# Patient Record
Sex: MALE | Race: WHITE | NOT HISPANIC OR LATINO | Employment: OTHER | URBAN - METROPOLITAN AREA
[De-identification: names, ages, dates, MRNs, and addresses within clinical notes are randomized per-mention and may not be internally consistent; named-entity substitution may affect disease eponyms.]

---

## 2017-01-03 ENCOUNTER — ALLSCRIPTS OFFICE VISIT (OUTPATIENT)
Dept: OTHER | Facility: OTHER | Age: 67
End: 2017-01-03

## 2017-01-06 ENCOUNTER — GENERIC CONVERSION - ENCOUNTER (OUTPATIENT)
Dept: OTHER | Facility: OTHER | Age: 67
End: 2017-01-06

## 2017-01-11 ENCOUNTER — TRANSCRIBE ORDERS (OUTPATIENT)
Dept: LAB | Facility: HOSPITAL | Age: 67
End: 2017-01-11

## 2017-01-12 ENCOUNTER — LAB (OUTPATIENT)
Dept: LAB | Facility: HOSPITAL | Age: 67
End: 2017-01-12
Payer: MEDICARE

## 2017-01-12 DIAGNOSIS — I27.29 OTHER SECONDARY PULMONARY HYPERTENSION (HCC): ICD-10-CM

## 2017-01-12 LAB
BASOPHILS # BLD AUTO: 0.01 THOUSANDS/ΜL (ref 0–0.1)
BASOPHILS NFR BLD AUTO: 0 % (ref 0–1)
EOSINOPHIL # BLD AUTO: 0.1 THOUSAND/ΜL (ref 0–0.61)
EOSINOPHIL NFR BLD AUTO: 2 % (ref 0–6)
ERYTHROCYTE [DISTWIDTH] IN BLOOD BY AUTOMATED COUNT: 13.3 % (ref 11.6–15.1)
HCT VFR BLD AUTO: 38.2 % (ref 36.5–49.3)
HGB BLD-MCNC: 12.2 G/DL (ref 12–17)
LYMPHOCYTES # BLD AUTO: 2.22 THOUSANDS/ΜL (ref 0.6–4.47)
LYMPHOCYTES NFR BLD AUTO: 40 % (ref 14–44)
MCH RBC QN AUTO: 30.7 PG (ref 26.8–34.3)
MCHC RBC AUTO-ENTMCNC: 31.9 G/DL (ref 31.4–37.4)
MCV RBC AUTO: 96 FL (ref 82–98)
MONOCYTES # BLD AUTO: 0.23 THOUSAND/ΜL (ref 0.17–1.22)
MONOCYTES NFR BLD AUTO: 4 % (ref 4–12)
NEUTROPHILS # BLD AUTO: 2.97 THOUSANDS/ΜL (ref 1.85–7.62)
NEUTS SEG NFR BLD AUTO: 54 % (ref 43–75)
NRBC BLD AUTO-RTO: 0 /100 WBCS
NT-PROBNP SERPL-MCNC: 33 PG/ML
PLATELET # BLD AUTO: 209 THOUSANDS/UL (ref 149–390)
PMV BLD AUTO: 9.3 FL (ref 8.9–12.7)
RBC # BLD AUTO: 3.98 MILLION/UL (ref 3.88–5.62)
WBC # BLD AUTO: 5.55 THOUSAND/UL (ref 4.31–10.16)

## 2017-01-12 PROCEDURE — 83880 ASSAY OF NATRIURETIC PEPTIDE: CPT

## 2017-01-12 PROCEDURE — 85025 COMPLETE CBC W/AUTO DIFF WBC: CPT

## 2017-01-12 PROCEDURE — 36415 COLL VENOUS BLD VENIPUNCTURE: CPT

## 2017-03-29 ENCOUNTER — ALLSCRIPTS OFFICE VISIT (OUTPATIENT)
Dept: OTHER | Facility: OTHER | Age: 67
End: 2017-03-29

## 2017-04-10 ENCOUNTER — APPOINTMENT (OUTPATIENT)
Dept: LAB | Facility: HOSPITAL | Age: 67
End: 2017-04-10
Attending: INTERNAL MEDICINE
Payer: MEDICARE

## 2017-04-10 ENCOUNTER — TRANSCRIBE ORDERS (OUTPATIENT)
Dept: LAB | Facility: HOSPITAL | Age: 67
End: 2017-04-10

## 2017-04-10 DIAGNOSIS — D64.9 ANEMIA: ICD-10-CM

## 2017-04-10 LAB
BASOPHILS # BLD AUTO: 0.01 THOUSANDS/ΜL (ref 0–0.1)
BASOPHILS NFR BLD AUTO: 0 % (ref 0–1)
EOSINOPHIL # BLD AUTO: 0.06 THOUSAND/ΜL (ref 0–0.61)
EOSINOPHIL NFR BLD AUTO: 1 % (ref 0–6)
ERYTHROCYTE [DISTWIDTH] IN BLOOD BY AUTOMATED COUNT: 13.5 % (ref 11.6–15.1)
FERRITIN SERPL-MCNC: 42 NG/ML (ref 8–388)
HCT VFR BLD AUTO: 37.9 % (ref 36.5–49.3)
HGB BLD-MCNC: 12.4 G/DL (ref 12–17)
LYMPHOCYTES # BLD AUTO: 1.87 THOUSANDS/ΜL (ref 0.6–4.47)
LYMPHOCYTES NFR BLD AUTO: 36 % (ref 14–44)
MCH RBC QN AUTO: 31.6 PG (ref 26.8–34.3)
MCHC RBC AUTO-ENTMCNC: 32.7 G/DL (ref 31.4–37.4)
MCV RBC AUTO: 96 FL (ref 82–98)
MONOCYTES # BLD AUTO: 0.19 THOUSAND/ΜL (ref 0.17–1.22)
MONOCYTES NFR BLD AUTO: 4 % (ref 4–12)
NEUTROPHILS # BLD AUTO: 3.06 THOUSANDS/ΜL (ref 1.85–7.62)
NEUTS SEG NFR BLD AUTO: 59 % (ref 43–75)
NRBC BLD AUTO-RTO: 0 /100 WBCS
PLATELET # BLD AUTO: 235 THOUSANDS/UL (ref 149–390)
PMV BLD AUTO: 9.8 FL (ref 8.9–12.7)
RBC # BLD AUTO: 3.93 MILLION/UL (ref 3.88–5.62)
WBC # BLD AUTO: 5.2 THOUSAND/UL (ref 4.31–10.16)

## 2017-04-10 PROCEDURE — 82728 ASSAY OF FERRITIN: CPT

## 2017-04-10 PROCEDURE — 85025 COMPLETE CBC W/AUTO DIFF WBC: CPT

## 2017-04-10 PROCEDURE — 36415 COLL VENOUS BLD VENIPUNCTURE: CPT

## 2017-04-14 ENCOUNTER — ALLSCRIPTS OFFICE VISIT (OUTPATIENT)
Dept: OTHER | Facility: OTHER | Age: 67
End: 2017-04-14

## 2017-04-19 ENCOUNTER — ALLSCRIPTS OFFICE VISIT (OUTPATIENT)
Dept: OTHER | Facility: OTHER | Age: 67
End: 2017-04-19

## 2017-05-04 ENCOUNTER — HOSPITAL ENCOUNTER (OUTPATIENT)
Dept: NON INVASIVE DIAGNOSTICS | Facility: HOSPITAL | Age: 67
Discharge: HOME | End: 2017-05-04
Payer: MEDICARE

## 2017-05-04 DIAGNOSIS — I48.0 PAROXYSMAL ATRIAL FIBRILLATION (HCC): ICD-10-CM

## 2017-05-04 PROCEDURE — 93226 XTRNL ECG REC<48 HR SCAN A/R: CPT

## 2017-05-04 PROCEDURE — 93225 XTRNL ECG REC<48 HRS REC: CPT

## 2017-07-10 ENCOUNTER — ALLSCRIPTS OFFICE VISIT (OUTPATIENT)
Dept: OTHER | Facility: OTHER | Age: 67
End: 2017-07-10

## 2017-07-19 ENCOUNTER — ALLSCRIPTS OFFICE VISIT (OUTPATIENT)
Dept: OTHER | Facility: OTHER | Age: 67
End: 2017-07-19

## 2017-07-19 ENCOUNTER — TRANSCRIBE ORDERS (OUTPATIENT)
Dept: ADMINISTRATIVE | Facility: HOSPITAL | Age: 67
End: 2017-07-19

## 2017-07-19 DIAGNOSIS — I49.1 SUPRAVENTRICULAR PREMATURE BEATS: Primary | ICD-10-CM

## 2017-08-27 ENCOUNTER — APPOINTMENT (EMERGENCY)
Dept: RADIOLOGY | Facility: HOSPITAL | Age: 67
DRG: 189 | End: 2017-08-27
Payer: MEDICARE

## 2017-08-27 ENCOUNTER — HOSPITAL ENCOUNTER (INPATIENT)
Facility: HOSPITAL | Age: 67
LOS: 2 days | Discharge: HOME HEALTH | DRG: 189 | End: 2017-08-29
Attending: EMERGENCY MEDICINE | Admitting: INTERNAL MEDICINE
Payer: MEDICARE

## 2017-08-27 DIAGNOSIS — R00.0 SINUS TACHYCARDIA: ICD-10-CM

## 2017-08-27 DIAGNOSIS — J44.1 COPD EXACERBATION (HCC): Primary | ICD-10-CM

## 2017-08-27 DIAGNOSIS — I48.92 ATRIAL FLUTTER (HCC): ICD-10-CM

## 2017-08-27 DIAGNOSIS — J96.10 CHRONIC RESPIRATORY FAILURE (HCC): ICD-10-CM

## 2017-08-27 LAB
ANION GAP SERPL CALCULATED.3IONS-SCNC: 5 MMOL/L (ref 4–13)
APTT PPP: 31 SECONDS (ref 23–35)
ATRIAL RATE: 123 BPM
BASOPHILS # BLD AUTO: 0.01 THOUSANDS/ΜL (ref 0–0.1)
BASOPHILS NFR BLD AUTO: 0 % (ref 0–1)
BUN SERPL-MCNC: 16 MG/DL (ref 5–25)
CALCIUM SERPL-MCNC: 9.4 MG/DL (ref 8.3–10.1)
CHLORIDE SERPL-SCNC: 100 MMOL/L (ref 100–108)
CO2 SERPL-SCNC: 35 MMOL/L (ref 21–32)
CREAT SERPL-MCNC: 0.82 MG/DL (ref 0.6–1.3)
DEPRECATED D DIMER PPP: 353 NG/ML (FEU) (ref 0–424)
EOSINOPHIL # BLD AUTO: 0.06 THOUSAND/ΜL (ref 0–0.61)
EOSINOPHIL NFR BLD AUTO: 1 % (ref 0–6)
ERYTHROCYTE [DISTWIDTH] IN BLOOD BY AUTOMATED COUNT: 13.5 % (ref 11.6–15.1)
GFR SERPL CREATININE-BSD FRML MDRD: 90 ML/MIN/1.73SQ M
GLUCOSE SERPL-MCNC: 154 MG/DL (ref 65–140)
HCT VFR BLD AUTO: 34.7 % (ref 36.5–49.3)
HGB BLD-MCNC: 11.1 G/DL (ref 12–17)
INR PPP: 1.02 (ref 0.86–1.16)
LYMPHOCYTES # BLD AUTO: 1.88 THOUSANDS/ΜL (ref 0.6–4.47)
LYMPHOCYTES NFR BLD AUTO: 27 % (ref 14–44)
MCH RBC QN AUTO: 30.8 PG (ref 26.8–34.3)
MCHC RBC AUTO-ENTMCNC: 32 G/DL (ref 31.4–37.4)
MCV RBC AUTO: 96 FL (ref 82–98)
MONOCYTES # BLD AUTO: 0.31 THOUSAND/ΜL (ref 0.17–1.22)
MONOCYTES NFR BLD AUTO: 4 % (ref 4–12)
NEUTROPHILS # BLD AUTO: 4.77 THOUSANDS/ΜL (ref 1.85–7.62)
NEUTS SEG NFR BLD AUTO: 68 % (ref 43–75)
NRBC BLD AUTO-RTO: 0 /100 WBCS
NT-PROBNP SERPL-MCNC: 70 PG/ML
P AXIS: 269 DEGREES
PLATELET # BLD AUTO: 234 THOUSANDS/UL (ref 149–390)
PMV BLD AUTO: 9.4 FL (ref 8.9–12.7)
POTASSIUM SERPL-SCNC: 3.9 MMOL/L (ref 3.5–5.3)
PR INTERVAL: 224 MS
PROTHROMBIN TIME: 13.4 SECONDS (ref 12.1–14.4)
QRS AXIS: -77 DEGREES
QRSD INTERVAL: 80 MS
QT INTERVAL: 290 MS
QTC INTERVAL: 415 MS
RBC # BLD AUTO: 3.6 MILLION/UL (ref 3.88–5.62)
SODIUM SERPL-SCNC: 140 MMOL/L (ref 136–145)
T WAVE AXIS: 78 DEGREES
TROPONIN I SERPL-MCNC: <0.02 NG/ML
VENTRICULAR RATE: 123 BPM
WBC # BLD AUTO: 7.06 THOUSAND/UL (ref 4.31–10.16)

## 2017-08-27 PROCEDURE — 36415 COLL VENOUS BLD VENIPUNCTURE: CPT | Performed by: EMERGENCY MEDICINE

## 2017-08-27 PROCEDURE — 83880 ASSAY OF NATRIURETIC PEPTIDE: CPT | Performed by: EMERGENCY MEDICINE

## 2017-08-27 PROCEDURE — 71020 HB CHEST X-RAY 2VW FRONTAL&LATL: CPT

## 2017-08-27 PROCEDURE — 85379 FIBRIN DEGRADATION QUANT: CPT | Performed by: EMERGENCY MEDICINE

## 2017-08-27 PROCEDURE — 85730 THROMBOPLASTIN TIME PARTIAL: CPT | Performed by: EMERGENCY MEDICINE

## 2017-08-27 PROCEDURE — 93005 ELECTROCARDIOGRAM TRACING: CPT | Performed by: EMERGENCY MEDICINE

## 2017-08-27 PROCEDURE — 94640 AIRWAY INHALATION TREATMENT: CPT

## 2017-08-27 PROCEDURE — 85610 PROTHROMBIN TIME: CPT | Performed by: EMERGENCY MEDICINE

## 2017-08-27 PROCEDURE — 85025 COMPLETE CBC W/AUTO DIFF WBC: CPT | Performed by: EMERGENCY MEDICINE

## 2017-08-27 PROCEDURE — 84484 ASSAY OF TROPONIN QUANT: CPT | Performed by: EMERGENCY MEDICINE

## 2017-08-27 PROCEDURE — 94760 N-INVAS EAR/PLS OXIMETRY 1: CPT | Performed by: SOCIAL WORKER

## 2017-08-27 PROCEDURE — 93005 ELECTROCARDIOGRAM TRACING: CPT

## 2017-08-27 PROCEDURE — 94760 N-INVAS EAR/PLS OXIMETRY 1: CPT

## 2017-08-27 PROCEDURE — 80048 BASIC METABOLIC PNL TOTAL CA: CPT | Performed by: EMERGENCY MEDICINE

## 2017-08-27 PROCEDURE — 94640 AIRWAY INHALATION TREATMENT: CPT | Performed by: SOCIAL WORKER

## 2017-08-27 PROCEDURE — 99285 EMERGENCY DEPT VISIT HI MDM: CPT

## 2017-08-27 RX ORDER — FERROUS SULFATE 325(65) MG
325 TABLET ORAL DAILY
Status: DISCONTINUED | OUTPATIENT
Start: 2017-08-28 | End: 2017-08-29 | Stop reason: HOSPADM

## 2017-08-27 RX ORDER — ACETAMINOPHEN 325 MG/1
650 TABLET ORAL EVERY 4 HOURS PRN
Status: DISCONTINUED | OUTPATIENT
Start: 2017-08-27 | End: 2017-08-29 | Stop reason: HOSPADM

## 2017-08-27 RX ORDER — SODIUM CHLORIDE FOR INHALATION 0.9 %
3 VIAL, NEBULIZER (ML) INHALATION
Status: DISCONTINUED | OUTPATIENT
Start: 2017-08-27 | End: 2017-08-27

## 2017-08-27 RX ORDER — LEVALBUTEROL 1.25 MG/.5ML
1.25 SOLUTION, CONCENTRATE RESPIRATORY (INHALATION)
Status: DISCONTINUED | OUTPATIENT
Start: 2017-08-27 | End: 2017-08-29 | Stop reason: HOSPADM

## 2017-08-27 RX ORDER — CITALOPRAM 20 MG/1
20 TABLET ORAL DAILY
Status: DISCONTINUED | OUTPATIENT
Start: 2017-08-28 | End: 2017-08-29 | Stop reason: HOSPADM

## 2017-08-27 RX ORDER — CITALOPRAM 20 MG/1
20 TABLET ORAL DAILY
COMMUNITY
End: 2018-02-26 | Stop reason: SDUPTHER

## 2017-08-27 RX ORDER — FUROSEMIDE 20 MG/1
10 TABLET ORAL DAILY
Status: DISCONTINUED | OUTPATIENT
Start: 2017-08-28 | End: 2017-08-29 | Stop reason: HOSPADM

## 2017-08-27 RX ORDER — ALBUTEROL SULFATE 2.5 MG/3ML
5 SOLUTION RESPIRATORY (INHALATION) ONCE
Status: COMPLETED | OUTPATIENT
Start: 2017-08-27 | End: 2017-08-27

## 2017-08-27 RX ORDER — FERROUS SULFATE 325(65) MG
325 TABLET ORAL DAILY
COMMUNITY

## 2017-08-27 RX ORDER — ALBUTEROL SULFATE 2.5 MG/3ML
10 SOLUTION RESPIRATORY (INHALATION) ONCE
Status: COMPLETED | OUTPATIENT
Start: 2017-08-27 | End: 2017-08-27

## 2017-08-27 RX ORDER — METHYLPREDNISOLONE SODIUM SUCCINATE 40 MG/ML
40 INJECTION, POWDER, LYOPHILIZED, FOR SOLUTION INTRAMUSCULAR; INTRAVENOUS EVERY 8 HOURS
Status: DISCONTINUED | OUTPATIENT
Start: 2017-08-27 | End: 2017-08-29 | Stop reason: HOSPADM

## 2017-08-27 RX ORDER — DILTIAZEM HYDROCHLORIDE 180 MG/1
180 CAPSULE, COATED, EXTENDED RELEASE ORAL DAILY
Status: DISCONTINUED | OUTPATIENT
Start: 2017-08-28 | End: 2017-08-29 | Stop reason: HOSPADM

## 2017-08-27 RX ORDER — SODIUM CHLORIDE FOR INHALATION 0.9 %
VIAL, NEBULIZER (ML) INHALATION
Status: COMPLETED
Start: 2017-08-27 | End: 2017-08-27

## 2017-08-27 RX ORDER — DILTIAZEM HYDROCHLORIDE 180 MG/1
180 CAPSULE, COATED, EXTENDED RELEASE ORAL DAILY
COMMUNITY
End: 2018-04-09 | Stop reason: SDUPTHER

## 2017-08-27 RX ORDER — FUROSEMIDE 20 MG/1
10 TABLET ORAL DAILY
Status: ON HOLD | COMMUNITY
End: 2017-11-11

## 2017-08-27 RX ORDER — DILTIAZEM HYDROCHLORIDE 120 MG/1
120 CAPSULE, COATED, EXTENDED RELEASE ORAL ONCE
Status: COMPLETED | OUTPATIENT
Start: 2017-08-27 | End: 2017-08-27

## 2017-08-27 RX ORDER — ONDANSETRON 2 MG/ML
4 INJECTION INTRAMUSCULAR; INTRAVENOUS EVERY 6 HOURS PRN
Status: DISCONTINUED | OUTPATIENT
Start: 2017-08-27 | End: 2017-08-29 | Stop reason: HOSPADM

## 2017-08-27 RX ORDER — LANOLIN ALCOHOL/MO/W.PET/CERES
3 CREAM (GRAM) TOPICAL
Status: DISCONTINUED | OUTPATIENT
Start: 2017-08-27 | End: 2017-08-29 | Stop reason: HOSPADM

## 2017-08-27 RX ADMIN — ISODIUM CHLORIDE 3 ML: 0.03 SOLUTION RESPIRATORY (INHALATION) at 09:01

## 2017-08-27 RX ADMIN — MELATONIN TAB 3 MG 3 MG: 3 TAB at 21:37

## 2017-08-27 RX ADMIN — IPRATROPIUM BROMIDE 0.5 MG: 0.5 SOLUTION RESPIRATORY (INHALATION) at 07:11

## 2017-08-27 RX ADMIN — IPRATROPIUM BROMIDE 0.5 MG: 0.5 SOLUTION RESPIRATORY (INHALATION) at 19:54

## 2017-08-27 RX ADMIN — IPRATROPIUM BROMIDE 1 MG: 0.5 SOLUTION RESPIRATORY (INHALATION) at 09:01

## 2017-08-27 RX ADMIN — METHYLPREDNISOLONE SODIUM SUCCINATE 40 MG: 40 INJECTION, POWDER, FOR SOLUTION INTRAMUSCULAR; INTRAVENOUS at 18:45

## 2017-08-27 RX ADMIN — ALBUTEROL SULFATE 10 MG: 2.5 SOLUTION RESPIRATORY (INHALATION) at 09:01

## 2017-08-27 RX ADMIN — DILTIAZEM HYDROCHLORIDE 120 MG: 120 CAPSULE, EXTENDED RELEASE ORAL at 18:45

## 2017-08-27 RX ADMIN — ALBUTEROL SULFATE 5 MG: 2.5 SOLUTION RESPIRATORY (INHALATION) at 07:11

## 2017-08-27 RX ADMIN — LEVALBUTEROL HYDROCHLORIDE 1.25 MG: 1.25 SOLUTION, CONCENTRATE RESPIRATORY (INHALATION) at 19:54

## 2017-08-28 ENCOUNTER — APPOINTMENT (INPATIENT)
Dept: NON INVASIVE DIAGNOSTICS | Facility: HOSPITAL | Age: 67
DRG: 189 | End: 2017-08-28
Payer: MEDICARE

## 2017-08-28 PROBLEM — R79.89 LOW TSH LEVEL: Status: ACTIVE | Noted: 2017-08-28

## 2017-08-28 LAB
ANION GAP SERPL CALCULATED.3IONS-SCNC: 3 MMOL/L (ref 4–13)
BUN SERPL-MCNC: 18 MG/DL (ref 5–25)
CALCIUM SERPL-MCNC: 9.1 MG/DL (ref 8.3–10.1)
CHLORIDE SERPL-SCNC: 100 MMOL/L (ref 100–108)
CO2 SERPL-SCNC: 37 MMOL/L (ref 21–32)
CREAT SERPL-MCNC: 0.76 MG/DL (ref 0.6–1.3)
GFR SERPL CREATININE-BSD FRML MDRD: 93 ML/MIN/1.73SQ M
GLUCOSE SERPL-MCNC: 166 MG/DL (ref 65–140)
POTASSIUM SERPL-SCNC: 4.2 MMOL/L (ref 3.5–5.3)
SODIUM SERPL-SCNC: 140 MMOL/L (ref 136–145)
T4 FREE SERPL-MCNC: 1.01 NG/DL (ref 0.76–1.46)
TSH SERPL DL<=0.05 MIU/L-ACNC: 0.27 UIU/ML (ref 0.36–3.74)

## 2017-08-28 PROCEDURE — 84443 ASSAY THYROID STIM HORMONE: CPT | Performed by: INTERNAL MEDICINE

## 2017-08-28 PROCEDURE — 84439 ASSAY OF FREE THYROXINE: CPT | Performed by: PHYSICIAN ASSISTANT

## 2017-08-28 PROCEDURE — 94760 N-INVAS EAR/PLS OXIMETRY 1: CPT

## 2017-08-28 PROCEDURE — 94640 AIRWAY INHALATION TREATMENT: CPT

## 2017-08-28 PROCEDURE — 80048 BASIC METABOLIC PNL TOTAL CA: CPT | Performed by: INTERNAL MEDICINE

## 2017-08-28 RX ADMIN — METHYLPREDNISOLONE SODIUM SUCCINATE 40 MG: 40 INJECTION, POWDER, FOR SOLUTION INTRAMUSCULAR; INTRAVENOUS at 08:05

## 2017-08-28 RX ADMIN — Medication 325 MG: at 08:04

## 2017-08-28 RX ADMIN — ENOXAPARIN SODIUM 40 MG: 40 INJECTION SUBCUTANEOUS at 08:04

## 2017-08-28 RX ADMIN — LEVALBUTEROL HYDROCHLORIDE 1.25 MG: 1.25 SOLUTION, CONCENTRATE RESPIRATORY (INHALATION) at 19:14

## 2017-08-28 RX ADMIN — LEVALBUTEROL HYDROCHLORIDE 1.25 MG: 1.25 SOLUTION, CONCENTRATE RESPIRATORY (INHALATION) at 08:16

## 2017-08-28 RX ADMIN — IPRATROPIUM BROMIDE 0.5 MG: 0.5 SOLUTION RESPIRATORY (INHALATION) at 00:51

## 2017-08-28 RX ADMIN — IPRATROPIUM BROMIDE 0.5 MG: 0.5 SOLUTION RESPIRATORY (INHALATION) at 08:16

## 2017-08-28 RX ADMIN — CITALOPRAM HYDROBROMIDE 20 MG: 20 TABLET ORAL at 08:04

## 2017-08-28 RX ADMIN — DILTIAZEM HYDROCHLORIDE 180 MG: 180 CAPSULE, COATED, EXTENDED RELEASE ORAL at 08:05

## 2017-08-28 RX ADMIN — LEVALBUTEROL HYDROCHLORIDE 1.25 MG: 1.25 SOLUTION, CONCENTRATE RESPIRATORY (INHALATION) at 00:51

## 2017-08-28 RX ADMIN — LEVALBUTEROL HYDROCHLORIDE 1.25 MG: 1.25 SOLUTION, CONCENTRATE RESPIRATORY (INHALATION) at 13:32

## 2017-08-28 RX ADMIN — IPRATROPIUM BROMIDE 0.5 MG: 0.5 SOLUTION RESPIRATORY (INHALATION) at 19:14

## 2017-08-28 RX ADMIN — METHYLPREDNISOLONE SODIUM SUCCINATE 40 MG: 40 INJECTION, POWDER, FOR SOLUTION INTRAMUSCULAR; INTRAVENOUS at 17:05

## 2017-08-28 RX ADMIN — FUROSEMIDE 10 MG: 20 TABLET ORAL at 08:04

## 2017-08-28 RX ADMIN — IPRATROPIUM BROMIDE 0.5 MG: 0.5 SOLUTION RESPIRATORY (INHALATION) at 13:32

## 2017-08-28 RX ADMIN — METHYLPREDNISOLONE SODIUM SUCCINATE 40 MG: 40 INJECTION, POWDER, FOR SOLUTION INTRAMUSCULAR; INTRAVENOUS at 00:41

## 2017-08-29 ENCOUNTER — APPOINTMENT (INPATIENT)
Dept: NON INVASIVE DIAGNOSTICS | Facility: HOSPITAL | Age: 67
DRG: 189 | End: 2017-08-29
Payer: MEDICARE

## 2017-08-29 ENCOUNTER — GENERIC CONVERSION - ENCOUNTER (OUTPATIENT)
Dept: OTHER | Facility: OTHER | Age: 67
End: 2017-08-29

## 2017-08-29 VITALS
DIASTOLIC BLOOD PRESSURE: 77 MMHG | WEIGHT: 143.3 LBS | SYSTOLIC BLOOD PRESSURE: 136 MMHG | OXYGEN SATURATION: 97 % | RESPIRATION RATE: 17 BRPM | HEART RATE: 93 BPM | HEIGHT: 67 IN | BODY MASS INDEX: 22.49 KG/M2 | TEMPERATURE: 97.8 F

## 2017-08-29 LAB
ANION GAP SERPL CALCULATED.3IONS-SCNC: 3 MMOL/L (ref 4–13)
BASOPHILS # BLD MANUAL: 0 THOUSAND/UL (ref 0–0.1)
BASOPHILS NFR MAR MANUAL: 0 % (ref 0–1)
BUN SERPL-MCNC: 22 MG/DL (ref 5–25)
CALCIUM SERPL-MCNC: 9.2 MG/DL (ref 8.3–10.1)
CHLORIDE SERPL-SCNC: 98 MMOL/L (ref 100–108)
CO2 SERPL-SCNC: 39 MMOL/L (ref 21–32)
CREAT SERPL-MCNC: 0.84 MG/DL (ref 0.6–1.3)
EOSINOPHIL # BLD MANUAL: 0 THOUSAND/UL (ref 0–0.4)
EOSINOPHIL NFR BLD MANUAL: 0 % (ref 0–6)
ERYTHROCYTE [DISTWIDTH] IN BLOOD BY AUTOMATED COUNT: 14 % (ref 11.6–15.1)
GFR SERPL CREATININE-BSD FRML MDRD: 89 ML/MIN/1.73SQ M
GLUCOSE SERPL-MCNC: 170 MG/DL (ref 65–140)
HCT VFR BLD AUTO: 32.7 % (ref 36.5–49.3)
HGB BLD-MCNC: 10.2 G/DL (ref 12–17)
LYMPHOCYTES # BLD AUTO: 0.26 THOUSAND/UL (ref 0.6–4.47)
LYMPHOCYTES # BLD AUTO: 3 % (ref 14–44)
MCH RBC QN AUTO: 31 PG (ref 26.8–34.3)
MCHC RBC AUTO-ENTMCNC: 31.2 G/DL (ref 31.4–37.4)
MCV RBC AUTO: 99 FL (ref 82–98)
MONOCYTES # BLD AUTO: 0 THOUSAND/UL (ref 0–1.22)
MONOCYTES NFR BLD: 0 % (ref 4–12)
NEUTROPHILS # BLD MANUAL: 8.29 THOUSAND/UL (ref 1.85–7.62)
NEUTS SEG NFR BLD AUTO: 97 % (ref 43–75)
NRBC BLD AUTO-RTO: 0 /100 WBCS
PLATELET # BLD AUTO: 249 THOUSANDS/UL (ref 149–390)
PLATELET BLD QL SMEAR: ADEQUATE
PMV BLD AUTO: 9.9 FL (ref 8.9–12.7)
POIKILOCYTOSIS BLD QL SMEAR: PRESENT
POTASSIUM SERPL-SCNC: 4.6 MMOL/L (ref 3.5–5.3)
RBC # BLD AUTO: 3.29 MILLION/UL (ref 3.88–5.62)
RBC MORPH BLD: PRESENT
SODIUM SERPL-SCNC: 140 MMOL/L (ref 136–145)
WBC # BLD AUTO: 8.55 THOUSAND/UL (ref 4.31–10.16)

## 2017-08-29 PROCEDURE — 80048 BASIC METABOLIC PNL TOTAL CA: CPT | Performed by: PHYSICIAN ASSISTANT

## 2017-08-29 PROCEDURE — 85027 COMPLETE CBC AUTOMATED: CPT | Performed by: PHYSICIAN ASSISTANT

## 2017-08-29 PROCEDURE — 94760 N-INVAS EAR/PLS OXIMETRY 1: CPT

## 2017-08-29 PROCEDURE — 93306 TTE W/DOPPLER COMPLETE: CPT

## 2017-08-29 PROCEDURE — 85007 BL SMEAR W/DIFF WBC COUNT: CPT | Performed by: PHYSICIAN ASSISTANT

## 2017-08-29 PROCEDURE — 94640 AIRWAY INHALATION TREATMENT: CPT

## 2017-08-29 RX ORDER — PREDNISONE 20 MG/1
40 TABLET ORAL DAILY
Qty: 14 TABLET | Refills: 0 | Status: SHIPPED | OUTPATIENT
Start: 2017-08-29 | End: 2017-09-08 | Stop reason: HOSPADM

## 2017-08-29 RX ADMIN — IPRATROPIUM BROMIDE 0.5 MG: 0.5 SOLUTION RESPIRATORY (INHALATION) at 07:31

## 2017-08-29 RX ADMIN — Medication 325 MG: at 08:31

## 2017-08-29 RX ADMIN — FUROSEMIDE 10 MG: 20 TABLET ORAL at 08:31

## 2017-08-29 RX ADMIN — DILTIAZEM HYDROCHLORIDE 180 MG: 180 CAPSULE, COATED, EXTENDED RELEASE ORAL at 08:31

## 2017-08-29 RX ADMIN — LEVALBUTEROL HYDROCHLORIDE 1.25 MG: 1.25 SOLUTION, CONCENTRATE RESPIRATORY (INHALATION) at 07:31

## 2017-08-29 RX ADMIN — METHYLPREDNISOLONE SODIUM SUCCINATE 40 MG: 40 INJECTION, POWDER, FOR SOLUTION INTRAMUSCULAR; INTRAVENOUS at 08:31

## 2017-08-29 RX ADMIN — ENOXAPARIN SODIUM 40 MG: 40 INJECTION SUBCUTANEOUS at 08:31

## 2017-08-29 RX ADMIN — LEVALBUTEROL HYDROCHLORIDE 1.25 MG: 1.25 SOLUTION, CONCENTRATE RESPIRATORY (INHALATION) at 01:23

## 2017-08-29 RX ADMIN — CITALOPRAM HYDROBROMIDE 20 MG: 20 TABLET ORAL at 08:31

## 2017-08-29 RX ADMIN — METHYLPREDNISOLONE SODIUM SUCCINATE 40 MG: 40 INJECTION, POWDER, FOR SOLUTION INTRAMUSCULAR; INTRAVENOUS at 00:40

## 2017-08-29 RX ADMIN — IPRATROPIUM BROMIDE 0.5 MG: 0.5 SOLUTION RESPIRATORY (INHALATION) at 01:23

## 2017-08-30 ENCOUNTER — APPOINTMENT (EMERGENCY)
Dept: RADIOLOGY | Facility: HOSPITAL | Age: 67
DRG: 189 | End: 2017-08-30
Payer: MEDICARE

## 2017-08-30 ENCOUNTER — HOSPITAL ENCOUNTER (INPATIENT)
Facility: HOSPITAL | Age: 67
LOS: 9 days | Discharge: HOME HEALTH | DRG: 189 | End: 2017-09-08
Attending: EMERGENCY MEDICINE | Admitting: INTERNAL MEDICINE
Payer: MEDICARE

## 2017-08-30 ENCOUNTER — GENERIC CONVERSION - ENCOUNTER (OUTPATIENT)
Dept: OTHER | Facility: OTHER | Age: 67
End: 2017-08-30

## 2017-08-30 DIAGNOSIS — J96.21 ACUTE ON CHRONIC RESPIRATORY FAILURE WITH HYPOXIA (HCC): ICD-10-CM

## 2017-08-30 DIAGNOSIS — J44.1 COPD WITH ACUTE EXACERBATION (HCC): Primary | ICD-10-CM

## 2017-08-30 DIAGNOSIS — J44.1 COPD EXACERBATION (HCC): ICD-10-CM

## 2017-08-30 PROBLEM — R09.02 HYPOXIA: Status: ACTIVE | Noted: 2017-08-30

## 2017-08-30 LAB
ALBUMIN SERPL BCP-MCNC: 4.1 G/DL (ref 3.5–5)
ALP SERPL-CCNC: 87 U/L (ref 46–116)
ALT SERPL W P-5'-P-CCNC: 16 U/L (ref 12–78)
ANION GAP SERPL CALCULATED.3IONS-SCNC: 3 MMOL/L (ref 4–13)
AST SERPL W P-5'-P-CCNC: 15 U/L (ref 5–45)
ATRIAL RATE: 129 BPM
ATRIAL RATE: 300 BPM
BASOPHILS # BLD AUTO: 0.01 THOUSANDS/ΜL (ref 0–0.1)
BASOPHILS NFR BLD AUTO: 0 % (ref 0–1)
BILIRUB SERPL-MCNC: 0.28 MG/DL (ref 0.2–1)
BUN SERPL-MCNC: 22 MG/DL (ref 5–25)
CALCIUM SERPL-MCNC: 9.9 MG/DL (ref 8.3–10.1)
CHLORIDE SERPL-SCNC: 95 MMOL/L (ref 100–108)
CO2 SERPL-SCNC: 39 MMOL/L (ref 21–32)
CREAT SERPL-MCNC: 0.86 MG/DL (ref 0.6–1.3)
EOSINOPHIL # BLD AUTO: 0 THOUSAND/ΜL (ref 0–0.61)
EOSINOPHIL NFR BLD AUTO: 0 % (ref 0–6)
ERYTHROCYTE [DISTWIDTH] IN BLOOD BY AUTOMATED COUNT: 13.9 % (ref 11.6–15.1)
GFR SERPL CREATININE-BSD FRML MDRD: 88 ML/MIN/1.73SQ M
GLUCOSE SERPL-MCNC: 135 MG/DL (ref 65–140)
HCT VFR BLD AUTO: 40.2 % (ref 36.5–49.3)
HGB BLD-MCNC: 12.9 G/DL (ref 12–17)
HOLD SPECIMEN: NORMAL
LYMPHOCYTES # BLD AUTO: 0.86 THOUSANDS/ΜL (ref 0.6–4.47)
LYMPHOCYTES NFR BLD AUTO: 8 % (ref 14–44)
MCH RBC QN AUTO: 31.2 PG (ref 26.8–34.3)
MCHC RBC AUTO-ENTMCNC: 32.1 G/DL (ref 31.4–37.4)
MCV RBC AUTO: 97 FL (ref 82–98)
MONOCYTES # BLD AUTO: 0.55 THOUSAND/ΜL (ref 0.17–1.22)
MONOCYTES NFR BLD AUTO: 5 % (ref 4–12)
NEUTROPHILS # BLD AUTO: 9.96 THOUSANDS/ΜL (ref 1.85–7.62)
NEUTS SEG NFR BLD AUTO: 87 % (ref 43–75)
NRBC BLD AUTO-RTO: 0 /100 WBCS
P AXIS: 81 DEGREES
PLATELET # BLD AUTO: 294 THOUSANDS/UL (ref 149–390)
PMV BLD AUTO: 9.8 FL (ref 8.9–12.7)
POTASSIUM SERPL-SCNC: 4.2 MMOL/L (ref 3.5–5.3)
PR INTERVAL: 158 MS
PROT SERPL-MCNC: 7.6 G/DL (ref 6.4–8.2)
QRS AXIS: 26 DEGREES
QRS AXIS: 66 DEGREES
QRSD INTERVAL: 76 MS
QRSD INTERVAL: 82 MS
QT INTERVAL: 302 MS
QT INTERVAL: 338 MS
QTC INTERVAL: 412 MS
QTC INTERVAL: 436 MS
RBC # BLD AUTO: 4.14 MILLION/UL (ref 3.88–5.62)
SODIUM SERPL-SCNC: 137 MMOL/L (ref 136–145)
SPECIMEN SOURCE: NORMAL
T WAVE AXIS: 82 DEGREES
T WAVE AXIS: 84 DEGREES
TROPONIN I BLD-MCNC: 0.02 NG/ML (ref 0–0.08)
VENTRICULAR RATE: 100 BPM
VENTRICULAR RATE: 112 BPM
WBC # BLD AUTO: 11.44 THOUSAND/UL (ref 4.31–10.16)

## 2017-08-30 PROCEDURE — 94660 CPAP INITIATION&MGMT: CPT

## 2017-08-30 PROCEDURE — 94640 AIRWAY INHALATION TREATMENT: CPT

## 2017-08-30 PROCEDURE — 84484 ASSAY OF TROPONIN QUANT: CPT

## 2017-08-30 PROCEDURE — 85025 COMPLETE CBC W/AUTO DIFF WBC: CPT | Performed by: EMERGENCY MEDICINE

## 2017-08-30 PROCEDURE — 94760 N-INVAS EAR/PLS OXIMETRY 1: CPT

## 2017-08-30 PROCEDURE — 36415 COLL VENOUS BLD VENIPUNCTURE: CPT

## 2017-08-30 PROCEDURE — 71010 HB CHEST X-RAY 1 VIEW FRONTAL (PORTABLE): CPT

## 2017-08-30 PROCEDURE — 96361 HYDRATE IV INFUSION ADD-ON: CPT

## 2017-08-30 PROCEDURE — 80053 COMPREHEN METABOLIC PANEL: CPT | Performed by: EMERGENCY MEDICINE

## 2017-08-30 PROCEDURE — 96374 THER/PROPH/DIAG INJ IV PUSH: CPT

## 2017-08-30 PROCEDURE — 93005 ELECTROCARDIOGRAM TRACING: CPT | Performed by: EMERGENCY MEDICINE

## 2017-08-30 PROCEDURE — 99285 EMERGENCY DEPT VISIT HI MDM: CPT

## 2017-08-30 PROCEDURE — 71275 CT ANGIOGRAPHY CHEST: CPT

## 2017-08-30 RX ORDER — ALBUTEROL SULFATE 90 UG/1
1 AEROSOL, METERED RESPIRATORY (INHALATION) EVERY 4 HOURS PRN
Status: DISCONTINUED | OUTPATIENT
Start: 2017-08-30 | End: 2017-09-08 | Stop reason: HOSPADM

## 2017-08-30 RX ORDER — SODIUM CHLORIDE FOR INHALATION 0.9 %
3 VIAL, NEBULIZER (ML) INHALATION EVERY 6 HOURS PRN
Status: DISCONTINUED | OUTPATIENT
Start: 2017-08-30 | End: 2017-08-31

## 2017-08-30 RX ORDER — LEVALBUTEROL 1.25 MG/.5ML
1.25 SOLUTION, CONCENTRATE RESPIRATORY (INHALATION) EVERY 6 HOURS PRN
Status: DISCONTINUED | OUTPATIENT
Start: 2017-08-30 | End: 2017-08-31

## 2017-08-30 RX ORDER — FUROSEMIDE 20 MG/1
10 TABLET ORAL DAILY
Status: DISCONTINUED | OUTPATIENT
Start: 2017-08-31 | End: 2017-09-08 | Stop reason: HOSPADM

## 2017-08-30 RX ORDER — CITALOPRAM 20 MG/1
20 TABLET ORAL DAILY
Status: DISCONTINUED | OUTPATIENT
Start: 2017-08-31 | End: 2017-09-08 | Stop reason: HOSPADM

## 2017-08-30 RX ORDER — DILTIAZEM HYDROCHLORIDE 180 MG/1
180 CAPSULE, COATED, EXTENDED RELEASE ORAL DAILY
Status: DISCONTINUED | OUTPATIENT
Start: 2017-08-31 | End: 2017-09-08 | Stop reason: HOSPADM

## 2017-08-30 RX ORDER — IPRATROPIUM BROMIDE AND ALBUTEROL SULFATE 2.5; .5 MG/3ML; MG/3ML
3 SOLUTION RESPIRATORY (INHALATION)
Status: DISCONTINUED | OUTPATIENT
Start: 2017-08-30 | End: 2017-08-30

## 2017-08-30 RX ORDER — SODIUM CHLORIDE FOR INHALATION 0.9 %
3 VIAL, NEBULIZER (ML) INHALATION
Status: DISCONTINUED | OUTPATIENT
Start: 2017-08-30 | End: 2017-08-31

## 2017-08-30 RX ORDER — FERROUS SULFATE 325(65) MG
325 TABLET ORAL DAILY
Status: DISCONTINUED | OUTPATIENT
Start: 2017-08-31 | End: 2017-09-08 | Stop reason: HOSPADM

## 2017-08-30 RX ORDER — METHYLPREDNISOLONE SODIUM SUCCINATE 125 MG/2ML
125 INJECTION, POWDER, LYOPHILIZED, FOR SOLUTION INTRAMUSCULAR; INTRAVENOUS ONCE
Status: COMPLETED | OUTPATIENT
Start: 2017-08-30 | End: 2017-08-30

## 2017-08-30 RX ORDER — LEVALBUTEROL 1.25 MG/.5ML
1.25 SOLUTION, CONCENTRATE RESPIRATORY (INHALATION)
Status: DISCONTINUED | OUTPATIENT
Start: 2017-08-30 | End: 2017-08-31

## 2017-08-30 RX ORDER — SODIUM CHLORIDE FOR INHALATION 0.9 %
3 VIAL, NEBULIZER (ML) INHALATION
Status: DISCONTINUED | OUTPATIENT
Start: 2017-08-30 | End: 2017-08-30 | Stop reason: SDUPTHER

## 2017-08-30 RX ORDER — LEVALBUTEROL 1.25 MG/.5ML
1.25 SOLUTION, CONCENTRATE RESPIRATORY (INHALATION)
Status: DISCONTINUED | OUTPATIENT
Start: 2017-08-30 | End: 2017-08-30 | Stop reason: SDUPTHER

## 2017-08-30 RX ORDER — METHYLPREDNISOLONE SODIUM SUCCINATE 40 MG/ML
40 INJECTION, POWDER, LYOPHILIZED, FOR SOLUTION INTRAMUSCULAR; INTRAVENOUS EVERY 8 HOURS SCHEDULED
Status: DISCONTINUED | OUTPATIENT
Start: 2017-08-30 | End: 2017-09-02

## 2017-08-30 RX ADMIN — LEVALBUTEROL HYDROCHLORIDE 1.25 MG: 1.25 SOLUTION, CONCENTRATE RESPIRATORY (INHALATION) at 19:28

## 2017-08-30 RX ADMIN — IOHEXOL 70 ML: 350 INJECTION, SOLUTION INTRAVENOUS at 15:40

## 2017-08-30 RX ADMIN — IPRATROPIUM BROMIDE AND ALBUTEROL SULFATE 3 ML: .5; 3 SOLUTION RESPIRATORY (INHALATION) at 13:47

## 2017-08-30 RX ADMIN — IPRATROPIUM BROMIDE AND ALBUTEROL SULFATE 3 ML: .5; 3 SOLUTION RESPIRATORY (INHALATION) at 10:11

## 2017-08-30 RX ADMIN — METHYLPREDNISOLONE SODIUM SUCCINATE 125 MG: 125 INJECTION, POWDER, FOR SOLUTION INTRAMUSCULAR; INTRAVENOUS at 10:10

## 2017-08-30 RX ADMIN — ISODIUM CHLORIDE 3 ML: 0.03 SOLUTION RESPIRATORY (INHALATION) at 19:28

## 2017-08-30 RX ADMIN — METHYLPREDNISOLONE SODIUM SUCCINATE 40 MG: 40 INJECTION, POWDER, FOR SOLUTION INTRAMUSCULAR; INTRAVENOUS at 22:57

## 2017-08-30 RX ADMIN — SODIUM CHLORIDE 1000 ML: 0.9 INJECTION, SOLUTION INTRAVENOUS at 10:17

## 2017-08-31 PROBLEM — J44.9 COPD, SEVERE (HCC): Status: ACTIVE | Noted: 2017-08-31

## 2017-08-31 PROBLEM — I47.1 PAROXYSMAL ATRIAL TACHYCARDIA (HCC): Status: ACTIVE | Noted: 2017-08-31

## 2017-08-31 PROCEDURE — 94640 AIRWAY INHALATION TREATMENT: CPT | Performed by: SOCIAL WORKER

## 2017-08-31 PROCEDURE — 94760 N-INVAS EAR/PLS OXIMETRY 1: CPT | Performed by: SOCIAL WORKER

## 2017-08-31 PROCEDURE — 94640 AIRWAY INHALATION TREATMENT: CPT

## 2017-08-31 PROCEDURE — 94760 N-INVAS EAR/PLS OXIMETRY 1: CPT

## 2017-08-31 RX ORDER — ZOLPIDEM TARTRATE 5 MG/1
5 TABLET ORAL
Status: DISCONTINUED | OUTPATIENT
Start: 2017-08-31 | End: 2017-09-02

## 2017-08-31 RX ORDER — SODIUM CHLORIDE FOR INHALATION 0.9 %
3 VIAL, NEBULIZER (ML) INHALATION
Status: DISCONTINUED | OUTPATIENT
Start: 2017-08-31 | End: 2017-09-07

## 2017-08-31 RX ORDER — LEVALBUTEROL 1.25 MG/.5ML
1.25 SOLUTION, CONCENTRATE RESPIRATORY (INHALATION)
Status: DISCONTINUED | OUTPATIENT
Start: 2017-08-31 | End: 2017-09-06

## 2017-08-31 RX ORDER — ASPIRIN 81 MG/1
81 TABLET, CHEWABLE ORAL DAILY
Status: DISCONTINUED | OUTPATIENT
Start: 2017-08-31 | End: 2017-09-08 | Stop reason: HOSPADM

## 2017-08-31 RX ADMIN — METHYLPREDNISOLONE SODIUM SUCCINATE 40 MG: 40 INJECTION, POWDER, FOR SOLUTION INTRAMUSCULAR; INTRAVENOUS at 15:14

## 2017-08-31 RX ADMIN — TIOTROPIUM BROMIDE 18 MCG: 18 CAPSULE ORAL; RESPIRATORY (INHALATION) at 08:50

## 2017-08-31 RX ADMIN — METHYLPREDNISOLONE SODIUM SUCCINATE 40 MG: 40 INJECTION, POWDER, FOR SOLUTION INTRAMUSCULAR; INTRAVENOUS at 21:22

## 2017-08-31 RX ADMIN — DILTIAZEM HYDROCHLORIDE 180 MG: 180 CAPSULE, EXTENDED RELEASE ORAL at 08:50

## 2017-08-31 RX ADMIN — ISODIUM CHLORIDE 3 ML: 0.03 SOLUTION RESPIRATORY (INHALATION) at 13:45

## 2017-08-31 RX ADMIN — CITALOPRAM HYDROBROMIDE 20 MG: 20 TABLET ORAL at 08:49

## 2017-08-31 RX ADMIN — ISODIUM CHLORIDE 3 ML: 0.03 SOLUTION RESPIRATORY (INHALATION) at 07:16

## 2017-08-31 RX ADMIN — LEVALBUTEROL HYDROCHLORIDE 1.25 MG: 1.25 SOLUTION, CONCENTRATE RESPIRATORY (INHALATION) at 19:49

## 2017-08-31 RX ADMIN — LEVALBUTEROL HYDROCHLORIDE 1.25 MG: 1.25 SOLUTION, CONCENTRATE RESPIRATORY (INHALATION) at 13:45

## 2017-08-31 RX ADMIN — Medication 325 MG: at 08:50

## 2017-08-31 RX ADMIN — ASPIRIN 81 MG 81 MG: 81 TABLET ORAL at 16:36

## 2017-08-31 RX ADMIN — ENOXAPARIN SODIUM 40 MG: 40 INJECTION SUBCUTANEOUS at 08:50

## 2017-08-31 RX ADMIN — METHYLPREDNISOLONE SODIUM SUCCINATE 40 MG: 40 INJECTION, POWDER, FOR SOLUTION INTRAMUSCULAR; INTRAVENOUS at 05:39

## 2017-08-31 RX ADMIN — ISODIUM CHLORIDE 3 ML: 0.03 SOLUTION RESPIRATORY (INHALATION) at 19:49

## 2017-08-31 RX ADMIN — LEVALBUTEROL HYDROCHLORIDE 1.25 MG: 1.25 SOLUTION, CONCENTRATE RESPIRATORY (INHALATION) at 07:16

## 2017-08-31 RX ADMIN — ZOLPIDEM TARTRATE 5 MG: 5 TABLET, FILM COATED ORAL at 22:34

## 2017-08-31 RX ADMIN — FUROSEMIDE 10 MG: 20 TABLET ORAL at 08:50

## 2017-09-01 LAB
ANION GAP SERPL CALCULATED.3IONS-SCNC: 2 MMOL/L (ref 4–13)
ARTERIAL PATENCY WRIST A: YES
BASE EXCESS BLDA CALC-SCNC: 11.4 MMOL/L
BUN SERPL-MCNC: 22 MG/DL (ref 5–25)
CALCIUM SERPL-MCNC: 9.3 MG/DL (ref 8.3–10.1)
CHLORIDE SERPL-SCNC: 96 MMOL/L (ref 100–108)
CO2 SERPL-SCNC: 41 MMOL/L (ref 21–32)
CREAT SERPL-MCNC: 0.78 MG/DL (ref 0.6–1.3)
ERYTHROCYTE [DISTWIDTH] IN BLOOD BY AUTOMATED COUNT: 13.9 % (ref 11.6–15.1)
GFR SERPL CREATININE-BSD FRML MDRD: 92 ML/MIN/1.73SQ M
GLUCOSE SERPL-MCNC: 161 MG/DL (ref 65–140)
HCO3 BLDA-SCNC: 39.8 MMOL/L (ref 22–28)
HCT VFR BLD AUTO: 37 % (ref 36.5–49.3)
HGB BLD-MCNC: 11.3 G/DL (ref 12–17)
MCH RBC QN AUTO: 29.8 PG (ref 26.8–34.3)
MCHC RBC AUTO-ENTMCNC: 30.5 G/DL (ref 31.4–37.4)
MCV RBC AUTO: 98 FL (ref 82–98)
NON VENT ROOM AIR: 21 %
O2 CT BLDA-SCNC: 16.9 ML/DL (ref 16–23)
OXYHGB MFR BLDA: 97.9 % (ref 94–97)
PCO2 BLDA: 74 MM HG (ref 36–44)
PH BLDA: 7.35 [PH] (ref 7.35–7.45)
PLATELET # BLD AUTO: 233 THOUSANDS/UL (ref 149–390)
PLATELET # BLD AUTO: 258 THOUSANDS/UL (ref 149–390)
PMV BLD AUTO: 9.5 FL (ref 8.9–12.7)
PMV BLD AUTO: 9.9 FL (ref 8.9–12.7)
PO2 BLDA: 130.2 MM HG (ref 75–129)
POTASSIUM SERPL-SCNC: 4.4 MMOL/L (ref 3.5–5.3)
RBC # BLD AUTO: 3.79 MILLION/UL (ref 3.88–5.62)
SODIUM SERPL-SCNC: 139 MMOL/L (ref 136–145)
SPECIMEN SOURCE: ABNORMAL
WBC # BLD AUTO: 6.14 THOUSAND/UL (ref 4.31–10.16)

## 2017-09-01 PROCEDURE — 82805 BLOOD GASES W/O2 SATURATION: CPT | Performed by: INTERNAL MEDICINE

## 2017-09-01 PROCEDURE — 94640 AIRWAY INHALATION TREATMENT: CPT

## 2017-09-01 PROCEDURE — 85049 AUTOMATED PLATELET COUNT: CPT | Performed by: INTERNAL MEDICINE

## 2017-09-01 PROCEDURE — 85027 COMPLETE CBC AUTOMATED: CPT | Performed by: PHYSICIAN ASSISTANT

## 2017-09-01 PROCEDURE — 80048 BASIC METABOLIC PNL TOTAL CA: CPT | Performed by: PHYSICIAN ASSISTANT

## 2017-09-01 PROCEDURE — 94660 CPAP INITIATION&MGMT: CPT

## 2017-09-01 PROCEDURE — 94760 N-INVAS EAR/PLS OXIMETRY 1: CPT

## 2017-09-01 RX ADMIN — FUROSEMIDE 10 MG: 20 TABLET ORAL at 08:57

## 2017-09-01 RX ADMIN — ISODIUM CHLORIDE 3 ML: 0.03 SOLUTION RESPIRATORY (INHALATION) at 13:38

## 2017-09-01 RX ADMIN — LEVALBUTEROL HYDROCHLORIDE 1.25 MG: 1.25 SOLUTION, CONCENTRATE RESPIRATORY (INHALATION) at 20:28

## 2017-09-01 RX ADMIN — ZOLPIDEM TARTRATE 5 MG: 5 TABLET, FILM COATED ORAL at 22:14

## 2017-09-01 RX ADMIN — METHYLPREDNISOLONE SODIUM SUCCINATE 40 MG: 40 INJECTION, POWDER, FOR SOLUTION INTRAMUSCULAR; INTRAVENOUS at 22:15

## 2017-09-01 RX ADMIN — ISODIUM CHLORIDE 3 ML: 0.03 SOLUTION RESPIRATORY (INHALATION) at 07:25

## 2017-09-01 RX ADMIN — CITALOPRAM HYDROBROMIDE 20 MG: 20 TABLET ORAL at 08:57

## 2017-09-01 RX ADMIN — LEVALBUTEROL HYDROCHLORIDE 1.25 MG: 1.25 SOLUTION, CONCENTRATE RESPIRATORY (INHALATION) at 13:38

## 2017-09-01 RX ADMIN — METHYLPREDNISOLONE SODIUM SUCCINATE 40 MG: 40 INJECTION, POWDER, FOR SOLUTION INTRAMUSCULAR; INTRAVENOUS at 14:14

## 2017-09-01 RX ADMIN — ENOXAPARIN SODIUM 40 MG: 40 INJECTION SUBCUTANEOUS at 08:58

## 2017-09-01 RX ADMIN — ISODIUM CHLORIDE 3 ML: 0.03 SOLUTION RESPIRATORY (INHALATION) at 20:28

## 2017-09-01 RX ADMIN — Medication 325 MG: at 08:57

## 2017-09-01 RX ADMIN — DILTIAZEM HYDROCHLORIDE 180 MG: 180 CAPSULE, EXTENDED RELEASE ORAL at 08:57

## 2017-09-01 RX ADMIN — LEVALBUTEROL HYDROCHLORIDE 1.25 MG: 1.25 SOLUTION, CONCENTRATE RESPIRATORY (INHALATION) at 07:25

## 2017-09-01 RX ADMIN — ASPIRIN 81 MG 81 MG: 81 TABLET ORAL at 08:57

## 2017-09-01 RX ADMIN — TIOTROPIUM BROMIDE 18 MCG: 18 CAPSULE ORAL; RESPIRATORY (INHALATION) at 08:57

## 2017-09-01 RX ADMIN — METHYLPREDNISOLONE SODIUM SUCCINATE 40 MG: 40 INJECTION, POWDER, FOR SOLUTION INTRAMUSCULAR; INTRAVENOUS at 06:16

## 2017-09-02 LAB
BASE EXCESS BLDA CALC-SCNC: 13.8 MMOL/L
HCO3 BLDA-SCNC: 42 MMOL/L (ref 22–28)
NASAL CANNULA: 4
O2 CT BLDA-SCNC: 16.2 ML/DL (ref 16–23)
OXYHGB MFR BLDA: 98.2 % (ref 94–97)
PCO2 BLDA: 73.9 MM HG (ref 36–44)
PH BLDA: 7.37 [PH] (ref 7.35–7.45)
PO2 BLDA: 160.5 MM HG (ref 75–129)
SPECIMEN SOURCE: ABNORMAL

## 2017-09-02 PROCEDURE — 36600 WITHDRAWAL OF ARTERIAL BLOOD: CPT

## 2017-09-02 PROCEDURE — 94640 AIRWAY INHALATION TREATMENT: CPT

## 2017-09-02 PROCEDURE — 82805 BLOOD GASES W/O2 SATURATION: CPT | Performed by: INTERNAL MEDICINE

## 2017-09-02 PROCEDURE — 94660 CPAP INITIATION&MGMT: CPT

## 2017-09-02 PROCEDURE — 94760 N-INVAS EAR/PLS OXIMETRY 1: CPT

## 2017-09-02 RX ORDER — ZOLPIDEM TARTRATE 5 MG/1
10 TABLET ORAL
Status: DISCONTINUED | OUTPATIENT
Start: 2017-09-02 | End: 2017-09-08 | Stop reason: HOSPADM

## 2017-09-02 RX ORDER — METHYLPREDNISOLONE SODIUM SUCCINATE 40 MG/ML
40 INJECTION, POWDER, LYOPHILIZED, FOR SOLUTION INTRAMUSCULAR; INTRAVENOUS EVERY 12 HOURS SCHEDULED
Status: DISCONTINUED | OUTPATIENT
Start: 2017-09-03 | End: 2017-09-06

## 2017-09-02 RX ADMIN — ISODIUM CHLORIDE 3 ML: 0.03 SOLUTION RESPIRATORY (INHALATION) at 07:53

## 2017-09-02 RX ADMIN — Medication 325 MG: at 08:25

## 2017-09-02 RX ADMIN — ENOXAPARIN SODIUM 40 MG: 40 INJECTION SUBCUTANEOUS at 08:26

## 2017-09-02 RX ADMIN — LEVALBUTEROL HYDROCHLORIDE 1.25 MG: 1.25 SOLUTION, CONCENTRATE RESPIRATORY (INHALATION) at 01:51

## 2017-09-02 RX ADMIN — TIOTROPIUM BROMIDE 18 MCG: 18 CAPSULE ORAL; RESPIRATORY (INHALATION) at 08:26

## 2017-09-02 RX ADMIN — CITALOPRAM HYDROBROMIDE 20 MG: 20 TABLET ORAL at 08:26

## 2017-09-02 RX ADMIN — ISODIUM CHLORIDE 3 ML: 0.03 SOLUTION RESPIRATORY (INHALATION) at 14:25

## 2017-09-02 RX ADMIN — ISODIUM CHLORIDE 3 ML: 0.03 SOLUTION RESPIRATORY (INHALATION) at 19:35

## 2017-09-02 RX ADMIN — LEVALBUTEROL HYDROCHLORIDE 1.25 MG: 1.25 SOLUTION, CONCENTRATE RESPIRATORY (INHALATION) at 07:53

## 2017-09-02 RX ADMIN — METHYLPREDNISOLONE SODIUM SUCCINATE 40 MG: 40 INJECTION, POWDER, FOR SOLUTION INTRAMUSCULAR; INTRAVENOUS at 13:44

## 2017-09-02 RX ADMIN — DILTIAZEM HYDROCHLORIDE 180 MG: 180 CAPSULE, EXTENDED RELEASE ORAL at 08:25

## 2017-09-02 RX ADMIN — FUROSEMIDE 10 MG: 20 TABLET ORAL at 08:25

## 2017-09-02 RX ADMIN — LEVALBUTEROL HYDROCHLORIDE 1.25 MG: 1.25 SOLUTION, CONCENTRATE RESPIRATORY (INHALATION) at 19:35

## 2017-09-02 RX ADMIN — ISODIUM CHLORIDE 3 ML: 0.03 SOLUTION RESPIRATORY (INHALATION) at 01:51

## 2017-09-02 RX ADMIN — METHYLPREDNISOLONE SODIUM SUCCINATE 40 MG: 40 INJECTION, POWDER, FOR SOLUTION INTRAMUSCULAR; INTRAVENOUS at 06:23

## 2017-09-02 RX ADMIN — LEVALBUTEROL HYDROCHLORIDE 1.25 MG: 1.25 SOLUTION, CONCENTRATE RESPIRATORY (INHALATION) at 14:25

## 2017-09-02 RX ADMIN — ZOLPIDEM TARTRATE 10 MG: 5 TABLET, FILM COATED ORAL at 22:16

## 2017-09-02 RX ADMIN — ASPIRIN 81 MG 81 MG: 81 TABLET ORAL at 08:25

## 2017-09-03 PROCEDURE — 94760 N-INVAS EAR/PLS OXIMETRY 1: CPT

## 2017-09-03 PROCEDURE — 94660 CPAP INITIATION&MGMT: CPT

## 2017-09-03 PROCEDURE — 94640 AIRWAY INHALATION TREATMENT: CPT

## 2017-09-03 RX ADMIN — LEVALBUTEROL HYDROCHLORIDE 1.25 MG: 1.25 SOLUTION, CONCENTRATE RESPIRATORY (INHALATION) at 08:15

## 2017-09-03 RX ADMIN — ISODIUM CHLORIDE 3 ML: 0.03 SOLUTION RESPIRATORY (INHALATION) at 08:15

## 2017-09-03 RX ADMIN — ISODIUM CHLORIDE 3 ML: 0.03 SOLUTION RESPIRATORY (INHALATION) at 13:39

## 2017-09-03 RX ADMIN — ISODIUM CHLORIDE 3 ML: 0.03 SOLUTION RESPIRATORY (INHALATION) at 00:50

## 2017-09-03 RX ADMIN — LEVALBUTEROL HYDROCHLORIDE 1.25 MG: 1.25 SOLUTION, CONCENTRATE RESPIRATORY (INHALATION) at 18:50

## 2017-09-03 RX ADMIN — ZOLPIDEM TARTRATE 10 MG: 5 TABLET, FILM COATED ORAL at 22:11

## 2017-09-03 RX ADMIN — CITALOPRAM HYDROBROMIDE 20 MG: 20 TABLET ORAL at 09:53

## 2017-09-03 RX ADMIN — FUROSEMIDE 10 MG: 20 TABLET ORAL at 09:53

## 2017-09-03 RX ADMIN — ISODIUM CHLORIDE 3 ML: 0.03 SOLUTION RESPIRATORY (INHALATION) at 18:50

## 2017-09-03 RX ADMIN — ASPIRIN 81 MG 81 MG: 81 TABLET ORAL at 09:53

## 2017-09-03 RX ADMIN — TIOTROPIUM BROMIDE 18 MCG: 18 CAPSULE ORAL; RESPIRATORY (INHALATION) at 09:53

## 2017-09-03 RX ADMIN — LEVALBUTEROL HYDROCHLORIDE 1.25 MG: 1.25 SOLUTION, CONCENTRATE RESPIRATORY (INHALATION) at 13:39

## 2017-09-03 RX ADMIN — METHYLPREDNISOLONE SODIUM SUCCINATE 40 MG: 40 INJECTION, POWDER, FOR SOLUTION INTRAMUSCULAR; INTRAVENOUS at 09:53

## 2017-09-03 RX ADMIN — DILTIAZEM HYDROCHLORIDE 180 MG: 180 CAPSULE, EXTENDED RELEASE ORAL at 09:53

## 2017-09-03 RX ADMIN — ENOXAPARIN SODIUM 40 MG: 40 INJECTION SUBCUTANEOUS at 09:53

## 2017-09-03 RX ADMIN — METHYLPREDNISOLONE SODIUM SUCCINATE 40 MG: 40 INJECTION, POWDER, FOR SOLUTION INTRAMUSCULAR; INTRAVENOUS at 20:47

## 2017-09-03 RX ADMIN — Medication 325 MG: at 09:53

## 2017-09-03 RX ADMIN — LEVALBUTEROL HYDROCHLORIDE 1.25 MG: 1.25 SOLUTION, CONCENTRATE RESPIRATORY (INHALATION) at 00:50

## 2017-09-04 LAB
ANION GAP SERPL CALCULATED.3IONS-SCNC: 2 MMOL/L (ref 4–13)
ARTERIAL PATENCY WRIST A: YES
BASE EXCESS BLDA CALC-SCNC: 13.2 MMOL/L
BUN SERPL-MCNC: 23 MG/DL (ref 5–25)
CALCIUM SERPL-MCNC: 8.5 MG/DL (ref 8.3–10.1)
CHLORIDE SERPL-SCNC: 97 MMOL/L (ref 100–108)
CO2 SERPL-SCNC: 41 MMOL/L (ref 21–32)
CREAT SERPL-MCNC: 0.84 MG/DL (ref 0.6–1.3)
GFR SERPL CREATININE-BSD FRML MDRD: 89 ML/MIN/1.73SQ M
GLUCOSE SERPL-MCNC: 190 MG/DL (ref 65–140)
HCO3 BLDA-SCNC: 41.5 MMOL/L (ref 22–28)
NASAL CANNULA: 4
O2 CT BLDA-SCNC: 16.7 ML/DL (ref 16–23)
OXYHGB MFR BLDA: 97.8 % (ref 94–97)
PCO2 BLDA: 74.5 MM HG (ref 36–44)
PH BLDA: 7.36 [PH] (ref 7.35–7.45)
PO2 BLDA: 117.6 MM HG (ref 75–129)
POTASSIUM SERPL-SCNC: 4.6 MMOL/L (ref 3.5–5.3)
SODIUM SERPL-SCNC: 140 MMOL/L (ref 136–145)
SPECIMEN SOURCE: ABNORMAL

## 2017-09-04 PROCEDURE — 94760 N-INVAS EAR/PLS OXIMETRY 1: CPT

## 2017-09-04 PROCEDURE — 94640 AIRWAY INHALATION TREATMENT: CPT

## 2017-09-04 PROCEDURE — 94660 CPAP INITIATION&MGMT: CPT

## 2017-09-04 PROCEDURE — 82805 BLOOD GASES W/O2 SATURATION: CPT | Performed by: NURSE PRACTITIONER

## 2017-09-04 PROCEDURE — 84481 FREE ASSAY (FT-3): CPT | Performed by: INTERNAL MEDICINE

## 2017-09-04 PROCEDURE — 80048 BASIC METABOLIC PNL TOTAL CA: CPT | Performed by: NURSE PRACTITIONER

## 2017-09-04 PROCEDURE — 36600 WITHDRAWAL OF ARTERIAL BLOOD: CPT

## 2017-09-04 RX ADMIN — ZOLPIDEM TARTRATE 10 MG: 5 TABLET, FILM COATED ORAL at 22:38

## 2017-09-04 RX ADMIN — ASPIRIN 81 MG 81 MG: 81 TABLET ORAL at 09:07

## 2017-09-04 RX ADMIN — LEVALBUTEROL HYDROCHLORIDE 1.25 MG: 1.25 SOLUTION, CONCENTRATE RESPIRATORY (INHALATION) at 00:32

## 2017-09-04 RX ADMIN — ISODIUM CHLORIDE 3 ML: 0.03 SOLUTION RESPIRATORY (INHALATION) at 00:32

## 2017-09-04 RX ADMIN — ENOXAPARIN SODIUM 40 MG: 40 INJECTION SUBCUTANEOUS at 09:06

## 2017-09-04 RX ADMIN — LEVALBUTEROL HYDROCHLORIDE 1.25 MG: 1.25 SOLUTION, CONCENTRATE RESPIRATORY (INHALATION) at 19:27

## 2017-09-04 RX ADMIN — LEVALBUTEROL HYDROCHLORIDE 1.25 MG: 1.25 SOLUTION, CONCENTRATE RESPIRATORY (INHALATION) at 13:24

## 2017-09-04 RX ADMIN — LEVALBUTEROL HYDROCHLORIDE 1.25 MG: 1.25 SOLUTION, CONCENTRATE RESPIRATORY (INHALATION) at 07:12

## 2017-09-04 RX ADMIN — DILTIAZEM HYDROCHLORIDE 180 MG: 180 CAPSULE, EXTENDED RELEASE ORAL at 09:06

## 2017-09-04 RX ADMIN — TIOTROPIUM BROMIDE 18 MCG: 18 CAPSULE ORAL; RESPIRATORY (INHALATION) at 09:06

## 2017-09-04 RX ADMIN — FUROSEMIDE 10 MG: 20 TABLET ORAL at 09:07

## 2017-09-04 RX ADMIN — METHYLPREDNISOLONE SODIUM SUCCINATE 40 MG: 40 INJECTION, POWDER, FOR SOLUTION INTRAMUSCULAR; INTRAVENOUS at 20:20

## 2017-09-04 RX ADMIN — CITALOPRAM HYDROBROMIDE 20 MG: 20 TABLET ORAL at 09:07

## 2017-09-04 RX ADMIN — Medication 325 MG: at 09:07

## 2017-09-04 RX ADMIN — ISODIUM CHLORIDE 3 ML: 0.03 SOLUTION RESPIRATORY (INHALATION) at 07:12

## 2017-09-04 RX ADMIN — METHYLPREDNISOLONE SODIUM SUCCINATE 40 MG: 40 INJECTION, POWDER, FOR SOLUTION INTRAMUSCULAR; INTRAVENOUS at 09:07

## 2017-09-04 RX ADMIN — ISODIUM CHLORIDE 3 ML: 0.03 SOLUTION RESPIRATORY (INHALATION) at 13:24

## 2017-09-04 RX ADMIN — ISODIUM CHLORIDE 3 ML: 0.03 SOLUTION RESPIRATORY (INHALATION) at 19:27

## 2017-09-05 LAB — T3FREE SERPL-MCNC: 1.47 PG/ML (ref 2.3–4.2)

## 2017-09-05 PROCEDURE — 94760 N-INVAS EAR/PLS OXIMETRY 1: CPT

## 2017-09-05 PROCEDURE — 94640 AIRWAY INHALATION TREATMENT: CPT

## 2017-09-05 PROCEDURE — 94660 CPAP INITIATION&MGMT: CPT

## 2017-09-05 RX ADMIN — ISODIUM CHLORIDE 3 ML: 0.03 SOLUTION RESPIRATORY (INHALATION) at 00:31

## 2017-09-05 RX ADMIN — FUROSEMIDE 10 MG: 20 TABLET ORAL at 09:30

## 2017-09-05 RX ADMIN — ENOXAPARIN SODIUM 40 MG: 40 INJECTION SUBCUTANEOUS at 09:31

## 2017-09-05 RX ADMIN — CITALOPRAM HYDROBROMIDE 20 MG: 20 TABLET ORAL at 09:30

## 2017-09-05 RX ADMIN — LEVALBUTEROL HYDROCHLORIDE 1.25 MG: 1.25 SOLUTION, CONCENTRATE RESPIRATORY (INHALATION) at 19:39

## 2017-09-05 RX ADMIN — LEVALBUTEROL HYDROCHLORIDE 1.25 MG: 1.25 SOLUTION, CONCENTRATE RESPIRATORY (INHALATION) at 13:28

## 2017-09-05 RX ADMIN — LEVALBUTEROL HYDROCHLORIDE 1.25 MG: 1.25 SOLUTION, CONCENTRATE RESPIRATORY (INHALATION) at 07:06

## 2017-09-05 RX ADMIN — ZOLPIDEM TARTRATE 10 MG: 5 TABLET, FILM COATED ORAL at 22:40

## 2017-09-05 RX ADMIN — ISODIUM CHLORIDE 3 ML: 0.03 SOLUTION RESPIRATORY (INHALATION) at 13:28

## 2017-09-05 RX ADMIN — ISODIUM CHLORIDE 3 ML: 0.03 SOLUTION RESPIRATORY (INHALATION) at 19:39

## 2017-09-05 RX ADMIN — ISODIUM CHLORIDE 3 ML: 0.03 SOLUTION RESPIRATORY (INHALATION) at 07:06

## 2017-09-05 RX ADMIN — Medication 325 MG: at 09:31

## 2017-09-05 RX ADMIN — METHYLPREDNISOLONE SODIUM SUCCINATE 40 MG: 40 INJECTION, POWDER, FOR SOLUTION INTRAMUSCULAR; INTRAVENOUS at 09:30

## 2017-09-05 RX ADMIN — ASPIRIN 81 MG 81 MG: 81 TABLET ORAL at 09:31

## 2017-09-05 RX ADMIN — METHYLPREDNISOLONE SODIUM SUCCINATE 40 MG: 40 INJECTION, POWDER, FOR SOLUTION INTRAMUSCULAR; INTRAVENOUS at 21:35

## 2017-09-05 RX ADMIN — LEVALBUTEROL HYDROCHLORIDE 1.25 MG: 1.25 SOLUTION, CONCENTRATE RESPIRATORY (INHALATION) at 00:31

## 2017-09-05 RX ADMIN — DILTIAZEM HYDROCHLORIDE 180 MG: 180 CAPSULE, EXTENDED RELEASE ORAL at 09:31

## 2017-09-05 RX ADMIN — TIOTROPIUM BROMIDE 18 MCG: 18 CAPSULE ORAL; RESPIRATORY (INHALATION) at 09:32

## 2017-09-06 PROCEDURE — 94760 N-INVAS EAR/PLS OXIMETRY 1: CPT

## 2017-09-06 PROCEDURE — 94640 AIRWAY INHALATION TREATMENT: CPT

## 2017-09-06 RX ORDER — PREDNISONE 20 MG/1
40 TABLET ORAL 2 TIMES DAILY WITH MEALS
Status: DISCONTINUED | OUTPATIENT
Start: 2017-09-06 | End: 2017-09-08 | Stop reason: HOSPADM

## 2017-09-06 RX ORDER — LEVALBUTEROL 1.25 MG/.5ML
1.25 SOLUTION, CONCENTRATE RESPIRATORY (INHALATION)
Status: DISCONTINUED | OUTPATIENT
Start: 2017-09-06 | End: 2017-09-06

## 2017-09-06 RX ORDER — LEVALBUTEROL 1.25 MG/.5ML
1.25 SOLUTION, CONCENTRATE RESPIRATORY (INHALATION)
Status: DISCONTINUED | OUTPATIENT
Start: 2017-09-06 | End: 2017-09-07

## 2017-09-06 RX ADMIN — ISODIUM CHLORIDE 3 ML: 0.03 SOLUTION RESPIRATORY (INHALATION) at 00:58

## 2017-09-06 RX ADMIN — CITALOPRAM HYDROBROMIDE 20 MG: 20 TABLET ORAL at 10:46

## 2017-09-06 RX ADMIN — ISODIUM CHLORIDE 3 ML: 0.03 SOLUTION RESPIRATORY (INHALATION) at 19:44

## 2017-09-06 RX ADMIN — LEVALBUTEROL HYDROCHLORIDE 1.25 MG: 1.25 SOLUTION, CONCENTRATE RESPIRATORY (INHALATION) at 19:44

## 2017-09-06 RX ADMIN — PREDNISONE 40 MG: 20 TABLET ORAL at 16:06

## 2017-09-06 RX ADMIN — ZOLPIDEM TARTRATE 10 MG: 5 TABLET, FILM COATED ORAL at 22:04

## 2017-09-06 RX ADMIN — ISODIUM CHLORIDE 3 ML: 0.03 SOLUTION RESPIRATORY (INHALATION) at 07:35

## 2017-09-06 RX ADMIN — FUROSEMIDE 10 MG: 20 TABLET ORAL at 10:45

## 2017-09-06 RX ADMIN — METHYLPREDNISOLONE SODIUM SUCCINATE 40 MG: 40 INJECTION, POWDER, FOR SOLUTION INTRAMUSCULAR; INTRAVENOUS at 10:46

## 2017-09-06 RX ADMIN — ENOXAPARIN SODIUM 40 MG: 40 INJECTION SUBCUTANEOUS at 10:47

## 2017-09-06 RX ADMIN — LEVALBUTEROL HYDROCHLORIDE 1.25 MG: 1.25 SOLUTION, CONCENTRATE RESPIRATORY (INHALATION) at 07:35

## 2017-09-06 RX ADMIN — DILTIAZEM HYDROCHLORIDE 180 MG: 180 CAPSULE, EXTENDED RELEASE ORAL at 10:47

## 2017-09-06 RX ADMIN — LEVALBUTEROL HYDROCHLORIDE 1.25 MG: 1.25 SOLUTION, CONCENTRATE RESPIRATORY (INHALATION) at 13:59

## 2017-09-06 RX ADMIN — Medication 325 MG: at 10:46

## 2017-09-06 RX ADMIN — LEVALBUTEROL HYDROCHLORIDE 1.25 MG: 1.25 SOLUTION, CONCENTRATE RESPIRATORY (INHALATION) at 00:58

## 2017-09-06 RX ADMIN — TIOTROPIUM BROMIDE 18 MCG: 18 CAPSULE ORAL; RESPIRATORY (INHALATION) at 10:48

## 2017-09-06 RX ADMIN — ASPIRIN 81 MG 81 MG: 81 TABLET ORAL at 10:46

## 2017-09-06 RX ADMIN — ISODIUM CHLORIDE 3 ML: 0.03 SOLUTION RESPIRATORY (INHALATION) at 13:59

## 2017-09-07 PROCEDURE — 94640 AIRWAY INHALATION TREATMENT: CPT

## 2017-09-07 PROCEDURE — 94760 N-INVAS EAR/PLS OXIMETRY 1: CPT

## 2017-09-07 RX ORDER — SODIUM CHLORIDE FOR INHALATION 0.9 %
3 VIAL, NEBULIZER (ML) INHALATION
Status: DISCONTINUED | OUTPATIENT
Start: 2017-09-07 | End: 2017-09-08 | Stop reason: HOSPADM

## 2017-09-07 RX ORDER — LEVALBUTEROL 1.25 MG/.5ML
1.25 SOLUTION, CONCENTRATE RESPIRATORY (INHALATION) EVERY 6 HOURS PRN
Status: DISCONTINUED | OUTPATIENT
Start: 2017-09-07 | End: 2017-09-08 | Stop reason: HOSPADM

## 2017-09-07 RX ORDER — SODIUM CHLORIDE FOR INHALATION 0.9 %
3 VIAL, NEBULIZER (ML) INHALATION EVERY 6 HOURS PRN
Status: DISCONTINUED | OUTPATIENT
Start: 2017-09-07 | End: 2017-09-08 | Stop reason: HOSPADM

## 2017-09-07 RX ORDER — LEVALBUTEROL 1.25 MG/.5ML
SOLUTION, CONCENTRATE RESPIRATORY (INHALATION)
Status: COMPLETED
Start: 2017-09-07 | End: 2017-09-07

## 2017-09-07 RX ORDER — LEVALBUTEROL 1.25 MG/.5ML
1.25 SOLUTION, CONCENTRATE RESPIRATORY (INHALATION)
Status: DISCONTINUED | OUTPATIENT
Start: 2017-09-07 | End: 2017-09-08 | Stop reason: HOSPADM

## 2017-09-07 RX ADMIN — ZOLPIDEM TARTRATE 10 MG: 5 TABLET, FILM COATED ORAL at 22:08

## 2017-09-07 RX ADMIN — FUROSEMIDE 10 MG: 20 TABLET ORAL at 09:51

## 2017-09-07 RX ADMIN — ISODIUM CHLORIDE 3 ML: 0.03 SOLUTION RESPIRATORY (INHALATION) at 01:28

## 2017-09-07 RX ADMIN — TIOTROPIUM BROMIDE 18 MCG: 18 CAPSULE ORAL; RESPIRATORY (INHALATION) at 09:50

## 2017-09-07 RX ADMIN — ISODIUM CHLORIDE 3 ML: 0.03 SOLUTION RESPIRATORY (INHALATION) at 08:17

## 2017-09-07 RX ADMIN — PREDNISONE 40 MG: 20 TABLET ORAL at 09:51

## 2017-09-07 RX ADMIN — PREDNISONE 40 MG: 20 TABLET ORAL at 15:31

## 2017-09-07 RX ADMIN — DILTIAZEM HYDROCHLORIDE 180 MG: 180 CAPSULE, EXTENDED RELEASE ORAL at 09:50

## 2017-09-07 RX ADMIN — ASPIRIN 81 MG 81 MG: 81 TABLET ORAL at 09:50

## 2017-09-07 RX ADMIN — ENOXAPARIN SODIUM 40 MG: 40 INJECTION SUBCUTANEOUS at 09:50

## 2017-09-07 RX ADMIN — Medication 325 MG: at 09:51

## 2017-09-07 RX ADMIN — LEVALBUTEROL HYDROCHLORIDE 1.25 MG: 1.25 SOLUTION, CONCENTRATE RESPIRATORY (INHALATION) at 19:33

## 2017-09-07 RX ADMIN — ISODIUM CHLORIDE 3 ML: 0.03 SOLUTION RESPIRATORY (INHALATION) at 14:03

## 2017-09-07 RX ADMIN — LEVALBUTEROL HYDROCHLORIDE 1.25 MG: 1.25 SOLUTION, CONCENTRATE RESPIRATORY (INHALATION) at 01:28

## 2017-09-07 RX ADMIN — ISODIUM CHLORIDE 3 ML: 0.03 SOLUTION RESPIRATORY (INHALATION) at 19:33

## 2017-09-07 RX ADMIN — CITALOPRAM HYDROBROMIDE 20 MG: 20 TABLET ORAL at 09:50

## 2017-09-07 RX ADMIN — LEVALBUTEROL HYDROCHLORIDE 1.25 MG: 1.25 SOLUTION, CONCENTRATE RESPIRATORY (INHALATION) at 14:03

## 2017-09-07 RX ADMIN — LEVALBUTEROL HYDROCHLORIDE 1.25 MG: 1.25 SOLUTION, CONCENTRATE RESPIRATORY (INHALATION) at 08:17

## 2017-09-08 VITALS
SYSTOLIC BLOOD PRESSURE: 113 MMHG | BODY MASS INDEX: 24.13 KG/M2 | OXYGEN SATURATION: 99 % | WEIGHT: 144.84 LBS | HEIGHT: 65 IN | HEART RATE: 76 BPM | DIASTOLIC BLOOD PRESSURE: 53 MMHG | TEMPERATURE: 98 F | RESPIRATION RATE: 18 BRPM

## 2017-09-08 PROCEDURE — G8988 SELF CARE GOAL STATUS: HCPCS

## 2017-09-08 PROCEDURE — 94760 N-INVAS EAR/PLS OXIMETRY 1: CPT

## 2017-09-08 PROCEDURE — 97165 OT EVAL LOW COMPLEX 30 MIN: CPT

## 2017-09-08 PROCEDURE — 97162 PT EVAL MOD COMPLEX 30 MIN: CPT

## 2017-09-08 PROCEDURE — G8989 SELF CARE D/C STATUS: HCPCS

## 2017-09-08 PROCEDURE — G8979 MOBILITY GOAL STATUS: HCPCS

## 2017-09-08 PROCEDURE — G8980 MOBILITY D/C STATUS: HCPCS

## 2017-09-08 PROCEDURE — G8978 MOBILITY CURRENT STATUS: HCPCS

## 2017-09-08 PROCEDURE — G8987 SELF CARE CURRENT STATUS: HCPCS

## 2017-09-08 PROCEDURE — 94640 AIRWAY INHALATION TREATMENT: CPT

## 2017-09-08 RX ORDER — PREDNISONE 20 MG/1
TABLET ORAL
Qty: 30 TABLET | Refills: 0 | Status: SHIPPED | OUTPATIENT
Start: 2017-09-08 | End: 2017-11-05

## 2017-09-08 RX ADMIN — ASPIRIN 81 MG 81 MG: 81 TABLET ORAL at 08:30

## 2017-09-08 RX ADMIN — LEVALBUTEROL HYDROCHLORIDE 1.25 MG: 1.25 SOLUTION, CONCENTRATE RESPIRATORY (INHALATION) at 13:46

## 2017-09-08 RX ADMIN — PREDNISONE 40 MG: 20 TABLET ORAL at 15:37

## 2017-09-08 RX ADMIN — ISODIUM CHLORIDE 3 ML: 0.03 SOLUTION RESPIRATORY (INHALATION) at 13:46

## 2017-09-08 RX ADMIN — DILTIAZEM HYDROCHLORIDE 180 MG: 180 CAPSULE, EXTENDED RELEASE ORAL at 08:32

## 2017-09-08 RX ADMIN — ISODIUM CHLORIDE 3 ML: 0.03 SOLUTION RESPIRATORY (INHALATION) at 07:33

## 2017-09-08 RX ADMIN — Medication 325 MG: at 08:30

## 2017-09-08 RX ADMIN — PREDNISONE 40 MG: 20 TABLET ORAL at 08:30

## 2017-09-08 RX ADMIN — TIOTROPIUM BROMIDE 18 MCG: 18 CAPSULE ORAL; RESPIRATORY (INHALATION) at 08:31

## 2017-09-08 RX ADMIN — CITALOPRAM HYDROBROMIDE 20 MG: 20 TABLET ORAL at 08:30

## 2017-09-08 RX ADMIN — ENOXAPARIN SODIUM 40 MG: 40 INJECTION SUBCUTANEOUS at 08:30

## 2017-09-08 RX ADMIN — FUROSEMIDE 10 MG: 20 TABLET ORAL at 08:30

## 2017-09-08 RX ADMIN — LEVALBUTEROL HYDROCHLORIDE 1.25 MG: 1.25 SOLUTION, CONCENTRATE RESPIRATORY (INHALATION) at 07:33

## 2017-09-13 ENCOUNTER — ALLSCRIPTS OFFICE VISIT (OUTPATIENT)
Dept: OTHER | Facility: OTHER | Age: 67
End: 2017-09-13

## 2017-09-19 DIAGNOSIS — I48.0 PAROXYSMAL ATRIAL FIBRILLATION (HCC): ICD-10-CM

## 2017-09-19 DIAGNOSIS — I48.92 ATRIAL FLUTTER (HCC): ICD-10-CM

## 2017-09-19 DIAGNOSIS — I49.1 ATRIAL PREMATURE DEPOLARIZATION: ICD-10-CM

## 2017-09-26 ENCOUNTER — ALLSCRIPTS OFFICE VISIT (OUTPATIENT)
Dept: OTHER | Facility: OTHER | Age: 67
End: 2017-09-26

## 2017-09-27 ENCOUNTER — GENERIC CONVERSION - ENCOUNTER (OUTPATIENT)
Dept: OTHER | Facility: OTHER | Age: 67
End: 2017-09-27

## 2017-09-29 ENCOUNTER — APPOINTMENT (OUTPATIENT)
Dept: LAB | Facility: HOSPITAL | Age: 67
End: 2017-09-29
Payer: MEDICARE

## 2017-09-29 ENCOUNTER — TRANSCRIBE ORDERS (OUTPATIENT)
Dept: LAB | Facility: HOSPITAL | Age: 67
End: 2017-09-29

## 2017-09-29 ENCOUNTER — GENERIC CONVERSION - ENCOUNTER (OUTPATIENT)
Dept: OTHER | Facility: OTHER | Age: 67
End: 2017-09-29

## 2017-09-29 DIAGNOSIS — I48.92 ATRIAL FLUTTER (HCC): ICD-10-CM

## 2017-09-29 DIAGNOSIS — I48.0 PAROXYSMAL ATRIAL FIBRILLATION (HCC): ICD-10-CM

## 2017-09-29 LAB
FERRITIN SERPL-MCNC: 125 NG/ML (ref 8–388)
HCT VFR BLD AUTO: 35.6 % (ref 36.5–49.3)
HGB BLD-MCNC: 11 G/DL (ref 12–17)
INR PPP: 1.24 (ref 0.86–1.16)
IRON SERPL-MCNC: 78 UG/DL (ref 65–175)
PROTHROMBIN TIME: 15.7 SECONDS (ref 12.1–14.4)

## 2017-09-29 PROCEDURE — 85610 PROTHROMBIN TIME: CPT

## 2017-09-29 PROCEDURE — 82728 ASSAY OF FERRITIN: CPT

## 2017-09-29 PROCEDURE — 85014 HEMATOCRIT: CPT

## 2017-09-29 PROCEDURE — 83540 ASSAY OF IRON: CPT

## 2017-09-29 PROCEDURE — 85018 HEMOGLOBIN: CPT

## 2017-09-29 PROCEDURE — 36415 COLL VENOUS BLD VENIPUNCTURE: CPT

## 2017-10-02 ENCOUNTER — GENERIC CONVERSION - ENCOUNTER (OUTPATIENT)
Dept: OTHER | Facility: OTHER | Age: 67
End: 2017-10-02

## 2017-10-02 DIAGNOSIS — D50.9 IRON DEFICIENCY ANEMIA: ICD-10-CM

## 2017-10-02 DIAGNOSIS — I48.92 ATRIAL FLUTTER (HCC): ICD-10-CM

## 2017-10-02 DIAGNOSIS — I48.0 PAROXYSMAL ATRIAL FIBRILLATION (HCC): ICD-10-CM

## 2017-10-03 ENCOUNTER — TRANSCRIBE ORDERS (OUTPATIENT)
Dept: LAB | Facility: HOSPITAL | Age: 67
End: 2017-10-03

## 2017-10-03 ENCOUNTER — APPOINTMENT (OUTPATIENT)
Dept: LAB | Facility: HOSPITAL | Age: 67
End: 2017-10-03
Payer: MEDICARE

## 2017-10-03 DIAGNOSIS — I48.92 ATRIAL FLUTTER, UNSPECIFIED TYPE (HCC): ICD-10-CM

## 2017-10-03 DIAGNOSIS — I48.92 ATRIAL FLUTTER, UNSPECIFIED TYPE (HCC): Primary | ICD-10-CM

## 2017-10-03 DIAGNOSIS — D50.9 IRON DEFICIENCY ANEMIA: ICD-10-CM

## 2017-10-03 DIAGNOSIS — I48.0 PAROXYSMAL ATRIAL FIBRILLATION (HCC): ICD-10-CM

## 2017-10-03 LAB
BASOPHILS # BLD AUTO: 0.01 THOUSANDS/ΜL (ref 0–0.1)
BASOPHILS NFR BLD AUTO: 0 % (ref 0–1)
EOSINOPHIL # BLD AUTO: 0.05 THOUSAND/ΜL (ref 0–0.61)
EOSINOPHIL NFR BLD AUTO: 1 % (ref 0–6)
ERYTHROCYTE [DISTWIDTH] IN BLOOD BY AUTOMATED COUNT: 14.4 % (ref 11.6–15.1)
FERRITIN SERPL-MCNC: 96 NG/ML (ref 8–388)
HCT VFR BLD AUTO: 33.7 % (ref 36.5–49.3)
HGB BLD-MCNC: 10.8 G/DL (ref 12–17)
LYMPHOCYTES # BLD AUTO: 2.22 THOUSANDS/ΜL (ref 0.6–4.47)
LYMPHOCYTES NFR BLD AUTO: 46 % (ref 14–44)
MCH RBC QN AUTO: 30.9 PG (ref 26.8–34.3)
MCHC RBC AUTO-ENTMCNC: 32 G/DL (ref 31.4–37.4)
MCV RBC AUTO: 97 FL (ref 82–98)
MONOCYTES # BLD AUTO: 0.21 THOUSAND/ΜL (ref 0.17–1.22)
MONOCYTES NFR BLD AUTO: 4 % (ref 4–12)
NEUTROPHILS # BLD AUTO: 2.22 THOUSANDS/ΜL (ref 1.85–7.62)
NEUTS SEG NFR BLD AUTO: 49 % (ref 43–75)
NRBC BLD AUTO-RTO: 0 /100 WBCS
PLATELET # BLD AUTO: 248 THOUSANDS/UL (ref 149–390)
PMV BLD AUTO: 9.1 FL (ref 8.9–12.7)
RBC # BLD AUTO: 3.49 MILLION/UL (ref 3.88–5.62)
WBC # BLD AUTO: 4.79 THOUSAND/UL (ref 4.31–10.16)

## 2017-10-03 PROCEDURE — 36415 COLL VENOUS BLD VENIPUNCTURE: CPT

## 2017-10-03 PROCEDURE — 85025 COMPLETE CBC W/AUTO DIFF WBC: CPT

## 2017-10-03 PROCEDURE — 82728 ASSAY OF FERRITIN: CPT

## 2017-10-04 ENCOUNTER — GENERIC CONVERSION - ENCOUNTER (OUTPATIENT)
Dept: OTHER | Facility: OTHER | Age: 67
End: 2017-10-04

## 2017-10-06 ENCOUNTER — GENERIC CONVERSION - ENCOUNTER (OUTPATIENT)
Dept: OTHER | Facility: OTHER | Age: 67
End: 2017-10-06

## 2017-10-06 ENCOUNTER — APPOINTMENT (OUTPATIENT)
Dept: LAB | Facility: HOSPITAL | Age: 67
End: 2017-10-06
Payer: MEDICARE

## 2017-10-06 DIAGNOSIS — I48.0 PAROXYSMAL ATRIAL FIBRILLATION (HCC): ICD-10-CM

## 2017-10-06 DIAGNOSIS — I48.92 ATRIAL FLUTTER, UNSPECIFIED TYPE (HCC): ICD-10-CM

## 2017-10-06 DIAGNOSIS — I48.92 ATRIAL FLUTTER (HCC): ICD-10-CM

## 2017-10-06 LAB
FERRITIN SERPL-MCNC: 86 NG/ML (ref 8–388)
HCT VFR BLD AUTO: 33.3 % (ref 36.5–49.3)
HGB BLD-MCNC: 10.6 G/DL (ref 12–17)
INR PPP: 3.14 (ref 0.86–1.16)
IRON SERPL-MCNC: 63 UG/DL (ref 65–175)
PROTHROMBIN TIME: 32.7 SECONDS (ref 12.1–14.4)

## 2017-10-06 PROCEDURE — 85018 HEMOGLOBIN: CPT

## 2017-10-06 PROCEDURE — 83540 ASSAY OF IRON: CPT

## 2017-10-06 PROCEDURE — 36415 COLL VENOUS BLD VENIPUNCTURE: CPT

## 2017-10-06 PROCEDURE — 85014 HEMATOCRIT: CPT

## 2017-10-06 PROCEDURE — 85610 PROTHROMBIN TIME: CPT

## 2017-10-06 PROCEDURE — 82728 ASSAY OF FERRITIN: CPT

## 2017-10-10 ENCOUNTER — GENERIC CONVERSION - ENCOUNTER (OUTPATIENT)
Dept: OTHER | Facility: OTHER | Age: 67
End: 2017-10-10

## 2017-10-10 ENCOUNTER — APPOINTMENT (OUTPATIENT)
Dept: LAB | Facility: HOSPITAL | Age: 67
End: 2017-10-10
Payer: MEDICARE

## 2017-10-10 DIAGNOSIS — I48.92 ATRIAL FLUTTER, UNSPECIFIED TYPE (HCC): ICD-10-CM

## 2017-10-10 LAB
INR PPP: 4.02 (ref 0.86–1.16)
PROTHROMBIN TIME: 39.8 SECONDS (ref 12.1–14.4)

## 2017-10-10 PROCEDURE — 85610 PROTHROMBIN TIME: CPT

## 2017-10-10 PROCEDURE — 36415 COLL VENOUS BLD VENIPUNCTURE: CPT

## 2017-10-13 ENCOUNTER — APPOINTMENT (OUTPATIENT)
Dept: LAB | Facility: HOSPITAL | Age: 67
End: 2017-10-13
Payer: MEDICARE

## 2017-10-13 ENCOUNTER — GENERIC CONVERSION - ENCOUNTER (OUTPATIENT)
Dept: OTHER | Facility: OTHER | Age: 67
End: 2017-10-13

## 2017-10-13 ENCOUNTER — TRANSCRIBE ORDERS (OUTPATIENT)
Dept: LAB | Facility: HOSPITAL | Age: 67
End: 2017-10-13

## 2017-10-13 DIAGNOSIS — I48.92 ATRIAL FLUTTER, UNSPECIFIED TYPE (HCC): ICD-10-CM

## 2017-10-13 DIAGNOSIS — Z79.01 LONG-TERM (CURRENT) USE OF ANTICOAGULANTS: ICD-10-CM

## 2017-10-13 DIAGNOSIS — I48.92 ATRIAL FLUTTER, UNSPECIFIED TYPE (HCC): Primary | ICD-10-CM

## 2017-10-13 LAB
HCT VFR BLD AUTO: 33.9 % (ref 36.5–49.3)
HGB BLD-MCNC: 10.4 G/DL (ref 12–17)
INR PPP: 2.46 (ref 0.86–1.16)
PROTHROMBIN TIME: 27 SECONDS (ref 12.1–14.4)

## 2017-10-13 PROCEDURE — 85014 HEMATOCRIT: CPT

## 2017-10-13 PROCEDURE — 36415 COLL VENOUS BLD VENIPUNCTURE: CPT

## 2017-10-13 PROCEDURE — 85018 HEMOGLOBIN: CPT

## 2017-10-13 PROCEDURE — 85610 PROTHROMBIN TIME: CPT

## 2017-10-16 ENCOUNTER — ALLSCRIPTS OFFICE VISIT (OUTPATIENT)
Dept: OTHER | Facility: OTHER | Age: 67
End: 2017-10-16

## 2017-10-16 ENCOUNTER — GENERIC CONVERSION - ENCOUNTER (OUTPATIENT)
Dept: OTHER | Facility: OTHER | Age: 67
End: 2017-10-16

## 2017-10-17 NOTE — PROGRESS NOTES
Assessment  1  Iron (Fe) deficiency anemia (280 9) (D50 9)    Plan  Iron (Fe) deficiency anemia    · (1) CBC/PLT/DIFF; Status:Active; Requested for:13Nov2017;    Perform:[facility]; ODV:66MNP1614; Ordered; For:Iron (Fe) deficiency anemia; Ordered By:Proothi, Malachi;   · (1) COMPREHENSIVE METABOLIC PANEL; Status:Active; Requested NMS:[id number];    Perform:[facility]; Due:16Oct2018; Ordered; For:Iron (Fe) deficiency anemia; Ordered By:Proothi, Malachi;   · (1) OCCULT BLOOD, FECAL IMMUNOCHEMICAL TEST; Status:Active; Requested  BYY:[id number];    Perform:[facility]; Due:17Oct2018; Last Updated By:Cuco Winn; 10/16/2017 9:23:07 AM;Ordered; For:Iron (Fe) deficiency anemia; Ordered By:Proothi, Malachi;   · (1) PROTEIN ELECTRO, SERUM; Status:Active; Requested RSM:[id number];    Perform:[facility]; Due:16Oct2018; Ordered; For:Iron (Fe) deficiency anemia; Ordered By:Proothi, Malachi;   · (1) RETICULOCYTE COUNT; Status:Active; Requested [id number];    Perform:[facility]; Due:16Oct2018; Ordered; For:Iron (Fe) deficiency anemia; Ordered By:Proothi, Malachi;   · (1) SED RATE; Status:Active; Requested BCJ:[id number];    Perform:[facility]; Due:16Oct2018; Ordered; For:Iron (Fe) deficiency anemia; Ordered By:Proothi, Malachi;   · Follow-up visit in 1 month Evaluation and Treatment  Follow-up  Status: Complete  Done:  90TOK0480 04:00PM   Ordered; For: Iron (Fe) deficiency anemia; Ordered By: Kathie Chavez Performed:  Due: 10NDH0071; Last Updated By: Juani Kirklandal; 10/16/2017 9:23:41 AM    Discussion/Summary  Discussion Summary:   Patient is here with his wife  Patient has history of iron deficiency anemia and Hemoccult-positive stool  He had GI evaluation  He had blood transfusions, Venofer and oral iron  Hemoglobin had come up to 12  4  Normal hemoglobin is down to 10 4  Patient has been taking one iron tablet daily  Stool color is brown    Patient had 2 admissions in the hospital in September 2017 for COPD with exacerbation  He is on 4 L nasal oxygen  He is in wheelchair here but he walks independently but slowly at home  Patient is on Coumadin under the care of his cardiologist   He states he goes for Coumadin blood test twice a week  Patient is going to have additional blood tests for anemia and stool test for blood  He will start thinking one iron tablet twice a day  problem is pulmonary and he is on oxygen 4 L/m continuously  He follows with lung specialist  He has less shortness of breath He has minimal nonproductive cough and dyspnea  Has some tiredness had MGUS previously  Last serum protein electrophoresis did not show monoclonal spike  examination and test results are as recorded and discussed  Plan is as above, additional blood tests, Stool test , increasing iron tablet to twice a day and monitoring of blood counts Condition discussed and explained  Questions answered  Counseling Documentation With Imm: The patient was counseled regarding diagnostic results,-- patient and family education,-- impressions  total time of encounter was 25 minutes-- and-- 15 minutes was spent counseling  Goals and Barriers: The patient has the current Goals: Correction of anemia and iron deficiency  The patent has the current Barriers: COPD  Patient's Capacity to Self-Care: Patient is able to Self-Care  Patient agrees and allows to involve family/caregiver in development of care plan:   Medication SE Review and Pt Understands Tx: The treatment plan was reviewed with the patient/guardian  The patient/guardian understands and agrees with the treatment plan   Self Referrals:   Self Referrals: No   Understands and agrees with treatment plan: The treatment plan was reviewed with the patient/guardian   The patient/guardian understands and agrees with the treatment plan      Chief Complaint  Chief Complaint: Chief Complaint:   The patient presents to the office today with Iron deficiency anemia in follow-up visit  History of Present Illness  HPI: Patient is here with his wife  Patient has history of iron deficiency anemia and Hemoccult-positive stool  He had GI evaluation  He had blood transfusions, Venofer and oral iron  Hemoglobin had come up to 12  4  Normal hemoglobin is down to 10 4  Patient has been taking one iron tablet daily  Stool color is brown   Patient had 2 admissions in the hospital in September 2017 for COPD with exacerbation  He is on 4 L nasal oxygen  He is in wheelchair here but he walks independently but slowly at home  Patient is on Coumadin under the care of his cardiologist   He states he goes for Coumadin blood test twice a week  Patient is going to have additional blood tests for anemia and stool test for blood  He will start thinking one iron tablet twice a day  problem is pulmonary and he is on oxygen 4 L/m continuously  He follows with lung specialist  He has less shortness of breath He has minimal nonproductive cough and dyspnea  Has some tiredness had MGUS previously  Last serum protein electrophoresis did not show monoclonal spike  Review of Systems                         No headaches, seizures, diplopia, dysphagia, hoarseness, angina pain, chest pain, palpitations,  hemoptysis, abdominal pain,  or hematuria  No fever, chills,  bone pains, skin rash, weight loss, nausea, vomiting and no change in bowel habits  Appetite is fair  No night sweats  Patient has minor arthritic symptoms  No leg cramps  No swelling of the ankles  No swollen glands  Anxious  Not unusually sensitive to heat or cold  No recent or frequent infections  Reviewed 13 systems  Other symptoms as in history of present illness      ROS Reviewed:   ROS reviewed  Active Problems  1  Advance directive discussed with patient (V65 49) (Z71 89)   2  Aftercare following joint replacement surgery (V54 81) (Z47 1)   3  Anxiety (300 00) (F41 9)   4  Aortic regurgitation (424 1) (I35 1)   5  Arthralgia Of The Right Pelvis/Hip/Femur (719 45)   6  Atrial flutter (427 32) (I48 92)   7  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)   8  Benign hypertension (401 1) (I10)   9  Chronic hypoxemic respiratory failure (518 83,799 02) (J96 11)   10  Chronic obstructive pulmonary disease (496) (J44 9)   11  Colon polyp (211 3) (K63 5)   12  Emphysema (492 8) (J43 9)   13  History of gastrointestinal hemorrhage (V12 79) (Z87 19)   14  History of Helicobacter infection (V12 09) (Z86 19)   15  Hypokalemia (276 8) (E87 6)   16  Hypoxia (799 02) (R09 02)   17  Iron (Fe) deficiency anemia (280 9) (D50 9)   18  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)   19  MGUS (monoclonal gammopathy of unknown significance) (273 1) (D47 2)   20  Need for influenza vaccination (V04 81) (Z23)   21  Need for prophylactic vaccination and inoculation against influenza (V04 81) (Z23)   22  Need for vaccination with 13-polyvalent pneumococcal conjugate vaccine (V03 82) (Z23)   23  Paroxysmal atrial fibrillation (427 31) (I48 0)   24  Patent foramen ovale (745 5) (Q21 1)   25  Pre-operative cardiovascular examination (V72 81) (Z01 810)   26  Primary localized osteoarthrosis of the hip, unspecified laterality (715 15) (M16 10)   27  Pulmonary artery hypertension (416 8) (I27 21)   28  Rupture Of The Proximal Bicipital Tendon Of The Left Arm (727 62)   29  Screening for genitourinary condition (V81 6) (Z13 89)   30  Tachycardia (785 0) (R00 0)    Past Medical History  1  History of Acute and chronic respiratory failure (518 84) (J96 20)   2  Aftercare following joint replacement surgery (V54 81) (Z47 1)   3  History of Atrial flutter (427 32) (I48 92)   4  History of Deep venous thrombosis of distal lower extremity (453 42) (I82 4Z9)   5  History of acute renal failure (V13 09) (Z87 448)   6  History of diverticulitis of colon (V12 79) (Z87 19)   7  History of edema (V13 89) (Z87 898)   8   History of gastrointestinal hemorrhage (V12 79) (Z87 19) 9  History of Helicobacter infection (V12 09) (Z86 19)   10  History of Sinus Tachycardia (427 89)   11  History of Tracheobronchitis (490) (J40)                 Reviewed     Surgical History  1  History of Appendectomy   2  History of Colon Surgery   3  History of Colon Surgery   4  History of Hernia Repair   5  History of Hernia Repair  Surgical History Reviewed: The surgical history was reviewed and updated today  Family History  Mother    1  Family history of Cancer   2  Family history of Smoking Cigarettes  Father    3  Family history of Acute Myocardial Infarction (V17 3)   4  Family history of Sudden/Instantaneous Cardiac Death (V17 41)  Family History    5  Family history of Arthritis (V17 7)   6  Family history of Diabetes Mellitus (V18 0)  Family History Reviewed: The family history was reviewed and updated today  Social History   · Denied: History of Alcohol Use (History)   · Daily Coffee Consumption (1  Cups/Day)   · Former smoker (F71 81) (E22 324)   · Marital History - Currently   Social History Reviewed: The social history was reviewed and updated today  Current Meds   1  Albuterol Sulfate (2 5 MG/3ML) 0 083% Inhalation Nebulization Solution; USE 1 UNIT   DOSE IN NEBULIZER EVERY 6 HOURS AS NEEDED; Therapy: 40DIW2367 to (Evaluate:26Eof4682)  Requested for: 97IDN3934; Last   Rx:53Ort3470 Ordered   2  Breo Ellipta 100-25 MCG/INH Inhalation Aerosol Powder Breath Activated; Take one   inhalation once a day  Rinse her mouth with water after each use; Therapy: 78JYH1992 to (Sima Reddy)  Requested for: 25GOT5802; Last   Rx:13Tec7995 Ordered   3  Cartia  MG Oral Capsule Extended Release 24 Hour; TAKE 1 CAPSULE BY   MOUTH EVERY DAY; Therapy: 76IUC5736 to (TBFXHGDU:85JFB1537)  Requested for: 28UNB2173; Last   Rx:18Hiw0334 Ordered   4  Citalopram Hydrobromide 20 MG Oral Tablet; TAKE 1 TABLET DAILY  Requested for:   19Apr2017; Last Rx:60Eux3374 Ordered   5   Ferrous Sulfate 325 (65 Fe) MG Oral Tablet; Take 1 tablet daily; Last Rx:48Ajj4145   Ordered   6  Folic Acid 1 MG Oral Tablet; take 1 tablet by mouth every day; Therapy: 71LFG1320 to (Evaluate:80Ltg0086)  Requested for: 34Uis2197; Last   Rx:99Gju8714 Ordered   7  Furosemide 20 MG Oral Tablet; take 1/2 tablet daily  Requested for: 52Mft3849; Last   Rx:27Gwd3693 Ordered   8  Potassium Chloride Fernanda ER 20 MEQ Oral Tablet Extended Release; take 1 tablet by   mouth every day; Therapy: 68VKP9540 to (Abbie Flores)  Requested for: 28DBO1329; Last   Rx:09Jan2017 Ordered   9  Spiriva Respimat 2 5 MCG/ACT Inhalation Aerosol Solution; TAKE 2 INHALATIONS DAILY; Therapy: 46IIR3620 to (Last Rx:44Ltr7508)  Requested for: 19Apr2017 Ordered   10  Warfarin Sodium 2 5 MG Oral Tablet; Take 1 to 2 tablets daily by mouth or as directed by    physician; Therapy: 26WVO6502 to (Evaluate:25Mar2018)  Requested for: 89HJA3388; Last    Rx:91Rhh7157 Ordered    Allergies  1  Penicillins                 Penicillin  Vitals  Vital Signs    Recorded: 61ZNC1152 08:49AM   Temperature 97 1 F   Heart Rate 112   Respiration 17   Systolic 455   Diastolic 56   Height 5 ft 7 in   Weight 144 lb    BMI Calculated 22 55   BSA Calculated 1 76   O2 Saturation 82   Pain Scale 0                            Reviewed  Tachycardia     Physical Exam                          Patient is alert and oriented  Not in  acute distress  On nasal oxygen  Stable vital on oxygen except tachycardia   Nick Spruce No icterus  No oral thrush  No sores in the mouth  No palpable neck mass  Regular heart rate  Clear lung fields but prolonged expiration  Abdomen soft and nontender  No palpable abdominal mass  No ascites  No edema of ankles  No calf tenderness  No focal neurological deficit  No skin rash  No palpable lymphadenopathy  Good arterial pulses  Anxious  Performance status 2  No significant change in examination        ECOG 2       Results/Data  (1) HGB AND HCT, BLOOD 09JMS4293 08:55AM José Antonio Reich     Test Name Result Flag Reference   HEMATOCRIT 33 9 % L 36 5-49 3   HEMOGLOBIN 10 4 g/dL L 12 0-17 0     (1) FERRITIN 59HAW7983 07:18AM José Antonio Reich     Test Name Result Flag Reference   FERRITIN 86 ng/mL  8-388     (1) IRON 29XZE1572 07:18AM José Antonio Reich     Test Name Result Flag Reference   IRON 63 ug/dL L    Patients treated with metal-binding drugs (ie  Deferoxamine) may have depressed iron values  (1) CBC/PLT/DIFF 03Oct2017 08:46AM Proothi, Eston Rinne Order Number: [id number]     Test Name Result Flag Reference   WBC COUNT 4 79 Thousand/uL  4 31-10 16   RBC COUNT 3 49 Million/uL L 3 88-5 62   HEMOGLOBIN 10 8 g/dL L 12 0-17 0   HEMATOCRIT 33 7 % L 36 5-49 3   MCV 97 fL  82-98   MCH 30 9 pg  26 8-34 3   MCHC 32 0 g/dL  31 4-37 4   RDW 14 4 %  11 6-15 1   MPV 9 1 fL  8 9-12 7   PLATELET COUNT 732 Thousands/uL  149-390   nRBC AUTOMATED 0 /100 WBCs     NEUTROPHILS RELATIVE PERCENT 49 %  43-75   LYMPHOCYTES RELATIVE PERCENT 46 % H 14-44   MONOCYTES RELATIVE PERCENT 4 %  4-12   EOSINOPHILS RELATIVE PERCENT 1 %  0-6   BASOPHILS RELATIVE PERCENT 0 %  0-1   NEUTROPHILS ABSOLUTE COUNT 2 22 Thousands/? ??L  1 85-7 62   LYMPHOCYTES ABSOLUTE COUNT 2 22 Thousands/? ??L  0 60-4 47   MONOCYTES ABSOLUTE COUNT 0 21 Thousand/? ??L  0 17-1 22   EOSINOPHILS ABSOLUTE COUNT 0 05 Thousand/? ??L  0 00-0 61   BASOPHILS ABSOLUTE COUNT 0 01 Thousands/? ??L  0 00-0 10   - Patient Instructions: This bloodwork is non-fasting  Please drink two glasses of water morning of bloodwork  Future Appointments    Date/Time Provider Specialty Site   11/17/2017 04:00 PM Agueda Sampson MD Hematology Oncology [facility]   01/16/2018 08:30 AM JENNIFER Schaffer   Internal Medicine [facility]   11/06/2017 01:40 PM Sai Alonzo DO Cardiology [facility]     Signatures   Electronically signed by : Bright Cano MD; Oct 16 2017 1:24PM EST                       (Author)    Electronically signed by : Soren Matthew MD; Oct 16 2017  1:25PM EST                       (Author)

## 2017-10-20 ENCOUNTER — GENERIC CONVERSION - ENCOUNTER (OUTPATIENT)
Dept: OTHER | Facility: OTHER | Age: 67
End: 2017-10-20

## 2017-10-20 ENCOUNTER — TRANSCRIBE ORDERS (OUTPATIENT)
Dept: LAB | Facility: HOSPITAL | Age: 67
End: 2017-10-20

## 2017-10-20 ENCOUNTER — APPOINTMENT (OUTPATIENT)
Dept: LAB | Facility: HOSPITAL | Age: 67
End: 2017-10-20
Payer: MEDICARE

## 2017-10-20 DIAGNOSIS — I48.92 ATRIAL FLUTTER (HCC): ICD-10-CM

## 2017-10-20 DIAGNOSIS — D50.9 IRON DEFICIENCY ANEMIA: ICD-10-CM

## 2017-10-20 LAB
ALBUMIN SERPL BCP-MCNC: 3.5 G/DL (ref 3.5–5)
ALP SERPL-CCNC: 84 U/L (ref 46–116)
ALT SERPL W P-5'-P-CCNC: 12 U/L (ref 12–78)
ANION GAP SERPL CALCULATED.3IONS-SCNC: 5 MMOL/L (ref 4–13)
AST SERPL W P-5'-P-CCNC: 12 U/L (ref 5–45)
BILIRUB SERPL-MCNC: 0.39 MG/DL (ref 0.2–1)
BUN SERPL-MCNC: 12 MG/DL (ref 5–25)
CALCIUM SERPL-MCNC: 9.5 MG/DL (ref 8.3–10.1)
CHLORIDE SERPL-SCNC: 96 MMOL/L (ref 100–108)
CO2 SERPL-SCNC: 36 MMOL/L (ref 21–32)
CREAT SERPL-MCNC: 0.89 MG/DL (ref 0.6–1.3)
ERYTHROCYTE [SEDIMENTATION RATE] IN BLOOD: 56 MM/HOUR (ref 0–10)
GFR SERPL CREATININE-BSD FRML MDRD: 87 ML/MIN/1.73SQ M
GLUCOSE SERPL-MCNC: 252 MG/DL (ref 65–140)
INR PPP: 1.67 (ref 0.86–1.16)
POTASSIUM SERPL-SCNC: 4.4 MMOL/L (ref 3.5–5.3)
PROT SERPL-MCNC: 7.3 G/DL (ref 6.4–8.2)
PROTHROMBIN TIME: 19.8 SECONDS (ref 12.1–14.4)
RETICS # AUTO: ABNORMAL 10*3/UL (ref 14356–105094)
RETICS # CALC: 4.04 % (ref 0.37–1.87)
SODIUM SERPL-SCNC: 137 MMOL/L (ref 136–145)

## 2017-10-20 PROCEDURE — 84165 PROTEIN E-PHORESIS SERUM: CPT

## 2017-10-20 PROCEDURE — 85610 PROTHROMBIN TIME: CPT

## 2017-10-20 PROCEDURE — 36415 COLL VENOUS BLD VENIPUNCTURE: CPT

## 2017-10-20 PROCEDURE — 80053 COMPREHEN METABOLIC PANEL: CPT

## 2017-10-20 PROCEDURE — 85652 RBC SED RATE AUTOMATED: CPT

## 2017-10-20 PROCEDURE — 85045 AUTOMATED RETICULOCYTE COUNT: CPT

## 2017-10-23 ENCOUNTER — GENERIC CONVERSION - ENCOUNTER (OUTPATIENT)
Dept: OTHER | Facility: OTHER | Age: 67
End: 2017-10-23

## 2017-10-24 LAB
ALBUMIN SERPL ELPH-MCNC: 3.93 G/DL (ref 3.5–5)
ALBUMIN SERPL ELPH-MCNC: 58.6 % (ref 52–65)
ALPHA1 GLOB SERPL ELPH-MCNC: 0.46 G/DL (ref 0.1–0.4)
ALPHA1 GLOB SERPL ELPH-MCNC: 6.8 % (ref 2.5–5)
ALPHA2 GLOB SERPL ELPH-MCNC: 0.94 G/DL (ref 0.4–1.2)
ALPHA2 GLOB SERPL ELPH-MCNC: 14.1 % (ref 7–13)
BETA GLOB ABNORMAL SERPL ELPH-MCNC: 0.39 G/DL (ref 0.4–0.8)
BETA1 GLOB SERPL ELPH-MCNC: 5.8 % (ref 5–13)
BETA2 GLOB SERPL ELPH-MCNC: 5.1 % (ref 2–8)
BETA2+GAMMA GLOB SERPL ELPH-MCNC: 0.34 G/DL (ref 0.2–0.5)
GAMMA GLOB ABNORMAL SERPL ELPH-MCNC: 0.64 G/DL (ref 0.5–1.6)
GAMMA GLOB SERPL ELPH-MCNC: 9.6 % (ref 12–22)
IGG/ALB SER: 1.42 {RATIO} (ref 1.1–1.8)
PROT SERPL-MCNC: 6.7 G/DL (ref 6.4–8.2)

## 2017-10-27 ENCOUNTER — GENERIC CONVERSION - ENCOUNTER (OUTPATIENT)
Dept: OTHER | Facility: OTHER | Age: 67
End: 2017-10-27

## 2017-10-27 ENCOUNTER — APPOINTMENT (OUTPATIENT)
Dept: LAB | Facility: HOSPITAL | Age: 67
End: 2017-10-27
Payer: MEDICARE

## 2017-10-27 DIAGNOSIS — I48.92 ATRIAL FLUTTER, UNSPECIFIED TYPE (HCC): ICD-10-CM

## 2017-10-27 DIAGNOSIS — I48.92 ATRIAL FLUTTER (HCC): ICD-10-CM

## 2017-10-27 DIAGNOSIS — Z79.01 LONG-TERM (CURRENT) USE OF ANTICOAGULANTS: ICD-10-CM

## 2017-10-27 LAB
HCT VFR BLD AUTO: 35.1 % (ref 36.5–49.3)
HGB BLD-MCNC: 11.2 G/DL (ref 12–17)
INR PPP: 3.39 (ref 0.86–1.16)
PROTHROMBIN TIME: 34.8 SECONDS (ref 12.1–14.4)

## 2017-10-27 PROCEDURE — 85018 HEMOGLOBIN: CPT

## 2017-10-27 PROCEDURE — 36415 COLL VENOUS BLD VENIPUNCTURE: CPT

## 2017-10-27 PROCEDURE — 85014 HEMATOCRIT: CPT

## 2017-10-27 PROCEDURE — 85610 PROTHROMBIN TIME: CPT

## 2017-10-30 ENCOUNTER — APPOINTMENT (OUTPATIENT)
Dept: LAB | Facility: HOSPITAL | Age: 67
End: 2017-10-30
Attending: INTERNAL MEDICINE
Payer: MEDICARE

## 2017-10-30 ENCOUNTER — GENERIC CONVERSION - ENCOUNTER (OUTPATIENT)
Dept: OTHER | Facility: OTHER | Age: 67
End: 2017-10-30

## 2017-10-30 DIAGNOSIS — D50.9 IRON DEFICIENCY ANEMIA: ICD-10-CM

## 2017-10-30 LAB — HEMOCCULT STL QL IA: NEGATIVE

## 2017-10-30 PROCEDURE — G0328 FECAL BLOOD SCRN IMMUNOASSAY: HCPCS

## 2017-11-03 ENCOUNTER — GENERIC CONVERSION - ENCOUNTER (OUTPATIENT)
Dept: OTHER | Facility: OTHER | Age: 67
End: 2017-11-03

## 2017-11-03 ENCOUNTER — APPOINTMENT (OUTPATIENT)
Dept: LAB | Facility: HOSPITAL | Age: 67
DRG: 189 | End: 2017-11-03
Payer: MEDICARE

## 2017-11-03 DIAGNOSIS — I48.92 ATRIAL FLUTTER (HCC): ICD-10-CM

## 2017-11-03 DIAGNOSIS — I48.0 PAROXYSMAL ATRIAL FIBRILLATION (HCC): ICD-10-CM

## 2017-11-03 LAB
ERYTHROCYTE [DISTWIDTH] IN BLOOD BY AUTOMATED COUNT: 14.4 % (ref 11.6–15.1)
HCT VFR BLD AUTO: 31.7 % (ref 36.5–49.3)
HGB BLD-MCNC: 9.8 G/DL (ref 12–17)
INR PPP: 5.79 (ref 0.86–1.16)
MCH RBC QN AUTO: 29.9 PG (ref 26.8–34.3)
MCHC RBC AUTO-ENTMCNC: 30.9 G/DL (ref 31.4–37.4)
MCV RBC AUTO: 97 FL (ref 82–98)
PLATELET # BLD AUTO: 331 THOUSANDS/UL (ref 149–390)
PMV BLD AUTO: 9.5 FL (ref 8.9–12.7)
PROTHROMBIN TIME: 53.2 SECONDS (ref 12.1–14.4)
RBC # BLD AUTO: 3.28 MILLION/UL (ref 3.88–5.62)
WBC # BLD AUTO: 7.55 THOUSAND/UL (ref 4.31–10.16)

## 2017-11-03 PROCEDURE — 36415 COLL VENOUS BLD VENIPUNCTURE: CPT

## 2017-11-03 PROCEDURE — 85027 COMPLETE CBC AUTOMATED: CPT

## 2017-11-03 PROCEDURE — 85610 PROTHROMBIN TIME: CPT

## 2017-11-05 ENCOUNTER — HOSPITAL ENCOUNTER (INPATIENT)
Facility: HOSPITAL | Age: 67
LOS: 6 days | Discharge: HOME HEALTH | DRG: 189 | End: 2017-11-11
Attending: EMERGENCY MEDICINE | Admitting: HOSPITALIST
Payer: MEDICARE

## 2017-11-05 ENCOUNTER — APPOINTMENT (EMERGENCY)
Dept: RADIOLOGY | Facility: HOSPITAL | Age: 67
DRG: 189 | End: 2017-11-05
Payer: MEDICARE

## 2017-11-05 DIAGNOSIS — D64.9 ANEMIA: ICD-10-CM

## 2017-11-05 DIAGNOSIS — R79.1 SUPRATHERAPEUTIC INR: ICD-10-CM

## 2017-11-05 DIAGNOSIS — J44.1 COPD WITH ACUTE EXACERBATION (HCC): Primary | ICD-10-CM

## 2017-11-05 PROBLEM — R06.02 SHORTNESS OF BREATH: Status: ACTIVE | Noted: 2017-11-05

## 2017-11-05 LAB
ANION GAP SERPL CALCULATED.3IONS-SCNC: 4 MMOL/L (ref 4–13)
APTT PPP: 81 SECONDS (ref 23–35)
ATRIAL RATE: 106 BPM
BASOPHILS # BLD AUTO: 0.01 THOUSANDS/ΜL (ref 0–0.1)
BASOPHILS NFR BLD AUTO: 0 % (ref 0–1)
BUN SERPL-MCNC: 18 MG/DL (ref 5–25)
CALCIUM SERPL-MCNC: 8.9 MG/DL (ref 8.3–10.1)
CHLORIDE SERPL-SCNC: 103 MMOL/L (ref 100–108)
CO2 SERPL-SCNC: 33 MMOL/L (ref 21–32)
CREAT SERPL-MCNC: 0.78 MG/DL (ref 0.6–1.3)
EOSINOPHIL # BLD AUTO: 0.18 THOUSAND/ΜL (ref 0–0.61)
EOSINOPHIL NFR BLD AUTO: 2 % (ref 0–6)
ERYTHROCYTE [DISTWIDTH] IN BLOOD BY AUTOMATED COUNT: 14.6 % (ref 11.6–15.1)
GFR SERPL CREATININE-BSD FRML MDRD: 92 ML/MIN/1.73SQ M
GLUCOSE SERPL-MCNC: 149 MG/DL (ref 65–140)
HCT VFR BLD AUTO: 28.9 % (ref 36.5–49.3)
HGB BLD-MCNC: 9.3 G/DL (ref 12–17)
INR PPP: 4.61 (ref 0.86–1.16)
LYMPHOCYTES # BLD AUTO: 4.17 THOUSANDS/ΜL (ref 0.6–4.47)
LYMPHOCYTES NFR BLD AUTO: 47 % (ref 14–44)
MCH RBC QN AUTO: 30.8 PG (ref 26.8–34.3)
MCHC RBC AUTO-ENTMCNC: 32.2 G/DL (ref 31.4–37.4)
MCV RBC AUTO: 96 FL (ref 82–98)
MONOCYTES # BLD AUTO: 0.53 THOUSAND/ΜL (ref 0.17–1.22)
MONOCYTES NFR BLD AUTO: 6 % (ref 4–12)
NEUTROPHILS # BLD AUTO: 3.96 THOUSANDS/ΜL (ref 1.85–7.62)
NEUTS SEG NFR BLD AUTO: 45 % (ref 43–75)
NRBC BLD AUTO-RTO: 0 /100 WBCS
P AXIS: 83 DEGREES
PLATELET # BLD AUTO: 307 THOUSANDS/UL (ref 149–390)
PMV BLD AUTO: 9.2 FL (ref 8.9–12.7)
POTASSIUM SERPL-SCNC: 3.9 MMOL/L (ref 3.5–5.3)
PR INTERVAL: 200 MS
PROTHROMBIN TIME: 44.4 SECONDS (ref 12.1–14.4)
QRS AXIS: 43 DEGREES
QRSD INTERVAL: 86 MS
QT INTERVAL: 320 MS
QTC INTERVAL: 425 MS
RBC # BLD AUTO: 3.02 MILLION/UL (ref 3.88–5.62)
SODIUM SERPL-SCNC: 140 MMOL/L (ref 136–145)
SPECIMEN SOURCE: NORMAL
T WAVE AXIS: 83 DEGREES
TROPONIN I BLD-MCNC: 0 NG/ML (ref 0–0.08)
VENTRICULAR RATE: 106 BPM
WBC # BLD AUTO: 8.92 THOUSAND/UL (ref 4.31–10.16)

## 2017-11-05 PROCEDURE — 71020 HB CHEST X-RAY 2VW FRONTAL&LATL: CPT

## 2017-11-05 PROCEDURE — 93005 ELECTROCARDIOGRAM TRACING: CPT

## 2017-11-05 PROCEDURE — 85610 PROTHROMBIN TIME: CPT | Performed by: EMERGENCY MEDICINE

## 2017-11-05 PROCEDURE — 80048 BASIC METABOLIC PNL TOTAL CA: CPT | Performed by: EMERGENCY MEDICINE

## 2017-11-05 PROCEDURE — 94640 AIRWAY INHALATION TREATMENT: CPT

## 2017-11-05 PROCEDURE — 85025 COMPLETE CBC W/AUTO DIFF WBC: CPT | Performed by: EMERGENCY MEDICINE

## 2017-11-05 PROCEDURE — 84484 ASSAY OF TROPONIN QUANT: CPT

## 2017-11-05 PROCEDURE — 94644 CONT INHLJ TX 1ST HOUR: CPT

## 2017-11-05 PROCEDURE — 85730 THROMBOPLASTIN TIME PARTIAL: CPT | Performed by: EMERGENCY MEDICINE

## 2017-11-05 PROCEDURE — 96374 THER/PROPH/DIAG INJ IV PUSH: CPT

## 2017-11-05 PROCEDURE — 94760 N-INVAS EAR/PLS OXIMETRY 1: CPT

## 2017-11-05 PROCEDURE — 36415 COLL VENOUS BLD VENIPUNCTURE: CPT | Performed by: EMERGENCY MEDICINE

## 2017-11-05 PROCEDURE — 99285 EMERGENCY DEPT VISIT HI MDM: CPT

## 2017-11-05 RX ORDER — DIPHENHYDRAMINE HCL 25 MG
25 TABLET ORAL
Status: DISCONTINUED | OUTPATIENT
Start: 2017-11-05 | End: 2017-11-05

## 2017-11-05 RX ORDER — DILTIAZEM HYDROCHLORIDE 180 MG/1
180 CAPSULE, COATED, EXTENDED RELEASE ORAL DAILY
Status: DISCONTINUED | OUTPATIENT
Start: 2017-11-06 | End: 2017-11-11 | Stop reason: HOSPADM

## 2017-11-05 RX ORDER — LEVALBUTEROL 1.25 MG/.5ML
1.25 SOLUTION, CONCENTRATE RESPIRATORY (INHALATION)
Status: DISCONTINUED | OUTPATIENT
Start: 2017-11-05 | End: 2017-11-06

## 2017-11-05 RX ORDER — CITALOPRAM 20 MG/1
20 TABLET ORAL DAILY
Status: DISCONTINUED | OUTPATIENT
Start: 2017-11-06 | End: 2017-11-11 | Stop reason: HOSPADM

## 2017-11-05 RX ORDER — FUROSEMIDE 40 MG/1
40 TABLET ORAL DAILY
Status: DISCONTINUED | OUTPATIENT
Start: 2017-11-06 | End: 2017-11-11 | Stop reason: HOSPADM

## 2017-11-05 RX ORDER — METHYLPREDNISOLONE SODIUM SUCCINATE 125 MG/2ML
125 INJECTION, POWDER, LYOPHILIZED, FOR SOLUTION INTRAMUSCULAR; INTRAVENOUS ONCE
Status: COMPLETED | OUTPATIENT
Start: 2017-11-05 | End: 2017-11-05

## 2017-11-05 RX ORDER — FERROUS SULFATE 325(65) MG
325 TABLET ORAL DAILY
Status: DISCONTINUED | OUTPATIENT
Start: 2017-11-06 | End: 2017-11-11 | Stop reason: HOSPADM

## 2017-11-05 RX ORDER — AZITHROMYCIN 200 MG/5ML
500 POWDER, FOR SUSPENSION ORAL ONCE
Status: COMPLETED | OUTPATIENT
Start: 2017-11-05 | End: 2017-11-05

## 2017-11-05 RX ORDER — WARFARIN SODIUM 2.5 MG/1
2.5 TABLET ORAL
COMMUNITY
End: 2018-01-29 | Stop reason: HOSPADM

## 2017-11-05 RX ORDER — LANOLIN ALCOHOL/MO/W.PET/CERES
6 CREAM (GRAM) TOPICAL
Status: DISCONTINUED | OUTPATIENT
Start: 2017-11-05 | End: 2017-11-11 | Stop reason: HOSPADM

## 2017-11-05 RX ORDER — POTASSIUM CHLORIDE 750 MG/1
10 TABLET, EXTENDED RELEASE ORAL ONCE
Status: COMPLETED | OUTPATIENT
Start: 2017-11-05 | End: 2017-11-05

## 2017-11-05 RX ORDER — METHYLPREDNISOLONE SODIUM SUCCINATE 40 MG/ML
40 INJECTION, POWDER, LYOPHILIZED, FOR SOLUTION INTRAMUSCULAR; INTRAVENOUS EVERY 12 HOURS SCHEDULED
Status: DISCONTINUED | OUTPATIENT
Start: 2017-11-05 | End: 2017-11-06

## 2017-11-05 RX ORDER — SODIUM CHLORIDE FOR INHALATION 0.9 %
3 VIAL, NEBULIZER (ML) INHALATION ONCE
Status: COMPLETED | OUTPATIENT
Start: 2017-11-05 | End: 2017-11-05

## 2017-11-05 RX ORDER — IPRATROPIUM BROMIDE AND ALBUTEROL SULFATE 2.5; .5 MG/3ML; MG/3ML
3 SOLUTION RESPIRATORY (INHALATION)
Status: DISCONTINUED | OUTPATIENT
Start: 2017-11-05 | End: 2017-11-05

## 2017-11-05 RX ADMIN — IPRATROPIUM BROMIDE 1 MG: 0.5 SOLUTION RESPIRATORY (INHALATION) at 06:43

## 2017-11-05 RX ADMIN — ALBUTEROL SULFATE 10 MG: 2.5 SOLUTION RESPIRATORY (INHALATION) at 06:42

## 2017-11-05 RX ADMIN — MELATONIN TAB 3 MG 6 MG: 3 TAB at 22:24

## 2017-11-05 RX ADMIN — METHYLPREDNISOLONE SODIUM SUCCINATE 40 MG: 40 INJECTION, POWDER, FOR SOLUTION INTRAMUSCULAR; INTRAVENOUS at 20:58

## 2017-11-05 RX ADMIN — POTASSIUM CHLORIDE 10 MEQ: 750 TABLET, EXTENDED RELEASE ORAL at 16:31

## 2017-11-05 RX ADMIN — LEVALBUTEROL HYDROCHLORIDE 1.25 MG: 1.25 SOLUTION, CONCENTRATE RESPIRATORY (INHALATION) at 19:14

## 2017-11-05 RX ADMIN — AZITHROMYCIN 500 MG: 200 POWDER, FOR SUSPENSION ORAL at 07:42

## 2017-11-05 RX ADMIN — ISODIUM CHLORIDE 3 ML: 0.03 SOLUTION RESPIRATORY (INHALATION) at 06:43

## 2017-11-05 RX ADMIN — METHYLPREDNISOLONE SODIUM SUCCINATE 125 MG: 125 INJECTION, POWDER, FOR SOLUTION INTRAMUSCULAR; INTRAVENOUS at 06:53

## 2017-11-05 RX ADMIN — IPRATROPIUM BROMIDE 0.5 MG: 0.5 SOLUTION RESPIRATORY (INHALATION) at 19:14

## 2017-11-05 NOTE — RESPIRATORY THERAPY NOTE
RT Protocol Note  Merrick Ansari 69 y o  male MRN: [id number]  Unit/Bed#: [facility] 629-01 Encounter: [id number]    Assessment    Principal Problem:    Severe COPD with acute exacerbation  Active Problems:    Iron deficiency anemia    Shortness of breath    Supratherapeutic INR      Home Pulmonary Medications:        Past Medical History:   Diagnosis Date    Anxiety     Aortic regurgitation     Atrial flutter (HCC)     Chronic respiratory failure (HCC)     Colon polyp     COPD (chronic obstructive pulmonary disease) (HCC)     GI bleed     Hypertension     Iron deficiency anemia     MGUS (monoclonal gammopathy of unknown significance)     Osteoarthritis of hip     PAC (premature atrial contraction)     Paroxysmal atrial fibrillation (HCC)     Patent foramen ovale     Pulmonary artery hypertension      Social History     Social History    Marital status: /Civil Union     Spouse name: N/A    Number of children: N/A    Years of education: N/A     Social History Main Topics    Smoking status: Former Smoker     Packs/day: 1 50     Years: 46 00     Types: Cigarettes     Quit date: 2012    Smokeless tobacco: Never Used    Alcohol use No    Drug use: No    Sexual activity: Not Asked     Other Topics Concern    None     Social History Narrative    None       Subjective         Objective    Physical Exam:   Assessment Type: (P) Assess only  General Appearance: (P) Awake  Respiratory Pattern: (P) Dyspnea with exertion  Bilateral Breath Sounds: (P) Clear, Diminished  Cough: (P) Non-productive, Strong  O2 Device: (P) 4lpm    Vitals:  Blood pressure 128/57, pulse (!) 109, temperature 98 5 °F (36 9 °C), temperature source Oral, resp  rate 20, weight 65 3 kg (144 lb), SpO2 99 %  Imaging and other studies: I have personally reviewed pertinent reports  O2 Device: (P) 4lpm     Plan    Respiratory Plan: (P) Mild Distress pathway        Resp Comments: (P) Pt presents with exacerbation of COPD  Vineet CURTIS diminished and clear with strong non-productive couph  Pt does wear 4lpm O2 at home   Will begin UDN with 1 25mg xopenex/NSS TID/prn

## 2017-11-05 NOTE — ED ATTENDING ATTESTATION
Hugh Aggarwal MD, saw and evaluated the patient  I have discussed the patient with the resident/non-physician practitioner and agree with the resident's/non-physician practitioner's findings, Plan of Care, and MDM as documented in the resident's/non-physician practitioner's note, except where noted  All available labs and Radiology studies were reviewed  At this point I agree with the current assessment done in the Emergency Department  I have conducted an independent evaluation of this patient including a focused history and a physical exam     68-year-old male, history of COPD, history of previous intubations for COPD, presenting to the emergency department for evaluation of increased shortness of breath  Patient states that this was present on awakening this morning  Patient reports that he took his pulse ox at home and was found to be in the 60s on his baseline 4 L nasal cannula  Patient denies any increased cough or purulent sputum  No fever  No chest pain  No abdominal pain, nausea, vomiting, diarrhea, dysuria, hematuria, urinary frequency  Ten systems reviewed negative except as noted  Stat elderly male sitting upright in the stretcher, getting a nebulized treatment during my evaluation  Head is normocephalic and atraumatic  Eyelids and lashes are normal   Mucous membranes are moist   Neck is nontender with full range of motion  Lungs are diminished throughout with some faint end-expiratory wheezing at the bases  Heart is tachycardic and irregular without any murmurs rubs or gallops  Abdomen is soft and nontender  Extremities are unremarkable in appearance  There is no calf swelling or tenderness  Skin is warm without any rashes  GCS is 15, cranial nerves 2-12 are intact, motor is 5/5 bilateral upper lower extremities  Assessment and plan:  68-year-old male, presenting to the emergency department for evaluation of shortness of breath which is likely secondary to COPD    Patient will be treated with Heart neb, Solu-Medrol, will obtain a chest x-ray and lab work, and the patient will be reassessed but likely need admission  Labs Reviewed   CBC AND DIFFERENTIAL - Abnormal        Result Value Ref Range Status    RBC 3 02 (*) 3 88 - 5 62 Million/uL Final    Hemoglobin 9 3 (*) 12 0 - 17 0 g/dL Final    Hematocrit 28 9 (*) 36 5 - 49 3 % Final    Lymphocytes Relative 47 (*) 14 - 44 % Final    WBC 8 92  4 31 - 10 16 Thousand/uL Final    MCV 96  82 - 98 fL Final    MCH 30 8  26 8 - 34 3 pg Final    MCHC 32 2  31 4 - 37 4 g/dL Final    RDW 14 6  11 6 - 15 1 % Final    MPV 9 2  8 9 - 12 7 fL Final    Platelets 107  965 - 390 Thousands/uL Final    nRBC 0  /100 WBCs Final    Neutrophils Relative 45  43 - 75 % Final    Monocytes Relative 6  4 - 12 % Final    Eosinophils Relative 2  0 - 6 % Final    Basophils Relative 0  0 - 1 % Final    Neutrophils Absolute 3 96  1 85 - 7 62 Thousands/µL Final    Lymphocytes Absolute 4 17  0 60 - 4 47 Thousands/µL Final    Monocytes Absolute 0 53  0 17 - 1 22 Thousand/µL Final    Eosinophils Absolute 0 18  0 00 - 0 61 Thousand/µL Final    Basophils Absolute 0 01  0 00 - 0 10 Thousands/µL Final   BASIC METABOLIC PANEL - Abnormal     CO2 33 (*) 21 - 32 mmol/L Final    Glucose 149 (*) 65 - 140 mg/dL Final    Comment:   If the patient is fasting, the ADA then defines impaired fasting glucose as > 100 mg/dL and diabetes as > or equal to 123 mg/dL  Specimen collection should occur prior to Sulfasalazine administration due to the potential for falsely depressed results  Specimen collection should occur prior to Sulfapyridine administration due to the potential for falsely elevated results      Sodium 140  136 - 145 mmol/L Final    Potassium 3 9  3 5 - 5 3 mmol/L Final    Chloride 103  100 - 108 mmol/L Final    Anion Gap 4  4 - 13 mmol/L Final    BUN 18  5 - 25 mg/dL Final    Creatinine 0 78  0 60 - 1 30 mg/dL Final    Comment: Standardized to IDMS reference method    Calcium 8 9  8 3 - 10 1 mg/dL Final    eGFR 92  ml/min/1 73sq m Final    Narrative:     National Kidney Disease Education Program recommendations are as follows:  GFR calculation is accurate only with a steady state creatinine  Chronic Kidney disease less than 60 ml/min/1 73 sq  meters  Kidney failure less than 15 ml/min/1 73 sq  meters  PROTIME-INR - Abnormal     Protime 44 4 (*) 12 1 - 14 4 seconds Final    INR 4 61 (*) 0 86 - 1 16 Final   APTT - Abnormal     PTT 81 (*) 23 - 35 seconds Final    Narrative: Therapeutic Heparin Range = 60-90 seconds   POCT TROPONIN - Normal    POC Troponin I 0 00  0 00 - 0 08 ng/ml Final    Specimen Type VENOUS   Final    Narrative:     [employer] i-Stat handheld analyzer 99% cutoff is > 0 08ng/mL in network Emergency Departments    o cTnI 99% cutoff is useful only when applied to patients in the clinical setting of myocardial ischemia  o cTnI 99% cutoff should be interpreted in the context of clinical history, ECG findings and possibly cardiac imaging to establish correct diagnosis  o cTnI 99% cutoff may be suggestive but clearly not indicative of a coronary event without the clinical setting of myocardial ischemia  XR chest 2 views   Final Result      Emphysematous changes are noted  Otherwise clear lungs            Workstation performed: [id number]

## 2017-11-05 NOTE — H&P
History and Physical - [facility]    Patient Information: Danny Roman 69 y o  male MRN: [id number]  Unit/Bed#: ED 14 Encounter: [id number]  Admitting Physician: Christi Clay MD  PCP: Ricky Soto MD  Date of Admission:  11/05/17    Assessment/Plan:    Hospital Problem List:     Principal Problem:    Severe COPD with acute exacerbation  Active Problems:    Iron deficiency anemia    Shortness of breath    Supratherapeutic INR      Plan for the Primary Problem(s):  · Acute COPD exacerbation:  · Possibly exacerbated by a viral infection - patient denies any worsening cough  · Patient has known history of severe COPD on 4 L nasal cannula at baseline continuously  · Has been admitted multiple times with COPD exacerbation  · Currently on baseline home oxygen  · Symptoms improved after steroids and nebulizers in the ER  · Will admit the patient and start the patient on 40 mg IV Solu-Medrol b i d  can switch to p o  in 24-48 hours once patient symptoms improved significantly  · Will hold his home Breo and Spiriva for now and start the patient on DuoNeb every 4 hours  · Will hold off on antibiotic for now as patient denies any significant cough or secretions  · Consult pulmonology  · Patient will benefit from outpatient pulmonary rehab    Plan for Additional Problems:   · History of paroxysmal atrial fibrillation:  Currently in normal sinus rhythm  Continue Coumadin  · Supratherapeutic INR:  Hold Coumadin dose tonight as INR 4 6 on admission  Resume Coumadin tomorrow  Patient currently taking 2 5 mg daily Coumadin  · Iron-deficiency anemia:  Patient has required transfusions in the past year follows with pulmonology  Hemoglobin just a little lower than baseline  No evidence of bleeding  Continue to monitor  Continue iron tablets      VTE Prophylaxis: Warfarin (Coumadin)  / sequential compression device   Code Status: full  POLST: POLST form is not discussed and not completed at this time     Anticipated Length of Stay:  Patient will be admitted on an Inpatient basis with an anticipated length of stay of  > 2 midnights  Justification for Hospital Stay:  COPD exacerbation    Total Time for Visit, including Counseling / Coordination of Care: 1 hour  Greater than 50% of this total time spent on direct patient counseling and coordination of care  Chief Complaint:   Worsening shortness of breath and hypoxia    History of Present Illness:    Felicia Bamberger is a 69 y o  male who presents with worsening shortness of breath that woke him up from sleep this morning  Patient has known history of severe COPD and currently uses 4 L of oxygen at baseline  When he woke up this morning he was feeling short of breath  He checked his pulse ox at home and it was 62%  He used his nebulizer but his symptoms did not improve and also his hypoxia was persistent, so he called EMS and was brought to the hospital   He was feeling fine up until last night when he went to bed  He denies any fever, cough, chest pain or palpitations  He said that his daughter works as a dental hygienist is sick with cold  But he currently denies any sore throat cough or cold  He was recently admitted for COPD exacerbation the end of September  He follows with Dr James Meng as outpatient  Any recently saw him in the office on 10/16  As per the patient and the family he was advised to do some aerobic exercises but he has not been doing that  In the ER patient's symptoms improved significantly after getting 125 mg IV Solu-Medrol, albuterol nebulizer and azithromycin  He also has history of proximal atrial fibrillation and follows with Dr Wooten Said  He was started on Coumadin by him  He currently denies any palpitations or chest pain  He has known history of iron-deficiency anemia, follows with Dr Edi Montes as outpatient    He has had required transfusions in the past     Review of Systems:    Review of Systems Constitutional: Negative for activity change and appetite change  HENT: Negative for congestion and sore throat  Eyes: Negative for photophobia and visual disturbance  Respiratory: Positive for shortness of breath and wheezing  Negative for cough  Cardiovascular: Negative for chest pain and palpitations  Gastrointestinal: Negative for abdominal distention, abdominal pain, diarrhea, nausea and vomiting  Endocrine: Negative for polyuria  Genitourinary: Negative for dysuria and flank pain  Musculoskeletal: Negative for arthralgias and myalgias  Skin: Negative for rash  Neurological: Negative for dizziness and light-headedness  Psychiatric/Behavioral: Negative for behavioral problems  Past Medical and Surgical History:     Past Medical History:   Diagnosis Date    Anxiety     Aortic regurgitation     Atrial flutter (HCC)     Chronic respiratory failure (HCC)     Colon polyp     COPD (chronic obstructive pulmonary disease) (HCC)     GI bleed     Hypertension     Iron deficiency anemia     MGUS (monoclonal gammopathy of unknown significance)     Osteoarthritis of hip     PAC (premature atrial contraction)     Paroxysmal atrial fibrillation (HCC)     Patent foramen ovale     Pulmonary artery hypertension        Past Surgical History:   Procedure Laterality Date    APPENDECTOMY      COLON SURGERY      HERNIA REPAIR      JOINT REPLACEMENT      KNEE SURGERY         Meds/Allergies:    Prior to Admission medications    Medication Sig Start Date End Date Taking?  Authorizing Provider   Albuterol Sulfate (PROVENTIL HFA IN) Inhale 2 puffs as needed    Historical Provider, MD   citalopram (CELEXA) 20 mg tablet Take 20 mg by mouth daily    Historical Provider, MD   diltiazem (DILTIAZEM CD) 180 mg 24 hr capsule Take 180 mg by mouth daily    Historical Provider, MD   ferrous sulfate 325 (65 Fe) mg tablet Take 325 mg by mouth daily    Historical Provider, MD   Fluticasone Furoate-Vilanterol (BREO ELLIPTA IN) Inhale 1 puff daily    Historical Provider, MD   furosemide (LASIX) 20 mg tablet Take 10 mg by mouth daily    Historical Provider, MD   Potassium Chloride (KLOR-CON 10 PO) Take 10 mEq by mouth daily    Historical Provider, MD   tiotropium (SPIRIVA HANDIHALER) 18 mcg inhalation capsule Place 1 puff into inhaler and inhale daily    Historical Provider, MD   predniSONE 20 mg tablet 4 tabs PO once  daily for 3 days  3 tabs PO once daily for 3 days  2 tabs PO once daily for 3 days  1 tabs PO once daily for 3 days and stop 9/8/17 11/5/17  Ruby Rae MD     I have reviewed home medications with patient personally  Allergies: Allergies   Allergen Reactions    Penicillins Anaphylaxis       Social History:     Marital Status: /Civil Union   Occupation:   Patient Pre-hospital Living Situation:   Patient Pre-hospital Level of Mobility:   Patient Pre-hospital Diet Restrictions:   Substance Use History:   History   Alcohol Use No     History   Smoking Status    Former Smoker    Packs/day: 1 50    Years: 46 00    Types: Cigarettes    Quit date: 2012   Smokeless Tobacco    Never Used     History   Drug Use No       Family History:    Family History   Problem Relation Age of Onset    Cancer Mother     Heart attack Father     Sudden death Father        Physical Exam:     Vitals:   Blood Pressure: 149/63 (11/05/17 1100)  Pulse: 104 (11/05/17 1100)  Temperature: 98 8 °F (37 1 °C) (11/05/17 0618)  Temp Source: Oral (11/05/17 0618)  Respirations: (!) 33 (11/05/17 1100)  Weight - Scale: 65 3 kg (144 lb) (11/05/17 0615)  SpO2: 100 % (11/05/17 1100)    Physical Exam   Constitutional: He is oriented to person, place, and time  No distress  He appeared comfortable when I saw him  He was able to finish full sentences without any trouble breathing  HENT:   Head: Normocephalic and atraumatic  Eyes: EOM are normal    Neck: Neck supple     Cardiovascular: Normal rate, regular rhythm, normal heart sounds and intact distal pulses  Exam reveals no gallop and no friction rub  No murmur heard  Pulmonary/Chest: Effort normal  No respiratory distress  He has no wheezes  He has no rales  Decreased breath sounds bilaterally, no audible wheezing   Abdominal: Soft  Bowel sounds are normal  He exhibits no distension  There is no tenderness  Musculoskeletal: Normal range of motion  He exhibits no edema  Neurological: He is alert and oriented to person, place, and time  Skin: Skin is warm  Psychiatric: He has a normal mood and affect  Additional Data:     Lab Results: I have personally reviewed pertinent reports  Results from last 7 days  Lab Units 11/05/17  0653   WBC Thousand/uL 8 92   HEMOGLOBIN g/dL 9 3*   HEMATOCRIT % 28 9*   PLATELETS Thousands/uL 307   NEUTROS PCT % 45   LYMPHS PCT % 47*   MONOS PCT % 6   EOS PCT % 2       Results from last 7 days  Lab Units 11/05/17  0653   SODIUM mmol/L 140   POTASSIUM mmol/L 3 9   CHLORIDE mmol/L 103   CO2 mmol/L 33*   BUN mg/dL 18   CREATININE mg/dL 0 78   CALCIUM mg/dL 8 9   GLUCOSE RANDOM mg/dL 149*       Results from last 7 days  Lab Units 11/05/17  0654   INR  4 61*       Imaging: I have personally reviewed pertinent reports  Xr Chest 2 Views    Result Date: 11/5/2017  Narrative: CHEST - DUAL ENERGY INDICATION:  Shortness of breath  COMPARISON:  Chest x-ray from 8/30/2017, and CT pulmonary angiogram from 8/30/2017  VIEWS:  PA (including soft tissue/bone algorithms) and lateral projections IMAGES:  5 FINDINGS:     Cardiomediastinal silhouette appears unremarkable  Emphysematous changes are noted consistent with chronic obstructive pulmonary disease  No airspace opacity to suggest focal pneumonia  No pneumothorax or pleural effusion  Visualized osseous structures appear within normal limits for the patient's age  Impression: Emphysematous changes are noted  Otherwise clear lungs   Workstation performed: [id number]       Allscripts / Epic Records Reviewed: Yes     ** Please Note: This note has been constructed using a voice recognition system   **

## 2017-11-05 NOTE — ED PROVIDER NOTES
History  Chief Complaint   Patient presents with    Shortness of Breath     pt brought in by ems pt c/o sob this morning hx of copd      Patient is a 26-year-old male with a past medical history significant for COPD on 4 L home O2, AFib on Coumadin, and multiple other comorbidities who presents with shortness of breath  Patient reports that he woke up this morning and was feeling considerably more short of breath than baseline, checked his home pulse ox and noted his oxygen to be 60%  He gave himself a nebulizer treatment and felt mild improvement, then called EMS  Patient received another nebulizer treatment on route by EMS  Currently, patient says that he feels slightly improved but not back to his baseline  Patient reports that he always has a cough and it is not changed in terms of severity or sputum  Denies fevers, chills, chest pain, palpitations  Does report that his daughter is sick with a cold  Patient does have a history previous ICU admissions for his COPD and has been previously intubated as well  Assessment and plan:  Shortness of breath most likely secondary to a COPD exacerbation  Will give a 1 hour long nebulizer treatment, steroids, azithromycin  Chest x-ray to rule out pneumonia  Cardiac workup  Reassess  Prior to Admission Medications   Prescriptions Last Dose Informant Patient Reported? Taking?    Albuterol Sulfate (PROVENTIL HFA IN)   Yes No   Sig: Inhale 2 puffs as needed   Fluticasone Furoate-Vilanterol (BREO ELLIPTA IN)   Yes No   Sig: Inhale 1 puff daily   Potassium Chloride (KLOR-CON 10 PO)   Yes No   Sig: Take 10 mEq by mouth daily   citalopram (CELEXA) 20 mg tablet   Yes No   Sig: Take 20 mg by mouth daily   diltiazem (DILTIAZEM CD) 180 mg 24 hr capsule   Yes No   Sig: Take 180 mg by mouth daily   ferrous sulfate 325 (65 Fe) mg tablet   Yes No   Sig: Take 325 mg by mouth daily   furosemide (LASIX) 20 mg tablet   Yes No   Sig: Take 10 mg by mouth daily   tiotropium (Becky Barks) 18 mcg inhalation capsule   Yes No   Sig: Place 1 puff into inhaler and inhale daily   warfarin (COUMADIN) 2 5 mg tablet   Yes Yes   Sig: Take 2 5 mg by mouth daily      Facility-Administered Medications: None       Past Medical History:   Diagnosis Date    Anxiety     Aortic regurgitation     Atrial flutter (HCC)     Chronic respiratory failure (HCC)     Colon polyp     COPD (chronic obstructive pulmonary disease) (HCC)     GI bleed     Hypertension     Iron deficiency anemia     MGUS (monoclonal gammopathy of unknown significance)     Osteoarthritis of hip     PAC (premature atrial contraction)     Paroxysmal atrial fibrillation (HCC)     Patent foramen ovale     Pulmonary artery hypertension        Past Surgical History:   Procedure Laterality Date    APPENDECTOMY      COLON SURGERY      HERNIA REPAIR      JOINT REPLACEMENT      KNEE SURGERY         Family History   Problem Relation Age of Onset    Cancer Mother     Heart attack Father     Sudden death Father      I have reviewed and agree with the history as documented  Social History   Substance Use Topics    Smoking status: Former Smoker     Packs/day: 1 50     Years: 46 00     Types: Cigarettes     Quit date: 2012    Smokeless tobacco: Never Used    Alcohol use No        Review of Systems   Constitutional: Negative for chills and fever  HENT: Negative for congestion  Eyes: Negative for visual disturbance  Respiratory: Positive for shortness of breath  Negative for cough, chest tightness, wheezing and stridor  Cardiovascular: Negative for chest pain and palpitations  Gastrointestinal: Negative for abdominal pain, diarrhea, nausea and vomiting  Genitourinary: Negative for dysuria and hematuria  Musculoskeletal: Negative for back pain, neck pain and neck stiffness  Skin: Negative for pallor  Neurological: Negative for dizziness, light-headedness and headaches     Psychiatric/Behavioral: Negative for sleep disturbance  All other systems reviewed and are negative  Physical Exam  ED Triage Vitals   Temperature Pulse Respirations Blood Pressure SpO2   11/05/17 0618 11/05/17 0615 11/05/17 0615 11/05/17 0615 11/05/17 0615   98 8 °F (37 1 °C) (!) 108 22 155/58 100 %      Temp Source Heart Rate Source Patient Position - Orthostatic VS BP Location FiO2 (%)   11/05/17 0618 11/05/17 0615 11/05/17 0615 11/05/17 0615 --   Oral Monitor Lying Right arm       Pain Score       11/05/17 0845       No Pain           Orthostatic Vital Signs  Vitals:    11/05/17 1500 11/05/17 1530 11/05/17 1606 11/05/17 1900   BP: 116/59 119/59 128/57 117/68   Pulse: (!) 106 (!) 106 (!) 109 (!) 111   Patient Position - Orthostatic VS: Lying Lying Lying        Physical Exam   Constitutional: He is oriented to person, place, and time  He appears well-developed and well-nourished  No distress  HENT:   Head: Normocephalic and atraumatic  Right Ear: External ear normal    Left Ear: External ear normal    Nose: Nose normal    Mouth/Throat: Oropharynx is clear and moist  No oropharyngeal exudate  Eyes: Conjunctivae and EOM are normal  Pupils are equal, round, and reactive to light  Neck: Normal range of motion  Neck supple  Cardiovascular: Regular rhythm, normal heart sounds and intact distal pulses  Exam reveals no gallop and no friction rub  No murmur heard  Mildly tachycardic   Pulmonary/Chest: No respiratory distress  He has wheezes  He has no rales  He exhibits no tenderness  Mildly tachypneic and severely diminished breath sounds   Abdominal: Soft  Bowel sounds are normal  He exhibits no distension and no mass  There is no tenderness  There is no guarding  Musculoskeletal: Normal range of motion  He exhibits no edema or tenderness  Neurological: He is alert and oriented to person, place, and time  Moves all 4 extremities    Skin: Skin is warm and dry  Capillary refill takes less than 2 seconds   He is not diaphoretic  No erythema  Psychiatric: He has a normal mood and affect  His behavior is normal    Nursing note and vitals reviewed  ED Medications  Medications    EMS REPLENISHMENT MED (not administered)   citalopram (CeleXA) tablet 20 mg (not administered)   diltiazem (CARDIZEM CD) 24 hr capsule 180 mg (not administered)   ferrous sulfate tablet 325 mg (not administered)   furosemide (LASIX) tablet 40 mg (not administered)   methylPREDNISolone sodium succinate (Solu-MEDROL) injection 40 mg (not administered)   levalbuterol (XOPENEX) inhalation solution 1 25 mg (1 25 mg Nebulization Given 11/5/17 1914)   ipratropium (ATROVENT) 0 02 % inhalation solution 0 5 mg (0 5 mg Nebulization Given 11/5/17 1914)   albuterol inhalation solution 10 mg (10 mg Nebulization Given 11/5/17 0642)   ipratropium (ATROVENT) 0 02 % inhalation solution 1 mg (1 mg Nebulization Given 11/5/17 0643)   sodium chloride 0 9 % inhalation solution 3 mL (3 mL Nebulization Given 11/5/17 0643)   methylPREDNISolone sodium succinate (Solu-MEDROL) injection 125 mg (125 mg Intravenous Given 11/5/17 0653)   azithromycin (ZITHROMAX) oral suspension 500 mg (500 mg Oral Given 11/5/17 0742)   potassium chloride (K-DUR,KLOR-CON) CR tablet 10 mEq (10 mEq Oral Given 11/5/17 1631)       Diagnostic Studies  Results Reviewed     Procedure Component Value Units Date/Time    Protime-INR [[id number]]  (Abnormal) Collected:  11/05/17 0654    Lab Status:  Final result Specimen:  Blood from Arm, Right Updated:  11/05/17 0812     Protime 44 4 (H) seconds      INR 4 61 (H)    APTT [[id number]]  (Abnormal) Collected:  11/05/17 0654    Lab Status:  Final result Specimen:  Blood from Arm, Right Updated:  11/05/17 0812     PTT 81 (H) seconds     Narrative:          Therapeutic Heparin Range = 60-90 seconds    Basic metabolic panel [[id number]]  (Abnormal) Collected:  11/05/17 0653    Lab Status:  Final result Specimen:  Blood from Arm, Right Updated:  11/05/17 0723     Sodium 140 mmol/L      Potassium 3 9 mmol/L      Chloride 103 mmol/L      CO2 33 (H) mmol/L      Anion Gap 4 mmol/L      BUN 18 mg/dL      Creatinine 0 78 mg/dL      Glucose 149 (H) mg/dL      Calcium 8 9 mg/dL      eGFR 92 ml/min/1 73sq m     Narrative:         National Kidney Disease Education Program recommendations are as follows:  GFR calculation is accurate only with a steady state creatinine  Chronic Kidney disease less than 60 ml/min/1 73 sq  meters  Kidney failure less than 15 ml/min/1 73 sq  meters  POCT troponin [[id number]]  (Normal) Collected:  11/05/17 0657    Lab Status:  Final result Updated:  11/05/17 0710     POC Troponin I 0 00 ng/ml      Specimen Type VENOUS    Narrative:         [employer] i-Stat handheld analyzer 99% cutoff is > 0 08ng/mL in network Emergency Departments    o cTnI 99% cutoff is useful only when applied to patients in the clinical setting of myocardial ischemia  o cTnI 99% cutoff should be interpreted in the context of clinical history, ECG findings and possibly cardiac imaging to establish correct diagnosis  o cTnI 99% cutoff may be suggestive but clearly not indicative of a coronary event without the clinical setting of myocardial ischemia      CBC and differential [[id number]]  (Abnormal) Collected:  11/05/17 0653    Lab Status:  Final result Specimen:  Blood from Arm, Right Updated:  11/05/17 0704     WBC 8 92 Thousand/uL      RBC 3 02 (L) Million/uL      Hemoglobin 9 3 (L) g/dL      Hematocrit 28 9 (L) %      MCV 96 fL      MCH 30 8 pg      MCHC 32 2 g/dL      RDW 14 6 %      MPV 9 2 fL      Platelets 447 Thousands/uL      nRBC 0 /100 WBCs      Neutrophils Relative 45 %      Lymphocytes Relative 47 (H) %      Monocytes Relative 6 %      Eosinophils Relative 2 %      Basophils Relative 0 %      Neutrophils Absolute 3 96 Thousands/µL      Lymphocytes Absolute 4 17 Thousands/µL      Monocytes Absolute 0 53 Thousand/µL      Eosinophils Absolute 0 18 Thousand/µL      Basophils Absolute 0 01 Thousands/µL                  XR chest 2 views   Final Result by Bree Wolfe MD (11/05 7852)      Emphysematous changes are noted  Otherwise clear lungs  Workstation performed: [id number]               Procedures  ECG 12 Lead Documentation  Date/Time: 11/5/2017 6:45 AM  Performed by: Savi Erazo  Authorized by: Liliana Lagos     Indications / Diagnosis:  Sob  Patient location:  ED  Previous ECG:     Previous ECG:  Compared to current    Comparison ECG info:  08/30/2017    Similarity:  No change  Interpretation:     Interpretation: non-specific    Rate:     ECG rate:  106    ECG rate assessment: tachycardic    Rhythm:     Rhythm: sinus rhythm    Ectopy:     Ectopy: PAC    QRS:     QRS axis:  Normal    QRS intervals:  Normal  Conduction:     Conduction: normal    ST segments:     ST segments:  Non-specific  T waves:     T waves: flattening      Flattening:  V2          Phone Consults  ED Phone Contact    ED Course  ED Course as of Nov 05 2032   Sun Nov 05, 2017   0827 Hemoglobin: (!) 9 3   0827 Supratherapeutic  INR: (!) 4 61   [id number] Paged SLIM x 2    [id number] 3rd time paging SLIM                                MDM  CritCare Time    Disposition  Final diagnoses:   COPD with acute exacerbation ([facility] )   Supratherapeutic INR   Anemia     Time reflects when diagnosis was documented in both MDM as applicable and the Disposition within this note     Time User Action Codes Description Comment    11/5/2017  8:29 AM Michaela Naranjo Add [J44 1] COPD with acute exacerbation ([facility] )     11/5/2017  8:29 AM Michaela Naranjo Add [R79 1] Supratherapeutic INR     11/5/2017  8:29 AM Abrahan Newton [D64 9] Anemia     11/5/2017 11:56 AM Nelson Arita Modify [R79 1] Supratherapeutic INR       ED Disposition     ED Disposition Condition Comment    Admit  [facility] was agreed to be Admission Status: inpatient status to the service of Dr [facility]   Follow-up Information    None       Current Discharge Medication List      CONTINUE these medications which have NOT CHANGED    Details   warfarin (COUMADIN) 2 5 mg tablet Take 2 5 mg by mouth daily      Albuterol Sulfate (PROVENTIL HFA IN) Inhale 2 puffs as needed      citalopram (CELEXA) 20 mg tablet Take 20 mg by mouth daily      diltiazem (DILTIAZEM CD) 180 mg 24 hr capsule Take 180 mg by mouth daily      ferrous sulfate 325 (65 Fe) mg tablet Take 325 mg by mouth daily      Fluticasone Furoate-Vilanterol (BREO ELLIPTA IN) Inhale 1 puff daily      furosemide (LASIX) 20 mg tablet Take 10 mg by mouth daily      Potassium Chloride (KLOR-CON 10 PO) Take 10 mEq by mouth daily      tiotropium (SPIRIVA HANDIHALER) 18 mcg inhalation capsule Place 1 puff into inhaler and inhale daily           No discharge procedures on file  ED Provider  Attending physically available and evaluated Rickie Phoenix I managed the patient along with the ED Attending      Electronically Signed by         Mack Dinh DO  Resident  11/05/17 2032

## 2017-11-06 ENCOUNTER — GENERIC CONVERSION - ENCOUNTER (OUTPATIENT)
Dept: OTHER | Facility: OTHER | Age: 67
End: 2017-11-06

## 2017-11-06 LAB
ANION GAP SERPL CALCULATED.3IONS-SCNC: 2 MMOL/L (ref 4–13)
BUN SERPL-MCNC: 21 MG/DL (ref 5–25)
CALCIUM SERPL-MCNC: 9 MG/DL (ref 8.3–10.1)
CHLORIDE SERPL-SCNC: 104 MMOL/L (ref 100–108)
CO2 SERPL-SCNC: 36 MMOL/L (ref 21–32)
CREAT SERPL-MCNC: 0.89 MG/DL (ref 0.6–1.3)
ERYTHROCYTE [DISTWIDTH] IN BLOOD BY AUTOMATED COUNT: 14.7 % (ref 11.6–15.1)
GFR SERPL CREATININE-BSD FRML MDRD: 87 ML/MIN/1.73SQ M
GLUCOSE SERPL-MCNC: 172 MG/DL (ref 65–140)
HCT VFR BLD AUTO: 25.7 % (ref 36.5–49.3)
HGB BLD-MCNC: 8.1 G/DL (ref 12–17)
INR PPP: 4.06 (ref 0.86–1.16)
MCH RBC QN AUTO: 30.3 PG (ref 26.8–34.3)
MCHC RBC AUTO-ENTMCNC: 31.5 G/DL (ref 31.4–37.4)
MCV RBC AUTO: 96 FL (ref 82–98)
PLATELET # BLD AUTO: 299 THOUSANDS/UL (ref 149–390)
PMV BLD AUTO: 9.3 FL (ref 8.9–12.7)
POTASSIUM SERPL-SCNC: 4.4 MMOL/L (ref 3.5–5.3)
PROTHROMBIN TIME: 40.1 SECONDS (ref 12.1–14.4)
RBC # BLD AUTO: 2.67 MILLION/UL (ref 3.88–5.62)
SODIUM SERPL-SCNC: 142 MMOL/L (ref 136–145)
WBC # BLD AUTO: 6.86 THOUSAND/UL (ref 4.31–10.16)

## 2017-11-06 PROCEDURE — G8979 MOBILITY GOAL STATUS: HCPCS

## 2017-11-06 PROCEDURE — 85027 COMPLETE CBC AUTOMATED: CPT | Performed by: INTERNAL MEDICINE

## 2017-11-06 PROCEDURE — G8980 MOBILITY D/C STATUS: HCPCS

## 2017-11-06 PROCEDURE — 80048 BASIC METABOLIC PNL TOTAL CA: CPT | Performed by: INTERNAL MEDICINE

## 2017-11-06 PROCEDURE — 94760 N-INVAS EAR/PLS OXIMETRY 1: CPT

## 2017-11-06 PROCEDURE — 97163 PT EVAL HIGH COMPLEX 45 MIN: CPT

## 2017-11-06 PROCEDURE — 85610 PROTHROMBIN TIME: CPT | Performed by: INTERNAL MEDICINE

## 2017-11-06 PROCEDURE — 94640 AIRWAY INHALATION TREATMENT: CPT

## 2017-11-06 PROCEDURE — G8978 MOBILITY CURRENT STATUS: HCPCS

## 2017-11-06 RX ORDER — METHYLPREDNISOLONE SODIUM SUCCINATE 125 MG/2ML
60 INJECTION, POWDER, LYOPHILIZED, FOR SOLUTION INTRAMUSCULAR; INTRAVENOUS EVERY 8 HOURS SCHEDULED
Status: DISCONTINUED | OUTPATIENT
Start: 2017-11-06 | End: 2017-11-07

## 2017-11-06 RX ORDER — LEVALBUTEROL 1.25 MG/.5ML
1.25 SOLUTION, CONCENTRATE RESPIRATORY (INHALATION) 4 TIMES DAILY
Status: DISCONTINUED | OUTPATIENT
Start: 2017-11-06 | End: 2017-11-06

## 2017-11-06 RX ORDER — LEVALBUTEROL 1.25 MG/.5ML
1.25 SOLUTION, CONCENTRATE RESPIRATORY (INHALATION) EVERY 6 HOURS
Status: DISCONTINUED | OUTPATIENT
Start: 2017-11-06 | End: 2017-11-06

## 2017-11-06 RX ORDER — LEVALBUTEROL 1.25 MG/.5ML
1.25 SOLUTION, CONCENTRATE RESPIRATORY (INHALATION)
Status: DISCONTINUED | OUTPATIENT
Start: 2017-11-06 | End: 2017-11-11 | Stop reason: HOSPADM

## 2017-11-06 RX ADMIN — METHYLPREDNISOLONE SODIUM SUCCINATE 60 MG: 125 INJECTION, POWDER, FOR SOLUTION INTRAMUSCULAR; INTRAVENOUS at 14:30

## 2017-11-06 RX ADMIN — MELATONIN TAB 3 MG 6 MG: 3 TAB at 22:14

## 2017-11-06 RX ADMIN — FUROSEMIDE 40 MG: 40 TABLET ORAL at 08:37

## 2017-11-06 RX ADMIN — IPRATROPIUM BROMIDE 0.5 MG: 0.5 SOLUTION RESPIRATORY (INHALATION) at 07:33

## 2017-11-06 RX ADMIN — Medication 325 MG: at 08:37

## 2017-11-06 RX ADMIN — DILTIAZEM HYDROCHLORIDE 180 MG: 180 CAPSULE, COATED, EXTENDED RELEASE ORAL at 08:38

## 2017-11-06 RX ADMIN — IPRATROPIUM BROMIDE 0.5 MG: 0.5 SOLUTION RESPIRATORY (INHALATION) at 13:40

## 2017-11-06 RX ADMIN — METHYLPREDNISOLONE SODIUM SUCCINATE 60 MG: 125 INJECTION, POWDER, FOR SOLUTION INTRAMUSCULAR; INTRAVENOUS at 22:14

## 2017-11-06 RX ADMIN — LEVALBUTEROL HYDROCHLORIDE 1.25 MG: 1.25 SOLUTION, CONCENTRATE RESPIRATORY (INHALATION) at 13:40

## 2017-11-06 RX ADMIN — IPRATROPIUM BROMIDE 0.5 MG: 0.5 SOLUTION RESPIRATORY (INHALATION) at 19:34

## 2017-11-06 RX ADMIN — LEVALBUTEROL HYDROCHLORIDE 1.25 MG: 1.25 SOLUTION, CONCENTRATE RESPIRATORY (INHALATION) at 07:33

## 2017-11-06 RX ADMIN — METHYLPREDNISOLONE SODIUM SUCCINATE 40 MG: 40 INJECTION, POWDER, FOR SOLUTION INTRAMUSCULAR; INTRAVENOUS at 08:37

## 2017-11-06 RX ADMIN — CITALOPRAM HYDROBROMIDE 20 MG: 20 TABLET ORAL at 08:37

## 2017-11-06 RX ADMIN — LEVALBUTEROL HYDROCHLORIDE 1.25 MG: 1.25 SOLUTION, CONCENTRATE RESPIRATORY (INHALATION) at 19:34

## 2017-11-06 NOTE — ASSESSMENT & PLAN NOTE
· Continue IV steroids as prescribed by pulmonology  · Continue nebulized bronchodilators currently at 4 times daily  · Supplemental oxygen, taper down as tolerated  · Incentive spirometry  · Antibiotics held due to lack of infectious symptoms  · Will likely need 3-4 days in the hospital with a slow taper as he has failed outpatient therapy and has been readmitted

## 2017-11-06 NOTE — PHYSICAL THERAPY NOTE
Physical Therapy Evaluation    Patient Name: Yohan Juárez    GSIPE'V Date: 11/6/2017     Problem List  Patient Active Problem List   Diagnosis    Acute hypoxic respiratory failure superimposed on chronic hypercapnic respiratory failure     Iron deficiency anemia    Pulmonary artery hypertension    Aortic regurgitation    Severe COPD with acute exacerbation    Low TSH level    Paroxysmal atrial tachycardia (HCC)    Supratherapeutic INR        Past Medical History  Past Medical History:   Diagnosis Date    Anxiety     Aortic regurgitation     Atrial flutter (HCC)     Chronic respiratory failure (HCC)     Colon polyp     COPD (chronic obstructive pulmonary disease) (HCC)     GI bleed     Hypertension     Iron deficiency anemia     MGUS (monoclonal gammopathy of unknown significance)     Osteoarthritis of hip     PAC (premature atrial contraction)     Paroxysmal atrial fibrillation (HCC)     Patent foramen ovale     Pulmonary artery hypertension         Past Surgical History  Past Surgical History:   Procedure Laterality Date    APPENDECTOMY      COLON SURGERY      HERNIA REPAIR      JOINT REPLACEMENT      KNEE SURGERY          11/06/17 1415   Note Type   Note type Eval only   Pain Assessment   Pain Assessment No/denies pain   Pain Score No Pain   Home Living   Type of Home House   Home Layout Multi-level; Able to live on main level with bedroom/bathroom; Bed/bath upstairs  (main bed and bath upstairs)   Home Equipment (o2; pulse ox; nebulizer)   Prior Function   Level of [place] Needs assistance with IADLs; Independent with ADLs and functional mobility   Lives With Spouse; Family;Daughter   Receives Help From Family   ADL Assistance Independent   IADLs Needs assistance   Falls in the last 6 months 0   Comments indep w/ functional mobiltiy- completed pulmonary rehab- 4lo2 at home chronic- requires frequent rests at baseline- rests during stairs and only completes 1x/ day   Restrictions/Precautions   Other Precautions O2;Telemetry;Multiple lines   General   Additional Pertinent History readmission recnet stay for COPD exac 8/30-9/8/17   Family/Caregiver Present No   Cognition   Overall Cognitive Status WFL   Arousal/Participation Alert   Orientation Level Oriented X4   Memory Within functional limits   Following Commands Follows all commands and directions without difficulty   RUE Assessment   RUE Assessment WFL   LUE Assessment   LUE Assessment WFL   RLE Assessment   RLE Assessment WFL   LLE Assessment   LLE Assessment WFL   Coordination   Movements are Fluid and Coordinated 1   Light Touch   RLE Light Touch Grossly intact   LLE Light Touch Grossly intact   Transfers   Sit to Stand 7  Independent   Stand to Sit 7  Independent   Stand pivot 7  Independent   Ambulation/Elevation   Gait pattern WNL   Gait Assistance 7  Independent   Additional items (A for o2 tank only )   Assistive Device None   Distance 200' w/ standing rests 0 LOB- Spo2 w/ ambualtion maintained >92% (+) SOB and BURTON   Stair Management Assistance (pt declines need to attempt- )   Balance   Static Sitting Normal   Dynamic Sitting Normal   Static Standing Good   Dynamic Standing Good   Ambulatory Good   Endurance Deficit   Endurance Deficit Yes   Endurance Deficit Description SOB/BURTON- worse that reported basenli ne per pt report   Activity Tolerance   Medical Staff Made Aware CM and restorative updated    Nurse Made Aware yes-    Assessment   Prognosis Good   Problem List Decreased endurance;Decreased mobility   Assessment Pt is 69 y o  male seen for PT evaluation s/p admit to [place] [id number] on 11/5/2017  Pt w/ recent admit and d/c for acute COPD exac 8/30- 9/8/17  Pt presenting from home w/ worsening SOB/ BURTON w/ SPO2 taken at home in the 60's%- Pt has hx of severe COPD and is on 4LO2 at baseline at home      Current dx/ problem list includes: severe COPD w/ acute exacerbation; also dx w/ low TSH levels; paroxysmal atria tachycardia; and supratherapeutic INR  PT now consulted for assessment of mobility and d/c needs  PMhx and  personal factors currently affecting pt's physical performance include: (see all listed above) as well as home O@ (4L) multiple hospitalizations; limited mobiltiy at baseline 2* severe COPD/ limited activity tolerance    Prior to admission, pt was living w/ spouse and adult daughter in [facility] w/ mobiltiy- has as hx of pulmonary rehab- but not currently active in same- uses 4Lo2 at home  Pablo [place] Upon evaluation, pt currently demonstrating mobility at independent  levels and w/o use of AD- SPO2 w/ activity was monitored and was maintained >92% w/ ambulation >200' w/ standing rest breaks- Pt requires A w/ O2 safe and management only  Pt was instructed in basic seated HEP to increase endurance and energy conservation was reviewed  From a PT perspective pt is safe to d/c home once medically cleared and skilled PT in acute setting is currently not indicated as pt is able to perform mobiltiy tasks at baseline- only limitation is endurance and activity tolerance and restorative is recommended while in hospital for frequent ambulation w/ rest b/t   Pt is agreeable w/ plan and d/c from pt at current time  CM updated on above      Goals   Patient Goals breathe better and go home    Recommendation   Recommendation D/C PT   Equipment Recommended (pt has all at home )   PT - OK to Discharge Yes  (when medically cleared )   Modified [place] Scale   Modified [place] Scale 1   Barthel Index   Feeding 10   Bathing 5   Grooming Score 5   Dressing Score 10   Bladder Score 10   Bowels Score 10   Toilet Use Score 10   Transfers (Bed/Chair) Score 15   Mobility (Level Surface) Score 15   Stairs Score 5   Barthel Index Score 95   Alicia Ramsey, PT

## 2017-11-06 NOTE — SOCIAL WORK
CM attempted to meet w/pt who was sleeping @ bedside  Phoned wife/preferred contact Enma Merlos [phone] who reports pt lives with wife and adult daughter in 3-story home [address]  Pt is independent in ADLs  Pt uses O2 ([facility]) and nebulizer  Pt open w/[facility]  Has hx inpt rehab at [facility]  Pt's preferred pharmacy is [facility] on [phone]  Pt does not have hx MH/D&A tx  Main contact: Wife- Porfirio Dickson ([phone])  Referral made to  [facility] for resumption of care  Will follow for d/c needs  CM reviewed d/c planning process including the following: identifying help at home, patient preference for d/c planning needs, Discharge Lounge, [facility] Meds to Bed program, availability of treatment team to discuss questions or concerns patient and/or family may have regarding understanding medications and recognizing signs and symptoms once discharged  CM also encouraged patient to follow up with all recommended appointments after discharge  Patient advised of importance for patient and family to participate in managing patients medical well being

## 2017-11-06 NOTE — PROGRESS NOTES
Attempted to reapply telemetry leads and patches several times, unable to get telemetry reading on monitor  Shaheen Cohen RN, at bedside

## 2017-11-06 NOTE — ASSESSMENT & PLAN NOTE
· Likely worsened due to Xopenex  · Continue to monitor on telemetry  · Encourage p o  fluid intake  · Continue Cardizem  · Monitor heart rates

## 2017-11-06 NOTE — PLAN OF CARE
Problem: DISCHARGE PLANNING - CARE MANAGEMENT  Goal: Discharge to post-acute care or home with appropriate resources  INTERVENTIONS:  - Conduct assessment to determine patient/family and health care team treatment goals, and need for post-acute services based on payer coverage, community resources, and patient preferences, and barriers to discharge  - Address psychosocial, clinical, and financial barriers to discharge as identified in assessment in conjunction with the patient/family and health care team  - Arrange appropriate level of post-acute services according to patient's   needs and preference and payer coverage in collaboration with the physician and health care team  - Communicate with and update the patient/family, physician, and health care team regarding progress on the discharge plan  - Arrange appropriate transportation to post-acute venues  - Anticipate d/c to home w/[facility]  Outcome: Progressing

## 2017-11-06 NOTE — RESPIRATORY THERAPY NOTE
RT Protocol Note  Rickie Phoenix 69 y o  male MRN: [id number]  Unit/Bed#: [facility] 629-01 Encounter: [id number]    Assessment    Principal Problem:    Severe COPD with acute exacerbation  Active Problems:    Iron deficiency anemia    Paroxysmal atrial tachycardia (HCC)    Supratherapeutic INR      Home Pulmonary Medications:  Breo, spiriva    Past Medical History:   Diagnosis Date    Anxiety     Aortic regurgitation     Atrial flutter (HCC)     Chronic respiratory failure (HCC)     Colon polyp     COPD (chronic obstructive pulmonary disease) (HCC)     GI bleed     Hypertension     Iron deficiency anemia     MGUS (monoclonal gammopathy of unknown significance)     Osteoarthritis of hip     PAC (premature atrial contraction)     Paroxysmal atrial fibrillation (HCC)     Patent foramen ovale     Pulmonary artery hypertension      Social History     Social History    Marital status: /Civil Union     Spouse name: N/A    Number of children: N/A    Years of education: N/A     Social History Main Topics    Smoking status: Former Smoker     Packs/day: 1 50     Years: 46 00     Types: Cigarettes     Quit date: 2012    Smokeless tobacco: Never Used    Alcohol use No    Drug use: No    Sexual activity: Not Asked     Other Topics Concern    None     Social History Narrative    None       Subjective    Subjective Data: sob with excertion    Objective    Physical Exam:   Assessment Type: Pre-treatment  General Appearance: Alert, Awake  Respiratory Pattern: Normal  Chest Assessment: Chest expansion symmetrical  Bilateral Breath Sounds: Diminished, Clear  Cough: Non-productive  O2 Device: nasal cannula  Subjective Data: sob with excertion    Vitals:  Blood pressure 108/67, pulse 94, temperature 98 6 °F (37 °C), temperature source Oral, resp  rate 18, weight 65 3 kg (144 lb), SpO2 95 %  Imaging and other studies: I have personally reviewed pertinent reports        O2 Device: nasal cannula     Plan    Respiratory Plan: No distress/Pulmonary history        Resp Comments: went to explain flutter device and pt refused and asked to take it back    pt has one at home and feels it doesn't work

## 2017-11-06 NOTE — PROGRESS NOTES
Progress Note - Katia Mcpherson 69 y o  male MRN: [id number]    Unit/Bed#: [facility] 629-01 Encounter: [id number]        * Severe COPD with acute exacerbation   Assessment & Plan    · Acute on chronic respiratory failure  · Continue IV steroids as prescribed by pulmonology  · Continue nebulized bronchodilators currently at 4 times daily  · Supplemental oxygen, taper down as tolerated  · Incentive spirometry  · Antibiotics held due to lack of infectious symptoms  · Will likely need 3-4 days in the hospital with a slow taper as he has failed outpatient therapy and has been readmitted            Pharmacologic: Warfarin (Coumadin)  Mechanical VTE Prophylaxis in Place: Yes    Patient Centered Rounds: I have performed bedside rounds with nursing staff today  Education and Discussions with Family / Patient: patient    Time Spent for Care: 20 minutes  More than 50% of total time spent on counseling and coordination of care as described above  Current Length of Stay: 1 day(s)    Current Patient Status: Inpatient   Certification Statement: The patient will continue to require additional inpatient hospital stay due to IV steroids, shortness of breath    Discharge Plan / Estimated Discharge Date: 3-4 days      Code Status: Level 1 - Full Code      Subjective:   Reports continued shortness of breath, he is able to make it to the bathroom but has significant dyspnea afterwards  Mild cough nonproductive  Reports he can't sleep due to multiple medications and blood draws  Denies nausea vomiting abdominal pain or diarrhea    Objective:     Vitals:   Temp (24hrs), Av 4 °F (36 9 °C), Min:97 8 °F (36 6 °C), Max:99 4 °F (37 4 °C)    HR:  [] 100  Resp:  [18-22] 20  BP: ()/(51-68) 103/57  SpO2:  [95 %-100 %] 99 %  Body mass index is 23 96 kg/m²  Input and Output Summary (last 24 hours):        Intake/Output Summary (Last 24 hours) at 17 1630  Last data filed at 17 1430   Gross per 24 hour   Intake 580 ml   Output                3 ml   Net              577 ml       Physical Exam:     Physical Exam   Constitutional: He is oriented to person, place, and time  He appears well-developed and well-nourished  HENT:   Head: Normocephalic and atraumatic  Eyes: EOM are normal  Pupils are equal, round, and reactive to light  Neck: Neck supple  No JVD present  Cardiovascular:   Tachycardic and irregular   Pulmonary/Chest: Effort normal    Very little air entry in lung fields  No discernible wheezing or crackles   Abdominal: Soft  There is no tenderness  Musculoskeletal: He exhibits no edema  Neurological: He is alert and oriented to person, place, and time  Skin: Skin is warm and dry  Additional Data:     Labs:      Results from last 7 days  Lab Units 11/06/17  0538 11/05/17  0653   WBC Thousand/uL 6 86 8 92   HEMOGLOBIN g/dL 8 1* 9 3*   HEMATOCRIT % 25 7* 28 9*   PLATELETS Thousands/uL 299 307   NEUTROS PCT %  --  45   LYMPHS PCT %  --  47*   MONOS PCT %  --  6   EOS PCT %  --  2       Results from last 7 days  Lab Units 11/06/17  0538   SODIUM mmol/L 142   POTASSIUM mmol/L 4 4   CHLORIDE mmol/L 104   CO2 mmol/L 36*   BUN mg/dL 21   CREATININE mg/dL 0 89   CALCIUM mg/dL 9 0   GLUCOSE RANDOM mg/dL 172*       Results from last 7 days  Lab Units 11/06/17  0538   INR  4 06*         Recent Cultures (last 7 days):           Last 24 Hours Medication List:     EMS replenish medication  Does not apply Once   citalopram 20 mg Oral Daily   diltiazem 180 mg Oral Daily   ferrous sulfate 325 mg Oral Daily   furosemide 40 mg Oral Daily   ipratropium 0 5 mg Nebulization TID   levalbuterol 1 25 mg Nebulization TID   methylPREDNISolone sodium succinate 60 mg Intravenous Q8H [address]        Today, Patient Was Seen By: Danya Hoskins MD    ** Please Note: Dragon 360 Dictation voice to text software may have been used in the creation of this document   **

## 2017-11-06 NOTE — CASE MANAGEMENT
Initial Clinical Review    Admission: Date/Time/Statement: 11/5/17 @ 0939     Orders Placed This Encounter   Procedures    Inpatient Admission (expected length of stay for this patient is greater than two midnights)     Standing Status:   Standing     Number of Occurrences:   1     Order Specific Question:   Admitting Physician     Answer:   Melina Yang [[id number]]     Order Specific Question:   Level of Care     Answer:   Med Surg [[id number]]     Order Specific Question:   Estimated length of stay     Answer:   More than 2 Midnights     Order Specific Question:   Certification     Answer:   I certify that inpatient services are medically necessary for this patient for a duration of greater than two midnights  See H&P and MD Progress Notes for additional information about the patient's course of treatment  ED: Date/Time/Mode of Arrival:   ED Arrival Information     Expected Arrival Acuity Means of Arrival Escorted By Service Admission Type    - 11/5/2017 06:09 Emergent Ambulance [facility]) General Medicine Emergency    Arrival Complaint    SOB          Chief Complaint:   Chief Complaint   Patient presents with    Shortness of Breath     pt brought in by ems pt c/o sob this morning hx of copd        History of Illness:   Yazmin Kulkarni is a 69 y o  male who presents with worsening shortness of breath that woke him up from sleep this morning  Patient has known history of severe COPD and currently uses 4 L of oxygen at baseline  When he woke up this morning he was feeling short of breath  He checked his pulse ox at home and it was 62%  He used his nebulizer but his symptoms did not improve and also his hypoxia was persistent, so he called EMS and was brought to the hospital   He was feeling fine up until last night when he went to bed  He denies any fever, cough, chest pain or palpitations  He said that his daughter works as a dental hygienist is sick with cold    But he currently denies any sore throat cough or cold        He was recently admitted for COPD exacerbation the end of September  He follows with Dr Dane Peguero as outpatient  Any recently saw him in the office on 10/16  As per the patient and the family he was advised to do some aerobic exercises but he has not been doing that      In the ER patient's symptoms improved significantly after getting 125 mg IV Solu-Medrol, albuterol nebulizer and azithromycin      He also has history of proximal atrial fibrillation and follows with Dr Dipesh Whittaker  He was started on Coumadin by him  He currently denies any palpitations or chest pain      He has known history of iron-deficiency anemia, follows with Dr Hoang Park as outpatient    He has had required transfusions in the past        ED Vital Signs:   ED Triage Vitals   Temperature Pulse Respirations Blood Pressure SpO2   11/05/17 0618 11/05/17 0615 11/05/17 0615 11/05/17 0615 11/05/17 0615   98 8 °F (37 1 °C) (!) 108 22 155/58 100 %      Temp Source Heart Rate Source Patient Position - Orthostatic VS BP Location FiO2 (%)   11/05/17 0618 11/05/17 0615 11/05/17 0615 11/05/17 0615 --   Oral Monitor Lying Right arm       Pain Score       11/05/17 0845       No Pain        Wt Readings from Last 1 Encounters:   11/05/17 65 3 kg (144 lb)       Vital Signs (abnormal):   Date/Time  Temp  Pulse  Resp  BP  SpO2  O2 Device  Patient Position - Orthostatic VS   11/06/17 0853  --  --  --  --  --  Nasal cannula  --   11/06/17 0733  --  --  --  --  98 %  Nasal cannula  --   11/06/17 0700  98 6 °F (37 °C)  94  18  108/67  100 %  --  Lying   11/06/17 0318  97 8 °F (36 6 °C)  62  18  97/54  99 %  Nasal cannula  Lying   11/05/17 2244  99 4 °F (37 4 °C)  83  22  90/51  100 %  --  --   11/05/17 2100  --  --  --  --  --  Nasal cannula  --   11/05/17 1914  --  --  --  --  98 %  Nasal cannula  --   11/05/17 1900  98 1 °F (36 7 °C)   111  22  117/68  98 %  --  --   11/05/17 1641  --  --  --  --  --  Nasal cannula  --   11/05/17 1606  98 5 °F (36 9 °C)   109  20  128/57  99 %  Nasal cannula  Lying   11/05/17 1530  --   106   26  119/59  100 %  None (Room air)  Lying   11/05/17 1500  --   106   30  116/59  100 %  Nasal cannula  Lying   11/05/17 1430  --   106   23  122/56  100 %  None (Room air)  Lying   11/05/17 1400  --   106   31  110/51  100 %  --  --   11/05/17 1330  --   106  19  131/58  100 %  None (Room air)  Lying   11/05/17 1230  --   120   24   129/107  96 %  None (Room air)  Lying   11/05/17 1100  --  104   33  149/63  100 %  --  --   11/05/17 1057  --  105  22  149/63  100 %  Nasal cannula  --   11/05/17 0845  --   108  18  124/56  100 %  --  --   11/05/17 0638  --  --  --  --  100 %  Nasal cannula  --   11/05/17 0618  98 8 °F (37 1 °C)  --  --  --  --  --  --   11/05/17 0615  --   108  22  155/58  100 %  Nasal cannula  Lying         Abnormal Labs/Diagnostic Test Results:  Lab Units 11/06/17  0538 11/05/17  0653   WBC Thousand/uL 6 86 8 92   HEMOGLOBIN g/dL 8 1* 9 3*   HEMATOCRIT % 25 7* 28 9*   PLATELETS Thousands/uL 299 307   NEUTROS PCT %  --  45   LYMPHS PCT %  --  47*   MONOS PCT %  --  6   EOS PCT %  --  2         Lab Units 11/06/17  0538   SODIUM mmol/L 142   POTASSIUM mmol/L 4 4   CHLORIDE mmol/L 104   CO2 mmol/L 36*   BUN mg/dL 21   CREATININE mg/dL 0 89   CALCIUM mg/dL 9 0   GLUCOSE RANDOM mg/dL 172*         Lab Units 11/06/17  0538   INR   4 06*              CXR  - Emphysematous changes are noted    Otherwise clear lungs    ED Treatment:   Medication Administration from 11/05/2017 0609 to 11/05/2017 1550       Date/Time Order Dose Route Action Action by Comments     11/05/2017 0642 albuterol inhalation solution 10 mg 10 mg Nebulization Given Damon Flores, RT      11/05/2017 0643 ipratropium (ATROVENT) 0 02 % inhalation solution 1 mg 1 mg Nebulization Given Damon Flower, RT      11/05/2017 3954 sodium chloride 0 9 % inhalation solution 3 mL 3 mL Nebulization Given Damon Flower, RT      11/05/2017 3944 methylPREDNISolone sodium succinate (Solu-MEDROL) injection 125 mg 125 mg Intravenous Given Solo Servin RN      02/90/4849 0742 azithromycin (ZITHROMAX) oral suspension 500 mg 500 mg Oral Given Solo Servin RN           Past Medical/Surgical History:   Past Medical History:   Diagnosis Date    Anxiety     Aortic regurgitation     Atrial flutter (HCC)     Chronic respiratory failure (HCC)     Colon polyp     COPD (chronic obstructive pulmonary disease) (HCC)     GI bleed     Hypertension     Iron deficiency anemia     MGUS (monoclonal gammopathy of unknown significance)     Osteoarthritis of hip     PAC (premature atrial contraction)     Paroxysmal atrial fibrillation (HCC)     Patent foramen ovale     Pulmonary artery hypertension      Past Surgical History:   Procedure Laterality Date    APPENDECTOMY        COLON SURGERY        HERNIA REPAIR        JOINT REPLACEMENT        KNEE SURGERY                 Admitting Diagnosis: Shortness of breath [R06 02]  Anemia [D64 9]  COPD with acute exacerbation (HCC) [J44 1]  Supratherapeutic INR [R79 1]    Age/Sex: 69 y o  male    Assessment/Plan:   Plan for the Primary Problem(s):  · Acute COPD exacerbation:  ? Possibly exacerbated by a viral infection - patient denies any worsening cough  ? Patient has known history of severe COPD on 4 L nasal cannula at baseline continuously  ? Has been admitted multiple times with COPD exacerbation  ? Currently on baseline home oxygen  ? Symptoms improved after steroids and nebulizers in the ER  ? Will admit the patient and start the patient on 40 mg IV Solu-Medrol b i d  can switch to p o  in 24-48 hours once patient symptoms improved significantly  ? Will hold his home Breo and Spiriva for now and start the patient on DuoNeb every 4 hours  ? Will hold off on antibiotic for now as patient denies any significant cough or secretions  ? Consult pulmonology  ?  Patient will benefit from outpatient pulmonary rehab   Plan for Additional Problems:   · History of paroxysmal atrial fibrillation:  Currently in normal sinus rhythm  Continue Coumadin  · Supratherapeutic INR:  Hold Coumadin dose tonight as INR 4 6 on admission  Resume Coumadin tomorrow  Patient currently taking 2 5 mg daily Coumadin  · Iron-deficiency anemia:  Patient has required transfusions in the past year follows with pulmonology  Hemoglobin just a little lower than baseline  No evidence of bleeding  Continue to monitor    Continue iron tablets        Admission Orders:  Scheduled Meds:   citalopram 20 mg Oral Daily   diltiazem 180 mg Oral Daily   ferrous sulfate 325 mg Oral Daily   furosemide 40 mg Oral Daily   ipratropium 0 5 mg Nebulization 4x Daily   levalbuterol 1 25 mg Nebulization 4x Daily   methylPREDNISolone sodium succinate 60 mg Intravenous Q8H [address]     Continuous Infusions:    PRN Meds: melatonin    Nursing Orders - Telem - VS q 4 - Sequential compression device to le's- Diet regular house - PT/OT eval  - 4 L NC O2     Consult - Pulmonary  - Impressions:   1- COPD with acute exacerbation     Severe COPD, History of tobacco abuse   Stopped 5 years ago  2- history of PAF on long term anticoagulation  3- supratheraputic INR  4- Iron deficiency anemia        Recommendations:  1- increase Steroid  2- Increase Neb treatments  3- Chest PT including flutter valve     If not improving by tomorrow to add antibiotics for broad spectrum coverage

## 2017-11-06 NOTE — CONSULTS
Pulmonary Consultation   Della Luque 69 y o  male MRN: [id number]  Unit/Bed#: [facility] 629-01 Encounter: [id number]      Reason for consultation:  Severe COPD with acute exacerbation    Requesting physician:     Pete Petersen MD    Impressions:   1- COPD with acute exacerbation    Severe COPD, History of tobacco abuse   Stopped 5 years ago  2- history of PAF on long term anticoagulation  3- supratheraputic INR  4- Iron deficiency anemia      Recommendations:  1- increase Steroid  2- Increase Neb treatments  3- Chest PT including flutter valve    If not improving by tomorrow to add antibiotics for broad spectrum coverage     History of Present Illness   HPI:  Della Luque is a 69 y o  male whowho presents with worsening shortness of breath that woke him up from sleep this morning  Patient has known history of severe COPD and currently uses 4 L of oxygen at baseline  When he woke up this morning he was feeling short of breath  He checked his pulse ox at home and it was 62%  He used his nebulizer but his symptoms did not improve and also his hypoxia was persistent, so he called EMS and was brought to the hospital   He was feeling fine up until last night when he went to bed  He denies any fever, cough, chest pain or palpitations  He said that his daughter works as a dental hygienist is sick with cold  But he currently denies any sore throat cough or cold      Last exacerbation was in September    Review of systems:  12 point review of systems was completed and was otherwise negative except as listed in HPI   SOB, cough but unable to produce sputum, no hemoptysis      Historical Information   Past Medical History:   Diagnosis Date    Anxiety     Aortic regurgitation     Atrial flutter (HCC)     Chronic respiratory failure (HCC)     Colon polyp     COPD (chronic obstructive pulmonary disease) (HCC)     GI bleed     Hypertension     Iron deficiency anemia     MGUS (monoclonal gammopathy of unknown significance)     Osteoarthritis of hip     PAC (premature atrial contraction)     Paroxysmal atrial fibrillation (HCC)     Patent foramen ovale     Pulmonary artery hypertension      Past Surgical History:   Procedure Laterality Date    APPENDECTOMY      COLON SURGERY      HERNIA REPAIR      JOINT REPLACEMENT      KNEE SURGERY       Family History   Problem Relation Age of Onset    Cancer Mother     Heart attack Father     Sudden death Father        Occupational History:reviewed  Social History: smoked heavy till 5 years ago  Meds/Allergies   Current Facility-Administered Medications   Medication Dose Route Frequency     EMS REPLENISHMENT MED   Does not apply Once    citalopram (CeleXA) tablet 20 mg  20 mg Oral Daily    diltiazem (CARDIZEM CD) 24 hr capsule 180 mg  180 mg Oral Daily    ferrous sulfate tablet 325 mg  325 mg Oral Daily    furosemide (LASIX) tablet 40 mg  40 mg Oral Daily    ipratropium (ATROVENT) 0 02 % inhalation solution 0 5 mg  0 5 mg Nebulization TID    levalbuterol (XOPENEX) inhalation solution 1 25 mg  1 25 mg Nebulization TID    melatonin tablet 6 mg  6 mg Oral HS PRN    methylPREDNISolone sodium succinate (Solu-MEDROL) injection 40 mg  40 mg Intravenous Q12H [address]     Prescriptions Prior to Admission   Medication    warfarin (COUMADIN) 2 5 mg tablet    Albuterol Sulfate (PROVENTIL HFA IN)    citalopram (CELEXA) 20 mg tablet    diltiazem (DILTIAZEM CD) 180 mg 24 hr capsule    ferrous sulfate 325 (65 Fe) mg tablet    Fluticasone Furoate-Vilanterol (BREO ELLIPTA IN)    furosemide (LASIX) 20 mg tablet    Potassium Chloride (KLOR-CON 10 PO)    tiotropium (SPIRIVA HANDIHALER) 18 mcg inhalation capsule     Allergies   Allergen Reactions    Penicillins Anaphylaxis       Vitals: Blood pressure 108/67, pulse 94, temperature 98 6 °F (37 °C), temperature source Oral, resp  rate 18, weight 65 3 kg (144 lb), SpO2 98 %  , 4L Body mass index is 23 96 kg/m²        Intake/Output Summary (Last 24 hours) at 11/06/17 1035  Last data filed at 11/05/17 1920   Gross per 24 hour   Intake              120 ml   Output                0 ml   Net              120 ml       Physical Exam  General: , Awake alert and oriented x 3, conversant with some exertional  dyspnea, NAD, normal affect  HEENT:  PERRL, Sclera noninjected, nonicteric OU, Nares patent, no nasal flaring, no nasal drainage, Mucous membranes, moist, no oral lesions, normal dentition  NECK: Trachea midline, no accessory muscle use, no stridor, no cervical or supraclavicular adenopathy, JVP not elevated  CARDIAC: IRR  PULM: Decrease BS bilateral with scattered wheeze  CHEST: No gross deformities, equal chest expansion on inspiration bilaterally  ABD: Normoactive bowel sounds, soft nontender, nondistended, no rebound, no rigidity, no guarding    EXT: No cyanosis, no clubbing, no edema, normal capillary refill  SKIN:  No rashes, no lesions  NEURO: no focal neurologic deficits, AAOx3, moving all extremities appropriately    Labs: I have personally reviewed pertinent lab results  , ABG: No results found for: PHART, TMT7GZG, PO2ART, WGP4NAK, H5VNVHQX, BEART, SOURCE, BNP: No results found for: BNP, CBC:   Lab Results   Component Value Date    WBC 6 86 11/06/2017    HGB 8 1 (L) 11/06/2017    HCT 25 7 (L) 11/06/2017    MCV 96 11/06/2017     11/06/2017    MCH 30 3 11/06/2017    MCHC 31 5 11/06/2017    RDW 14 7 11/06/2017    MPV 9 3 11/06/2017   , CMP:   Lab Results   Component Value Date     11/06/2017    K 4 4 11/06/2017     11/06/2017    CO2 36 (H) 11/06/2017    ANIONGAP 2 (L) 11/06/2017    BUN 21 11/06/2017    CREATININE 0 89 11/06/2017    GLUCOSE 172 (H) 11/06/2017    CALCIUM 9 0 11/06/2017    EGFR 87 11/06/2017   , PT/INR:   Lab Results   Component Value Date    INR 4 06 (H) 11/06/2017   , Troponin: No results found for: TROPONINI    Results from last 7 days  Lab Units 11/06/17  0538 11/05/17  0653 11/03/17  0848   WBC Thousand/uL 6 86 8 92 7 55   HEMOGLOBIN g/dL 8 1* 9 3* 9 8*   HEMATOCRIT % 25 7* 28 9* 31 7*   PLATELETS Thousands/uL 299 307 331   NEUTROS PCT %  --  45  --    MONOS PCT %  --  6  --       Results from last 7 days  Lab Units 11/06/17  0538 11/05/17  0653   SODIUM mmol/L 142 140   POTASSIUM mmol/L 4 4 3 9   CHLORIDE mmol/L 104 103   CO2 mmol/L 36* 33*   BUN mg/dL 21 18   CREATININE mg/dL 0 89 0 78   CALCIUM mg/dL 9 0 8 9   GLUCOSE RANDOM mg/dL 172* 149*                Results from last 7 days  Lab Units 11/06/17  0538 11/05/17  0654 11/03/17  0848   INR  4 06* 4 61* 5 79*   PTT seconds  --  81*  --            0  Lab Value Date/Time   TROPONINI <0 02 08/27/2017 0652   TROPONINI <0 04 06/02/2015 0326   TROPONINI <0 04 06/01/2015 1949   TROPONINI <0 04 05/14/2015 0600       Imaging and other studies: I have personally reviewed pertinent reports  and I have personally reviewed pertinent films in PACS  Emphysema  Pulmonary function testing will check    EKG, Pathology, and Other Studies: I have personally reviewed pertinent reports     and I have personally reviewed pertinent films in PACS      Code Status: Level 1 - Full Code    Sonali Manzo MD  [address]

## 2017-11-06 NOTE — ASSESSMENT & PLAN NOTE
· Acute on chronic respiratory failure  · Continue IV steroids as prescribed by pulmonology, possible taper after tomorrow  · Continue nebulized bronchodilators currently TID  · Supplemental oxygen, taper down as tolerated  · Incentive spirometry  · Antibiotics held due to lack of infectious symptoms  · Will likely need 3-4 days in the hospital with a slow taper as he has failed outpatient therapy and has been readmitted

## 2017-11-07 DIAGNOSIS — I48.92 ATRIAL FLUTTER (HCC): ICD-10-CM

## 2017-11-07 LAB
HCT VFR BLD AUTO: 26.7 % (ref 36.5–49.3)
HGB BLD-MCNC: 8.4 G/DL (ref 12–17)
INR PPP: 3.04 (ref 0.86–1.16)
PROTHROMBIN TIME: 31.9 SECONDS (ref 12.1–14.4)

## 2017-11-07 PROCEDURE — 94640 AIRWAY INHALATION TREATMENT: CPT

## 2017-11-07 PROCEDURE — 94760 N-INVAS EAR/PLS OXIMETRY 1: CPT

## 2017-11-07 PROCEDURE — 85610 PROTHROMBIN TIME: CPT | Performed by: INTERNAL MEDICINE

## 2017-11-07 PROCEDURE — 85014 HEMATOCRIT: CPT | Performed by: INTERNAL MEDICINE

## 2017-11-07 PROCEDURE — 85018 HEMOGLOBIN: CPT | Performed by: INTERNAL MEDICINE

## 2017-11-07 RX ORDER — METHYLPREDNISOLONE SODIUM SUCCINATE 40 MG/ML
40 INJECTION, POWDER, LYOPHILIZED, FOR SOLUTION INTRAMUSCULAR; INTRAVENOUS EVERY 8 HOURS SCHEDULED
Status: DISCONTINUED | OUTPATIENT
Start: 2017-11-07 | End: 2017-11-09

## 2017-11-07 RX ADMIN — MELATONIN TAB 3 MG 6 MG: 3 TAB at 21:16

## 2017-11-07 RX ADMIN — LEVALBUTEROL HYDROCHLORIDE 1.25 MG: 1.25 SOLUTION, CONCENTRATE RESPIRATORY (INHALATION) at 14:37

## 2017-11-07 RX ADMIN — METHYLPREDNISOLONE SODIUM SUCCINATE 40 MG: 40 INJECTION, POWDER, FOR SOLUTION INTRAMUSCULAR; INTRAVENOUS at 13:42

## 2017-11-07 RX ADMIN — METHYLPREDNISOLONE SODIUM SUCCINATE 40 MG: 40 INJECTION, POWDER, FOR SOLUTION INTRAMUSCULAR; INTRAVENOUS at 21:15

## 2017-11-07 RX ADMIN — FUROSEMIDE 40 MG: 40 TABLET ORAL at 08:55

## 2017-11-07 RX ADMIN — IPRATROPIUM BROMIDE 0.5 MG: 0.5 SOLUTION RESPIRATORY (INHALATION) at 14:37

## 2017-11-07 RX ADMIN — IPRATROPIUM BROMIDE 0.5 MG: 0.5 SOLUTION RESPIRATORY (INHALATION) at 07:32

## 2017-11-07 RX ADMIN — LEVALBUTEROL HYDROCHLORIDE 1.25 MG: 1.25 SOLUTION, CONCENTRATE RESPIRATORY (INHALATION) at 19:26

## 2017-11-07 RX ADMIN — DILTIAZEM HYDROCHLORIDE 180 MG: 180 CAPSULE, COATED, EXTENDED RELEASE ORAL at 08:55

## 2017-11-07 RX ADMIN — LEVALBUTEROL HYDROCHLORIDE 1.25 MG: 1.25 SOLUTION, CONCENTRATE RESPIRATORY (INHALATION) at 07:32

## 2017-11-07 RX ADMIN — Medication 325 MG: at 08:56

## 2017-11-07 RX ADMIN — CITALOPRAM HYDROBROMIDE 20 MG: 20 TABLET ORAL at 08:55

## 2017-11-07 RX ADMIN — IPRATROPIUM BROMIDE 0.5 MG: 0.5 SOLUTION RESPIRATORY (INHALATION) at 19:26

## 2017-11-07 RX ADMIN — METHYLPREDNISOLONE SODIUM SUCCINATE 60 MG: 125 INJECTION, POWDER, FOR SOLUTION INTRAMUSCULAR; INTRAVENOUS at 06:52

## 2017-11-07 NOTE — ASSESSMENT & PLAN NOTE
· Likely worsened on admission due to Xopenex, improved now  · Continue to monitor on telemetry  · Encourage p o  fluid intake  · Continue Cardizem  · Monitor heart rates

## 2017-11-07 NOTE — PLAN OF CARE
DISCHARGE PLANNING - CARE MANAGEMENT     Discharge to post-acute care or home with appropriate resources Progressing        Potential for Falls     Patient will remain free of falls Progressing        RESPIRATORY - ADULT     Achieves optimal ventilation and oxygenation Progressing

## 2017-11-07 NOTE — PROGRESS NOTES
Progress Note - Pulmonary   Aditya Toure 69 y o  male MRN: [id number]  Unit/Bed#: [facility] 629-01 Encounter: [id number]      Assessment:  1  Chronic hypoxic respiratory failure - on 4LNC at baseline  2  Severe COPD with acute exacerbation - slowly improving    Plan:  1  Plan to decrease solumedrol to 40mg Q8hrs in AM  2  Continue Xopenex/Atrovent nebulizers TID  3  At discharge, should be placed back on Breo 100, Spiriva, and ventolin as needed  I prescription has also been placed in [employer] for albuterol solution for nebulizer to be used Q6hrs prn  4  Continue supplemental oxygen at [facility] to keep saturations greater than or equal to 88%  5  Incentive spirometry Q1hr, OOB as able, increase activity as able  6  Outpatient pulmonary follow up as per discharge instructions    Subjective:   Mr Cha Valles is seen sitting in bed this morning  He notes that he is feeling better than on admission, but still not yet back to his pulmonary baseline  He is on his baseline amount of home O2  He denies chest pain, resting shortness of breath, fevers or bronchospasm  He has occasional nonproductive cough  He was able to ambulate yesterday in the halls for a bit  He does not have any medication at home for his nebulizer that is not   Objective:     Vitals: Blood pressure 116/56, pulse (!) 54, temperature 97 8 °F (36 6 °C), temperature source Oral, resp  rate 18, height 5' 7" (1 702 m), weight 65 3 kg (144 lb), SpO2 99 %  , 4LNC, Body mass index is 22 55 kg/m²        Intake/Output Summary (Last 24 hours) at 17 0926  Last data filed at 17 0601   Gross per 24 hour   Intake              640 ml   Output              153 ml   Net              487 ml         Physical Exam  Gen: Awake, alert, oriented x 3, no acute distress  HEENT: Mucous membranes moist, no oral lesions, no thrush, wearing O2 via NC  NECK: No accessory muscle use, JVP not elevated  Cardiac: Irregular, single S1, single S2, no murmurs, no rubs, no gallops  Lungs: Decreased breath sounds throughout bilaterally without wheezes, rhonchi or rales noted  Abdomen: normoactive bowel sounds, soft nontender, nondistended, no rebound or rigidity, no guarding  Extremities: no cyanosis, no clubbing, no edema    Labs: I have personally reviewed pertinent lab results  , ABG: No results found for: PHART, ZQX8EQQ, PO2ART, EZN3JSJ, D3LHRSDL, BEART, SOURCE, BNP: No results found for: BNP, CBC:   Lab Results   Component Value Date    HGB 8 4 (L) 11/07/2017    HCT 26 7 (L) 11/07/2017   , CMP: No results found for: NA, K, CL, CO2, ANIONGAP, BUN, CREATININE, GLUCOSE, CALCIUM, AST, ALT, ALKPHOS, PROT, ALBUMIN, BILITOT, EGFR, PT/INR:   Lab Results   Component Value Date    INR 3 04 (H) 11/07/2017   , Troponin: No results found for: TROPONINI     Imaging and other studies: I have personally reviewed pertinent films in PACS    PA & Lat CXR 11/5/17  FINDINGS:          Cardiomediastinal silhouette appears unremarkable      Emphysematous changes are noted consistent with chronic obstructive pulmonary disease  No airspace opacity to suggest focal pneumonia  No pneumothorax or pleural effusion      Visualized osseous structures appear within normal limits for the patient's age      IMPRESSION:     Emphysematous changes are noted  Otherwise clear lungs       Griselda Colace, PA-C

## 2017-11-07 NOTE — PROGRESS NOTES
Progress Note - Kimberly Cowart 69 y o  male MRN: [id number]    Unit/Bed#: [facility] 629-01 Encounter: [id number]        * Severe COPD with acute exacerbation   Assessment & Plan    · Acute on chronic respiratory failure  · Continue IV steroids as prescribed by pulmonology, possible taper after tomorrow  · Continue nebulized bronchodilators currently TID  · Supplemental oxygen, taper down as tolerated  · Incentive spirometry  · Antibiotics held due to lack of infectious symptoms  · Will likely need 3-4 days in the hospital with a slow taper as he has failed outpatient therapy and has been readmitted        Supratherapeutic INR   Assessment & Plan    · Improving  · No signs of bleeding  · Continue to hold warfarin 1 more day  · Monitor INR daily        Iron deficiency anemia   Assessment & Plan    · Stable  · Continue iron supplementation        Paroxysmal atrial tachycardia (HCC)   Assessment & Plan    · Likely worsened on admission due to Xopenex, improved now  · Continue to monitor on telemetry  · Encourage p o  fluid intake  · Continue Cardizem  · Monitor heart rates            Pharmacologic: Warfarin (Coumadin)  Mechanical VTE Prophylaxis in Place: Yes    Patient Centered Rounds: I have performed bedside rounds with nursing staff today  Education and Discussions with Family / Patient: patient    Time Spent for Care: 15 minutes  More than 50% of total time spent on counseling and coordination of care as described above  Current Length of Stay: 2 day(s)    Current Patient Status: Inpatient   Certification Statement: The patient will continue to require additional inpatient hospital stay due to acute resp failure    Discharge Plan / Estimated Discharge Date: 3-4 days      Code Status: Level 1 - Full Code      Subjective:   Slight improvement in breathing, denies significant cough   Still very dyspneic with minimal exertion    Objective:     Vitals:   Temp (24hrs), Av °F (36 7 °C), Min:97 5 °F (36 4 °C), Max:98 6 °F (37 °C)    HR:  [] 68  Resp:  [18-21] 18  BP: (103-119)/(53-65) 114/65  SpO2:  [93 %-99 %] 97 %  Body mass index is 22 55 kg/m²  Input and Output Summary (last 24 hours): Intake/Output Summary (Last 24 hours) at 11/07/17 1451  Last data filed at 11/07/17 1401   Gross per 24 hour   Intake              740 ml   Output              500 ml   Net              240 ml       Physical Exam:     Physical Exam   Constitutional: He is oriented to person, place, and time  He appears well-developed and well-nourished  HENT:   Head: Normocephalic and atraumatic  Eyes: EOM are normal  Pupils are equal, round, and reactive to light  No scleral icterus  Neck: Neck supple  No JVD present  Cardiovascular: Normal rate and regular rhythm  Pulmonary/Chest: Effort normal    Very diminished breath sounds in all lung fields   Abdominal: Soft  There is no tenderness  Musculoskeletal: He exhibits no edema  Neurological: He is alert and oriented to person, place, and time  Skin: Skin is warm         Additional Data:     Labs:      Results from last 7 days  Lab Units 11/07/17  0843 11/06/17  0538 11/05/17  0653   WBC Thousand/uL  --  6 86 8 92   HEMOGLOBIN g/dL 8 4* 8 1* 9 3*   HEMATOCRIT % 26 7* 25 7* 28 9*   PLATELETS Thousands/uL  --  299 307   NEUTROS PCT %  --   --  45   LYMPHS PCT %  --   --  47*   MONOS PCT %  --   --  6   EOS PCT %  --   --  2       Results from last 7 days  Lab Units 11/06/17  0538   SODIUM mmol/L 142   POTASSIUM mmol/L 4 4   CHLORIDE mmol/L 104   CO2 mmol/L 36*   BUN mg/dL 21   CREATININE mg/dL 0 89   CALCIUM mg/dL 9 0   GLUCOSE RANDOM mg/dL 172*       Results from last 7 days  Lab Units 11/07/17  0843   INR  3 04*         Recent Cultures (last 7 days):           Last 24 Hours Medication List:     EMS replenish medication  Does not apply Once   citalopram 20 mg Oral Daily   diltiazem 180 mg Oral Daily   ferrous sulfate 325 mg Oral Daily   furosemide 40 mg Oral Daily ipratropium 0 5 mg Nebulization TID   levalbuterol 1 25 mg Nebulization TID   methylPREDNISolone sodium succinate 40 mg Intravenous Counts include 234 beds at the [facility]        Today, Patient Was Seen By: Alba Olmstead MD    ** Please Note: Dragon 360 Dictation voice to text software may have been used in the creation of this document   **

## 2017-11-08 LAB
BASOPHILS # BLD MANUAL: 0 THOUSAND/UL (ref 0–0.1)
BASOPHILS NFR MAR MANUAL: 0 % (ref 0–1)
EOSINOPHIL # BLD MANUAL: 0 THOUSAND/UL (ref 0–0.4)
EOSINOPHIL NFR BLD MANUAL: 0 % (ref 0–6)
ERYTHROCYTE [DISTWIDTH] IN BLOOD BY AUTOMATED COUNT: 15 % (ref 11.6–15.1)
HCT VFR BLD AUTO: 27.2 % (ref 36.5–49.3)
HGB BLD-MCNC: 8.5 G/DL (ref 12–17)
HYPERCHROMIA BLD QL SMEAR: PRESENT
INR PPP: 2.42 (ref 0.86–1.16)
LYMPHOCYTES # BLD AUTO: 0.44 THOUSAND/UL (ref 0.6–4.47)
LYMPHOCYTES # BLD AUTO: 4 % (ref 14–44)
MCH RBC QN AUTO: 30.1 PG (ref 26.8–34.3)
MCHC RBC AUTO-ENTMCNC: 31.3 G/DL (ref 31.4–37.4)
MCV RBC AUTO: 97 FL (ref 82–98)
MONOCYTES # BLD AUTO: 0.11 THOUSAND/UL (ref 0–1.22)
MONOCYTES NFR BLD: 1 % (ref 4–12)
NEUTROPHILS # BLD MANUAL: 10.34 THOUSAND/UL (ref 1.85–7.62)
NEUTS SEG NFR BLD AUTO: 95 % (ref 43–75)
NRBC BLD AUTO-RTO: 0 /100 WBCS
PLATELET # BLD AUTO: 335 THOUSANDS/UL (ref 149–390)
PLATELET BLD QL SMEAR: ADEQUATE
PMV BLD AUTO: 9.2 FL (ref 8.9–12.7)
POIKILOCYTOSIS BLD QL SMEAR: PRESENT
PROTHROMBIN TIME: 26.6 SECONDS (ref 12.1–14.4)
RBC # BLD AUTO: 2.82 MILLION/UL (ref 3.88–5.62)
RBC MORPH BLD: PRESENT
WBC # BLD AUTO: 10.88 THOUSAND/UL (ref 4.31–10.16)

## 2017-11-08 PROCEDURE — 94760 N-INVAS EAR/PLS OXIMETRY 1: CPT

## 2017-11-08 PROCEDURE — 94640 AIRWAY INHALATION TREATMENT: CPT

## 2017-11-08 PROCEDURE — 85610 PROTHROMBIN TIME: CPT | Performed by: INTERNAL MEDICINE

## 2017-11-08 PROCEDURE — 85027 COMPLETE CBC AUTOMATED: CPT | Performed by: INTERNAL MEDICINE

## 2017-11-08 PROCEDURE — G8988 SELF CARE GOAL STATUS: HCPCS

## 2017-11-08 PROCEDURE — G8987 SELF CARE CURRENT STATUS: HCPCS

## 2017-11-08 PROCEDURE — 97166 OT EVAL MOD COMPLEX 45 MIN: CPT

## 2017-11-08 PROCEDURE — G8989 SELF CARE D/C STATUS: HCPCS

## 2017-11-08 PROCEDURE — 85007 BL SMEAR W/DIFF WBC COUNT: CPT | Performed by: INTERNAL MEDICINE

## 2017-11-08 RX ADMIN — METHYLPREDNISOLONE SODIUM SUCCINATE 40 MG: 40 INJECTION, POWDER, FOR SOLUTION INTRAMUSCULAR; INTRAVENOUS at 13:24

## 2017-11-08 RX ADMIN — LEVALBUTEROL HYDROCHLORIDE 1.25 MG: 1.25 SOLUTION, CONCENTRATE RESPIRATORY (INHALATION) at 07:28

## 2017-11-08 RX ADMIN — IPRATROPIUM BROMIDE 0.5 MG: 0.5 SOLUTION RESPIRATORY (INHALATION) at 13:29

## 2017-11-08 RX ADMIN — LEVALBUTEROL HYDROCHLORIDE 1.25 MG: 1.25 SOLUTION, CONCENTRATE RESPIRATORY (INHALATION) at 20:06

## 2017-11-08 RX ADMIN — MELATONIN TAB 3 MG 6 MG: 3 TAB at 21:32

## 2017-11-08 RX ADMIN — IPRATROPIUM BROMIDE 0.5 MG: 0.5 SOLUTION RESPIRATORY (INHALATION) at 20:06

## 2017-11-08 RX ADMIN — IPRATROPIUM BROMIDE 0.5 MG: 0.5 SOLUTION RESPIRATORY (INHALATION) at 07:28

## 2017-11-08 RX ADMIN — Medication 325 MG: at 08:34

## 2017-11-08 RX ADMIN — CITALOPRAM HYDROBROMIDE 20 MG: 20 TABLET ORAL at 08:32

## 2017-11-08 RX ADMIN — DILTIAZEM HYDROCHLORIDE 180 MG: 180 CAPSULE, COATED, EXTENDED RELEASE ORAL at 08:32

## 2017-11-08 RX ADMIN — METHYLPREDNISOLONE SODIUM SUCCINATE 40 MG: 40 INJECTION, POWDER, FOR SOLUTION INTRAMUSCULAR; INTRAVENOUS at 05:36

## 2017-11-08 RX ADMIN — LEVALBUTEROL HYDROCHLORIDE 1.25 MG: 1.25 SOLUTION, CONCENTRATE RESPIRATORY (INHALATION) at 13:29

## 2017-11-08 RX ADMIN — FUROSEMIDE 40 MG: 40 TABLET ORAL at 08:32

## 2017-11-08 RX ADMIN — METHYLPREDNISOLONE SODIUM SUCCINATE 40 MG: 40 INJECTION, POWDER, FOR SOLUTION INTRAMUSCULAR; INTRAVENOUS at 21:32

## 2017-11-08 NOTE — PROGRESS NOTES
Progress Note - Pulmonary   Ervin Acharya 69 y o  male MRN: [id number]  Unit/Bed#: [facility] 629-01 Encounter: [id number]      Assessment:  1  Chronic hypoxic respiratory failure - on 4LNC at baseline  2  Severe COPD with acute exacerbation - slowly improving    Still far from his baseline as he says  Plan:  1  Continue same steroid dose for today  2  Continue Xopenex/Atrovent nebulizers TID  3  At discharge, should be placed back on Breo 100, Spiriva, and ventolin as needed  I prescription has also been placed in [employer] for albuterol solution for nebulizer to be used Q6hrs prn  4  Continue supplemental oxygen at [facility] to keep saturations greater than or equal to 88%  5  Incentive spirometry Q1hr, OOB as able, increase activity as able  6  Outpatient pulmonary follow up as per discharge instructions    Subjective:   Mr Gael Hopkins is seen sitting in bed this morning  He notes that he is feeling better than on admission, but still not yet back to his pulmonary baseline  He is on his baseline amount of home O2  He denies chest pain, resting shortness of breath, fevers or bronchospasm  He has occasional nonproductive cough  He was able to ambulate yesterday in the halls for a bit then got too short of breath    He does not have any medication at home for his nebulizer  Objective:     Vitals: Blood pressure 134/62, pulse 83, temperature 97 9 °F (36 6 °C), temperature source Oral, resp  rate 18, height 5' 7" (1 702 m), weight 65 3 kg (144 lb), SpO2 99 %  , 4LNC, Body mass index is 22 55 kg/m²        Intake/Output Summary (Last 24 hours) at 11/08/17 1032  Last data filed at 11/08/17 0836   Gross per 24 hour   Intake              640 ml   Output              700 ml   Net              -60 ml         Physical Exam  Gen: Awake, alert, oriented x 3, no acute distress  HEENT: Mucous membranes moist, no oral lesions, no thrush, wearing O2 via NC  NECK: No accessory muscle use, JVP not elevated  Cardiac: Irregular, single S1, single S2, no murmurs, no rubs, no gallops  Lungs: Decreased breath sounds throughout bilaterally without wheezes, rhonchi or rales noted  Abdomen: normoactive bowel sounds, soft nontender, nondistended, no rebound or rigidity, no guarding  Extremities: no cyanosis, no clubbing, no edema    Labs: I have personally reviewed pertinent lab results  , ABG: No results found for: PHART, JTS2IDK, PO2ART, BVP4UHE, Y6BVUSDM, BEART, SOURCE, BNP: No results found for: BNP, CBC:   Lab Results   Component Value Date    WBC 10 88 (H) 11/08/2017    HGB 8 5 (L) 11/08/2017    HCT 27 2 (L) 11/08/2017    MCV 97 11/08/2017     11/08/2017    MCH 30 1 11/08/2017    MCHC 31 3 (L) 11/08/2017    RDW 15 0 11/08/2017    MPV 9 2 11/08/2017    NRBC 0 11/08/2017   , CMP: No results found for: NA, K, CL, CO2, ANIONGAP, BUN, CREATININE, GLUCOSE, CALCIUM, AST, ALT, ALKPHOS, PROT, ALBUMIN, BILITOT, EGFR, PT/INR:   Lab Results   Component Value Date    INR 2 42 (H) 11/08/2017   , Troponin: No results found for: TROPONINI     Imaging and other studies: I have personally reviewed pertinent films in PACS    PA & Lat CXR 11/5/17  FINDINGS:          Cardiomediastinal silhouette appears unremarkable      Emphysematous changes are noted consistent with chronic obstructive pulmonary disease  No airspace opacity to suggest focal pneumonia  No pneumothorax or pleural effusion      Visualized osseous structures appear within normal limits for the patient's age      IMPRESSION:     Emphysematous changes are noted  Otherwise clear lungs       Astrid Price MD

## 2017-11-08 NOTE — OCCUPATIONAL THERAPY NOTE
[address] Evaluation     Patient Name: Sedrick Homans  HMFWK'C Date: 11/8/2017  Problem List  Patient Active Problem List   Diagnosis    Acute hypoxic respiratory failure superimposed on chronic hypercapnic respiratory failure     Iron deficiency anemia    Pulmonary artery hypertension    Aortic regurgitation    Severe COPD with acute exacerbation    Low TSH level    Paroxysmal atrial tachycardia (HCC)    Supratherapeutic INR     Past Medical History  Past Medical History:   Diagnosis Date    Anxiety     Aortic regurgitation     Atrial flutter (HCC)     Chronic respiratory failure (HCC)     Colon polyp     COPD (chronic obstructive pulmonary disease) (HCC)     GI bleed     Hypertension     Iron deficiency anemia     MGUS (monoclonal gammopathy of unknown significance)     Osteoarthritis of hip     PAC (premature atrial contraction)     Paroxysmal atrial fibrillation (HCC)     Patent foramen ovale     Pulmonary artery hypertension      Past Surgical History  Past Surgical History:   Procedure Laterality Date    APPENDECTOMY      COLON SURGERY      HERNIA REPAIR      JOINT REPLACEMENT      KNEE SURGERY              11/08/17 0905   Note Type   Note type Eval only   Restrictions/Precautions   Weight Bearing Precautions Per Order No   Other Precautions O2   Pain Assessment   Pain Assessment No/denies pain   Pain Score No Pain   Home Living   Type of Home House   Home Layout Multi-level;Bed/bath upstairs; Able to live on main level with bedroom/bathroom  (2 [address]UMYA)   Bathroom Shower/Tub Tub/shower unit   Bathroom Toilet Standard   Bathroom Equipment Shower chair  (NO USE PTA)   Bathroom Accessibility Accessible   Additional Comments NO DME USE AT BASELINE   HOME O2/NEBULIZER   Prior Function   Level of [place] Independent with ADLs and functional mobility   Lives With Spouse;Daughter   Receives Help From Family   ADL Assistance Independent   IADLs Independent   Falls in the last 6 months 0   Vocational Retired   Lifestyle   Autonomy PT REPORTS BASELINE INDEPENDENCE IN ADLS/HOUSEHOLD [place]  NO DME USE AT BASELINE  NO RECENT H/O FALLS  Reciprocal Relationships PT RESIDES WITH SPOUSE AND DAUGHTER -- WORK FT  Service to Others PT IS RETIRED  PREVIOUSLY WORKED IN [employer]  Intrinsic Gratification PT ENJOYS WATCHING TV  Psychosocial   Psychosocial (WDL) WDL   Subjective   Subjective "I DON'T WANT TO LEAVE TOO EARLY"   ADL   Where Assessed Chair   Eating Assistance 7  Independent   Grooming Assistance 7  Independent   UB Bathing Assistance 6  Modified Independent   LB Bathing Assistance 6  Modified Independent   UB Dressing Assistance 6  Modified independent   LB Dressing Assistance 6  Modified independent   Toileting Assistance  6  Modified independent   Bed Mobility   Supine to Sit Unable to assess   Sit to Supine Unable to assess   Additional Comments PT OOB IN CHAIR UPON ARRIVAL  PT LEFT IN ROOM CHAIR POST EVAL      Transfers   Sit to Stand 7  Independent   Stand to Sit 7  Independent   Functional Mobility   Functional Mobility 7  Independent   Balance   Static Sitting Good   Dynamic Sitting Good   Static Standing Good   Dynamic Standing Good   Ambulatory Good   Activity Tolerance   Activity Tolerance Patient limited by fatigue;Treatment limited secondary to medical complications (Comment)   Medical Staff Made Aware F/U W/ CM DORON   Nurse Made Aware OKAY TO SEE PER PROSPER SYED Assessment   RUE Assessment WFL   LUE Assessment   LUE Assessment WFL   Hand Function   Gross Motor Coordination Functional   Fine Motor Coordination Functional   Cognition   Overall Cognitive Status WFL   Arousal/Participation Alert   Attention Within functional limits   Orientation Level Oriented X4   Memory Within functional limits   Following Commands Follows all commands and directions without difficulty   Comments PT ENGAGES IN APPROPRIATE CONVERSATION W/ THERAPIST THIS AM  Assessment   Prognosis Good   Assessment PT IS A 68 YO M ADMIT W/ WORSENING SOB AND HYPOXIA BASED ON HOME O2 CHECK  PT CURRENTLY UNDERGOING W/U FOR COPD EXACERBATION AND SUPRATHERAPEUTIC INR  PT W/ PMH SIGNIFICANT FOR SEVERE COPD, PFO, GI BLEED, PAF, ANEMIA AND AORTIC REGURGITATION  PT ON 4LO2 AT BASELINE  PT IS FROM HOME W/ FAMILY AND REPORTS BASELINE INDEPENDENCE IN ADLS/HOUSEHOLD IADLS  PT W/ NO DME USE AT BASELINE AND NO RECENT H/O FALLS  CURRENTLY PT COMPLETING ADLS W/ MOD I W/ INCREASED TIME AND REST BREAKS REQUIRED  PT CURRENTLY COMPLETING FUNCTIONAL TRANSFERS/AMBULATION W/ INDEPENDENCE W/ ADD'L REST BREAKS REQUIRED  PT EDUCATED RE: COMPENSATORY STRATEGIES AND SAFETY PRECAUTIONS FOR ADL COMPLETION AT D/C  PT REMAINS LIMITED 2* ACTIVITY TOLERANCE, FATIGUE AND MEDICAL STATUS  OT DOES ANTICIPATE D/C HOME W/ FAMILY SUPPORT PENDING MEDICAL STABILITY  NO ADD'L INPT OT NEEDS IDENTIFIED AT THIS TIME  D/C OT  Goals   Patient Goals PT WOULD LIKE TO GET BETTER AND RETURN HOME   Plan   OT Frequency Eval only   Recommendation   OT Discharge Recommendation Home with family support   OT - OK to Discharge (PENDING MEDICAL STABILITY   )   Barthel Index   Feeding 10   Bathing 5   Grooming Score 5   Dressing Score 10   Bladder Score 10   Bowels Score 10   Toilet Use Score 10   Transfers (Bed/Chair) Score 15   Mobility (Level Surface) Score 15   Stairs Score 5   Barthel Index Score 95   Modified González Scale   Modified Ware Scale 2     DOCUMENTATION COMPLETED BY PAULA MILLS MS, OTR/L

## 2017-11-09 LAB
ALBUMIN SERPL BCP-MCNC: 3.3 G/DL (ref 3.5–5)
ALP SERPL-CCNC: 70 U/L (ref 46–116)
ALT SERPL W P-5'-P-CCNC: 18 U/L (ref 12–78)
ANION GAP SERPL CALCULATED.3IONS-SCNC: 5 MMOL/L (ref 4–13)
ANISOCYTOSIS BLD QL SMEAR: PRESENT
AST SERPL W P-5'-P-CCNC: 11 U/L (ref 5–45)
BASOPHILS # BLD MANUAL: 0 THOUSAND/UL (ref 0–0.1)
BASOPHILS NFR MAR MANUAL: 0 % (ref 0–1)
BILIRUB SERPL-MCNC: 0.34 MG/DL (ref 0.2–1)
BUN SERPL-MCNC: 22 MG/DL (ref 5–25)
CALCIUM SERPL-MCNC: 9.1 MG/DL (ref 8.3–10.1)
CHLORIDE SERPL-SCNC: 95 MMOL/L (ref 100–108)
CO2 SERPL-SCNC: 38 MMOL/L (ref 21–32)
CREAT SERPL-MCNC: 0.78 MG/DL (ref 0.6–1.3)
EOSINOPHIL # BLD MANUAL: 0 THOUSAND/UL (ref 0–0.4)
EOSINOPHIL NFR BLD MANUAL: 0 % (ref 0–6)
ERYTHROCYTE [DISTWIDTH] IN BLOOD BY AUTOMATED COUNT: 14.6 % (ref 11.6–15.1)
GFR SERPL CREATININE-BSD FRML MDRD: 92 ML/MIN/1.73SQ M
GLUCOSE SERPL-MCNC: 164 MG/DL (ref 65–140)
HCT VFR BLD AUTO: 28.6 % (ref 36.5–49.3)
HGB BLD-MCNC: 8.9 G/DL (ref 12–17)
INR PPP: 1.72 (ref 0.86–1.16)
LYMPHOCYTES # BLD AUTO: 0.44 THOUSAND/UL (ref 0.6–4.47)
LYMPHOCYTES # BLD AUTO: 5 % (ref 14–44)
MACROCYTES BLD QL AUTO: PRESENT
MCH RBC QN AUTO: 30 PG (ref 26.8–34.3)
MCHC RBC AUTO-ENTMCNC: 31.1 G/DL (ref 31.4–37.4)
MCV RBC AUTO: 96 FL (ref 82–98)
MONOCYTES # BLD AUTO: 0.09 THOUSAND/UL (ref 0–1.22)
MONOCYTES NFR BLD: 1 % (ref 4–12)
MYELOCYTES NFR BLD MANUAL: 1 % (ref 0–1)
NEUTROPHILS # BLD MANUAL: 8.26 THOUSAND/UL (ref 1.85–7.62)
NEUTS SEG NFR BLD AUTO: 93 % (ref 43–75)
NRBC BLD AUTO-RTO: 1 /100 WBCS
PLATELET # BLD AUTO: 368 THOUSANDS/UL (ref 149–390)
PLATELET BLD QL SMEAR: ADEQUATE
PMV BLD AUTO: 9.5 FL (ref 8.9–12.7)
POIKILOCYTOSIS BLD QL SMEAR: PRESENT
POTASSIUM SERPL-SCNC: 4.1 MMOL/L (ref 3.5–5.3)
PROT SERPL-MCNC: 6.6 G/DL (ref 6.4–8.2)
PROTHROMBIN TIME: 20.3 SECONDS (ref 12.1–14.4)
RBC # BLD AUTO: 2.97 MILLION/UL (ref 3.88–5.62)
RBC MORPH BLD: PRESENT
SODIUM SERPL-SCNC: 138 MMOL/L (ref 136–145)
WBC # BLD AUTO: 8.88 THOUSAND/UL (ref 4.31–10.16)

## 2017-11-09 PROCEDURE — 80053 COMPREHEN METABOLIC PANEL: CPT | Performed by: INTERNAL MEDICINE

## 2017-11-09 PROCEDURE — 85007 BL SMEAR W/DIFF WBC COUNT: CPT | Performed by: INTERNAL MEDICINE

## 2017-11-09 PROCEDURE — 85610 PROTHROMBIN TIME: CPT | Performed by: INTERNAL MEDICINE

## 2017-11-09 PROCEDURE — 94760 N-INVAS EAR/PLS OXIMETRY 1: CPT

## 2017-11-09 PROCEDURE — 85027 COMPLETE CBC AUTOMATED: CPT | Performed by: INTERNAL MEDICINE

## 2017-11-09 PROCEDURE — 94640 AIRWAY INHALATION TREATMENT: CPT

## 2017-11-09 RX ORDER — METHYLPREDNISOLONE SODIUM SUCCINATE 40 MG/ML
40 INJECTION, POWDER, LYOPHILIZED, FOR SOLUTION INTRAMUSCULAR; INTRAVENOUS EVERY 12 HOURS SCHEDULED
Status: DISCONTINUED | OUTPATIENT
Start: 2017-11-09 | End: 2017-11-09

## 2017-11-09 RX ORDER — METHYLPREDNISOLONE SODIUM SUCCINATE 40 MG/ML
20 INJECTION, POWDER, LYOPHILIZED, FOR SOLUTION INTRAMUSCULAR; INTRAVENOUS EVERY 8 HOURS SCHEDULED
Status: DISCONTINUED | OUTPATIENT
Start: 2017-11-09 | End: 2017-11-10

## 2017-11-09 RX ORDER — WARFARIN SODIUM 1 MG/1
3 TABLET ORAL
Status: DISCONTINUED | OUTPATIENT
Start: 2017-11-09 | End: 2017-11-11 | Stop reason: HOSPADM

## 2017-11-09 RX ADMIN — LEVALBUTEROL HYDROCHLORIDE 1.25 MG: 1.25 SOLUTION, CONCENTRATE RESPIRATORY (INHALATION) at 13:13

## 2017-11-09 RX ADMIN — MELATONIN TAB 3 MG 6 MG: 3 TAB at 22:19

## 2017-11-09 RX ADMIN — LEVALBUTEROL HYDROCHLORIDE 1.25 MG: 1.25 SOLUTION, CONCENTRATE RESPIRATORY (INHALATION) at 07:21

## 2017-11-09 RX ADMIN — METHYLPREDNISOLONE SODIUM SUCCINATE 20 MG: 40 INJECTION, POWDER, FOR SOLUTION INTRAMUSCULAR; INTRAVENOUS at 22:18

## 2017-11-09 RX ADMIN — IPRATROPIUM BROMIDE 0.5 MG: 0.5 SOLUTION RESPIRATORY (INHALATION) at 19:05

## 2017-11-09 RX ADMIN — FUROSEMIDE 40 MG: 40 TABLET ORAL at 09:03

## 2017-11-09 RX ADMIN — IPRATROPIUM BROMIDE 0.5 MG: 0.5 SOLUTION RESPIRATORY (INHALATION) at 13:13

## 2017-11-09 RX ADMIN — METHYLPREDNISOLONE SODIUM SUCCINATE 40 MG: 40 INJECTION, POWDER, FOR SOLUTION INTRAMUSCULAR; INTRAVENOUS at 05:44

## 2017-11-09 RX ADMIN — CITALOPRAM HYDROBROMIDE 20 MG: 20 TABLET ORAL at 09:03

## 2017-11-09 RX ADMIN — METHYLPREDNISOLONE SODIUM SUCCINATE 20 MG: 40 INJECTION, POWDER, FOR SOLUTION INTRAMUSCULAR; INTRAVENOUS at 17:11

## 2017-11-09 RX ADMIN — Medication 325 MG: at 09:03

## 2017-11-09 RX ADMIN — DILTIAZEM HYDROCHLORIDE 180 MG: 180 CAPSULE, COATED, EXTENDED RELEASE ORAL at 09:03

## 2017-11-09 RX ADMIN — LEVALBUTEROL HYDROCHLORIDE 1.25 MG: 1.25 SOLUTION, CONCENTRATE RESPIRATORY (INHALATION) at 19:05

## 2017-11-09 RX ADMIN — IPRATROPIUM BROMIDE 0.5 MG: 0.5 SOLUTION RESPIRATORY (INHALATION) at 07:21

## 2017-11-09 RX ADMIN — WARFARIN SODIUM 3 MG: 1 TABLET ORAL at 17:17

## 2017-11-09 NOTE — PROGRESS NOTES
Progress Note - Pulmonary   Alana Arizmendi 69 y o  male MRN: [id number]  Unit/Bed#: [facility] 629-01 Encounter: [id number]      Assessment:  1  Chronic hypoxic respiratory failure - on 4LNC at baseline  2  Severe COPD with acute exacerbation - slowly improving  Better but not back to his baseline  Plan:  1  Decrease Steroid slowly  2  Continue Xopenex/Atrovent nebulizers TID  3  At discharge, should be placed back on Breo 100, Spiriva, and ventolin as needed  I prescription has also been placed in [employer] for albuterol solution for nebulizer to be used Q6hrs prn  4  Continue supplemental oxygen at [facility] to keep saturations greater than or equal to 88%  5  Incentive spirometry Q1hr, OOB as able, increase activity as able  6  Outpatient pulmonary follow up as per discharge instructions    Subjective:   Mr Belen Simpson is seen sitting in bed this morning  He notes that he is feeling better than on admission, but still not yet back to his pulmonary baseline  He is on his baseline amount of home O2  He denies chest pain, resting shortness of breath, fevers or bronchospasm  He has occasional nonproductive cough  He was able to ambulate yesterday in the halls for a bit then got too short of breath    He does not have any medication at home for his nebulizer    Better but not back to his baseline   Objective:     Vitals: Blood pressure 123/67, pulse 77, temperature 98 °F (36 7 °C), temperature source Oral, resp  rate 18, height 5' 7" (1 702 m), weight 65 3 kg (144 lb), SpO2 100 %  , 4LNC, Body mass index is 22 55 kg/m²        Intake/Output Summary (Last 24 hours) at 11/09/17 1153  Last data filed at 11/09/17 0400   Gross per 24 hour   Intake              320 ml   Output              550 ml   Net             -230 ml         Physical Exam  Gen: Awake, alert, oriented x 3, no acute distress  HEENT: Mucous membranes moist, no oral lesions, no thrush, wearing O2 via NC  NECK: No accessory muscle use, JVP not elevated  Cardiac: Irregular, single S1, single S2, no murmurs, no rubs, no gallops  Lungs: Decreased breath sounds throughout bilaterally without wheezes, rhonchi or rales noted  Abdomen: normoactive bowel sounds, soft nontender, nondistended, no rebound or rigidity, no guarding  Extremities: no cyanosis, no clubbing, no edema    Labs: I have personally reviewed pertinent lab results  , ABG: No results found for: PHART, CVR0YZZ, PO2ART, UDA4CUY, Z5LIBGYK, BEART, SOURCE, BNP: No results found for: BNP, CBC:   Lab Results   Component Value Date    WBC 8 88 11/09/2017    HGB 8 9 (L) 11/09/2017    HCT 28 6 (L) 11/09/2017    MCV 96 11/09/2017     11/09/2017    MCH 30 0 11/09/2017    MCHC 31 1 (L) 11/09/2017    RDW 14 6 11/09/2017    MPV 9 5 11/09/2017    NRBC 1 11/09/2017   , CMP:   Lab Results   Component Value Date     11/09/2017    K 4 1 11/09/2017    CL 95 (L) 11/09/2017    CO2 38 (H) 11/09/2017    ANIONGAP 5 11/09/2017    BUN 22 11/09/2017    CREATININE 0 78 11/09/2017    GLUCOSE 164 (H) 11/09/2017    CALCIUM 9 1 11/09/2017    AST 11 11/09/2017    ALT 18 11/09/2017    ALKPHOS 70 11/09/2017    PROT 6 6 11/09/2017    ALBUMIN 3 3 (L) 11/09/2017    BILITOT 0 34 11/09/2017    EGFR 92 11/09/2017   , PT/INR:   Lab Results   Component Value Date    INR 1 72 (H) 11/09/2017   , Troponin: No results found for: TROPONINI     Imaging and other studies: I have personally reviewed pertinent films in PACS    PA & Lat CXR 11/5/17  FINDINGS:          Cardiomediastinal silhouette appears unremarkable      Emphysematous changes are noted consistent with chronic obstructive pulmonary disease  No airspace opacity to suggest focal pneumonia  No pneumothorax or pleural effusion      Visualized osseous structures appear within normal limits for the patient's age      IMPRESSION:     Emphysematous changes are noted  Otherwise clear lungs       Kimberly Rosas MD

## 2017-11-09 NOTE — PROGRESS NOTES
[facility] Progress Note  Patient: Rosa Elena Jacobo 69 y o  male   MRN: [id number]  PCP: Ancelmo Roe MD  Unit/Bed#: [facility] 629-01 Encounter: [id number]  Date Of Visit: 17    Assessment:      Principal Problem:    Severe COPD with acute exacerbation  Active Problems:    Iron deficiency anemia    Paroxysmal atrial tachycardia (HCC)    Supratherapeutic INR      Plan:  · Acute COPD exacerbation  · Background severe COPD  · IV steroids/ nebs/o2  · pulm following  · Slow steroid taper anticipated  · Anxiety  · Reassured  · AFib  · Rate controlled  · Warfarin  · Check INR  · Chronic resp failure  · Home o2  · pulm follow up          VTE Pharmacologic Prophylaxis: Warfarin (Coumadin)  Mechanical: Mechanical VTE prophylaxis in place  Patient Centered Rounds: I have performed bedside rounds with nursing staff today  Discussions with Specialists or Other Care Team Provider:     Education and Discussions with Family / Patient:     Time Spent for Care: More than 50% of total time spent on counseling and coordination of care as described above  Current Length of Stay: 3 day(s)    Current Patient Status: Inpatient   Certification Statement: The patient will continue to require additional inpatient hospital stay due to IV steroids    Discharge Plan:    Code Status: Level 1 - Full Code      Subjective: still wheezing    Objective:     Vitals:   Temp (24hrs), Av °F (36 7 °C), Min:97 9 °F (36 6 °C), Max:98 2 °F (36 8 °C)    HR:  [] 87  Resp:  [18-20] 18  BP: ()/(58-62) 125/58  SpO2:  [97 %-100 %] 99 %  Body mass index is 22 55 kg/m²  Input and Output Summary (last 24 hours): Intake/Output Summary (Last 24 hours) at 17  Last data filed at 17 1400   Gross per 24 hour   Intake              460 ml   Output              350 ml   Net              110 ml       Physical Exam   Constitutional: He is oriented to person, place, and time     Cardiovascular: Normal heart sounds  Pulmonary/Chest: He has wheezes  Abdominal: Soft  Bowel sounds are normal    Neurological: He is alert and oriented to person, place, and time  anxious         Additional Data:     Labs:      Results from last 7 days  Lab Units 11/08/17  0544  11/05/17  0653   WBC Thousand/uL 10 88*  < > 8 92   HEMOGLOBIN g/dL 8 5*  < > 9 3*   HEMATOCRIT % 27 2*  < > 28 9*   PLATELETS Thousands/uL 335  < > 307   NEUTROS PCT %  --   --  45   LYMPHS PCT %  --   --  47*   LYMPHO PCT % 4*  --   --    MONOS PCT %  --   --  6   MONO PCT MAN % 1*  --   --    EOS PCT %  --   --  2   EOSINO PCT MANUAL % 0  --   --    < > = values in this interval not displayed  Results from last 7 days  Lab Units 11/06/17  0538   SODIUM mmol/L 142   POTASSIUM mmol/L 4 4   CHLORIDE mmol/L 104   CO2 mmol/L 36*   BUN mg/dL 21   CREATININE mg/dL 0 89   CALCIUM mg/dL 9 0   GLUCOSE RANDOM mg/dL 172*       Results from last 7 days  Lab Units 11/08/17  0544   INR  2 42*       * I Have Reviewed All Lab Data Listed Above  Imaging:  Imaging Personally Reviewed by Myself Includes:     Cultures:   Blood Culture: No results found for: BLOODCX  Urine Culture: No results found for: URINECX  Sputum Culture: No components found for: SPUTUMCX  Wound Culture: No results found for: WOUNDCULT    Last 24 Hours Medication List:     EMS replenish medication  Does not apply Once   citalopram 20 mg Oral Daily   diltiazem 180 mg Oral Daily   ferrous sulfate 325 mg Oral Daily   furosemide 40 mg Oral Daily   ipratropium 0 5 mg Nebulization TID   levalbuterol 1 25 mg Nebulization TID   methylPREDNISolone sodium succinate 40 mg Intravenous Q8H [address]        Today, Patient Was Seen By: Delgado Palma MD    ** Please Note: Dragon 360 Dictation voice to text software may have been used in the creation of this document   **

## 2017-11-09 NOTE — CASE MANAGEMENT
Continued Stay Review    Date: 11/9/2017    Vital Signs: /67   Pulse 77   Temp 98 °F (36 7 °C) (Oral)   Resp 18   Ht 5' 7" (1 702 m)   Wt 65 3 kg (144 lb)   SpO2 100% on 4 liters NC O2    BMI 22 55 kg/m²     Medications:   Scheduled Meds:   citalopram 20 mg Oral Daily   diltiazem 180 mg Oral Daily   ferrous sulfate 325 mg Oral Daily   furosemide 40 mg Oral Daily   ipratropium 0 5 mg Nebulization TID   levalbuterol 1 25 mg Nebulization TID   methylPREDNISolone sodium succinate 40 mg Intravenous Q8H [address]     Continuous Infusions:    PRN Meds: melatonin     Nursing orders - VS q 4 0 Sequential Compression device to le's - NC O2 - Diet regular house     Abnormal Labs/Diagnostic Results:      Ref Range & Units 11/9/17 0607 11/8/17 0544 11/7/17 0843 11/6/17 0538 11/5/17 0653   WBC 4 31 - 10 16 Thousand/uL 8 88  10 88    6 86  8 92    RBC 3 88 - 5 62 Million/uL 2 97   2 82    2 67   3 02     Hemoglobin 12 0 - 17 0 g/dL 8 9   8 5   8 4   8 1   9 3     Hematocrit 36 5 - 49 3 % 28 6   27 2   26 7   25 7   28 9     MCV 82 - 98 fL 96  97   96  96    MCH 26 8 - 34 3 pg 30 0  30 1   30 3  30 8    MCHC 31 4 - 37 4 g/dL 31 1   31 3    31 5  32 2    RDW 11 6 - 15 1 % 14 6  15 0   14 7  14 6    MPV 8 9 - 12 7 fL 9 5  9 2   9 3  9 2    Platelets 587 - 516 Thousands/uL 368  335   299  307    nRBC /100 WBCs 1  0CM    0    Neutrophils Relative      45    Basophils Absolute      0 01    Lymphocytes Relative      47     Monocytes Relative      6    Eosinophils Relative      2    Basophils Relative      0    Neutrophils Absolute      3 96    Lymphocytes Absolute      4 17    Monocytes Absolute      0 53    Eosinophils Absolute      0 18               Ref Range & Units 11/9/17 0607 11/6/17 0538 11/5/17 0653 10/20/17 0836 10/20/17 0836   Sodium 136 - 145 mmol/L 138  142  140   137    Potassium 3 5 - 5 3 mmol/L 4 1  4 4  3 9   4 4    Chloride 100 - 108 mmol/L 95   104  103   96     CO2 21 - 32 mmol/L 38   36   33    36     Anion Gap 4 - 13 mmol/L 5  2   4   5    BUN 5 - 25 mg/dL 22  21  18   12    Creatinine 0 60 - 1 30 mg/dL 0 78  0 89CM  0 78CM   0 89CM    Glucose 65 - 140 mg/dL 164   172CM   149CM    252CM     Calcium 8 3 - 10 1 mg/dL 9 1  9 0  8 9   9 5    AST 5 - 45 U/L 11     12CM    ALT 12 - 78 U/L 18     12CM    Alkaline Phosphatase 46 - 116 U/L 70     84    Total Protein 6 4 - 8 2 g/dL 6 6    6 7  7 3    Albumin 3 5 - 5 0 g/dL 3 3      3 5    Total Bilirubin 0 20 - 1 00 mg/dL 0 34     0 39    eGFR ml/min/1 73sq m 92  87  92   87            Pt/INR 20 3/1 72      Age/Sex: 69 y o  male     Assessment/Plan:     · Acute COPD exacerbation  ? Background severe COPD  ? IV steroids/ nebs/o2  ? pulm following  ? Slow steroid taper anticipated  · Anxiety  ? Reassured  · AFib  ? Rate controlled  ? Warfarin  ? Check INR  · Chronic resp failure  ? Home o2  ?  pulm follow up       Discharge Plan:  Lives with wife & daughter -  independent with ADL's- has home O2 and nebulizer - open to [facility]  Hx of rehab at [facility]

## 2017-11-09 NOTE — PROGRESS NOTES
[facility] Progress Note  Patient: Alana Arizmendi 69 y o  male   MRN: [id number]  PCP: Ken Jones MD  Unit/Bed#: [facility] 629-01 Encounter: [id number]  Date Of Visit: 17    Assessment:      Principal Problem:    Severe COPD with acute exacerbation  Active Problems:    Iron deficiency anemia    Paroxysmal atrial tachycardia ([address] )    Supratherapeutic INR      Plan:  · Acute COPD exacerbation  ? Background severe COPD on 4l home o2  ? Continue IV steroids- reduce dose to 20 Q8h solumedrol  ? Continue nebs/ inhalers and o2  ? pulm following  · Anxiety  ? Reassured  · AFib  ? Rate controlled  ? Restarted Warfarin  ? Rpt INR in 48 hrs  · Chronic resp failure  ? Home o2  ? pulm follow up          VTE Pharmacologic Prophylaxis: Warfarin (Coumadin)  Mechanical: Mechanical VTE prophylaxis in place  Patient Centered Rounds: I have performed bedside rounds with nursing staff today  Discussions with Specialists or Other Care Team Provider:     Education and Discussions with Family / Patient:     Time Spent for Care: More than 50% of total time spent on counseling and coordination of care as described above  Current Length of Stay: 4 day(s)    Current Patient Status: Inpatient   Certification Statement: The patient will continue to require additional inpatient hospital stay due to COPD exacerbation    Discharge Plan:    Code Status: Level 1 - Full Code      Subjective: no new complaints overnight    Objective:     Vitals:   Temp (24hrs), Av 9 °F (36 6 °C), Min:97 7 °F (36 5 °C), Max:98 °F (36 7 °C)    HR:  [77-82] 77  Resp:  [18] 18  BP: (108-123)/(55-67) 123/67  SpO2:  [88 %-100 %] 88 %  Body mass index is 22 55 kg/m²  Input and Output Summary (last 24 hours):        Intake/Output Summary (Last 24 hours) at 17 1537  Last data filed at 17 1401   Gross per 24 hour   Intake              860 ml   Output              550 ml   Net              310 ml       Physical Exam Constitutional: He is oriented to person, place, and time  HENT:   Head: Normocephalic and atraumatic  Cardiovascular: Normal heart sounds  Pulmonary/Chest: Effort normal and breath sounds normal    Abdominal: Soft  Bowel sounds are normal    Neurological: He is alert and oriented to person, place, and time  Skin: There is pallor  Additional Data:     Labs:      Results from last 7 days  Lab Units 11/09/17  0607  11/05/17  0653   WBC Thousand/uL 8 88  < > 8 92   HEMOGLOBIN g/dL 8 9*  < > 9 3*   HEMATOCRIT % 28 6*  < > 28 9*   PLATELETS Thousands/uL 368  < > 307   NEUTROS PCT %  --   --  45   LYMPHS PCT %  --   --  47*   LYMPHO PCT % 5*  < >  --    MONOS PCT %  --   --  6   MONO PCT MAN % 1*  < >  --    EOS PCT %  --   --  2   EOSINO PCT MANUAL % 0  < >  --    < > = values in this interval not displayed  Results from last 7 days  Lab Units 11/09/17  0607   SODIUM mmol/L 138   POTASSIUM mmol/L 4 1   CHLORIDE mmol/L 95*   CO2 mmol/L 38*   BUN mg/dL 22   CREATININE mg/dL 0 78   CALCIUM mg/dL 9 1   TOTAL PROTEIN g/dL 6 6   BILIRUBIN TOTAL mg/dL 0 34   ALK PHOS U/L 70   ALT U/L 18   AST U/L 11   GLUCOSE RANDOM mg/dL 164*       Results from last 7 days  Lab Units 11/09/17  0607   INR  1 72*       * I Have Reviewed All Lab Data Listed Above      Imaging:  Imaging Personally Reviewed by Myself Includes:     Cultures:   Blood Culture: No results found for: BLOODCX  Urine Culture: No results found for: URINECX  Sputum Culture: No components found for: SPUTUMCX  Wound Culture: No results found for: WOUNDCULT    Last 24 Hours Medication List:     EMS replenish medication  Does not apply Once   citalopram 20 mg Oral Daily   diltiazem 180 mg Oral Daily   ferrous sulfate 325 mg Oral Daily   furosemide 40 mg Oral Daily   ipratropium 0 5 mg Nebulization TID   levalbuterol 1 25 mg Nebulization TID   methylPREDNISolone sodium succinate 40 mg Intravenous Q12H [facility]        Today, Patient Was Seen By: Mac Max MD Jonathan    ** Please Note: Dragon 360 Dictation voice to text software may have been used in the creation of this document   **

## 2017-11-10 LAB
ALBUMIN SERPL BCP-MCNC: 2.8 G/DL (ref 3.5–5)
ALP SERPL-CCNC: 65 U/L (ref 46–116)
ALT SERPL W P-5'-P-CCNC: 17 U/L (ref 12–78)
ANION GAP SERPL CALCULATED.3IONS-SCNC: 3 MMOL/L (ref 4–13)
AST SERPL W P-5'-P-CCNC: 23 U/L (ref 5–45)
BASOPHILS # BLD AUTO: 0.01 THOUSANDS/ΜL (ref 0–0.1)
BASOPHILS NFR BLD AUTO: 0 % (ref 0–1)
BILIRUB SERPL-MCNC: 0.28 MG/DL (ref 0.2–1)
BUN SERPL-MCNC: 26 MG/DL (ref 5–25)
CALCIUM SERPL-MCNC: 8.8 MG/DL (ref 8.3–10.1)
CHLORIDE SERPL-SCNC: 99 MMOL/L (ref 100–108)
CO2 SERPL-SCNC: 38 MMOL/L (ref 21–32)
CREAT SERPL-MCNC: 0.78 MG/DL (ref 0.6–1.3)
EOSINOPHIL # BLD AUTO: 0 THOUSAND/ΜL (ref 0–0.61)
EOSINOPHIL NFR BLD AUTO: 0 % (ref 0–6)
ERYTHROCYTE [DISTWIDTH] IN BLOOD BY AUTOMATED COUNT: 14.8 % (ref 11.6–15.1)
GFR SERPL CREATININE-BSD FRML MDRD: 92 ML/MIN/1.73SQ M
GLUCOSE SERPL-MCNC: 169 MG/DL (ref 65–140)
HCT VFR BLD AUTO: 29 % (ref 36.5–49.3)
HGB BLD-MCNC: 9.1 G/DL (ref 12–17)
LYMPHOCYTES # BLD AUTO: 0.55 THOUSANDS/ΜL (ref 0.6–4.47)
LYMPHOCYTES NFR BLD AUTO: 6 % (ref 14–44)
MCH RBC QN AUTO: 30.2 PG (ref 26.8–34.3)
MCHC RBC AUTO-ENTMCNC: 31.4 G/DL (ref 31.4–37.4)
MCV RBC AUTO: 96 FL (ref 82–98)
MONOCYTES # BLD AUTO: 0.45 THOUSAND/ΜL (ref 0.17–1.22)
MONOCYTES NFR BLD AUTO: 5 % (ref 4–12)
NEUTROPHILS # BLD AUTO: 8.59 THOUSANDS/ΜL (ref 1.85–7.62)
NEUTS SEG NFR BLD AUTO: 89 % (ref 43–75)
NRBC BLD AUTO-RTO: 0 /100 WBCS
PLATELET # BLD AUTO: 340 THOUSANDS/UL (ref 149–390)
PMV BLD AUTO: 9 FL (ref 8.9–12.7)
POTASSIUM SERPL-SCNC: 4.9 MMOL/L (ref 3.5–5.3)
PROT SERPL-MCNC: 5.7 G/DL (ref 6.4–8.2)
RBC # BLD AUTO: 3.01 MILLION/UL (ref 3.88–5.62)
SODIUM SERPL-SCNC: 140 MMOL/L (ref 136–145)
WBC # BLD AUTO: 9.68 THOUSAND/UL (ref 4.31–10.16)

## 2017-11-10 PROCEDURE — 80053 COMPREHEN METABOLIC PANEL: CPT | Performed by: INTERNAL MEDICINE

## 2017-11-10 PROCEDURE — 94760 N-INVAS EAR/PLS OXIMETRY 1: CPT

## 2017-11-10 PROCEDURE — 94640 AIRWAY INHALATION TREATMENT: CPT

## 2017-11-10 PROCEDURE — 85025 COMPLETE CBC W/AUTO DIFF WBC: CPT | Performed by: INTERNAL MEDICINE

## 2017-11-10 RX ORDER — GUAIFENESIN/DEXTROMETHORPHAN 100-10MG/5
10 SYRUP ORAL EVERY 4 HOURS PRN
Status: DISCONTINUED | OUTPATIENT
Start: 2017-11-10 | End: 2017-11-11 | Stop reason: HOSPADM

## 2017-11-10 RX ORDER — PREDNISONE 20 MG/1
40 TABLET ORAL DAILY
Status: DISCONTINUED | OUTPATIENT
Start: 2017-11-10 | End: 2017-11-11 | Stop reason: HOSPADM

## 2017-11-10 RX ADMIN — FUROSEMIDE 40 MG: 40 TABLET ORAL at 09:30

## 2017-11-10 RX ADMIN — DILTIAZEM HYDROCHLORIDE 180 MG: 180 CAPSULE, COATED, EXTENDED RELEASE ORAL at 09:30

## 2017-11-10 RX ADMIN — LEVALBUTEROL HYDROCHLORIDE 1.25 MG: 1.25 SOLUTION, CONCENTRATE RESPIRATORY (INHALATION) at 13:34

## 2017-11-10 RX ADMIN — LEVALBUTEROL HYDROCHLORIDE 1.25 MG: 1.25 SOLUTION, CONCENTRATE RESPIRATORY (INHALATION) at 19:45

## 2017-11-10 RX ADMIN — PREDNISONE 40 MG: 20 TABLET ORAL at 13:57

## 2017-11-10 RX ADMIN — LEVALBUTEROL HYDROCHLORIDE 1.25 MG: 1.25 SOLUTION, CONCENTRATE RESPIRATORY (INHALATION) at 08:00

## 2017-11-10 RX ADMIN — IPRATROPIUM BROMIDE 0.5 MG: 0.5 SOLUTION RESPIRATORY (INHALATION) at 08:00

## 2017-11-10 RX ADMIN — Medication 325 MG: at 09:32

## 2017-11-10 RX ADMIN — WARFARIN SODIUM 3 MG: 1 TABLET ORAL at 17:17

## 2017-11-10 RX ADMIN — IPRATROPIUM BROMIDE 0.5 MG: 0.5 SOLUTION RESPIRATORY (INHALATION) at 13:34

## 2017-11-10 RX ADMIN — IPRATROPIUM BROMIDE 0.5 MG: 0.5 SOLUTION RESPIRATORY (INHALATION) at 19:45

## 2017-11-10 RX ADMIN — CITALOPRAM HYDROBROMIDE 20 MG: 20 TABLET ORAL at 09:30

## 2017-11-10 RX ADMIN — METHYLPREDNISOLONE SODIUM SUCCINATE 20 MG: 40 INJECTION, POWDER, FOR SOLUTION INTRAMUSCULAR; INTRAVENOUS at 05:55

## 2017-11-10 NOTE — PROGRESS NOTES
[facility] Progress Note  Patient: Phyllis Rabago 69 y o  male   MRN: [id number]  PCP: Jarek Damon MD  Unit/Bed#: [facility] 629-01 Encounter: [id number]  Date Of Visit: 11/10/17    Assessment:      Principal Problem:    Severe COPD with acute exacerbation  Active Problems:    Iron deficiency anemia    Paroxysmal atrial tachycardia ([address] )    Supratherapeutic INR      Plan:  · Acute COPD exacerbation  ? Background severe COPD on 4l home o2  ? Switched to PO prednisone per pulm1  ? Continue nebs/ inhalers and o2  ? pulm follow up as outpt  · Anxiety  ? Worried about readmission; believes that he was discharged too early  · AFib  ? Rate controlled  ? Restarted Warfarin  ? Rpt INR in am  · Acute resp failure POA - resolved on Chronic resp failure  ? Home o2  ? pulm follow up          VTE Pharmacologic Prophylaxis: Warfarin (Coumadin)  Mechanical: Mechanical VTE prophylaxis in place  Patient Centered Rounds: I have performed bedside rounds with nursing staff today  Discussions with Specialists or Other Care Team Provider:     Education and Discussions with Family / Patient:     Time Spent for Care: More than 50% of total time spent on counseling and coordination of care as described above  Current Length of Stay: 5 day(s)    Current Patient Status: Inpatient   Certification Statement: The patient will continue to require additional inpatient hospital stay due to copd exacerbation    Discharge Plan:    Code Status: Level 1 - Full Code      Subjective: no new complaints; reports some expectoration after which, he feels better    Objective:     Vitals:   Temp (24hrs), Av 5 °F (36 9 °C), Min:98 2 °F (36 8 °C), Max:98 9 °F (37 2 °C)    HR:  [] 98  Resp:  [18] 18  BP: (110-140)/(52-78) 140/78  SpO2:  [93 %-98 %] 97 %  Body mass index is 22 55 kg/m²  Input and Output Summary (last 24 hours):        Intake/Output Summary (Last 24 hours) at 11/10/17 1529  Last data filed at 11/10/17 1400 Gross per 24 hour   Intake             1000 ml   Output              700 ml   Net              300 ml       Physical Exam   Constitutional: He is oriented to person, place, and time  HENT:   Head: Normocephalic and atraumatic  Eyes: Pupils are equal, round, and reactive to light  Cardiovascular: Normal heart sounds  Pulmonary/Chest: Effort normal and breath sounds normal    Abdominal: Soft  Bowel sounds are normal    Neurological: He is alert and oriented to person, place, and time  Skin: There is pallor  Additional Data:     Labs:      Results from last 7 days  Lab Units 11/10/17  0846   WBC Thousand/uL 9 68   HEMOGLOBIN g/dL 9 1*   HEMATOCRIT % 29 0*   PLATELETS Thousands/uL 340   NEUTROS PCT % 89*   LYMPHS PCT % 6*   MONOS PCT % 5   EOS PCT % 0       Results from last 7 days  Lab Units 11/10/17  0556   SODIUM mmol/L 140   POTASSIUM mmol/L 4 9   CHLORIDE mmol/L 99*   CO2 mmol/L 38*   BUN mg/dL 26*   CREATININE mg/dL 0 78   CALCIUM mg/dL 8 8   TOTAL PROTEIN g/dL 5 7*   BILIRUBIN TOTAL mg/dL 0 28   ALK PHOS U/L 65   ALT U/L 17   AST U/L 23   GLUCOSE RANDOM mg/dL 169*       Results from last 7 days  Lab Units 11/09/17  0607   INR  1 72*       * I Have Reviewed All Lab Data Listed Above      Imaging:  Imaging Personally Reviewed by Myself Includes:     Cultures:   Blood Culture: No results found for: BLOODCX  Urine Culture: No results found for: URINECX  Sputum Culture: No components found for: SPUTUMCX  Wound Culture: No results found for: WOUNDCULT    Last 24 Hours Medication List:     EMS replenish medication  Does not apply Once   citalopram 20 mg Oral Daily   diltiazem 180 mg Oral Daily   ferrous sulfate 325 mg Oral Daily   furosemide 40 mg Oral Daily   ipratropium 0 5 mg Nebulization TID   levalbuterol 1 25 mg Nebulization TID   predniSONE 40 mg Oral Daily   warfarin 3 mg Oral Daily (warfarin)        Today, Patient Was Seen By: Ian Wilson MD    ** Please Note: Dragon 360 Dictation voice to text software may have been used in the creation of this document   **

## 2017-11-10 NOTE — PROGRESS NOTES
Progress Note - Pulmonary   Rach Wang 69 y o  male MRN: [id number]  Unit/Bed#: [facility] 629-01 Encounter: [id number]      Assessment:  1  Chronic hypoxic respiratory failure - on 4LNC at baseline  Better clinically  2  Severe COPD with acute exacerbation - slowly improving  Continue to improve    Plan:  1  Switch to oral Steroid  2  Continue Xopenex/Atrovent nebulizers TID  3  At discharge, should be placed back on Breo 100, Spiriva, and ventolin as needed  I prescription has also been placed in [employer] for albuterol solution for nebulizer to be used Q6hrs prn  4  Continue supplemental oxygen at [facility] to keep saturations greater than or equal to 88%  5  Incentive spirometry Q1hr, OOB as able, increase activity as able  6  Outpatient pulmonary follow up as per discharge instructions    Ok to be discharged tomorrow  Will switch to oral Prednisone and taper very slowly    He has appointment with Pulmonary     Subjective:   Mr Reyna Quintana is seen sitting in bed this morning  He notes that he is feeling better than on admission, but still not yet back to his pulmonary baseline  He is on his baseline amount of home O2  He denies chest pain, resting shortness of breath, fevers or bronchospasm  He has occasional nonproductive cough  He was able to ambulate yesterday in the halls for a bit then got too short of breath    He does not have any medication at home for his nebulizer    Better but not back to his baseline   Objective:     Vitals: Blood pressure 140/78, pulse 98, temperature 98 2 °F (36 8 °C), temperature source Oral, resp  rate 18, height 5' 7" (1 702 m), weight 65 3 kg (144 lb), SpO2 97 %  , 4LNC, Body mass index is 22 55 kg/m²        Intake/Output Summary (Last 24 hours) at 11/10/17 1344  Last data filed at 11/10/17 0950   Gross per 24 hour   Intake              780 ml   Output              400 ml   Net              380 ml         Physical Exam  Gen: Awake, alert, oriented x 3, no acute distress  HEENT: Mucous membranes moist, no oral lesions, no thrush, wearing O2 via NC  NECK: No accessory muscle use, JVP not elevated  Cardiac: Irregular, single S1, single S2, no murmurs, no rubs, no gallops  Lungs: Decreased breath sounds throughout bilaterally without wheezes, rhonchi or rales noted  Abdomen: normoactive bowel sounds, soft nontender, nondistended, no rebound or rigidity, no guarding  Extremities: no cyanosis, no clubbing, no edema    Labs: I have personally reviewed pertinent lab results  , ABG: No results found for: PHART, PKA5VEJ, PO2ART, JSC4LGE, D5UDJUFS, BEART, SOURCE, BNP: No results found for: BNP, CBC:   Lab Results   Component Value Date    WBC 9 68 11/10/2017    HGB 9 1 (L) 11/10/2017    HCT 29 0 (L) 11/10/2017    MCV 96 11/10/2017     11/10/2017    MCH 30 2 11/10/2017    MCHC 31 4 11/10/2017    RDW 14 8 11/10/2017    MPV 9 0 11/10/2017    NRBC 0 11/10/2017   , CMP:   Lab Results   Component Value Date     11/10/2017    K 4 9 11/10/2017    CL 99 (L) 11/10/2017    CO2 38 (H) 11/10/2017    ANIONGAP 3 (L) 11/10/2017    BUN 26 (H) 11/10/2017    CREATININE 0 78 11/10/2017    GLUCOSE 169 (H) 11/10/2017    CALCIUM 8 8 11/10/2017    AST 23 11/10/2017    ALT 17 11/10/2017    ALKPHOS 65 11/10/2017    PROT 5 7 (L) 11/10/2017    ALBUMIN 2 8 (L) 11/10/2017    BILITOT 0 28 11/10/2017    EGFR 92 11/10/2017   , PT/INR:   No results found for: PT, INR, Troponin: No results found for: TROPONINI     Imaging and other studies: I have personally reviewed pertinent films in PACS    PA & Lat CXR 11/5/17  FINDINGS:          Cardiomediastinal silhouette appears unremarkable      Emphysematous changes are noted consistent with chronic obstructive pulmonary disease  No airspace opacity to suggest focal pneumonia  No pneumothorax or pleural effusion      Visualized osseous structures appear within normal limits for the patient's age      IMPRESSION:     Emphysematous changes are noted  Otherwise clear lungs  Henry Wong MD

## 2017-11-11 VITALS
WEIGHT: 144 LBS | HEART RATE: 98 BPM | HEIGHT: 67 IN | RESPIRATION RATE: 18 BRPM | OXYGEN SATURATION: 93 % | DIASTOLIC BLOOD PRESSURE: 67 MMHG | SYSTOLIC BLOOD PRESSURE: 140 MMHG | BODY MASS INDEX: 22.6 KG/M2 | TEMPERATURE: 98.2 F

## 2017-11-11 PROBLEM — J44.1 COPD WITH ACUTE EXACERBATION (HCC): Status: RESOLVED | Noted: 2017-08-27 | Resolved: 2017-11-11

## 2017-11-11 PROBLEM — R79.1 SUPRATHERAPEUTIC INR: Status: RESOLVED | Noted: 2017-11-05 | Resolved: 2017-11-11

## 2017-11-11 LAB
ALBUMIN SERPL BCP-MCNC: 2.8 G/DL (ref 3.5–5)
ALP SERPL-CCNC: 73 U/L (ref 46–116)
ALT SERPL W P-5'-P-CCNC: 17 U/L (ref 12–78)
ANION GAP SERPL CALCULATED.3IONS-SCNC: 3 MMOL/L (ref 4–13)
AST SERPL W P-5'-P-CCNC: 10 U/L (ref 5–45)
BASOPHILS # BLD AUTO: 0 THOUSANDS/ΜL (ref 0–0.1)
BASOPHILS NFR BLD AUTO: 0 % (ref 0–1)
BILIRUB SERPL-MCNC: 0.2 MG/DL (ref 0.2–1)
BUN SERPL-MCNC: 22 MG/DL (ref 5–25)
CALCIUM SERPL-MCNC: 8.8 MG/DL (ref 8.3–10.1)
CHLORIDE SERPL-SCNC: 98 MMOL/L (ref 100–108)
CO2 SERPL-SCNC: 42 MMOL/L (ref 21–32)
CREAT SERPL-MCNC: 0.74 MG/DL (ref 0.6–1.3)
EOSINOPHIL # BLD AUTO: 0 THOUSAND/ΜL (ref 0–0.61)
EOSINOPHIL NFR BLD AUTO: 0 % (ref 0–6)
ERYTHROCYTE [DISTWIDTH] IN BLOOD BY AUTOMATED COUNT: 14.8 % (ref 11.6–15.1)
GFR SERPL CREATININE-BSD FRML MDRD: 94 ML/MIN/1.73SQ M
GLUCOSE SERPL-MCNC: 161 MG/DL (ref 65–140)
HCT VFR BLD AUTO: 26 % (ref 36.5–49.3)
HGB BLD-MCNC: 8.1 G/DL (ref 12–17)
INR PPP: 1.98 (ref 0.86–1.16)
LYMPHOCYTES # BLD AUTO: 0.45 THOUSANDS/ΜL (ref 0.6–4.47)
LYMPHOCYTES NFR BLD AUTO: 7 % (ref 14–44)
MCH RBC QN AUTO: 30 PG (ref 26.8–34.3)
MCHC RBC AUTO-ENTMCNC: 31.2 G/DL (ref 31.4–37.4)
MCV RBC AUTO: 96 FL (ref 82–98)
MONOCYTES # BLD AUTO: 0.46 THOUSAND/ΜL (ref 0.17–1.22)
MONOCYTES NFR BLD AUTO: 7 % (ref 4–12)
NEUTROPHILS # BLD AUTO: 5.87 THOUSANDS/ΜL (ref 1.85–7.62)
NEUTS SEG NFR BLD AUTO: 86 % (ref 43–75)
NRBC BLD AUTO-RTO: 1 /100 WBCS
PLATELET # BLD AUTO: 311 THOUSANDS/UL (ref 149–390)
PMV BLD AUTO: 9 FL (ref 8.9–12.7)
POTASSIUM SERPL-SCNC: 4.1 MMOL/L (ref 3.5–5.3)
PROT SERPL-MCNC: 5.5 G/DL (ref 6.4–8.2)
PROTHROMBIN TIME: 22.7 SECONDS (ref 12.1–14.4)
RBC # BLD AUTO: 2.7 MILLION/UL (ref 3.88–5.62)
SODIUM SERPL-SCNC: 143 MMOL/L (ref 136–145)
WBC # BLD AUTO: 6.91 THOUSAND/UL (ref 4.31–10.16)

## 2017-11-11 PROCEDURE — 80053 COMPREHEN METABOLIC PANEL: CPT | Performed by: INTERNAL MEDICINE

## 2017-11-11 PROCEDURE — 85610 PROTHROMBIN TIME: CPT | Performed by: INTERNAL MEDICINE

## 2017-11-11 PROCEDURE — 94760 N-INVAS EAR/PLS OXIMETRY 1: CPT

## 2017-11-11 PROCEDURE — 85025 COMPLETE CBC W/AUTO DIFF WBC: CPT | Performed by: INTERNAL MEDICINE

## 2017-11-11 PROCEDURE — 94640 AIRWAY INHALATION TREATMENT: CPT

## 2017-11-11 RX ORDER — FUROSEMIDE 20 MG/1
20 TABLET ORAL DAILY
Qty: 30 TABLET | Refills: 0 | Status: SHIPPED | OUTPATIENT
Start: 2017-11-13 | End: 2018-03-21 | Stop reason: SDUPTHER

## 2017-11-11 RX ORDER — PREDNISONE 10 MG/1
TABLET ORAL
Qty: 100 TABLET | Refills: 0 | Status: SHIPPED | OUTPATIENT
Start: 2017-11-11 | End: 2018-02-20 | Stop reason: ALTCHOICE

## 2017-11-11 RX ORDER — LANOLIN ALCOHOL/MO/W.PET/CERES
6 CREAM (GRAM) TOPICAL
Qty: 30 TABLET | Refills: 0 | Status: SHIPPED | OUTPATIENT
Start: 2017-11-11

## 2017-11-11 RX ORDER — FLUTICASONE FUROATE AND VILANTEROL 100; 25 UG/1; UG/1
1 POWDER RESPIRATORY (INHALATION) DAILY
Qty: 1 EACH | Refills: 0 | Status: SHIPPED | OUTPATIENT
Start: 2017-11-11 | End: 2018-02-20 | Stop reason: SDUPTHER

## 2017-11-11 RX ORDER — GUAIFENESIN/DEXTROMETHORPHAN 100-10MG/5
10 SYRUP ORAL EVERY 4 HOURS PRN
Qty: 118 ML | Refills: 0 | Status: SHIPPED | OUTPATIENT
Start: 2017-11-11 | End: 2018-11-13 | Stop reason: ALTCHOICE

## 2017-11-11 RX ADMIN — PREDNISONE 40 MG: 20 TABLET ORAL at 09:22

## 2017-11-11 RX ADMIN — IPRATROPIUM BROMIDE 0.5 MG: 0.5 SOLUTION RESPIRATORY (INHALATION) at 13:45

## 2017-11-11 RX ADMIN — DILTIAZEM HYDROCHLORIDE 180 MG: 180 CAPSULE, COATED, EXTENDED RELEASE ORAL at 09:22

## 2017-11-11 RX ADMIN — LEVALBUTEROL HYDROCHLORIDE 1.25 MG: 1.25 SOLUTION, CONCENTRATE RESPIRATORY (INHALATION) at 13:45

## 2017-11-11 RX ADMIN — CITALOPRAM HYDROBROMIDE 20 MG: 20 TABLET ORAL at 09:22

## 2017-11-11 RX ADMIN — FUROSEMIDE 40 MG: 40 TABLET ORAL at 09:21

## 2017-11-11 RX ADMIN — LEVALBUTEROL HYDROCHLORIDE 1.25 MG: 1.25 SOLUTION, CONCENTRATE RESPIRATORY (INHALATION) at 07:44

## 2017-11-11 RX ADMIN — Medication 325 MG: at 09:21

## 2017-11-11 RX ADMIN — IPRATROPIUM BROMIDE 0.5 MG: 0.5 SOLUTION RESPIRATORY (INHALATION) at 07:44

## 2017-11-11 NOTE — SOCIAL WORK
CM consulted to [address] for RN order to resume services  Dr Shelia Eng from [facility] made aware

## 2017-11-11 NOTE — DISCHARGE SUMMARY
Discharge Summary - [facility]    Patient Information: Melissa Abad 69 y o  male MRN: [id number]  Unit/Bed#: [facility] 629-01 Encounter: [id number]    Discharging Physician / Practitioner: Magdiel Darden MD  PCP: Jan Ruiz MD  Admission Date: 11/5/2017  Discharge Date: 11/11/17    Reason for Admission: copd exacerbation    Discharge Diagnoses:     Principal Problem (Resolved):    Severe COPD with acute exacerbation  Active Problems:    Chronic respiratory failure (HCC)    Iron deficiency anemia    Paroxysmal atrial tachycardia ([address] )  Resolved Problems:    Supratherapeutic INR      Consultations During Hospital Stay:  · Dr Carlos Smith Pulmonary    Procedures Performed:     · CXR - nil       Incidental Findings:   · None    Test Results Pending at Discharge (will require follow up): · None     Outpatient Tests Requested:  · None    Complications: None    Hospital Course:     Melissa Abad is a 69 y o  male patient who originally presented to the hospital on 11/5/2017 due to dyspnea and hypoxia with wheezing of sudden onset in early hours  He responded fairly quickly but then continues to have significant wheezing requiring prolonged IV steroids  He also has a significant component of anxiety based on presenting scenario when his sats were recorded in the high 60's  There could have been some pulm congestion but recent echo unremarkable, BNP was nml  He does take lasic at home that was continued in hospital   Eventually he was discharged on a slow steroid taper, Breo, spiriva and ventolin  Pulm will follow as outpt  Stable at discharge  Will resume lasix at 20mg daily      Condition at Discharge: fair     Discharge Day Visit / Exam:     Vitals: Blood Pressure: 140/67 (11/11/17 0700)  Pulse: 98 (11/11/17 0700)  Temperature: 98 2 °F (36 8 °C) (11/11/17 0700)  Temp Source: Oral (11/11/17 0700)  Respirations: 18 (11/11/17 0700)  Height: 5' 7" (170 2 cm) (11/05/17 0615)  Weight - Scale: 65 3 kg (144 lb) (11/05/17 0615)  SpO2: 93 % (11/11/17 1346)  Exam:   Physical Exam   Constitutional: He is oriented to person, place, and time  HENT:   Head: Normocephalic  Cardiovascular: Normal heart sounds  Pulmonary/Chest: Effort normal and breath sounds normal    Abdominal: Soft  Bowel sounds are normal    Neurological: He is alert and oriented to person, place, and time  Skin: Skin is warm and dry  Discharge instructions/Information to patient and family:   See after visit summary for information provided to patient and family  Provisions for Follow-Up Care:  See after visit summary for information related to follow-up care and any pertinent home health orders  Disposition: Home     Discharge Statement:  I spent 35 minutes discharging the patient  This time was spent on the day of discharge  I had direct contact with the patient on the day of discharge  Greater than 50% of the total time was spent examining patient, answering all patient questions, arranging and discussing plan of care with patient as well as directly providing post-discharge instructions  Additional time then spent on discharge activities  Discharge Medications:  See after visit summary for reconciled discharge medications provided to patient and family  ** Please Note: Dragon 360 Dictation voice to text software may have been used in the creation of this document   **

## 2017-11-13 ENCOUNTER — GENERIC CONVERSION - ENCOUNTER (OUTPATIENT)
Dept: OTHER | Facility: OTHER | Age: 67
End: 2017-11-13

## 2017-11-13 ENCOUNTER — LAB REQUISITION (OUTPATIENT)
Dept: LAB | Facility: HOSPITAL | Age: 67
End: 2017-11-13
Payer: MEDICARE

## 2017-11-13 DIAGNOSIS — I48.92 ATRIAL FLUTTER (HCC): ICD-10-CM

## 2017-11-13 DIAGNOSIS — D50.9 IRON DEFICIENCY ANEMIA: ICD-10-CM

## 2017-11-13 DIAGNOSIS — R73.01 IMPAIRED FASTING GLUCOSE: ICD-10-CM

## 2017-11-13 LAB
ANISOCYTOSIS BLD QL SMEAR: PRESENT
BASOPHILS # BLD MANUAL: 0 THOUSAND/UL (ref 0–0.1)
BASOPHILS NFR MAR MANUAL: 0 % (ref 0–1)
EOSINOPHIL # BLD MANUAL: 0.08 THOUSAND/UL (ref 0–0.4)
EOSINOPHIL NFR BLD MANUAL: 1 % (ref 0–6)
ERYTHROCYTE [DISTWIDTH] IN BLOOD BY AUTOMATED COUNT: 14.7 % (ref 11.6–15.1)
HCT VFR BLD AUTO: 31.9 % (ref 36.5–49.3)
HGB BLD-MCNC: 9.9 G/DL (ref 12–17)
INR PPP: 2.15 (ref 0.86–1.16)
LYMPHOCYTES # BLD AUTO: 0.72 THOUSAND/UL (ref 0.6–4.47)
LYMPHOCYTES # BLD AUTO: 9 % (ref 14–44)
MCH RBC QN AUTO: 30.2 PG (ref 26.8–34.3)
MCHC RBC AUTO-ENTMCNC: 31 G/DL (ref 31.4–37.4)
MCV RBC AUTO: 97 FL (ref 82–98)
MONOCYTES # BLD AUTO: 0.08 THOUSAND/UL (ref 0–1.22)
MONOCYTES NFR BLD: 1 % (ref 4–12)
NEUTROPHILS # BLD MANUAL: 7 THOUSAND/UL (ref 1.85–7.62)
NEUTS SEG NFR BLD AUTO: 88 % (ref 43–75)
NRBC BLD AUTO-RTO: 0 /100 WBCS
PLATELET # BLD AUTO: 360 THOUSANDS/UL (ref 149–390)
PLATELET BLD QL SMEAR: ADEQUATE
PMV BLD AUTO: 9.4 FL (ref 8.9–12.7)
PROTHROMBIN TIME: 24.2 SECONDS (ref 12.1–14.4)
RBC # BLD AUTO: 3.28 MILLION/UL (ref 3.88–5.62)
RBC MORPH BLD: PRESENT
VARIANT LYMPHS # BLD AUTO: 1 %
WBC # BLD AUTO: 7.96 THOUSAND/UL (ref 4.31–10.16)

## 2017-11-13 PROCEDURE — 85027 COMPLETE CBC AUTOMATED: CPT | Performed by: INTERNAL MEDICINE

## 2017-11-13 PROCEDURE — 85610 PROTHROMBIN TIME: CPT | Performed by: INTERNAL MEDICINE

## 2017-11-13 PROCEDURE — 85007 BL SMEAR W/DIFF WBC COUNT: CPT | Performed by: INTERNAL MEDICINE

## 2017-11-17 ENCOUNTER — GENERIC CONVERSION - ENCOUNTER (OUTPATIENT)
Dept: OTHER | Facility: OTHER | Age: 67
End: 2017-11-17

## 2017-11-17 ENCOUNTER — ALLSCRIPTS OFFICE VISIT (OUTPATIENT)
Dept: OTHER | Facility: OTHER | Age: 67
End: 2017-11-17

## 2017-11-20 NOTE — PROGRESS NOTES
Assessment    1  Iron (Fe) deficiency anemia (280 9) (D50 9)    Plan  Iron (Fe) deficiency anemia    · Follow-up visit in 3 months Evaluation and Treatment  Follow-up  Status: Hold For -Scheduling  Requested for: 96DEE3783   Ordered; For: Iron (Fe) deficiency anemia; Ordered By: Maximo York Performed:  Due: 36XVI8604   · (1) CBC/PLT/DIFF; Status:Active; Requested JRO:53NPB0645; Perform:[facility]; OZC:97KWE6001; Last Updated By:Daniel Winn; 11/17/2017 4:30:19 PM;Ordered; For:Iron (Fe) deficiency anemia; Ordered By:Proothi, Malachi;   · (1) CBC/PLT/DIFF; Status:Active; Requested ZLX:76KNU0540; Perform:[facility]; VPV:68NWC7010; Last Updated By:Daniel Winn; 11/17/2017 4:30:09 PM;Ordered; For:Iron (Fe) deficiency anemia; Ordered By:Proothi, Malachi;   · (1) CBC/PLT/DIFF; Status:Active; Requested JLD:14EMQ2682; Perform:[facility]; JPB:84DHO4459; Last Updated By:Daniel Winn; 11/17/2017 4:30:09 PM;Ordered; For:Iron (Fe) deficiency anemia; Ordered By:Proothi, Malachi;   · (1) CBC/PLT/DIFF; Status:Active; Requested SAQ:27XOA4142; Perform:[facility]; JXO:32GIY3521; Last Updated By:Daniel Winn; 11/17/2017 4:29:58 PM;Ordered; For:Iron (Fe) deficiency anemia; Ordered By:Proothi, Malachi;   · (1) CBC/PLT/DIFF; Status:Active; Requested for:26Nbq6799;    Perform:[facility]; Due:65Pyw1098; Last Updated By:Daniel Winn; 11/17/2017 4:29:58 PM;Ordered; For:Iron (Fe) deficiency anemia; Ordered By:Proothi, Malachi;   · (1) CBC/PLT/DIFF; Status:Active; Requested for:17Nov2017;    Perform:[facility]; WWN:88WQY8328; Ordered; For:Iron (Fe) deficiency anemia; Ordered By:Proothi, Malachi;   · (1) CBC/PLT/DIFF; Status:Active; Requested for:17Nov2017;    Perform:[facility]; NUS:43DUY8545; Last Updated By:Daniel Winn; 11/17/2017 4:30:19 PM;Ordered;(Fe) deficiency anemia;  Ordered By:Proothi, Malachi;   · (1) CBC/PLT/DIFF; Status:Complete; Requested for:Recurring Schedule: 2017;12/15/2017; 2018; 2018; 3/9/2018; 2018 ; Perform:[facility]; IHQ:83MAR7105; Ordered; For:Iron (Fe) deficiency anemia; Ordered By:Proothi, Malachi;   · (1) FERRITIN; Status:Active; Requested JJL:47BSE1181; Perform:[facility]; ATT:48GXF0982; Last Updated By:Walter Winn; 2017 4:30:19 PM;Ordered; For:Iron (Fe) deficiency anemia; Ordered By:Proothi, Malachi;   · (1) FERRITIN; Status:Active; Requested SDZ:88MES2208; Perform:[facility]; DADA:47RCW7154; Last Updated By:Walter Winn; 2017 4:30:09 PM;Ordered; For:Iron (Fe) deficiency anemia; Ordered By:Proothi, Malachi;   · (1) FERRITIN; Status:Active; Requested [id number]; Perform:[facility]; KAQ:01SJN1163; Last Updated By:Walter Winn; 2017 4:30:09 PM;Ordered; For:Iron (Fe) deficiency anemia; Ordered By:Proothi, Malachi;   · (1) FERRITIN; Status:Active; Requested HAMZAH:96UWI7664; Perform:[facility]; OHM:14NEB1885; Last Updated By:Walter Winn; 2017 4:29:58 PM;Ordered; For:Iron (Fe) deficiency anemia; Ordered By:Proothi, Malachi;   · (1) FERRITIN; Status:Active; Requested for:64Vic1863;    Perform:[facility]; Due:97Lsx0272; Last Updated By:Walter Winn; 2017 4:29:58 PM;Ordered; For:Iron (Fe) deficiency anemia; Ordered By:Proothi, Malachi;   · (1) FERRITIN; Status:Active; Requested for:2017;    Perform:[facility]; WKY:55HLC7455; Ordered; For:Iron (Fe) deficiency anemia; Ordered By:Malachi Davis;   · (1) FERRITIN; Status:Active; Requested for:2017;    Perform:[facility]; TND:05HUI6537; Last Updated By:Walter Winn; 2017 4:30:19 PM;Ordered;(Fe) deficiency anemia; Ordered By:Malachi Davis;   · (1) FERRITIN; Status:Complete; Requested for:Recurring Schedule: 2017;12/15/2017; 2018; 2018; 3/9/2018; 2018 ;     Perform:[facility]; Due:08Lpt9704; Ordered;(Fe) deficiency anemia; Ordered By:Aundrea Davis;    Discussion/Summary  Discussion Summary:   Patient is here with his wife  Patient has been taking oral iron tablet twice a day for iron deficiency anemia  Previously he had Hemoccult-positive stool  He had GI evaluation  He had blood transfusions, Venofer and oral iron  Hemoglobin had come up to 12  4  Normal hemoglobin is down to 9 9  Stool color is brown  Hemoccult stool test is negative this time  Patient had 2 admissions in the hospital in September 2017 for COPD with exacerbation  He was admitted again this month, November 2017 for COPD with exacerbation  Also I have noticed that his blood sugar was high 252  He does not have history of diabetes  He will discuss this with his primary physician  He is on 4 L nasal oxygen  He is in wheelchair here but he walks independently but slowly at home  Patient is on Coumadin under the care of his cardiologist   He states he goes for Coumadin blood test twice a week  Patient will have blood checked for blood count and ferritin few times and there is no improvement in blood counts and he remained iron deficient he would consider intravenous iron and repeat GI evaluation  Otherwise he will be evaluated for anemia  problem is pulmonary and he is on oxygen 4 L/m continuously  He follows with lung specialist  He has less shortness of breath He has minimal nonproductive cough and dyspnea  Has some tiredness had MGUS previously  Last serum protein electrophoresis did not show monoclonal spike  examination and test results are as recorded and discussed  Plan is as above, monitoring of blood counts and ferritin and decide about intravenous Venofer if needed otherwise additional workup for anemia  Condition and plan discussed and explained  Questions answered  Counseling Documentation With Imm: The patient was counseled regarding diagnostic results,-- patient and family education,-- impressions   total time of encounter was 25 minutes-- and-- 15 minutes was spent counseling  Goals and Barriers: The patient has the current Goals: Correction of anemia and iron deficiency  The patent has the current Barriers: COPD  Patient's Capacity to Self-Care: Patient is able to Self-Care  Patient agrees and allows to involve family/caregiver in development of care plan:   Medication SE Review and Pt Understands Tx: Possible side effects of new medications were reviewed with the patient/guardian today  The treatment plan was reviewed with the patient/guardian  The patient/guardian understands and agrees with the treatment plan   Self Referrals:   Self Referrals: No   Understands and agrees with treatment plan: The treatment plan was reviewed with the patient/guardian  The patient/guardian understands and agrees with the treatment plan      Chief Complaint  Chief Complaint: Chief Complaint:  The patient presents to the office today with Iron deficiency anemia in follow-up visit  History of Present Illness  HPI: Patient is here with his wife  Patient has been taking oral iron tablet twice a day for iron deficiency anemia  Previously he had Hemoccult-positive stool  He had GI evaluation  He had blood transfusions, Venofer and oral iron  Hemoglobin had come up to 12  4  Normal hemoglobin is down to 9 9  Stool color is brown  Hemoccult stool test is negative this time  Patient had 2 admissions in the hospital in September 2017 for COPD with exacerbation  He was admitted again this month, November 2017 for COPD with exacerbation  Also I have noticed that his blood sugar was high 252  He does not have history of diabetes  He will discuss this with his primary physician  He is on 4 L nasal oxygen  He is in wheelchair here but he walks independently but slowly at home  Patient is on Coumadin under the care of his cardiologist   He states he goes for Coumadin blood test twice a week   Patient will have blood checked for blood count and ferritin few times and there is no improvement in blood counts and he remained iron deficient he would consider intravenous iron and repeat GI evaluation  Otherwise he will be evaluated for anemia  problem is pulmonary and he is on oxygen 4 L/m continuously  He follows with lung specialist  He has less shortness of breath He has minimal nonproductive cough and dyspnea  Has some tiredness had MGUS previously  Last serum protein electrophoresis did not show monoclonal spike  Review of Systems                      No headaches, seizures, diplopia, dysphagia, hoarseness, angina pain, chest pain, palpitations,  hemoptysis, abdominal pain,  or hematuria  No fever, chills,  bone pains, skin rash, weight loss, nausea, vomiting and no change in bowel habits  Appetite is fair  No night sweats  Patient has minor arthritic symptoms  No leg cramps  No swelling of the ankles  No swollen glands  Anxious  Not unusually sensitive to heat or cold  Reviewed 13 systems  Other symptoms as in history of present illness    ROS Reviewed:   ROS reviewed  Active Problems  1  Advance directive discussed with patient (V65 49) (Z71 89)   2  Aftercare following joint replacement surgery (V54 81) (Z47 1)   3  Anxiety (300 00) (F41 9)   4  Aortic regurgitation (424 1) (I35 1)   5  Arthralgia Of The Right Pelvis/Hip/Femur (719 45)   6  Atrial flutter (427 32) (I48 92)   7  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)   8  Benign hypertension (401 1) (I10)   9  Chronic hypoxemic respiratory failure (518 83,799 02) (J96 11)   10  Chronic obstructive pulmonary disease (496) (J44 9)   11  Colon polyp (211 3) (K63 5)   12  Emphysema (492 8) (J43 9)   13  History of gastrointestinal hemorrhage (V12 79) (Z87 19)   14  History of Helicobacter infection (V12 09) (Z86 19)   15  Hypokalemia (276 8) (E87 6)   16  Hypoxia (799 02) (R09 02)   17  Iron (Fe) deficiency anemia (280 9) (D50 9)   18  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)   19   MGUS (monoclonal gammopathy of unknown significance) (273 1) (D47 2)   20  Need for influenza vaccination (V04 81) (Z23)   21  Need for prophylactic vaccination and inoculation against influenza (V04 81) (Z23)   22  Need for vaccination with 13-polyvalent pneumococcal conjugate vaccine (V03 82) (Z23)   23  Paroxysmal atrial fibrillation (427 31) (I48 0)   24  Patent foramen ovale (745 5) (Q21 1)   25  Pre-operative cardiovascular examination (V72 81) (Z01 810)   26  Primary localized osteoarthrosis of the hip, unspecified laterality (715 15) (M16 10)   27  Pulmonary artery hypertension (416 8) (I27 21)   28  Rupture Of The Proximal Bicipital Tendon Of The Left Arm (727 62)   29  Screening for genitourinary condition (V81 6) (Z13 89)   30  Tachycardia (785 0) (R00 0)    Past Medical History    1  History of Acute and chronic respiratory failure (518 84) (J96 20)   2  Aftercare following joint replacement surgery (V54 81) (Z47 1)   3  History of Atrial flutter (427 32) (I48 92)   4  History of Deep venous thrombosis of distal lower extremity (453 42) (I82 4Z9)   5  History of acute renal failure (V13 09) (Z87 448)   6  History of diverticulitis of colon (V12 79) (Z87 19)   7  History of edema (V13 89) (Z87 898)   8  History of gastrointestinal hemorrhage (V12 79) (Z87 19)   9  History of Helicobacter infection (V12 09) (Z86 19)   10  History of Sinus Tachycardia (427 89)   11  History of Tracheobronchitis (490) (J40)          Reviewed   Surgical History  1  History of Appendectomy   2  History of Colon Surgery   3  History of Colon Surgery   4  History of Hernia Repair   5  History of Hernia Repair  Surgical History Reviewed: The surgical history was reviewed and updated today  Family History  Mother    1  Family history of Cancer   2  Family history of Smoking Cigarettes  Father    3  Family history of Acute Myocardial Infarction (V17 3)   4   Family history of Sudden/Instantaneous Cardiac Death (V17 41)  Family History    5  Family history of Arthritis (V17 7)   6  Family history of Diabetes Mellitus (V18 0)  Family History Reviewed: The family history was reviewed and updated today  Social History     · Denied: History of Alcohol Use (History)   · Daily Coffee Consumption (1  Cups/Day)   · Former smoker (B71 22) (W24 501)   · Marital History - Currently   Social History Reviewed: The social history was reviewed and updated today  Current Meds   1  Albuterol Sulfate (2 5 MG/3ML) 0 083% Inhalation Nebulization Solution; USE 1 UNIT DOSE EVERY 4-6 HOURS AS NEEDED FOR WHEEZING ; Therapy: 65GQW3909 to (457 1881)  Requested for: 96SAD1432; Last Rx:05Uvu4590 Ordered   2  Albuterol Sulfate (2 5 MG/3ML) 0 083% Inhalation Nebulization Solution; USE 1 UNIT DOSE IN NEBULIZER EVERY 6 HOURS AS NEEDED; Therapy: 74EUS5701 to (Evaluate:90Nfs4251)  Requested for: 02EXL9613; Last Rx:89Vck9011 Ordered   3  Breo Ellipta 100-25 MCG/INH Inhalation Aerosol Powder Breath Activated; Take one inhalation once a day  Rinse her mouth with water after each use; Therapy: 35VTW2874 to (Radha Crowder)  Requested for: 00CCX6309; Last Rx:94Nud0822 Ordered   4  Cartia  MG Oral Capsule Extended Release 24 Hour; TAKE 1 CAPSULE BY MOUTH EVERY DAY; Therapy: 11VXY4966 to (FTYBZZOP:43QXR5978)  Requested for: 36OUC6473; Last Rx:18Trc8985 Ordered   5  Citalopram Hydrobromide 20 MG Oral Tablet; TAKE 1 TABLET DAILY  Requested for: 34Yfm9122; Last Rx:60Hrv5224 Ordered   6  Ferrous Sulfate 325 (65 Fe) MG Oral Tablet; Take 1 tablet daily; Last Rx:13Utg9231 Ordered   7  Folic Acid 1 MG Oral Tablet; take 1 tablet by mouth every day; Therapy: 59BGO1544 to (Evaluate:56Vrl2894)  Requested for: 66Ntc4506; Last Rx:78Pvw8698 Ordered   8  Furosemide 20 MG Oral Tablet; TAKE 1/2 TABLET BY MOUTH DAILY; Therapy: 77TKF7135 to [phone])  Requested for: 25Oct2017; Last WO:59LRT0080 Ordered   9   Potassium Chloride Fernanda ER 20 MEQ Oral Tablet Extended Release; take 1 tablet by mouth every day; Therapy: 62MOJ5743 to ([phone])  Requested for: 26WDD8598; Last Rx:09Jan2017 Ordered   10  PredniSONE 10 MG Oral Tablet; TAKE 4 TABLET Daily; Therapy: (Recorded:17Nov2017) to Recorded   11  Spiriva Respimat 2 5 MCG/ACT Inhalation Aerosol Solution; TAKE 2 INHALATIONS DAILY; Therapy: 46OAH2951 to (Last Rx:19Apr2017)  Requested for: 19Apr2017 Ordered   12  Warfarin Sodium 2 5 MG Oral Tablet; Take 1 to 2 tablets daily by mouth or as directed by  physician; Therapy: 98YPX8612 to (Evaluate:25Mar2018)  Requested for: 92ERI0815; Last  Rx:37Mli9473 Ordered    Allergies    1  Penicillins          Penicillin  Vitals  Vital Signs    Recorded: 43JAH2969 04:02PM   Temperature 97 3 F   Heart Rate 95   Respiration 15   Systolic 739   Diastolic 60   Height 5 ft 7 in   Weight 140 lb 2 oz   BMI Calculated 21 95   BSA Calculated 1 74   O2 Saturation 93   Pain Scale 0                       Reviewed  Tachycardia   Physical Exam                       Patient is alert and oriented  Not in  acute distress  On nasal oxygen  Stable vital on oxygen except low normal blood pressure      No icterus  No oral thrush  No sores in the mouth  No palpable neck mass  Regular heart rate  Clear lung fields but prolonged expiration  Abdomen soft and nontender  No palpable abdominal mass  No ascites  No edema of ankles  No calf tenderness  No focal neurological deficit  No skin rash  No palpable lymphadenopathy  Good arterial pulses  Anxious  Performance status 2       ECOG 2       Results/Data  (1) CBC/PLT/DIFF 39VWV0489 02:50PM Proothi, Glennda Reagin     Test Name Result Flag Reference   WBC COUNT 7 96 Thousand/uL  4 31-10 16   RBC COUNT 3 28 Million/uL L 3 88-5 62   HEMOGLOBIN 9 9 g/dL L 12 0-17 0   HEMATOCRIT 31 9 % L 36 5-49 3   MCV 97 fL  82-98   MCH 30 2 pg  26 8-34 3   MCHC 31 0 g/dL L 31 4-37 4   RDW 14 7 %  11 6-15 1   MPV 9 4 fL  8 9-12 7   PLATELET COUNT 213 Thousands/uL  149-390   nRBC AUTOMATED 0 /100 WBCs       This is an appended report  These results have been appended to a previously preliminary verified report  This is a patient instruction: This test is non-fasting  Please drink two glasses of water morning of bloodwork  This is an appended report  These results have been appended to a previously verified report  (1) OCCULT BLOOD, FECAL IMMUNOCHEMICAL TEST 30Oct2017 01:25PM Anais Limon Order Number: [id number]     Test Name Result Flag Reference   OCCULT BLD, FECAL IMMUNOLOGICAL Negative  Negative   Performed by Fecal Immunochemical Test      (1) RETICULOCYTE COUNT 84ZDO8113 08:36AM Proothi, Malachi     Test Name Result Flag Reference   RETIC ABS # [id number] H [id number]   RETIC % 4 04 % H 0 37-1 87     (1) COMPREHENSIVE METABOLIC PANEL 23RFD2910 26:95BF Proothi, Malachi     Test Name Result Flag Reference   GLUCOSE,RANDM 252 mg/dL H      If the patient is fasting, the ADA then defines impaired fasting glucose as > 100 mg/dL and diabetes as > or equal to 123 mg/dL  Specimen collection should occur prior to Sulfasalazine administration due to the potential for falsely depressed results  Specimen collection should occur prior to Sulfapyridine administration due to the potential for falsely elevated results  SODIUM 137 mmol/L  136-145   POTASSIUM 4 4 mmol/L  3 5-5 3   CHLORIDE 96 mmol/L L 100-108   CARBON DIOXIDE 36 mmol/L H 21-32   ANION GAP (CALC) 5 mmol/L  4-13   BLOOD UREA NITROGEN 12 mg/dL  5-25   CREATININE 0 89 mg/dL  0 60-1 30   Standardized to IDMS reference method   CALCIUM 9 5 mg/dL  8 3-10 1   BILI, TOTAL 0 39 mg/dL  0 20-1 00   ALK PHOSPHATAS 84 U/L     ALT (SGPT) 12 U/L  12-78   Specimen collection should occur prior to Sulfasalazine and/or Sulfapyridine administration due to the potential for falsely depressed results     AST(SGOT) 12 U/L  5-45   Specimen collection should occur prior to Sulfasalazine administration due to the potential for falsely depressed results  ALBUMIN 3 5 g/dL  3 5-5 0   TOTAL PROTEIN 7 3 g/dL  6 4-8 2   eGFR 87 ml/min/1 73sq m       National Kidney Disease Education Program recommendations are as follows: GFR calculation is accurate only with a steady state creatinine Chronic Kidney disease less than 60 ml/min/1 73 sq  meters Kidney failure less than 15 ml/min/1 73 sq  meters  (1) SED RATE 20Oct2017 08:36AM Proothi, Eula Solian     Test Name Result Flag Reference   SED RATE 56 mm/hour H 0-10     (1) PROTEIN ELECTRO, SERUM 20Oct2017 08:36AM Proothi, Malachi     Test Name Result Flag Reference   A/G RATIO 1 42  1 10-1 80   Albumin 58 6 %  52 0-65 0   Albumin Conc  3 93 g/dl  3 50-5 00   Alpha 1 Conc  0 46 g/dL H 0 10-0 40   ALPHA 1 6 8 % H 2 5-5 0   Alpha 2 Conc  0 94 g/dL  0 40-1 20   ALPHA 2 14 1 % H 7 0-13 0   Beta 1 Conc  0 39 g/dL L 0 40-0 80   BETA-1 5 8 %  5 0-13 0   Beta 2 Conc 0 34 g/dL  0 20-0 50   BETA-2 5 1 %  2 0-8 0   Gamma Conc 0 64 g/dL  0 50-1 60   GAMMA GLOBULIN 9 6 % L 12 0-22 0   Interpretation (Report)     The serum total protein and albumin are within normal limits  The serum protein electrophoresis shows an increased alpha-1 region  The serum protein electrophoresis shows a decreased beta-1 region  No monoclonal bands noted  Reviewed by: Maris Olivas MD ([id number]) **Electronic Signature**   TOTAL PROTEIN  6 7 g/dL  6 4-8 2     (1) FERRITIN 07IYF1075 07:18AM Shadia Kotyk     Test Name Result Flag Reference   FERRITIN 86 ng/mL  8-388     (1) IRON 49HNB2480 07:18AM Shadia Kotyk     Test Name Result Flag Reference   IRON 63 ug/dL L    Patients treated with metal-binding drugs (ie  Deferoxamine) may have depressed iron values       Future Appointments    Date/Time Provider Specialty Site   11/21/2017 08:00 AM Shelbi Rabago Nurse Practitioner [facility]   03/19/2018 09:00 AM Estiven Conn MD Hematology Oncology [facility]   01/16/2018 08:30 AM JENNIFER Schaffer   Internal Medicine [facility]   01/08/2018 03:40 PM Greg Joyce MD Pulmonary Medicine [place] [address]       Signatures   Electronically signed by : Bright Cano MD; Nov 19 2017 10:52PM EST                       (Author)

## 2017-11-21 ENCOUNTER — ALLSCRIPTS OFFICE VISIT (OUTPATIENT)
Dept: OTHER | Facility: OTHER | Age: 67
End: 2017-11-21

## 2017-12-15 DIAGNOSIS — D50.9 IRON DEFICIENCY ANEMIA: ICD-10-CM

## 2017-12-15 DIAGNOSIS — I48.92 ATRIAL FLUTTER (HCC): ICD-10-CM

## 2018-01-02 ENCOUNTER — LAB REQUISITION (OUTPATIENT)
Dept: LAB | Facility: HOSPITAL | Age: 68
End: 2018-01-02
Payer: MEDICARE

## 2018-01-02 DIAGNOSIS — J43.9 PULMONARY EMPHYSEMA (HCC): ICD-10-CM

## 2018-01-02 DIAGNOSIS — D50.9 IRON DEFICIENCY ANEMIA: ICD-10-CM

## 2018-01-02 DIAGNOSIS — J96.10 CHRONIC RESPIRATORY FAILURE (HCC): ICD-10-CM

## 2018-01-02 LAB
BASOPHILS # BLD AUTO: 0.01 THOUSANDS/ΜL (ref 0–0.1)
BASOPHILS NFR BLD AUTO: 0 % (ref 0–1)
EOSINOPHIL # BLD AUTO: 0.05 THOUSAND/ΜL (ref 0–0.61)
EOSINOPHIL NFR BLD AUTO: 1 % (ref 0–6)
ERYTHROCYTE [DISTWIDTH] IN BLOOD BY AUTOMATED COUNT: 14.6 % (ref 11.6–15.1)
EST. AVERAGE GLUCOSE BLD GHB EST-MCNC: 146 MG/DL
FERRITIN SERPL-MCNC: 43 NG/ML (ref 8–388)
HBA1C MFR BLD: 6.7 % (ref 4.2–6.3)
HCT VFR BLD AUTO: 32.4 % (ref 36.5–49.3)
HGB BLD-MCNC: 10.2 G/DL (ref 12–17)
LYMPHOCYTES # BLD AUTO: 1.36 THOUSANDS/ΜL (ref 0.6–4.47)
LYMPHOCYTES NFR BLD AUTO: 28 % (ref 14–44)
MCH RBC QN AUTO: 29.7 PG (ref 26.8–34.3)
MCHC RBC AUTO-ENTMCNC: 31.5 G/DL (ref 31.4–37.4)
MCV RBC AUTO: 94 FL (ref 82–98)
MONOCYTES # BLD AUTO: 0.3 THOUSAND/ΜL (ref 0.17–1.22)
MONOCYTES NFR BLD AUTO: 6 % (ref 4–12)
NEUTROPHILS # BLD AUTO: 3.02 THOUSANDS/ΜL (ref 1.85–7.62)
NEUTS SEG NFR BLD AUTO: 65 % (ref 43–75)
NRBC BLD AUTO-RTO: 0 /100 WBCS
PLATELET # BLD AUTO: 312 THOUSANDS/UL (ref 149–390)
PMV BLD AUTO: 9.5 FL (ref 8.9–12.7)
RBC # BLD AUTO: 3.44 MILLION/UL (ref 3.88–5.62)
WBC # BLD AUTO: 4.79 THOUSAND/UL (ref 4.31–10.16)

## 2018-01-02 PROCEDURE — 82728 ASSAY OF FERRITIN: CPT | Performed by: INTERNAL MEDICINE

## 2018-01-02 PROCEDURE — 85025 COMPLETE CBC W/AUTO DIFF WBC: CPT | Performed by: INTERNAL MEDICINE

## 2018-01-02 PROCEDURE — 83036 HEMOGLOBIN GLYCOSYLATED A1C: CPT | Performed by: INTERNAL MEDICINE

## 2018-01-05 ENCOUNTER — ALLSCRIPTS OFFICE VISIT (OUTPATIENT)
Dept: OTHER | Facility: OTHER | Age: 68
End: 2018-01-05

## 2018-01-06 NOTE — CONSULTS
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)    Plan   Atrial flutter    · EKG/ECG- POC; Status:Complete;   Done: 97QPW6797   Perform: In Office; SLB:56QWW0831; Last Updated By:Keri Walters; 1/5/2018 9:58:18 AM;Ordered; For:Atrial flutter; Ordered By:Prieto Rojas;  Atrial premature complex    · Follow-up PRN Evaluation and Treatment  Follow-up  Status: Complete  Done:    29EKB5130 06:30PM   Ordered; For: Atrial premature complex; Ordered By: Tonya Duque Performed:  Due: 87NEF6337    Discussion/Summary      This patient presents a complex decision making process  I have not found any documented atrial fibrillation and he had most likely a paroxysmal atrial tachycardia during his hospital admission in early September  He did have bleeding  His wife states he was on full dose or even higher dose aspirin therapy  He certainly is at risk for the development of atrial fibrillation as there is medical literature demonstrating that dense atrial ectopy predisposes patients for cerebrovascular accident and for development of atrial fibrillation  He has significant lung disease and valvular heart disease, both of which increase his risk for atrial fibrillation or atrial flutter  is tolerating warfarin  discussed the possibility of placing a cardiac event recorder today  I showed him on a cardiac event recorder looks like I explained how the procedure is done and how we follow   of his sinus tachycardia and his frequent atrial ectopy  I do believe it is is going to be very difficult to tease out whether he's having atrial fibrillation or atrial flutter  Because of the limited electrocardiogram is obtained with placement of a cardiac event recorder  Event recorder will be prone to calling atrial fibrillation   Wet cc an irregular RR interval and although the current generation recorder is supposed to have less false positives in the previous event recorder  I still believe  He will have quite a bit of false positives cold for atrial fibrillation, and I think be very difficult to tease out whether he is having atrial fibrillation or not and it will be difficult to use this data to decide upon and anticoagulation strategy  also was not particularly interested in having the device place because he does not want to feel it in his chest wall  second option would be to place a 30 day monitor  Certainly, if we saw/confirmed atrial fibrillation  This would make us feel better about keeping him on warfarin long-term  However, if over 30 day  We did not see atrial fibrillation  I would not necessarily be inclined to stop the warfarin given his predisposition for dense atrial ectopy and high risk for developing atrial fibrillation in the future  summary, the patient is tolerating warfarin  It is already been initiated for stroke prevention and I think it is reasonable to continue it understanding there are inherent bleeding risks  Chief Complaint   Patient here for consult for afib/flutter per Dr Winston Rm  Only complaint is SOB (COPD), on continuous O2  History of Present Illness   Cardiology HPI Free Text Note Form [facility]: This patient is seen in consultation at the request of Dr Luis Cooley for consideration for placement of a cardiac event recorder to evaluate for atrial fibrillation  He has significant atrial ectopy  I reviewed multiple EKGs from past hospital admissions and he did have an episode of paroxysmal atrial tachycardia  However, I did not appreciate any atrial flutter or atrial fibrillation  has severe COPD and is on home oxygen and essentially is on oxygen all the time  He becomes short of breath with minimal exertion  He does not have palpitations, lightheadedness, or dizziness   Other medical history is remarkable for aortic regurgitation  There was one episode of gastrointestinal bleeding  However, this was when he was on full dose aspirin  is 2+ aortic regurgitation  Also, history of pulmonary hypertension  He has been on warfarin therapy since September 2017  point review of systems is positive for lack of energy, shortness of breath with minimal exertion  Depression and dizziness  He also suffers from chronic anemia  All other review of systems negative  Today      Review of Systems      ROS reviewed  Active Problems   1  Advance directive discussed with patient (V65 49) (Z71 89)   2  Aftercare following joint replacement surgery (V54 81) (Z47 1)   3  AI (aortic incompetence) (424 1) (I35 1)   4  Anxiety (300 00) (F41 9)   5  Arthralgia Of The Right Pelvis/Hip/Femur (719 45)   6  Atrial flutter (427 32) (I48 92)   7  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)   8  Benign hypertension (401 1) (I10)   9  Chronic hypoxemic respiratory failure (518 83,799 02) (J96 11)   10  Chronic obstructive pulmonary disease (496) (J44 9)   11  Colon polyp (211 3) (K63 5)   12  Emphysema (492 8) (J43 9)   13  History of gastrointestinal hemorrhage (V12 79) (Z87 19)   14  History of Helicobacter infection (V12 09) (Z86 19)   15  Hypokalemia (276 8) (E87 6)   16  Hypoxia (799 02) (R09 02)   17  Impaired fasting glucose (790 21) (R73 01)   18  Iron (Fe) deficiency anemia (280 9) (D50 9)   19  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)   20  MGUS (monoclonal gammopathy of unknown significance) (273 1) (D47 2)   21  Need for influenza vaccination (V04 81) (Z23)   22  Need for prophylactic vaccination and inoculation against influenza (V04 81) (Z23)   23  Need for vaccination with 13-polyvalent pneumococcal conjugate vaccine (V03 82) (Z23)   24  Paroxysmal atrial fibrillation (427 31) (I48 0)   25  Patent foramen ovale (745 5) (Q21 1)   26  Pre-operative cardiovascular examination (V72 81) (Z01 810)   27   Primary localized osteoarthrosis of the hip, unspecified laterality (715 15) (M16 10)   28  Pulmonary artery hypertension (416 8) (I27 21)   29  Rupture Of The Proximal Bicipital Tendon Of The Left Arm (727 62)   30  Screening for genitourinary condition (V81 6) (Z13 89)   31  Tachycardia (785 0) (R00 0)    Past Medical History    · History of Acute and chronic respiratory failure (518 84) (J96 20)   · Aftercare following joint replacement surgery (V54 81) (Z47 1)   · History of Atrial flutter (427 32) (I48 92)   · History of Deep venous thrombosis of distal lower extremity (453 42) (I82 4Z9)   · History of acute renal failure (V13 09) (Z87 448)   · History of diverticulitis of colon (V12 79) (Z87 19)   · History of edema (V13 89) (A21 099)   · History of gastrointestinal hemorrhage (V12 79) (Z87 19)   · History of Helicobacter infection (V12 09) (Z86 19)   · History of Sinus Tachycardia (427 89)   · History of Tracheobronchitis (490) (J40)     The active problems and past medical history were reviewed and updated today  Surgical History    · History of Appendectomy   · History of Colon Surgery   · History of Colon Surgery   · History of Hernia Repair   · History of Hernia Repair     The surgical history was reviewed and updated today  Family History   Mother    · Family history of Cancer   · Family history of Smoking Cigarettes  Father    · Family history of Acute Myocardial Infarction (V17 3)   · Family history of Sudden/Instantaneous Cardiac Death (V17 41)  Family History    · Family history of Arthritis (V17 7)   · Family history of Diabetes Mellitus (V18 0)  Family History Reviewed: The family history was reviewed and updated today  Social History    · Denied: History of Alcohol Use (History)   · Daily Coffee Consumption (1  Cups/Day)   · Former smoker (Z08 87) (O48 826)   · Quit 2 years ago  Siena Skipper Siena Skipper Smoked for 35 years, a pack and a half a day   · Marital History - Currently   The social history was reviewed and updated today  Current Meds    1  Albuterol Sulfate (2 5 MG/3ML) 0 083% Inhalation Nebulization Solution; USE 1 UNIT     DOSE EVERY 4-6 HOURS AS NEEDED FOR WHEEZING ; Therapy: 61XHE4515 to ([facility])  Requested for: 59CYQ7659; Last     Rx:08Nov2017 Ordered   2  Breo Ellipta 100-25 MCG/INH Inhalation Aerosol Powder Breath Activated; Take one     inhalation once a day  Rinse her mouth with water after each use; Therapy: 26PDC3244 to (Viktoria Courtney)  Requested for: 29WIG6821; Last     Rx:69Wwu4395 Ordered   3  Cartia  MG Oral Capsule Extended Release 24 Hour; TAKE 1 CAPSULE BY     MOUTH EVERY DAY; Therapy: 19VNF8781 to (NJYQVGBZ:66YUN3757)  Requested for: 22JOY9929; Last     Rx:23Phz2490 Ordered   4  Citalopram Hydrobromide 20 MG Oral Tablet; TAKE 1 TABLET DAILY  Requested for:     19Apr2017; Last Rx:19Apr2017 Ordered   5  Ferrous Sulfate 325 (65 Fe) MG Oral Tablet; Take 1 tablet daily; Last Rx:42Fgt4171     Ordered   6  Folic Acid 1 MG Oral Tablet; take 1 tablet by mouth every day; Therapy: 02YNE4192 to (Evaluate:46Suv4177)  Requested for: 20Cvp2845; Last     Rx:91Cyu7892 Ordered   7  Furosemide 20 MG Oral Tablet; TAKE 1/2 TABLET BY MOUTH DAILY; Therapy: 83LKD5093 to [phone])  Requested for: 25Oct2017; Last     IB:73OJA3647 Ordered   8  Potassium Chloride Fernanda ER 20 MEQ Oral Tablet Extended Release; take 1 tablet by     mouth every day; Therapy: 91ZBK6001 to (Syed Trimble)  Requested for: 98KFY7117; Last     Rx:09Jan2017 Ordered   9  PredniSONE 10 MG Oral Tablet; TAKE 4 TABLET Daily; Therapy: (Recorded:17Nov2017) to Recorded   10  Spiriva Respimat 2 5 MCG/ACT Inhalation Aerosol Solution; TAKE 2 INHALATIONS DAILY; Therapy: 20DUJ6653 to [facility])  Requested for: 53FJO7744; Last      Rx:01Jan2018 Ordered   11  Warfarin Sodium 2 5 MG Oral Tablet; Take 1 to 2 tablets daily by mouth or as directed by      physician;       Therapy: 21CSV0752 to (Bette Kapadia)  Requested for: 13GZJ4841; Last      Rx:41Hht3000 Ordered     The medication list was reviewed and updated today  Allergies   1  Penicillins    Vitals   Signs   Heart Rate: 971  Systolic: 177, RUE, Sitting  Diastolic: 60, RUE, Sitting  BP Cuff Size: Large  Height: 5 ft 7 in  Weight: 138 lb 2 oz  BMI Calculated: 21 63  BSA Calculated: 1 73    Physical Exam        Constitutional - General appearance: No acute distress, well appearing and well nourished  Eyes - Conjunctiva and Sclera examination: Conjunctiva pink, sclera anicteric  Neck - Normal, no JVD   Pulmonary - Respiratory effort: No signs of respiratory distress  -- Auscultation of lungs: Abnormal  -- He has diminished airflow throughout his lung fields  Cardiovascular - Auscultation of heart: Normal rate and rhythm, normal S1 and S2, no murmurs  -- Pedal pulses: Normal, 2+ bilaterally  -- Examination of extremities for edema and/or varicosities: Normal        Abdomen - Soft  Musculoskeletal - Gait and station: Normal gait  Skin - Skin: Normal without rashes  Skin is warm and well perfused  Neurologic - Speech normal  No focal deficits        Psychiatric - Orientation to person, place, and time: Normal       Results/Data   Sinus tachycardia at 117 bpm, right atrial enlargement present ECHO COMPLETE WITH CONTRAST IF INDICATED 2016 12:44PM Ez Ano      Test Name Result Flag Reference   ECHO COMPLETE WITH CONTRAST IF INDICATED (Report)     [facility] [address]      [address]      [phone]           Transthoracic Echocardiogram      2D, M-mode, Doppler, and Color Doppler           Study date: 2016           Patient: Aurelio Rogel      MR number: [id number]      Account number: [de-identified]      : 1950      Age: 77 years      Gender: Male      Status: Outpatient      Location: [facility]      Height: 67 in      Weight: 147 lb      BP: 122/ 72 mmHg           Indications: Aortic insufficiency           Diagnoses: I35 9 - Nonrheumatic aortic valve disorder, unspecified           Sonographer: Alicja Resendiz      Primary Physician: Megan Sena MD      Referring Physician: Vee Murrell MD      Group: [facility]      Interpreting Physician: Lizbeth Crane MD           SUMMARY           PROCEDURE INFORMATION:      This was a technically difficult study  LEFT VENTRICLE:      Systolic function was vigorous  Ejection fraction was estimated to be 65 %  There were no regional wall motion abnormalities  MITRAL VALVE:      There was trace regurgitation  AORTIC VALVE:      There was mild regurgitation  TRICUSPID VALVE:      There was trace regurgitation  COMPARISONS:      There has been no significant interval change  Comparison was made with the      previous study of 07-May-2015  HISTORY: PRIOR HISTORY: Atrial flutter, atrial fibrillation, tachycardia,      pulmonary hypertension, PFO, deep vein thrombosis, former smoker, COPD, edema           PROCEDURE: The study was performed in the [address] [facility]  This was a routine study  The transthoracic approach was used  The study      included complete 2D imaging, M-mode, complete spectral Doppler, and color      Doppler  Echocardiographic views were limited due to lung interference  This      was a technically difficult study  LEFT VENTRICLE: Size was normal  Systolic function was vigorous  Ejection      fraction was estimated to be 65 %  There were no regional wall motion      abnormalities  Wall thickness was normal  No evidence of apical thrombus        DOPPLER: Left ventricular diastolic function parameters were normal            RIGHT VENTRICLE: The size was normal  Systolic function was normal  Wall      thickness was normal  LEFT ATRIUM: Size was normal            RIGHT ATRIUM: Size was normal            MITRAL VALVE: Valve structure was normal  There was normal leaflet separation  DOPPLER: The transmitral velocity was within the normal range  There was no      evidence for stenosis  There was trace regurgitation  AORTIC VALVE: The valve was trileaflet  Leaflets exhibited mildly increased      thickness, mild calcification, and normal cuspal separation  DOPPLER:      Transaortic velocity was within the normal range  There was no evidence for      stenosis  There was mild regurgitation  TRICUSPID VALVE: The valve structure was normal  There was normal leaflet      separation  DOPPLER: The transtricuspid velocity was within the normal range  There was no evidence for stenosis  There was trace regurgitation  PULMONIC VALVE: Leaflets exhibited normal thickness, no calcification, and      normal cuspal separation  DOPPLER: The transpulmonic velocity was within the      normal range  There was no significant regurgitation  PERICARDIUM: There was no pericardial effusion  The pericardium was normal in      appearance  AORTA: The root exhibited normal size  SYSTEMIC VEINS: IVC: The inferior vena cava was normal in size             SYSTEM MEASUREMENT TABLES           2D      %FS: 32 18 %      Ao Diam: 2 61 cm      EDV(Teich): 46 5 ml      EF(Cube): 68 8 %      EF(Teich): 61 58 %      ESV(Cube): 11 96 ml      ESV(Teich): 17 86 ml      IVSd: 0 81 cm      LA Area: 12 1 cm2      LA Diam: 2 25 cm      LVEDV MOD A4C: 41 68 ml      LVEF MOD A4C: 51 86 %      LVESV MOD A4C: 20 07 ml      LVIDd: 3 37 cm      LVIDs: 2 29 cm      LVLd A4C: 6 9 cm      LVLs A4C: 5 93 cm      LVPWd: 0 9 cm      RA Area: 13 19 cm2      RV Diam : 4 1 cm      SV MOD A4C: 21 62 ml      SV(Cube): 26 38 ml      SV(Teich): 28 63 ml           CW      AR Dec Crockett: 2 63 m/s2      AR Dec Time: 1437 58 ms AR PHT: 416 9 ms      AR Vmax: 3 63 m/s      AR maxP 84 mmHg           MM      TAPSE: 2 35 cm           PW      E': 0 09 m/s      E/E': 7 64      MV A Carlos: 1 1 m/s      MV Dec Ellsworth: 6 66 m/s2      MV DecT: 107 97 ms      MV E Carlos: 0 72 m/s      MV E/A Ratio: 0 65           [facility] Accredited Echocardiography Laboratory           Prepared and electronically signed by           Aldo Noguera MD      Signed 2016 14:07:50      Future Appointments      Date/Time Provider Specialty Site   2018 09:00 AM Sav Diallo MD Hematology Oncology [address] [place] [address] ONCOLOGY   2018 08:30 AM JENNIFER Spaulding  [facility]   2018 03:40 PM Ladarius Whitmore MD Pulmonary Medicine [facility] [place]     End of Encounter Meds   1  Citalopram Hydrobromide 20 MG Oral Tablet; TAKE 1 TABLET DAILY  Requested for:     2017; Last Rx:2017 Ordered  2  Warfarin Sodium 2 5 MG Oral Tablet; Take 1 to 2 tablets daily by mouth or as directed by     physician; Therapy: 26UTY7598 to (HAZAZNJO:60KYM9954)  Requested for: 18VTC6675; Last     Rx:65Lqa0164 Ordered  3  Albuterol Sulfate (2 5 MG/3ML) 0 083% Inhalation Nebulization Solution; USE 1 UNIT     DOSE EVERY 4-6 HOURS AS NEEDED FOR WHEEZING ; Therapy: 53HHZ1680 to (Bonnie Lorri)  Requested for: 88IPE7126; Last     Rx:71Fnn0767 Ordered  4  Breo Ellipta 100-25 MCG/INH Inhalation Aerosol Powder Breath Activated; Take one     inhalation once a day  Rinse her mouth with water after each use; Therapy: 27FDP0828 to (Olga Muhammad)  Requested for: 99VZP4854; Last     Rx:79Oxn3987 Ordered   5  Furosemide 20 MG Oral Tablet; TAKE 1/2 TABLET BY MOUTH DAILY; Therapy: 51GOA6098 to 96 312268)  Requested for: 2017; Last     S68VVP6624 Ordered   6  Spiriva Respimat 2 5 MCG/ACT Inhalation Aerosol Solution; TAKE 2 INHALATIONS DAILY;      Therapy: 81XLU0464 to ()  Requested for: 11UCH7214; Last     Rx:01Jan2018 Ordered  7  Ferrous Sulfate 325 (65 Fe) MG Oral Tablet; Take 1 tablet daily; Last Rx:13Sep2017     Ordered  8  Potassium Chloride Fernanda ER 20 MEQ Oral Tablet Extended Release; take 1 tablet by     mouth every day; Therapy: 78NAY7428 to ([id number])  Requested for: 05ZQC2811; Last     Rx:09Jan2017 Ordered  9  Folic Acid 1 MG Oral Tablet; take 1 tablet by mouth every day; Therapy: 83CKV8459 to (Evaluate:03Sep2018)  Requested for: 36Slj8617; Last     Rx:08Sep2017 Ordered  10  Cartia  MG Oral Capsule Extended Release 24 Hour; TAKE 1 CAPSULE BY      MOUTH EVERY DAY; Therapy: 30JNU4386 to (CJCBAIWO:45KOS1369)  Requested for: 13QUQ5038; Last      Rx:27Sep2017 Ordered  11  PredniSONE 10 MG Oral Tablet; TAKE 4 TABLET Daily;       Therapy: (Recorded:17Nov2017) to Recorded    Signatures    Electronically signed by : Roslyn Reddy DO; Jan 5 2018  6:36PM EST                       (Author)

## 2018-01-10 NOTE — MISCELLANEOUS
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)   9  Benign hypertension (401 1) (I10)   10  Chronic hypoxemic respiratory failure (518 83,799 02) (J96 11)   11  Chronic obstructive pulmonary disease (496) (J44 9)   12  Colon polyp (211 3) (K63 5)   13  Emphysema (492 8) (J43 9)   14  History of gastrointestinal hemorrhage (V12 79) (Z87 19)   15  History of Helicobacter infection (V12 09) (Z86 19)   16  Hypokalemia (276 8) (E87 6)   17  Hypoxia (799 02) (R09 02)   18  Iron (Fe) deficiency anemia (280 9) (D50 9)   19  MGUS (monoclonal gammopathy of unknown significance) (273 1) (D47 2)   20  Need for influenza vaccination (V04 81) (Z23)   21  Need for prophylactic vaccination and inoculation against influenza (V04 81) (Z23)   22  Need for vaccination with 13-polyvalent pneumococcal conjugate vaccine (V03 82) (Z23)   23  Paroxysmal atrial fibrillation (427 31) (I48 0)   24  Patent foramen ovale (745 5) (Q21 1)   25  Pre-operative cardiovascular examination (V72 81) (Z01 810)   26  Primary localized osteoarthrosis of the hip, unspecified laterality (715 15) (M16 10)   27  Pulmonary artery hypertension (416 8) (I27 2)   28  Rupture Of The Proximal Bicipital Tendon Of The Left Arm (727 62)   29  Screening for genitourinary condition (V81 6) (Z13 89)   30  Tachycardia (785 0) (R00 0)    Past Medical History    1  History of Acute and chronic respiratory failure (518 84) (J96 20)   2   Aftercare following joint replacement surgery (V54 81) (Z47 1)   3  History of Atrial flutter (427 32) (I48 92)   4  History of Deep venous thrombosis of distal lower extremity (453 42) (I82 4Z9)   5  History of acute renal failure (V13 09) (Z87 448)   6  History of diverticulitis of colon (V12 79) (Z87 19)   7  History of edema (V13 89) (Z87 898)   8  History of gastrointestinal hemorrhage (V12 79) (Z87 19)   9  History of Helicobacter infection (V12 09) (Z86 19)   10  History of Sinus Tachycardia (427 89)   11  History of Tracheobronchitis (490) (J40)    Surgical History    1  History of Appendectomy   2  History of Colon Surgery   3  History of Colon Surgery   4  History of Hernia Repair   5  History of Hernia Repair    Family History  Mother    1  Family history of Cancer   2  Family history of Smoking Cigarettes  Father    3  Family history of Acute Myocardial Infarction (V17 3)   4  Family history of Sudden/Instantaneous Cardiac Death (V17 41)  Family History    5  Family history of Arthritis (V17 7)   6  Family history of Diabetes Mellitus (V18 0)    Social History    · Denied: History of Alcohol Use (History)   · Daily Coffee Consumption (1  Cups/Day)   · Former smoker (P30 09) (O41 444)   · Marital History - Currently     Current Meds   1  Breo Ellipta 100-25 MCG/INH Inhalation Aerosol Powder Breath Activated; Take one   inhalation once a day  Rinse her mouth with water after each use; Therapy: 08WVR9530 to (Eliane Ngo)  Requested for: 65ZOX4141; Last   Rx:83Ljq2756 Ordered   2  Cartia  MG Oral Capsule Extended Release 24 Hour; TAKE 1 CAPSULE BY   MOUTH EVERY DAY; Therapy: 36NBS0391 to (Hair Burt)  Requested for: 03Apr2017; Last   Rx:03Apr2017 Ordered   3  Citalopram Hydrobromide 20 MG Oral Tablet; TAKE 1 TABLET DAILY  Requested for:   19Apr2017; Last Rx:19Apr2017 Ordered   4  Ferrous Sulfate 325 (65 Fe) MG Oral Tablet; TAKE 1 TABLET TWICE DAILY; Therapy: (Recorded:15Jun2015) to Recorded   5  Folic Acid 1 MG Oral Tablet; TAKE 1 TABLET DAILY; Therapy: 72PHW7606 to (Terrace Severance)  Requested for: 90LBW9190; Last   TR:12VRL7962 Ordered   6  Furosemide 20 MG Oral Tablet; take 1/2 tablet daily  Requested for: 01Adm0957; Last   Rx:50Bda2360 Ordered   7  Potassium Chloride Fernanda ER 20 MEQ Oral Tablet Extended Release; take 1 tablet by   mouth every day; Therapy: 19GXT2900 to (Terrace Severance)  Requested for: 24AXF6847; Last   Rx:09Jan2017 Ordered   8  Spiriva Respimat 2 5 MCG/ACT Inhalation Aerosol Solution; TAKE 2 INHALATIONS DAILY; Therapy: 81PGD7674 to (Last Rx:54Ahr6958)  Requested for: 50Ykk7597 Ordered    Allergies    1  Penicillins    Future Appointments    Date/Time Provider Specialty Site   09/26/2017 01:30 PM JENNIFER Espana   Cardiology [facility]   10/16/2017 08:45 AM Lizbeth Casey MD Hematology Oncology [facility]   10/04/2017 08:30 AM Kelvin Dias MD Pulmonary Medicine [place] [address]     Signatures   Electronically signed by : Evon Grace, ; Aug 30 2017  9:08AM EST                       (Author)    Electronically signed by : JENNIFER Edwards ; Aug 30 2017  1:39PM EST                       (Author)

## 2018-01-12 VITALS
SYSTOLIC BLOOD PRESSURE: 120 MMHG | HEIGHT: 67 IN | DIASTOLIC BLOOD PRESSURE: 60 MMHG | WEIGHT: 145.5 LBS | BODY MASS INDEX: 22.84 KG/M2 | HEART RATE: 109 BPM | OXYGEN SATURATION: 86 %

## 2018-01-12 NOTE — PROGRESS NOTES
REPORT NAME: Progress Notes Report  VISIT DATE: 10/6/2017  VISIT TIME: 12:23 PM EDT  PATIENT NAME: Doreen Edmonds  MEDICAL RECORD NUMBER: [id number]  YOB: 1950  AGE: 69  REFERRING PHYSICIAN: Elizabeth Francis  SUPERVISING CLINICIAN: [facility] ([facility] CARE PROVIDER: [facility]  PATIENT HOME ADDRESS: [address]  PATIENT HOME PHONE: [phone]  SOCIAL SECURITY NUMBER:   DIAGNOSIS 1: Unspecified atrial flutter / I48 92  DIAGNOSIS 2:   INR RANGE: 2 - 2 5  INR GOAL: 2 25  TREATMENT START DATE:   TREATMENT END DATE:   NEXT VISIT: 10/10/2017  [facility]  VISIT RESULTS  ENCOUNTER NUMBER:   TEST LOCATION:   TEST TYPE:   VISIT TYPE:   CURRENT INR: 3 14 PROTIME:   SPECIMEN COL AND RPT DATE: 10/6/2017 12:23 PM  EDT  VITAL SIGNS  PULSE:  BP: / WEIGHT:  HEIGHT:  TEMP:   CURRENT ANTICOAGULANT DOSING SCHEDULE  DOSE SIZE: 2 5mg    ANTICOAGULANT TYPE: WARFARIN SOD  DOSING REGIMEN  Sun       Mon       Tues      Wed       Thurs     Fri       Sat  Total/Wk  2 5       2 5       2 5       2 5       2 5       2 5       2 5       17 5  PATIENT MEDICATION INSTRUCTION: Yes  PATIENT NUTRITIONAL COUNSELING: No  PATIENT BRUISING INSTRUCTION: No  LAST EDUCATION DATE:   PREVIOUS VISIT INFORMATION  VISITDATE  INRGoal INR   Sun    Mon    Tues   Wed    Thurs  Fri    Sat  Total/wk  10/6/2017   2 25    3 14  2 5    2 5    2 5    2 5    2 5    2 5    2 5  17 5  9/29/2017   2 25    1 2   2 5    2 5    5      5      2 5    5      5  27 5  ADDITIONAL PREVIOUS VISIT INFORMATION  VISITDATE   PRIMARY RX               DOSE      CrCl  10/6/2017   WARFARIN SOD             2 5mg  9/29/2017   WARFARIN SOD             2 5mg  OTHER CURRENT MEDICATIONS:  WARFARIN SOD  PROGRESS NOTES: gave dosage to kami, retest inr on Tuesday    PATIENT INSTRUCTIONS:   TEST LOCATION: , , ,   INBOUND LAB DATA:  Lab       Lab Value Col Date                 Rpt Date                 Lab  Reference Range  Electronically signed by: [facility] on 10/6/2017 12:23 PM EDT

## 2018-01-12 NOTE — RESULT NOTES
Verified Results  (1) PT WITH INR 95Cpw1878 08:36AM Porter Merrill Order Number: [id number]     Test Name Result Flag Reference   INR 1 67 H 0 86-1 16   PT 19 8 seconds H 12 1-14 4

## 2018-01-12 NOTE — MISCELLANEOUS
Message   Recorded as Task   Date: 08/08/2016 10:36 AM, Created By: Donnell Aguayo   Task Name: Med Renewal Request   Assigned To: Saima Merida   Regarding Patient: Octavio Jones, Status: Active   CommentJosie Lynsey - 08 Aug 2016 10:36 AM     TASK CREATED  Caller: Self; Renew Medication; [phone] (Home); [phone] (Work)  Pt asking if he should renew Cler 20 potassium tablet  Pharmacy states physician must contact pharmacy to renew  Please advise, thank you      Pharmacy is [facility]    Please notify pt [phone]        Plan  Hypokalemia    · Klor-Con M20 20 MEQ Oral Tablet Extended Release; TAKE 1 TABLET DAILY    Signatures   Electronically signed by : Reino Boxer, M D ; Aug  8 2016  1:21PM EST                       (Author)

## 2018-01-12 NOTE — MISCELLANEOUS
Assessment    1  Chronic obstructive pulmonary disease (496) (J44 9)   2  Benign hypertension (401 1) (I10)   3  Impaired fasting glucose (790 21) (R73 01)    Plan  Impaired fasting glucose    · (1) HEMOGLOBIN A1C; Status:Active; Requested for:43Uyf0733;    Perform:[facility]; Due:51Oxg9723; Ordered; For:Impaired fasting glucose; Ordered By:Emma Stewart;    Discussion/Summary  Discussion Summary:   Increase protein   gain weight   follow up with specialists  had flu shot     Chief Complaint  Chief Complaint Free Text Note Form: TCM      History of Present Illness  TCM Communication [facility]: The patient is being contacted for follow-up after hospitalization  Hospital records were reviewed  He was hospitalized at [facility]  The date of admission: 11/05/2017, date of discharge: 11/11/2017  Diagnosis: Severe COPD with acute exacerbation  He was discharged to home  Medications were not reviewed today  He scheduled a follow up appointment  The patient is currently asymptomatic  Counseling was provided to patient's family  Wife, Feliciano Knutson  Communication performed and completed by Jose Dinero   HPI: patient is here for a hospital follow up  he has high sugar 200s but not diabetic  on prednisone for copd  also dx with afib following dr Camacho Media  uses portable O2      Review of Systems  Complete-Male:   Constitutional: no fever, not feeling poorly, no chills and not feeling tired  Eyes: no eye pain, no eyesight problems, eyes not red and no purulent discharge from the eyes  ENT: no earache, no nosebleeds, no hearing loss and no nasal discharge  Cardiovascular: no chest pain, the heart rate was not fast and no palpitations  Respiratory: shortness of breath, wheezing and shortness of breath during exertion, but no cough  Gastrointestinal: no abdominal pain, no nausea, no constipation and no diarrhea  Active Problems    1  Advance directive discussed with patient (V65 49) (Z61 89)   2  Aftercare following joint replacement surgery (V54 81) (Z47 1)   3  Anxiety (300 00) (F41 9)   4  Aortic regurgitation (424 1) (I35 1)   5  Arthralgia Of The Right Pelvis/Hip/Femur (719 45)   6  Atrial flutter (427 32) (I48 92)   7  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)   8  Benign hypertension (401 1) (I10)   9  Chronic hypoxemic respiratory failure (518 83,799 02) (J96 11)   10  Chronic obstructive pulmonary disease (496) (J44 9)   11  Colon polyp (211 3) (K63 5)   12  Emphysema (492 8) (J43 9)   13  History of gastrointestinal hemorrhage (V12 79) (Z87 19)   14  History of Helicobacter infection (V12 09) (Z86 19)   15  Hypokalemia (276 8) (E87 6)   16  Hypoxia (799 02) (R09 02)   17  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)   18  MGUS (monoclonal gammopathy of unknown significance) (273 1) (D47 2)   19  Need for influenza vaccination (V04 81) (Z23)   20  Need for prophylactic vaccination and inoculation against influenza (V04 81) (Z23)   21  Need for vaccination with 13-polyvalent pneumococcal conjugate vaccine (V03 82) (Z23)   22  Paroxysmal atrial fibrillation (427 31) (I48 0)   23  Patent foramen ovale (745 5) (Q21 1)   24  Pre-operative cardiovascular examination (V72 81) (Z01 810)   25  Primary localized osteoarthrosis of the hip, unspecified laterality (715 15) (M16 10)   26  Pulmonary artery hypertension (416 8) (I27 21)   27  Rupture Of The Proximal Bicipital Tendon Of The Left Arm (727 62)   28  Screening for genitourinary condition (V81 6) (Z13 89)   29  Tachycardia (785 0) (R00 0)    Past Medical History    1  History of Acute and chronic respiratory failure (518 84) (J96 20)   2  Aftercare following joint replacement surgery (V54 81) (Z47 1)   3  History of Atrial flutter (427 32) (I48 92)   4  History of Deep venous thrombosis of distal lower extremity (453 42) (I82 4Z9)   5  History of acute renal failure (V13 09) (Z87 448)   6  History of diverticulitis of colon (V12 79) (Z87 19)   7   History of edema (V13 89) (Z87 898)   8  History of gastrointestinal hemorrhage (V12 79) (Z87 19)   9  History of Helicobacter infection (V12 09) (Z86 19)   10  History of Sinus Tachycardia (427 89)   11  History of Tracheobronchitis (490) (J40)    Surgical History    1  History of Appendectomy   2  History of Colon Surgery   3  History of Colon Surgery   4  History of Hernia Repair   5  History of Hernia Repair  Surgical History Reviewed: The surgical history was reviewed and updated today  Family History  Mother    1  Family history of Cancer   2  Family history of Smoking Cigarettes  Father    3  Family history of Acute Myocardial Infarction (V17 3)   4  Family history of Sudden/Instantaneous Cardiac Death (V17 41)  Family History    5  Family history of Arthritis (V17 7)   6  Family history of Diabetes Mellitus (V18 0)  Family History Reviewed: The family history was reviewed and updated today  Social History    · Denied: History of Alcohol Use (History)   · Daily Coffee Consumption (1  Cups/Day)   · Former smoker (M53 27) (Y92 830)   · Marital History - Currently   Social History Reviewed: The social history was reviewed and updated today  The social history was reviewed and is unchanged  Current Meds   1  Albuterol Sulfate (2 5 MG/3ML) 0 083% Inhalation Nebulization Solution; USE 1 UNIT   DOSE EVERY 4-6 HOURS AS NEEDED FOR WHEEZING ; Therapy: 47OCD6111 to (Rose Marie Solum)  Requested for: 90GRP3937; Last   Rx:66Hbz0934 Ordered   2  Albuterol Sulfate (2 5 MG/3ML) 0 083% Inhalation Nebulization Solution; USE 1 UNIT   DOSE IN NEBULIZER EVERY 6 HOURS AS NEEDED; Therapy: 37XSG6915 to (Evaluate:69Xzk3060)  Requested for: 43EOR0183; Last   Rx:09Pmn5519 Ordered   3  Breo Ellipta 100-25 MCG/INH Inhalation Aerosol Powder Breath Activated; Take one   inhalation once a day  Rinse her mouth with water after each use;    Therapy: 99TKP2394 to (Brenda Kennedy)  Requested for: 27NEQ2987; Last UE:29ZOG5768 Ordered   4  Cartia  MG Oral Capsule Extended Release 24 Hour; TAKE 1 CAPSULE BY   MOUTH EVERY DAY; Therapy: 14AGD1108 to (YXSCCSAX:76PTQ0179)  Requested for: 60WHN0564; Last   Rx:21Wpr9345 Ordered   5  Citalopram Hydrobromide 20 MG Oral Tablet; TAKE 1 TABLET DAILY  Requested for:   19Apr2017; Last Rx:19Apr2017 Ordered   6  Ferrous Sulfate 325 (65 Fe) MG Oral Tablet; Take 1 tablet daily; Last Rx:01Oow1232   Ordered   7  Folic Acid 1 MG Oral Tablet; take 1 tablet by mouth every day; Therapy: 34RFF6498 to (Evaluate:84Jxo8424)  Requested for: 22Sbh2165; Last   Rx:31Uwe4456 Ordered   8  Furosemide 20 MG Oral Tablet; TAKE 1/2 TABLET BY MOUTH DAILY; Therapy: 10IET9462 to [phone])  Requested for: 25Oct2017; Last   ZZ:90TSX3204 Ordered   9  Potassium Chloride Fernanda ER 20 MEQ Oral Tablet Extended Release; take 1 tablet by   mouth every day; Therapy: 40GZC3228 to (404 80 912)  Requested for: 86PUQ9615; Last   Rx:09Jan2017 Ordered   10  Spiriva Respimat 2 5 MCG/ACT Inhalation Aerosol Solution; TAKE 2 INHALATIONS DAILY; Therapy: 04QEF2362 to (Last Rx:19Apr2017)  Requested for: 19Apr2017 Ordered   11  Warfarin Sodium 2 5 MG Oral Tablet; Take 1 to 2 tablets daily by mouth or as directed by    physician; Therapy: 19LRZ4321 to 479 3086)  Requested for: 45IGP5868; Last    Rx:39Xvv1828 Ordered  Medication List Reviewed: The medication list was reviewed and updated today  Allergies    1  Penicillins    Vitals  Signs   Recorded: 21Nov2017 08:17AM   Heart Rate: 96  Respiration: 22  Systolic: 060, LUE, Sitting  Diastolic: 58, LUE, Sitting  Height: 5 ft 7 in  Weight: 139 lb 0 2 oz  BMI Calculated: 21 77  BSA Calculated: 1 73    Physical Exam    Constitutional   General appearance: No acute distress, well appearing and well nourished  Eyes   Conjunctiva and lids: No swelling, erythema, or discharge  Pupils and irises: Equal, round and reactive to light      Ears, Nose, Mouth, and Throat   External inspection of ears and nose: Normal     Otoscopic examination: Tympanic membrance translucent with normal light reflex  Canals patent without erythema  Nasal mucosa, septum, and turbinates: Normal without edema or erythema  Oropharynx: Normal with no erythema, edema, exudate or lesions  Pulmonary   Respiratory effort: No increased work of breathing or signs of respiratory distress  Auscultation of lungs: Clear to auscultation, equal breath sounds bilaterally, no wheezes, no rales, no rhonci  Cardiovascular   Auscultation of heart: Normal rate and rhythm, normal S1 and S2, without murmurs  Examination of extremities for edema and/or varicosities: Normal     Abdomen   Abdomen: Non-tender, no masses  Liver and spleen: No hepatomegaly or splenomegaly  Lymphatic   Palpation of lymph nodes in neck: No lymphadenopathy  Musculoskeletal   Gait and station: Normal     Digits and nails: Normal without clubbing or cyanosis  Inspection/palpation of joints, bones, and muscles: Normal     Skin   Skin and subcutaneous tissue: Normal without rashes or lesions  Psychiatric   Orientation to person, place and time: Normal     Mood and affect: Normal          Future Appointments    Date/Time Provider Specialty Site   03/19/2018 09:00 AM Kristopher Brar MD Hematology Oncology [facility]   01/16/2018 08:30 AM JENNIFER Zhou   Internal Medicine [facility]   01/08/2018 03:40 PM Álvaro Roman MD Pulmonary Medicine [place] [address]     Signatures   Electronically signed by : Rosmery Grajeda, ; Nov 13 2017 11:59AM EST                       (Author)    Electronically signed by : Emelie Bloch; Nov 21 2017 11:40AM EST                       (Author)    Electronically signed by : JENNIFER Hdz ; Nov 21 2017 12:51PM EST                       (Review)

## 2018-01-12 NOTE — PROGRESS NOTES
REPORT NAME: Progress Notes Report  VISIT DATE: 11/17/2017  VISIT TIME: 11:53 AM EST  PATIENT NAME: Fransisco Mcnair  MEDICAL RECORD NUMBER: [id number]  YOB: 1950  AGE: 69  REFERRING PHYSICIAN: Deborah Almanzar  SUPERVISING CLINICIAN: [facility] CARE PROVIDER: [facility]  PATIENT HOME ADDRESS: [address]  PATIENT HOME PHONE: [phone]  SOCIAL SECURITY NUMBER:   DIAGNOSIS 1: Unspecified atrial flutter / I48 92  DIAGNOSIS 2:   INR RANGE: 2 - 2 5  INR GOAL: 2 25  TREATMENT START DATE:   TREATMENT END DATE:   NEXT VISIT: 11/21/2017  [facility]  VISIT RESULTS  ENCOUNTER NUMBER:   TEST LOCATION:   TEST TYPE:   VISIT TYPE:   CURRENT INR: 3 PROTIME:   SPECIMEN COL AND RPT DATE: 11/17/2017 11:53 AM  EST  VITAL SIGNS  PULSE:  BP: / WEIGHT:  HEIGHT:  TEMP:   CURRENT ANTICOAGULANT DOSING SCHEDULE  DOSE SIZE: 2 5mg    ANTICOAGULANT TYPE: WARFARIN SOD  DOSING REGIMEN  Sun       Mon Tues Wed Thurs Fri       Sat  Total/Wk  1 25      2 5       2 5       1 25      2 5       1 25      2 5       13 75  PATIENT MEDICATION INSTRUCTION: Yes  PATIENT NUTRITIONAL COUNSELING: No  PATIENT BRUISING INSTRUCTION: No  LAST EDUCATION DATE:   PREVIOUS VISIT INFORMATION  VISITDATE  INRGoal INR   Sun    Mon Tues Wed Thurs Fri    Sat  Total/wk  11/17/2017  2 25    3     1 25   2 5    2 5    1 25   2 5    1 25   2 5  13 75  11/15/2017  2 25    3     2 5    2 5    2 5    1 25   2 5    0      2 5  13 75  11/13/2017  2 25    2 1   2 5    2 5    2 5    0      0      0      2 5  10  11/3/2017   2 25    5 79  HOLD   1 25   1 25   2 5    1 25   2 5    HOLD  8 75  ADDITIONAL PREVIOUS VISIT INFORMATION  VISITDATE   PRIMARY RX               DOSE      CrCl  11/17/2017  WARFARIN SOD             2 5mg  11/15/2017  WARFARIN SOD             2 5mg  11/13/2017  WARFARIN SOD             2 5mg  11/3/2017   WARFARIN SOD             2 5mg  OTHER CURRENT MEDICATIONS:  WARFARIN SOD  PROGRESS NOTES: gave dosage to Mandy Brown  v/o to retest inr on Tuesday  gave dosage to wife    PATIENT INSTRUCTIONS:   TEST LOCATION: , , ,   INBOUND LAB DATA:  Lab       Lab Value Col Date                 Rpt Date                 Lab  Reference Range  Electronically signed byLenard Stanton on 11/17/2017 11:53 AM EST

## 2018-01-13 VITALS
RESPIRATION RATE: 18 BRPM | DIASTOLIC BLOOD PRESSURE: 52 MMHG | TEMPERATURE: 99.2 F | OXYGEN SATURATION: 91 % | HEART RATE: 88 BPM | SYSTOLIC BLOOD PRESSURE: 120 MMHG | HEIGHT: 67 IN | BODY MASS INDEX: 23.07 KG/M2 | WEIGHT: 147 LBS

## 2018-01-13 VITALS
HEART RATE: 117 BPM | OXYGEN SATURATION: 81 % | BODY MASS INDEX: 23.27 KG/M2 | RESPIRATION RATE: 19 BRPM | SYSTOLIC BLOOD PRESSURE: 140 MMHG | HEIGHT: 67 IN | TEMPERATURE: 96.6 F | DIASTOLIC BLOOD PRESSURE: 62 MMHG | WEIGHT: 148.25 LBS

## 2018-01-13 VITALS
HEART RATE: 104 BPM | SYSTOLIC BLOOD PRESSURE: 120 MMHG | WEIGHT: 143.06 LBS | HEIGHT: 67 IN | OXYGEN SATURATION: 89 % | DIASTOLIC BLOOD PRESSURE: 50 MMHG | BODY MASS INDEX: 22.45 KG/M2

## 2018-01-13 VITALS
TEMPERATURE: 97.8 F | BODY MASS INDEX: 23.7 KG/M2 | SYSTOLIC BLOOD PRESSURE: 124 MMHG | WEIGHT: 151 LBS | RESPIRATION RATE: 17 BRPM | HEART RATE: 103 BPM | OXYGEN SATURATION: 82 % | HEIGHT: 67 IN | DIASTOLIC BLOOD PRESSURE: 68 MMHG

## 2018-01-13 VITALS
HEART RATE: 100 BPM | DIASTOLIC BLOOD PRESSURE: 60 MMHG | BODY MASS INDEX: 23.59 KG/M2 | HEIGHT: 67 IN | SYSTOLIC BLOOD PRESSURE: 140 MMHG | OXYGEN SATURATION: 80 % | WEIGHT: 150.31 LBS

## 2018-01-13 VITALS
OXYGEN SATURATION: 82 % | WEIGHT: 144 LBS | DIASTOLIC BLOOD PRESSURE: 56 MMHG | HEART RATE: 112 BPM | HEIGHT: 67 IN | SYSTOLIC BLOOD PRESSURE: 122 MMHG | BODY MASS INDEX: 22.6 KG/M2 | TEMPERATURE: 97.1 F | RESPIRATION RATE: 17 BRPM

## 2018-01-13 NOTE — PROGRESS NOTES
REPORT NAME: Progress Notes Report  VISIT DATE: 10/10/2017  VISIT TIME: 12:03 PM EDT  PATIENT NAME: Milagros Gray  MEDICAL RECORD NUMBER: [id number]  YOB: 1950  AGE: 69  REFERRING PHYSICIAN: [facility] Coronado  SUPERVISING CLINICIAN: [facility] CARE PROVIDER: [facility]  PATIENT HOME ADDRESS: [address]  PATIENT HOME PHONE: [phone]  SOCIAL SECURITY NUMBER:   DIAGNOSIS 1: Unspecified atrial flutter / I48 92  DIAGNOSIS 2:   INR RANGE: 2 - 2 5  INR GOAL: 2 25  TREATMENT START DATE:   TREATMENT END DATE:   NEXT VISIT: 10/13/2017  [facility]  VISIT RESULTS  ENCOUNTER NUMBER:   TEST LOCATION:   TEST TYPE:   VISIT TYPE:   CURRENT INR: 4 02 PROTIME:   SPECIMEN COL AND RPT DATE: 10/10/2017 12:03 PM  EDT  VITAL SIGNS  PULSE:  BP: / WEIGHT:  HEIGHT:  TEMP:   CURRENT ANTICOAGULANT DOSING SCHEDULE  DOSE SIZE: 2 5mg    ANTICOAGULANT TYPE: WARFARIN SOD  DOSING REGIMEN  Sun       Mon       Tues      Wed       Thurs     Fri       Sat  Total/Wk  2 5       2 5       HOLD      1 25      1 25      2 5       2 5       12 5  PATIENT MEDICATION INSTRUCTION: Yes  PATIENT NUTRITIONAL COUNSELING: No  PATIENT BRUISING INSTRUCTION: No  LAST EDUCATION DATE:   PREVIOUS VISIT INFORMATION  VISITDATE  INRGoal INR   Sun    Mon    Tues   Wed    Thurs  Fri    Sat  Total/wk  10/10/2017  2 25    4 02  2 5    2 5    HOLD   1 25   1 25   2 5    2 5  12 5  10/6/2017   2 25    3 14  2 5    2 5    2 5    2 5    2 5    2 5    2 5  17 5  9/29/2017   2 25    1 2   2 5    2 5    5      5      2 5    5      5  27 5  ADDITIONAL PREVIOUS VISIT INFORMATION  VISITDATE   PRIMARY RX               DOSE      CrCl  10/10/2017  WARFARIN SOD             2 5mg  10/6/2017   WARFARIN SOD             2 5mg  9/29/2017   WARFARIN SOD             2 5mg  OTHER CURRENT MEDICATIONS:  WARFARIN SOD  PROGRESS NOTES: gave dosage to wife  retest inr on Friday    PATIENT INSTRUCTIONS:   TEST LOCATION: , , , INBOUND LAB DATA:  Lab       Lab Value Col Date                 Rpt Date                 Lab  Reference Range  Electronically signed bySaud Browne on 10/10/2017 12:03 PM EDT

## 2018-01-13 NOTE — RESULT NOTES
Verified Results  (1) PT WITH INR 10Oct2017 08:56AM Vivienne Theodore     Test Name Result Flag Reference   INR 4 02 H 0 86-1 16   PT 39 8 seconds H 12 1-14 4

## 2018-01-14 VITALS
BODY MASS INDEX: 23.7 KG/M2 | SYSTOLIC BLOOD PRESSURE: 130 MMHG | OXYGEN SATURATION: 93 % | DIASTOLIC BLOOD PRESSURE: 62 MMHG | HEIGHT: 67 IN | RESPIRATION RATE: 20 BRPM | TEMPERATURE: 98.2 F | HEART RATE: 93 BPM | WEIGHT: 151 LBS

## 2018-01-14 VITALS
RESPIRATION RATE: 15 BRPM | HEIGHT: 67 IN | OXYGEN SATURATION: 93 % | WEIGHT: 140.13 LBS | SYSTOLIC BLOOD PRESSURE: 102 MMHG | HEART RATE: 95 BPM | BODY MASS INDEX: 21.99 KG/M2 | TEMPERATURE: 97.3 F | DIASTOLIC BLOOD PRESSURE: 60 MMHG

## 2018-01-14 VITALS
BODY MASS INDEX: 21.82 KG/M2 | WEIGHT: 139.01 LBS | HEIGHT: 67 IN | DIASTOLIC BLOOD PRESSURE: 58 MMHG | RESPIRATION RATE: 22 BRPM | HEART RATE: 96 BPM | SYSTOLIC BLOOD PRESSURE: 164 MMHG

## 2018-01-14 NOTE — RESULT NOTES
Verified Results  (1) IRON [id number] 08:36AM Kenisha Pavonsarthak Order Number: [id number]     Test Name Result Flag Reference   IRON 78 ug/dL     Patients treated with metal-binding drugs (ie  Deferoxamine) may have depressed iron values

## 2018-01-14 NOTE — RESULT NOTES
Verified Results  (1) CBC/ PLT (NO DIFF) 27EMB3557 08:48AM [facility] [place] Order Number: [id number]     Test Name Result Flag Reference   HEMATOCRIT 31 7 % L 36 5-49 3   HEMOGLOBIN 9 8 g/dL L 12 0-17 0   MCHC 30 9 g/dL L 31 4-37 4   MCH 29 9 pg  26 8-34 3   MCV 97 fL  82-98   PLATELET COUNT 109 Thousands/uL  149-390   RBC COUNT 3 28 Million/uL L 3 88-5 62   RDW 14 4 %  11 6-15 1   WBC COUNT 7 55 Thousand/uL  4 31-10 16   MPV 9 5 fL  8 9-12 7     (1) PT WITH INR 94IKQ7353 08:48AM Patricia Rivera Order Number: [id number]     Test Name Result Flag Reference   INR 5 79 HH 0 86-1 16   Slight Hemolysis   PT 53 2 seconds H 12 1-14 4

## 2018-01-15 NOTE — MISCELLANEOUS
History of Present Illness  HPI: Pt was admitted to [address] [place] [address] 11/5-11/11with acute exacerbation of very severe COPD  COPD Hospital Discharge Initial Follow-Up Call: The patient is being contacted for follow-up after hospitalization  The date of discharge is 11/11  I spoke with the patient   Patient was identified as medium risk for readmission per risk stratification  The patient was discharged to home with [address] [place] [address]  The FEV1 is in 2014 was 13 %  The Gold Stage of COPD is very severe  The patient is a former smoker with a 69 pack year history  The patient quit smoking in 2012  The patient is experiencing the following symptoms: cough and dyspnea, but no wheezing and no fever  with oxygen at 4 LPM  Oxygen is used all the time  Zone tool status is yellow  The following topics were reviewed:  rescue vs  maintenance inhalers, COPD zone tool: when to take action, energy conservation, physician follow-ups and medication compliance  The patient felt ready to be discharged from the hospital  The patient was given written &/or verbal educational materials specific to his COPD diagnosis  The patient was given the opportunity to walk in the hallways with staff assistance to assess his breathing status and review breathing techniques to help prevent increasing shortness of breath  The patient was instructed on when to return to his normal activities  The staff offered to assist the patient in making follow-up appointments with his family doctor or other specialists  Narrative Summary:    Roldan Guerrero reports being close to his chronic baseline  We reviewed his medication schedule- his wife monitors and schedules  Roldan Guerrero is enrolled in Pulmonary PALS and RN CM coming to complete REAGAN visit later today  He is realistic with his disease and goals of care  Current Meds    1  Citalopram Hydrobromide 20 MG Oral Tablet; TAKE 1 TABLET DAILY  Requested for:   19Apr2017; Last Rx:19Apr2017 Ordered    2  Warfarin Sodium 2 5 MG Oral Tablet; Take 1 to 2 tablets daily by mouth or as directed by   physician; Therapy: 56MZQ6116 to ([facility]:47PTV3832)  Requested for: 74EOO3996; Last   Rx:17Bqz9268 Ordered    3  Albuterol Sulfate (2 5 MG/3ML) 0 083% Inhalation Nebulization Solution; USE 1 UNIT   DOSE EVERY 4-6 HOURS AS NEEDED FOR WHEEZING ; Therapy: 52TEH3078 to (Amado Chaney)  Requested for: 45SUP8720; Last   Rx:14Khn7389 Ordered    4  Albuterol Sulfate (2 5 MG/3ML) 0 083% Inhalation Nebulization Solution; USE 1 UNIT   DOSE IN NEBULIZER EVERY 6 HOURS AS NEEDED; Therapy: 93BXH3691 to (Evaluate:52Drh0292)  Requested for: 11QMZ3255; Last   Rx:04Gvk8823 Ordered   5  Breo Ellipta 100-25 MCG/INH Inhalation Aerosol Powder Breath Activated; Take one   inhalation once a day  Rinse her mouth with water after each use; Therapy: 96YQX5091 to (Ramon Wells)  Requested for: 83GCJ6657; Last   Rx:63Hub5807 Ordered   6  Furosemide 20 MG Oral Tablet; TAKE 1/2 TABLET BY MOUTH DAILY; Therapy: 66SPN3743 to [id number])  Requested for: 25Oct2017; Last   WK:68RJJ5074 Ordered   7  Spiriva Respimat 2 5 MCG/ACT Inhalation Aerosol Solution; TAKE 2 INHALATIONS DAILY; Therapy: 50AEB4647 to (Last Rx:19Apr2017)  Requested for: 17Ige4004 Ordered    8  Ferrous Sulfate 325 (65 Fe) MG Oral Tablet; Take 1 tablet daily; Last Rx:13Pko3745   Ordered    9  Potassium Chloride Fernanda ER 20 MEQ Oral Tablet Extended Release; take 1 tablet by   mouth every day; Therapy: 87KCJ7626 to (Vero Ochoa)  Requested for: 50SCX4775; Last   Rx:09Jan2017 Ordered    10  Folic Acid 1 MG Oral Tablet; take 1 tablet by mouth every day; Therapy: 30NAG6642 to (Evaluate:14Jjw3110)  Requested for: 13Pez3370; Last    Rx:02Gdt0279 Ordered    11  Cartia  MG Oral Capsule Extended Release 24 Hour; TAKE 1 CAPSULE BY    MOUTH EVERY DAY;     Therapy: 05NIT6955 to [phone])  Requested for: 13OPL2271; Last    MS:85BFC7944 Ordered    Allergies    1  Penicillins    Future Appointments    Date/Time Provider Specialty Site   11/21/2017 08:00 AM Shelbi Cruz Nurse Practitioner [facility]   11/17/2017 04:00 PM Olivia Cohen MD Hematology Oncology [address] [place] [address] ONCOLOGY   01/16/2018 08:30 AM JENNIFER Umana   Internal Medicine [facility]   01/08/2018 03:40 PM Yaya Chaparro MD Pulmonary Medicine [place] [address]     Signatures   Electronically signed by : Joseph Monahan, ; Nov 13 2017 12:12PM EST                       (Author)

## 2018-01-15 NOTE — RESULT NOTES
Verified Results  (1) PT WITH INR 36Lzs3837 08:36AM Gabriele Howard Order Number: [id number]     Test Name Result Flag Reference   INR 1 24 H 0 86-1 16   PT 15 7 seconds H 12 1-14 4

## 2018-01-15 NOTE — PROGRESS NOTES
REPORT NAME: Progress Notes Report  VISIT DATE: 10/27/2017  VISIT TIME: 11:30 AM EDT  PATIENT NAME: Álvaro Lopez  MEDICAL RECORD NUMBER: [id number]  YOB: 1950  AGE: 69  REFERRING PHYSICIAN: Eldon Parrish  SUPERVISING CLINICIAN: [facility] ([facility]) CARE PROVIDER: [address]   PATIENT HOME ADDRESS: [address]  PATIENT HOME PHONE: [phone]  SOCIAL SECURITY NUMBER:   DIAGNOSIS 1: Unspecified atrial flutter / [id number]  DIAGNOSIS 2:   INR RANGE: 2 - 2 5  INR GOAL: 2 25  TREATMENT START DATE:   TREATMENT END DATE:   NEXT VISIT: 11/3/2017  [facility]  VISIT RESULTS  ENCOUNTER NUMBER:   TEST LOCATION:   TEST TYPE:   VISIT TYPE:   CURRENT INR: 3 39 PROTIME:   SPECIMEN COL AND RPT DATE: 10/27/2017 11:30 AM  EDT  VITAL SIGNS  PULSE:  BP: / WEIGHT:  HEIGHT:  TEMP:   CURRENT ANTICOAGULANT DOSING SCHEDULE  DOSE SIZE: 2 5mg    ANTICOAGULANT TYPE: WARFARIN SOD  DOSING REGIMEN  Sun       Mon Tues Wed Thurs Fri       Sat  Total/Wk  1 25      2 5       1 25      2 5       1 25      2 5       1 25      12 5  PATIENT MEDICATION INSTRUCTION: Yes  PATIENT NUTRITIONAL COUNSELING: Yes  PATIENT BRUISING INSTRUCTION: Yes  LAST EDUCATION DATE:   PREVIOUS VISIT INFORMATION  VISITDATE  INRGoal INR   Sun    Mon Tues Wed Thurs Fri    Sat  Total/wk  10/27/2017  2 25    3 39  1 25   2 5    1 25   2 5    1 25   2 5    1 25  12 5  10/20/2017  2 25    1 67  1 25   2 5    2 5    2 5    1 25   2 5    2 5  15  10/13/2017  2 25    2 46  1 25   2 5    1 25   2 5    1 25   2 5    1 25  12 5  10/10/2017  2 25    4 02  2 5    2 5    HOLD   1 25   1 25   2 5    2 5  12 5  ADDITIONAL PREVIOUS VISIT INFORMATION  VISITDATE   PRIMARY RX               DOSE      CrCl  10/27/2017  WARFARIN SOD             2 5mg  10/20/2017  WARFARIN SOD             2 5mg  10/13/2017  WARFARIN SOD             2 5mg  10/10/2017  WARFARIN SOD             2 5mg  OTHER CURRENT MEDICATIONS:  WARFARIN SOD  PROGRESS NOTES: spoke to wife  w changes/ 1 wk  PATIENT INSTRUCTIONS:   TEST LOCATION: , , ,   INBOUND LAB DATA:  Lab       Lab Value Col Date                 Rpt Date                 Lab  Reference Range  Electronically signed byAnnye Saint  on 10/27/2017 11:30 AM EDT

## 2018-01-16 DIAGNOSIS — I48.92 ATRIAL FLUTTER (HCC): ICD-10-CM

## 2018-01-16 NOTE — PROGRESS NOTES
REPORT NAME: Progress Notes Report  VISIT DATE: 10/13/2017  VISIT TIME: 11:44 AM EDT  PATIENT NAME: Doreen Edmonds  MEDICAL RECORD NUMBER: [id number]  YOB: 1950  AGE: 69  REFERRING PHYSICIAN: Elizabeth Francis  SUPERVISING CLINICIAN: [facility] ([facility]) CARE PROVIDER: [facility]  PATIENT HOME ADDRESS: [address]  PATIENT HOME PHONE: [phone]  SOCIAL SECURITY NUMBER:   DIAGNOSIS 1: Unspecified atrial flutter / I48 92  DIAGNOSIS 2:   INR RANGE: 2 - 2 5  INR GOAL: 2 25  TREATMENT START DATE:   TREATMENT END DATE:   NEXT VISIT: 10/20/2017  [facility]  VISIT RESULTS  ENCOUNTER NUMBER:   TEST LOCATION:   TEST TYPE:   VISIT TYPE:   CURRENT INR: 2 46 PROTIME:   SPECIMEN COL AND RPT DATE: 10/13/2017 11:44 AM  EDT  VITAL SIGNS  PULSE:  BP: / WEIGHT:  HEIGHT:  TEMP:   CURRENT ANTICOAGULANT DOSING SCHEDULE  DOSE SIZE: 2 5mg    ANTICOAGULANT TYPE: WARFARIN SOD  DOSING REGIMEN  Sun       Mon Tues Wed Thurs Fri       Sat  Total/Wk  1 25      2 5       1 25      2 5       1 25      2 5       1 25      12 5  PATIENT MEDICATION INSTRUCTION: Yes  PATIENT NUTRITIONAL COUNSELING: No  PATIENT BRUISING INSTRUCTION: No  LAST EDUCATION DATE:   PREVIOUS VISIT INFORMATION  VISITDATE  INRGoal INR   Sun    Mon Tues Wed Thurs Fri    Sat  Total/wk  10/13/2017  2 25    2 46  1 25   2 5    1 25   2 5    1 25   2 5    1 25  12 5  10/10/2017  2 25    4 02  2 5    2 5    HOLD   1 25   1 25   2 5    2 5  12 5  10/6/2017   2 25    3 14  2 5    2 5    2 5    2 5    2 5    2 5    2 5  17 5  9/29/2017   2 25    1 2   2 5    2 5    5      5      2 5    5      5  27 5  ADDITIONAL PREVIOUS VISIT INFORMATION  VISITDATE   PRIMARY RX               DOSE      CrCl  10/13/2017  WARFARIN SOD             2 5mg  10/10/2017  WARFARIN SOD             2 5mg  10/6/2017   WARFARIN SOD             2 5mg  9/29/2017   WARFARIN SOD             2 5mg  OTHER CURRENT MEDICATIONS:  WARFARIN SOD  PROGRESS NOTES: gave dosage to wife, retest inr in one week    PATIENT INSTRUCTIONS:   TEST LOCATION: , , ,   INBOUND LAB DATA:  Lab       Lab Value Col Date                 Rpt Date                 Lab  Reference Range  Electronically signed byJen Vera on 10/13/2017 11:43 AM EDT

## 2018-01-16 NOTE — PROGRESS NOTES
REPORT NAME: Progress Notes Report  VISIT DATE: 10/20/2017  VISIT TIME: 2:04 PM EDT  PATIENT NAME: Papi Shah  MEDICAL RECORD NUMBER: [id number]  YOB: 1950  AGE: 69  REFERRING PHYSICIAN: Denny Schaffer  SUPERVISING CLINICIAN: [facility] ([facility] CARE PROVIDER: [facility]  PATIENT HOME ADDRESS: [address]  PATIENT HOME PHONE: [phone]  SOCIAL SECURITY NUMBER:   DIAGNOSIS 1: Unspecified atrial flutter / [id number]  DIAGNOSIS 2:   INR RANGE: 2 - 2 5  INR GOAL: 2 25  TREATMENT START DATE:   TREATMENT END DATE:   NEXT VISIT: 10/27/2017  [facility] Cardiology  VISIT RESULTS  ENCOUNTER NUMBER:   TEST LOCATION:   TEST TYPE:   VISIT TYPE:   CURRENT INR: 1 67 PROTIME:   SPECIMEN COL AND RPT DATE: 10/20/2017 2:04 PM  EDT  VITAL SIGNS  PULSE:  BP: / WEIGHT:  HEIGHT:  TEMP:   CURRENT ANTICOAGULANT DOSING SCHEDULE  DOSE SIZE: 2 5mg    ANTICOAGULANT TYPE: WARFARIN SOD  DOSING REGIMEN  Sun       Mon Tues Wed Thurs Fri       Sat  Total/Wk  1 25      2 5       2 5       2 5       1 25      2 5       2 5       15  PATIENT MEDICATION INSTRUCTION: Yes  PATIENT NUTRITIONAL COUNSELING: No  PATIENT BRUISING INSTRUCTION: No  LAST EDUCATION DATE:   PREVIOUS VISIT INFORMATION  VISITDATE  INRGoal INR   Sun    Mon Tues Wed Thurs Fri    Sat  Total/wk  10/20/2017  2 25    1 67  1 25   2 5    2 5    2 5    1 25   2 5    2 5  15  10/13/2017  2 25    2 46  1 25   2 5    1 25   2 5    1 25   2 5    1 25  12 5  10/10/2017  2 25    4 02  2 5    2 5    HOLD   1 25   1 25   2 5    2 5  12 5  10/6/2017   2 25    3 14  2 5    2 5    2 5    2 5    2 5    2 5    2 5  17 5  ADDITIONAL PREVIOUS VISIT INFORMATION  VISITDATE   PRIMARY RX               DOSE      CrCl  10/20/2017  WARFARIN SOD             2 5mg  10/13/2017  WARFARIN SOD             2 5mg  10/10/2017  WARFARIN SOD             2 5mg  10/6/2017   WARFARIN SOD             2 5mg  OTHER CURRENT MEDICATIONS:  WARFARIN SOD  PROGRESS NOTES: gave dosage to wife, retest inr in one week    PATIENT INSTRUCTIONS:   TEST LOCATION: , , ,   INBOUND LAB DATA:  Lab       Lab Value Col Date                 Rpt Date                 Lab  Reference Range  Electronically signed Ana Maciel on 10/20/2017 2:04 PM EDT

## 2018-01-16 NOTE — MISCELLANEOUS
Assessment    1  Chronic obstructive pulmonary disease (496) (J44 9)   2  Benign hypertension (401 1) (I10)   3  Hypokalemia (276 8) (E87 6)   4  Chronic hypoxemic respiratory failure (518 83,799 02) (J96 11)   5  Iron (Fe) deficiency anemia (280 9) (D50 9)    Discussion/Summary  Discussion Summary:   COPD: continue medications and follow up pulm, pulse ox here today is good, and pt monitors at home    HTN: controlled, cont med    Anemia: continue iron as per heme onc    Will give flu shot today  Chief Complaint  Chief Complaint Free Text Note Form: TCM      History of Present Illness  TCM Communication [facility]: The patient is being contacted for follow-up after hospitalization  Hospital records were reviewed  He was hospitalized at [facility]  The date of admission: 08/30/2017, date of discharge: 09/08/2017  Diagnosis: COPD exacerbation  He was discharged to home  Medications were not reviewed today  He scheduled a follow up appointment  Symptoms: fatigue  Sore throat Counseling was provided to the patient  Communication performed and completed by Brown Bloch   HPI: I reviewed hospitalization for COPD exacerbation, pt doesn't feel back to baseline yet  Pt on oral steroid taper      Review of Systems  Complete-Male:   Constitutional: feeling tired, but no fever and no chills  Cardiovascular: the heart rate was not slow, no chest pain, the heart rate was not fast, no palpitations and no extremity edema  Respiratory: shortness of breath and shortness of breath during exertion, but no cough and no wheezing  Gastrointestinal: no constipation and no diarrhea  Genitourinary: no dysuria  Psychiatric: no anxiety  Active Problems    1  Advance directive discussed with patient (V65 49) (Z71 89)   2  Aftercare following joint replacement surgery (V54 81) (Z47 1)   3  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)   4  Anxiety (300 00) (F41 9)   5  Aortic regurgitation (424 1) (I35 1)   6   Arthralgia Of The Right Pelvis/Hip/Femur (719 45)   7  Atrial flutter (427 32) (I48 92)   8  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)   9  Benign hypertension (401 1) (I10)   10  Chronic hypoxemic respiratory failure (518 83,799 02) (J96 11)   11  Chronic obstructive pulmonary disease (496) (J44 9)   12  Colon polyp (211 3) (K63 5)   13  Emphysema (492 8) (J43 9)   14  History of gastrointestinal hemorrhage (V12 79) (Z87 19)   15  History of Helicobacter infection (V12 09) (Z86 19)   16  Hypokalemia (276 8) (E87 6)   17  Hypoxia (799 02) (R09 02)   18  Iron (Fe) deficiency anemia (280 9) (D50 9)   19  MGUS (monoclonal gammopathy of unknown significance) (273 1) (D47 2)   20  Need for influenza vaccination (V04 81) (Z23)   21  Need for prophylactic vaccination and inoculation against influenza (V04 81) (Z23)   22  Need for vaccination with 13-polyvalent pneumococcal conjugate vaccine (V03 82) (Z23)   23  Paroxysmal atrial fibrillation (427 31) (I48 0)   24  Patent foramen ovale (745 5) (Q21 1)   25  Pre-operative cardiovascular examination (V72 81) (Z01 810)   26  Primary localized osteoarthrosis of the hip, unspecified laterality (715 15) (M16 10)   27  Pulmonary artery hypertension (416 8) (I27 2)   28  Rupture Of The Proximal Bicipital Tendon Of The Left Arm (727 62)   29  Screening for genitourinary condition (V81 6) (Z13 89)   30  Tachycardia (785 0) (R00 0)    Past Medical History    1  History of Acute and chronic respiratory failure (518 84) (J96 20)   2  Aftercare following joint replacement surgery (V54 81) (Z47 1)   3  History of Atrial flutter (427 32) (I48 92)   4  History of Deep venous thrombosis of distal lower extremity (453 42) (I82 4Z9)   5  History of acute renal failure (V13 09) (Z87 448)   6  History of diverticulitis of colon (V12 79) (Z87 19)   7  History of edema (V13 89) (Z87 898)   8  History of gastrointestinal hemorrhage (V12 79) (Z87 19)   9   History of Helicobacter infection (V12 09) (Z86 19) 10  History of Sinus Tachycardia (427 89)   11  History of Tracheobronchitis (490) (J40)    Surgical History    1  History of Appendectomy   2  History of Colon Surgery   3  History of Colon Surgery   4  History of Hernia Repair   5  History of Hernia Repair    Family History  Mother    1  Family history of Cancer   2  Family history of Smoking Cigarettes  Father    3  Family history of Acute Myocardial Infarction (V17 3)   4  Family history of Sudden/Instantaneous Cardiac Death (V17 41)  Family History    5  Family history of Arthritis (V17 7)   6  Family history of Diabetes Mellitus (V18 0)    Social History    · Denied: History of Alcohol Use (History)   · Daily Coffee Consumption (1  Cups/Day)   · Former smoker (B64 60) (P49 395)   · Marital History - Currently   Social History Reviewed: The social history was reviewed and updated today  Current Meds   1  Albuterol Sulfate (2 5 MG/3ML) 0 083% Inhalation Nebulization Solution; USE 1 UNIT   DOSE IN NEBULIZER EVERY 6 HOURS AS NEEDED; Therapy: 65OOE4093 to (Evaluate:71Eny3102)  Requested for: 45FES4944; Last   Rx:96Kry8848 Ordered   2  Breo Ellipta 100-25 MCG/INH Inhalation Aerosol Powder Breath Activated; Take one   inhalation once a day  Rinse her mouth with water after each use; Therapy: 42ZRE9935 to (Authur Blush)  Requested for: 43ISE7800; Last   Rx:23Cjx5069 Ordered   3  Cartia  MG Oral Capsule Extended Release 24 Hour; TAKE 1 CAPSULE BY   MOUTH EVERY DAY; Therapy: 98QFU1245 to (Benigno Osborn)  Requested for: 03Apr2017; Last   Rx:03Apr2017 Ordered   4  Citalopram Hydrobromide 20 MG Oral Tablet; TAKE 1 TABLET DAILY  Requested for:   19Apr2017; Last Rx:19Apr2017 Ordered   5  Ferrous Sulfate 325 (65 Fe) MG Oral Tablet; TAKE 1 TABLET TWICE DAILY; Therapy: (Recorded:15Jun2015) to Recorded   6  Folic Acid 1 MG Oral Tablet; take 1 tablet by mouth every day;    Therapy: 99VVM2265 to (Evaluate:31Yvo7018)  Requested for: 59Unv5006; Last   Rx:08Sep2017 Ordered   7  Furosemide 20 MG Oral Tablet; take 1/2 tablet daily  Requested for: 43Aud9931; Last   Rx:95Moa3650 Ordered   8  Potassium Chloride Fernanda ER 20 MEQ Oral Tablet Extended Release; take 1 tablet by   mouth every day; Therapy: 82OMU5088 to ([id number])  Requested for: 01AWO7128; Last   Rx:56Xsg1782 Ordered   9  Spiriva Respimat 2 5 MCG/ACT Inhalation Aerosol Solution; TAKE 2 INHALATIONS DAILY; Therapy: 36RQP5586 to (Last Rx:19Apr2017)  Requested for: 19Apr2017 Ordered  Medication List Reviewed: The medication list was reviewed and updated today  Allergies    1  Penicillins    Vitals  Signs   Recorded: 13Sep2017 11:08AM   Temperature: 98 1 F, Oral  Heart Rate: 67  Respiration: 16  Systolic: 004, Sitting  Diastolic: 62, Sitting  Weight: 141 lb   BMI Calculated: 22 08  BSA Calculated: 1 74  O2 Saturation: 93    Physical Exam    Constitutional   General appearance: No acute distress, well appearing and well nourished  Head and Face   Head and face: Normal     Eyes   Conjunctiva and lids: No erythema, swelling or discharge  Ears, Nose, Mouth, and Throat   External inspection of ears and nose: Normal     Neck   Neck: Supple, symmetric, trachea midline, no masses  Thyroid: Normal, no thyromegaly  Pulmonary   Respiratory effort: No increased work of breathing or signs of respiratory distress  Auscultation of lungs: Clear to auscultation  Cardiovascular   Auscultation of heart: Normal rate and rhythm, normal S1 and S2, no murmurs  Carotid pulses: 2+ bilaterally  Examination of extremities for edema and/or varicosities: Normal     Neurologic   Cortical function: Normal mental status  Psychiatric   Judgment and insight: Normal     Orientation to person, place and time: Normal     Recent and remote memory: Intact      Mood and affect: Normal        Future Appointments    Date/Time Provider Specialty Site   09/26/2017 01:30 PM Dossie Cogan, M D  Cardiology [facility]   10/16/2017 08:45 AM Svitlana Davis MD Hematology Oncology [facility] MEDICAL ONCOLOGY   10/04/2017 08:30 AM Jitendra Shah MD Pulmonary Medicine [facility] 37     Signatures   Electronically signed by : Maddison Thompson, ; Sep 11 2017 10:16AM EST                       (Author)    Electronically signed by : Maddison Thompson, ; Sep 11 2017 10:17AM EST                       (Author)    Electronically signed by : Maddison Thompson, ; Sep 11 2017 10:18AM EST                       (Author)    Electronically signed by : JENNIFER Craft ; Sep 13 2017 11:24AM EST                       (Author)

## 2018-01-17 NOTE — PROGRESS NOTES
REPORT NAME: Progress Notes Report  VISIT DATE: 11/3/2017  VISIT TIME: 11:59 AM EDT  PATIENT NAME: Josue Wong  MEDICAL RECORD NUMBER: [id number]  YOB: 1950  AGE: 69  REFERRING PHYSICIAN: Ninfa Herbert  SUPERVISING CLINICIAN: [facility] [place] [facility] CARE PROVIDER: [facility]  PATIENT HOME ADDRESS: [address]  PATIENT HOME PHONE: [phone]  SOCIAL SECURITY NUMBER:   DIAGNOSIS 1: Unspecified atrial flutter / I48 92  DIAGNOSIS 2:   INR RANGE: 2 - 2 5  INR GOAL: 2 25  TREATMENT START DATE:   TREATMENT END DATE:   NEXT VISIT: 11/7/2017  [facility] Cardiology  VISIT RESULTS  ENCOUNTER NUMBER:   TEST LOCATION:   TEST TYPE:   VISIT TYPE:   CURRENT INR: 5 79 PROTIME:   SPECIMEN COL AND RPT DATE: 11/3/2017 11:59 AM  EDT  VITAL SIGNS  PULSE:  BP: / WEIGHT:  HEIGHT:  TEMP:   CURRENT ANTICOAGULANT DOSING SCHEDULE  DOSE SIZE: 2 5mg    ANTICOAGULANT TYPE: WARFARIN SOD  DOSING REGIMEN  Sun       Mon Tues Wed Thurs Fri       Sat  Total/Wk  HOLD      1 25      1 25      2 5       1 25      2 5       HOLD      8 75  PATIENT MEDICATION INSTRUCTION: Yes  PATIENT NUTRITIONAL COUNSELING: No  PATIENT BRUISING INSTRUCTION: No  LAST EDUCATION DATE:   PREVIOUS VISIT INFORMATION  VISITDATE  INRGoal INR   Sun    Mon Tues Wed Thurs Fri    Sat  Total/wk  11/3/2017   2 25    5 79  HOLD   1 25   1 25   2 5    1 25   2 5    HOLD  8 75  10/27/2017  2 25    3 39  1 25   2 5    1 25   2 5    1 25   2 5    1 25  12 5  10/20/2017  2 25    1 67  1 25   2 5    2 5    2 5    1 25   2 5    2 5  15  10/13/2017  2 25    2 46  1 25   2 5    1 25   2 5    1 25   2 5    1 25  12 5  ADDITIONAL PREVIOUS VISIT INFORMATION  VISITDATE   PRIMARY RX               DOSE      CrCl  11/3/2017   WARFARIN SOD             2 5mg  10/27/2017  WARFARIN SOD             2 5mg  10/20/2017  WARFARIN SOD             2 5mg  10/13/2017  WARFARIN SOD             2 5mg  OTHER CURRENT MEDICATIONS:  WARFARIN SOD  PROGRESS NOTES: gave dosage to wife, she denies any changes in meds or  health, pt treats COPD but has not had an med changes    PATIENT INSTRUCTIONS:   TEST LOCATION: , , ,   INBOUND LAB DATA:  Lab       Lab Value Col Date                 Rpt Date                 Lab  Reference Range  Electronically signed Shamir Olvera on 11/3/2017 11:59 AM EDT

## 2018-01-18 NOTE — RESULT NOTES
Verified Results  (1) HGB AND HCT, BLOOD 13Oct2017 08:55AM Karolina Bottoms     Test Name Result Flag Reference   HEMATOCRIT 33 9 % L 36 5-49 3   HEMOGLOBIN 10 4 g/dL L 12 0-17 0     (1) PT WITH INR 13Oct2017 08:55AM Karolina Bottoms     Test Name Result Flag Reference   INR 2 46 H 0 86-1 16   PT 27 0 seconds H 12 1-14 4

## 2018-01-18 NOTE — RESULT NOTES
Verified Results  (1) PT WITH INR 27Oct2017 08:34AM Mary Jane Mcdonough Order Number: [id number]     Test Name Result Flag Reference   INR 3 39 H 0 86-1 16   PT 34 8 seconds H 12 1-14 4     (1) HGB AND HCT, BLOOD 27Oct2017 08:34AM Lisa Carpenterford     Test Name Result Flag Reference   HEMATOCRIT 35 1 % L 36 5-49 3   HEMOGLOBIN 11 2 g/dL L 12 0-17 0

## 2018-01-20 ENCOUNTER — ANTICOAG VISIT (OUTPATIENT)
Dept: CARDIOLOGY CLINIC | Facility: CLINIC | Age: 68
End: 2018-01-20

## 2018-01-22 ENCOUNTER — GENERIC CONVERSION - ENCOUNTER (OUTPATIENT)
Dept: OTHER | Facility: OTHER | Age: 68
End: 2018-01-22

## 2018-01-22 VITALS
BODY MASS INDEX: 23.46 KG/M2 | SYSTOLIC BLOOD PRESSURE: 142 MMHG | WEIGHT: 141 LBS | TEMPERATURE: 98.1 F | DIASTOLIC BLOOD PRESSURE: 62 MMHG | RESPIRATION RATE: 16 BRPM | OXYGEN SATURATION: 93 % | HEART RATE: 67 BPM

## 2018-01-22 VITALS
OXYGEN SATURATION: 91 % | BODY MASS INDEX: 22.44 KG/M2 | TEMPERATURE: 97.5 F | SYSTOLIC BLOOD PRESSURE: 118 MMHG | DIASTOLIC BLOOD PRESSURE: 58 MMHG | RESPIRATION RATE: 18 BRPM | WEIGHT: 143 LBS | HEIGHT: 67 IN | HEART RATE: 107 BPM

## 2018-01-23 VITALS
BODY MASS INDEX: 21.68 KG/M2 | DIASTOLIC BLOOD PRESSURE: 60 MMHG | SYSTOLIC BLOOD PRESSURE: 130 MMHG | HEART RATE: 117 BPM | HEIGHT: 67 IN | WEIGHT: 138.13 LBS

## 2018-01-29 ENCOUNTER — OFFICE VISIT (OUTPATIENT)
Dept: CARDIOLOGY CLINIC | Facility: CLINIC | Age: 68
End: 2018-01-29
Payer: MEDICARE

## 2018-01-29 VITALS
WEIGHT: 140.3 LBS | HEIGHT: 67 IN | DIASTOLIC BLOOD PRESSURE: 68 MMHG | HEART RATE: 113 BPM | SYSTOLIC BLOOD PRESSURE: 160 MMHG | OXYGEN SATURATION: 84 % | BODY MASS INDEX: 22.02 KG/M2

## 2018-01-29 DIAGNOSIS — I48.0 PAROXYSMAL ATRIAL FIBRILLATION (HCC): Primary | ICD-10-CM

## 2018-01-29 PROCEDURE — 99214 OFFICE O/P EST MOD 30 MIN: CPT | Performed by: INTERNAL MEDICINE

## 2018-01-29 NOTE — PROGRESS NOTES
Patient seen on May 5, 2014  He had atrial flutter in the past  He was unaware at that time  He Holter counter in November 2013 no supraventricular tachycardia noted is very severe chronic obstructive lung disease and his oxygen dependent  He also has a documented PFO  His left ear function is known to be normal  Last echocardiogram November 2013  Left heart function 50% with +2 AI  He'll have another echocardiogram November 2014  bmp in November 2013 was mihaela     Patient seen September 9, 2014  He has severe chronic obstructive lung disease and is oxygen dependent  We follow for a history of past atrial flutter  He had a Holter counter done on may of 2014 in no atrial fibrillation  He also follow him for aortic regurgitation  His echocardiogram in may of 2014  Left ear function is normal  There is no volume overload  He has +2 aortic regurgitation  This is stable  In addition, his right ventricle is normal   His cholesterol done in may of 2014 shows an LDL of 96  He is only on low dose of Lasix and potassium and he'll days and  He is not on a statin drug      Patient seen Lexy 15, 2015  We are following her for mild to moderate aortic regurgitation  Last echocardiogram was done in may of 2015 and he has mild to moderate aortic regurgitation which does not appear to be significantly progressive  Left heart function is normal   In addition he's had atrial flutter in the past  His EKGs during his recent hospitalization with severe anemia shows normal sinus rhythm with right atrial enlargement  His Holter counter was also done in may of 2015 showing 1300+ premature contractions but no evidence of atrial fibrillation or flutter  His main complaint is severe chronic obstructive lung disease and oxygen dependency  In the end addition recently had severe anemia  I suspect he has angiodysplasia  He is on Medicare of hematology and gastroenterology  He had endoscopy and colonoscopy   Apparently pending is a capsule for the small bowel  He did get iron  From our standpoint we will follow his valve      Patient seen April 12, 2016  He has aortic regurgitation, pulmonary hypertension, adequate left ventricular function last checked, severe chronic obstructive lung disease and a history of recent drop in hemoglobin hematocrit  He fortunately continues to have sinus rhythm  Franny Chahal concern about him converting into atrial fibrillation flutter  Ablation would be quite troublesome at that time if that were to occur  We will recheck his echocardiogram to guard his aortic valve and his right sided heart pressures and function  His saturation is only 87% on 3 L  He is followed closely by the pulmonary group      Patient seen January 3, 2017  He is severe chronic obstructive lung disease and he has in his old records evidence of possibly paroxysmal atrial fibrillation  He is not on any anticoagulation  He had an echocardiogram and a Holter counter prior to this visit  He shows no evidence of atrial fibrillation  His echocardiogram is normal  In particular, his right heart is normal      Patient seen July 19, 2017  The issue at this gentleman with severe chronic obstructive lung disease is frequent premature atrial contractions are always concerned he may go into atrial fibrillation  Last Holter counter shows perhaps 10 seconds of atrial fibrillation  Line we hope that he will be aware of atrial fibrillation recurs  He is still not on Coumadin  We are doing Holter counters  The question therefore is when initially put him on anticoagulation        Patient sees September 26, 2017  His primary issue is very severe chronic obstructive lung disease which is oxygen dependent  Recently in the hospital for atrial flutter was documented  2015 is sudden drop in hemoglobin  We never documented GI bleeding  I been looking for a paroxysmal atrial fibrillation but never really clearly documented   However his been documented now in the hospital   Therefore, he is a candidate for anticoagulation  4 economic reasons he wants to start Coumadin  However I still think he is a reasonable candidate to put in a loop recorder  Concerned that he may not do well with anticoagulation as I'm worried about that past history of a sudden drop in hemoglobin hematocrit  He might be a candidate for a loop recorder  In the meantime I am going to start him on Coumadin with goal INR between 2 and 2 5    This patient was seen on the January 29th  [address]  My dictated    Did not come through  I am redictating    The following day from memory  From a cardiac standpoint he is stable  There is no evidence of documented atrial fibrillation  He is running a sinus tachycardia  Major issue continues to be his pulmonary status  No changes made from a cardiac standpoint         Review of Systems        Cardiac: rhythm problems and has swelling in the     Skin: No complaints of nonhealing sores or skin rash  Genitourinary: No complaints of recurrent urinary tract infections, frequent urination at night, difficult urination, blood in urine, kidney stones, loss of bladder control, no kidney or prostate problems, no erectile dysfunction  Psychological: No complaints of feeling depressed, anxiety, panic attacks, or difficulty concentrating  Respiratory: shortness of breath  HEENT: No complaints of serious problems, hearing problems, nose problems, throat problems, or snoring  Gastrointestinal: No complaints of liver problems, nausea, vomiting, heartburn, constipation, bloody stools, diarrhea, problems swallowing, adbominal pain, or rectal bleeding  Active Problems    1  Advance directive discussed with patient (V65 49) (Z71 89)   2  Aftercare following joint replacement surgery (V54 81) (Z47 1)   3  Anxiety (300 00) (F41 9)   4  Aortic regurgitation (424 1) (I35 1)   5  Arthralgia Of The Right Pelvis/Hip/Femur (719 45)   6   Atrial flutter (427 32) (I48 92) 7  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)   8  Benign hypertension (401 1) (I10)   9  Chronic hypoxemic respiratory failure (518 83,799 02) (J96 11)   10  Chronic obstructive pulmonary disease (496) (J44 9)   11  Colon polyp (211 3) (K63 5)   12  Emphysema (492 8) (J43 9)   13  History of gastrointestinal hemorrhage (V12 79) (Z87 19)   14  History of Helicobacter infection (V12 09) (Z86 19)   15  Hypokalemia (276 8) (E87 6)   16  Hypoxia (799 02) (R09 02)   17  Iron (Fe) deficiency anemia (280 9) (D50 9)   18  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)   19  MGUS (monoclonal gammopathy of unknown significance) (273 1) (D47 2)   20  Need for influenza vaccination (V04 81) (Z23)   21  Need for prophylactic vaccination and inoculation against influenza (V04 81) (Z23)   22  Need for vaccination with 13-polyvalent pneumococcal conjugate vaccine (V03 82) (Z23)   23  Paroxysmal atrial fibrillation (427 31) (I48 0)   24  Patent foramen ovale (745 5) (Q21 1)   25  Pre-operative cardiovascular examination (V72 81) (Z01 810)   26  Primary localized osteoarthrosis of the hip, unspecified laterality (715 15) (M16 10)   27  Pulmonary artery hypertension (416 8) (I27 2)   28  Rupture Of The Proximal Bicipital Tendon Of The Left Arm (727 62)   29  Screening for genitourinary condition (V81 6) (Z13 89)   30   Tachycardia (785 0) (R00 0)     Past Medical History    · History of Acute and chronic respiratory failure (518 84) (J96 20)   · Aftercare following joint replacement surgery (V54 81) (Z47 1)   · History of Atrial flutter (427 32) (I48 92)   · History of Deep venous thrombosis of distal lower extremity (453 42) (I82 4Z9)   · History of acute renal failure (V13 09) (Z87 448)   · History of diverticulitis of colon (V12 79) (Z87 19)   · History of edema (V13 89) (O71 064)   · History of gastrointestinal hemorrhage (V12 79) (Z87 19)   · History of Helicobacter infection (V12 09) (Z86 19)   · History of Sinus Tachycardia (427 89)   · History of Tracheobronchitis (490) (J40)     Surgical History    · History of Appendectomy   · History of Colon Surgery   · History of Colon Surgery   · History of Hernia Repair   · History of Hernia Repair     Family History    · Family history of Cancer   · Family history of Smoking Cigarettes    · Family history of Acute Myocardial Infarction (V17 3)   · Family history of Sudden/Instantaneous Cardiac Death (V17 41)    · Family history of Arthritis (V17 7)   · Family history of Diabetes Mellitus (V18 0)     Social History    · Denied: History of Alcohol Use (History)   · Daily Coffee Consumption (1  Cups/Day)   · Former smoker (V15 82) (Z87 891)   · Marital History - Currently   The social history was reviewed and is unchanged  Current Meds   1  Albuterol Sulfate (2 5 MG/3ML) 0 083% Inhalation Nebulization Solution; USE 1 UNIT   DOSE IN NEBULIZER EVERY 6 HOURS AS NEEDED; Therapy: 86KLZ8969 to (Evaluate:63Jlx2689)  Requested for: 20UWN9317; Last   Rx:36Qiy3014 Ordered   2  Breo Ellipta 100-25 MCG/INH Inhalation Aerosol Powder Breath Activated; Take one   inhalation once a day  Rinse her mouth with water after each use; Therapy: 86HNW4678 to (Caroline Medina)  Requested for: 24IMH5497; Last   Rx:64Kge1466 Ordered   3  Cartia  MG Oral Capsule Extended Release 24 Hour; TAKE 1 CAPSULE BY   MOUTH EVERY DAY; Therapy: 83JNH8966 to ([facility])  Requested for: 38Omc6775; Last   Rx:25Yqn3742 Ordered   4  Citalopram Hydrobromide 20 MG Oral Tablet; TAKE 1 TABLET DAILY  Requested for:   68Ytz2805; Last Rx:68Ema1740 Ordered   5  Ferrous Sulfate 325 (65 Fe) MG Oral Tablet; Take 1 tablet daily; Last Rx:99Bxr5304   Ordered   6  Folic Acid 1 MG Oral Tablet; take 1 tablet by mouth every day; Therapy: 90BAW0161 to (Evaluate:09Oad5333)  Requested for: 27Hne0360; Last   Rx:40Ehq2407 Ordered   7   Furosemide 20 MG Oral Tablet; take 1/2 tablet daily  Requested for: 15Mbv8241; Last Rx: 70WGY6097 Ordered   8  Potassium Chloride Fernanda ER 20 MEQ Oral Tablet Extended Release; take 1 tablet by   mouth every day; Therapy: 77EMQ0473 to ([place])  Requested for: 96ZHD3051; Last   Rx:09Jan2017 Ordered   9  Spiriva Respimat 2 5 MCG/ACT Inhalation Aerosol Solution; TAKE 2 INHALATIONS DAILY; Therapy: 98IXD2265 to (Last Rx:19Apr2017)  Requested for: 19Apr2017 Ordered     The medication list was reviewed and updated today  Allergies    1  Penicillins     Vitals    Recorded: 93NRX6726 01:34PM   Heart Rate 750   Systolic 527, LUE, Sitting   Diastolic 50, LUE, Sitting   Height 5 ft 7 in   Weight 143 lb 1 oz   BMI Calculated 22 41   BSA Calculated 1 75   O2 Saturation 89, Nasal Cannula   FiO2 4L/min, Nasal Cannula      Physical Exam     Constitutional   General appearance: Abnormal   On oxygen  Pulmonary   Respiratory effort: Abnormal     Auscultation of lungs: Abnormal   Requires oxygen as well  Cardiovascular   Auscultation of heart: Abnormal   Distant also irregular but EKG shows premature atrial contractions are frequent  Examination of extremities for edema and/or varicosities: Normal     Chest - Chest: Abnormal    Musculoskeletal Gait and station: Normal gait  Psychiatric - Orientation to person, place, and time: Normal  Mood and affect: Normal       Assessment    1  Atrial flutter (427 32) (I48 92)   2  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)   3  Benign hypertension (401 1) (I10)   4  Paroxysmal atrial fibrillation (427 31) (I48 0)     Plan  Paroxysmal atrial fibrillation    · (1) CBC/ PLT (NO DIFF); Status:Active; Requested RPF:[id number];    Perform:[facility]; AMD:54EON2069; Ordered; For:Paroxysmal atrial fibrillation; Ordered By:Aubrey Sandhu;   · (1) FERRITIN; Status:Active; Requested SQJ:[id number];    Perform:[facility]; VTT:54OGJ5708; Ordered;   For:Paroxysmal atrial fibrillation; Ordered By:Aubrey Sandhu;   · (1) IRON; Status:Active; Requested [facility]:36QRF6234;    Perform:[facility]; SNEHAL:42QVO7104; Ordered; For:Paroxysmal atrial fibrillation; Ordered By:Aubrey Sandhu;   · Follow Up After Tests Complete Evaluation and Treatment  Follow-up  Status: Hold For -  Scheduling  Requested for: 40PEU9347   Ordered; For: Paroxysmal atrial fibrillation; Ordered By: Messi Nina Performed:  Due: 09CGQ5950   · *1 - [facility]  *  Status: Hold For -  Scheduling  Requested for: 69RGP5671   Ordered; For: Paroxysmal atrial fibrillation; Ordered By: Messi Nina Performed:  Due: 32TGH0975  () Care Summary provided  : Yes     Discussion/Summary     The issue in this gentleman from our standpoint he is following his aortic regurgitation  Last echo was may of 2014+2 aortic regurgitation and no volume overload of the left heart  His last Holter was in may of 2014  He continues to have shown no evidence of paroxysmal atrial fibrillation  His LDL is 96  We will see him in one year after a Holter and echo  If he has any palpitations he'll report to us sooner     Patient seen on Lexy 15, 2015  Recent Holter counter as stated in history of present illness does not show any atrial fibrillation  In addition, recent hospitalization for severe anemia also did not show any evidence of atrial fibrillation and electrocardiograms I was able to obtain  His echocardiogram shows mild to moderate aortic urtication  He is under the care of hematology and gastroenterology for his severe anemia and presumed gastrointestinal bleeding  Suspicion of course is angiodysplasia     Patient seen April 12, 2016  Is in a sinus tachycardia  His right atrial enlargement probable right ventricular enlargement  We will check an echo in the fall for his aortic valve and right side of his heart  As noted in my history of present illness   Her, he will be a problem with fever requires anticoagulation because of a significant drop in hemoglobin in year 2015  I suspect he has angiodysplasia     Issues seen January 3, 2017  Holter and echo are essentially normal  There were done April 2016  Right heart is also is normal  CBC and differential are now normal normal  He is not on anticoagulation  He is a remote history of atrial fibrillation  Is not well documented     Patient seen July 19, 2017  The issue is following this gentleman with severe chronic obstructive lung disease who does have 6400 PACs as to whether or not he is developing atrial fibrillation and require long-term anticoagulation  He is aware that if he notices any change in his exercise capacity and or palpitations for prolonged period of time that he should see us immediately to consider long-term anticoagulation     Patient seen September 26, 2017  In excess or patient's chronic frontal lung disease was admitted to the hospital  They did a documented atrial flutter  I'm going to start him on Coumadin and keep his INR between 2 and 2 5  I'm concerned about his hemoglobin dropped 2015  We never documented GI bleed  However I'm concerned about it  Perhaps loop recorder will help with his care  He is a lot of atrial irritability  I'm and wondering when and if we would document the atrial fibrillation or flutter        Future Appointments     Signatures   Electronically signed by : JENNIFER Torres ; Sep 26 2017  1:57PM EST                       (Author)            Electronically signed by Karishma Li MD at 1/13/2018  8:11 AM

## 2018-02-06 ENCOUNTER — LAB REQUISITION (OUTPATIENT)
Dept: LAB | Facility: HOSPITAL | Age: 68
End: 2018-02-06
Payer: MEDICARE

## 2018-02-06 DIAGNOSIS — D64.9 ANEMIA: ICD-10-CM

## 2018-02-06 LAB
BASOPHILS # BLD AUTO: 0 THOUSANDS/ΜL (ref 0–0.1)
BASOPHILS NFR BLD AUTO: 0 % (ref 0–1)
EOSINOPHIL # BLD AUTO: 0.04 THOUSAND/ΜL (ref 0–0.61)
EOSINOPHIL NFR BLD AUTO: 1 % (ref 0–6)
ERYTHROCYTE [DISTWIDTH] IN BLOOD BY AUTOMATED COUNT: 14.9 % (ref 11.6–15.1)
FERRITIN SERPL-MCNC: 30 NG/ML (ref 8–388)
HCT VFR BLD AUTO: 35.3 % (ref 36.5–49.3)
HGB BLD-MCNC: 10.9 G/DL (ref 12–17)
LYMPHOCYTES # BLD AUTO: 1.14 THOUSANDS/ΜL (ref 0.6–4.47)
LYMPHOCYTES NFR BLD AUTO: 31 % (ref 14–44)
MCH RBC QN AUTO: 29.1 PG (ref 26.8–34.3)
MCHC RBC AUTO-ENTMCNC: 30.9 G/DL (ref 31.4–37.4)
MCV RBC AUTO: 94 FL (ref 82–98)
MONOCYTES # BLD AUTO: 0.16 THOUSAND/ΜL (ref 0.17–1.22)
MONOCYTES NFR BLD AUTO: 4 % (ref 4–12)
NEUTROPHILS # BLD AUTO: 2.38 THOUSANDS/ΜL (ref 1.85–7.62)
NEUTS SEG NFR BLD AUTO: 64 % (ref 43–75)
NRBC BLD AUTO-RTO: 0 /100 WBCS
PLATELET # BLD AUTO: 252 THOUSANDS/UL (ref 149–390)
PMV BLD AUTO: 9.1 FL (ref 8.9–12.7)
RBC # BLD AUTO: 3.74 MILLION/UL (ref 3.88–5.62)
WBC # BLD AUTO: 3.74 THOUSAND/UL (ref 4.31–10.16)

## 2018-02-06 PROCEDURE — 85025 COMPLETE CBC W/AUTO DIFF WBC: CPT | Performed by: INTERNAL MEDICINE

## 2018-02-06 PROCEDURE — 82728 ASSAY OF FERRITIN: CPT | Performed by: INTERNAL MEDICINE

## 2018-02-07 ENCOUNTER — ANTICOAG VISIT (OUTPATIENT)
Dept: CARDIOLOGY CLINIC | Facility: CLINIC | Age: 68
End: 2018-02-07

## 2018-02-08 ENCOUNTER — OFFICE VISIT (OUTPATIENT)
Dept: CARDIOLOGY CLINIC | Facility: CLINIC | Age: 68
End: 2018-02-08
Payer: MEDICARE

## 2018-02-08 DIAGNOSIS — I48.91 ATRIAL FIBRILLATION, UNSPECIFIED TYPE (HCC): Primary | ICD-10-CM

## 2018-02-09 DIAGNOSIS — D50.9 IRON DEFICIENCY ANEMIA: ICD-10-CM

## 2018-02-11 ENCOUNTER — HOSPITAL ENCOUNTER (OUTPATIENT)
Dept: NON INVASIVE DIAGNOSTICS | Facility: HOSPITAL | Age: 68
Discharge: HOME | End: 2018-02-11
Payer: MEDICARE

## 2018-02-11 DIAGNOSIS — I48.0 PAROXYSMAL ATRIAL FIBRILLATION (HCC): ICD-10-CM

## 2018-02-11 PROCEDURE — 93225 XTRNL ECG REC<48 HRS REC: CPT

## 2018-02-11 PROCEDURE — 93226 XTRNL ECG REC<48 HR SCAN A/R: CPT

## 2018-02-12 PROCEDURE — 93224 XTRNL ECG REC UP TO 48 HRS: CPT | Performed by: INTERNAL MEDICINE

## 2018-02-13 DIAGNOSIS — E87.6 HYPOKALEMIA: Primary | ICD-10-CM

## 2018-02-13 RX ORDER — POTASSIUM CHLORIDE 20 MEQ/1
TABLET, EXTENDED RELEASE ORAL
Qty: 30 TABLET | Refills: 5 | Status: SHIPPED | OUTPATIENT
Start: 2018-02-13 | End: 2018-09-09 | Stop reason: SDUPTHER

## 2018-02-20 DIAGNOSIS — J44.9 CHRONIC OBSTRUCTIVE PULMONARY DISEASE, UNSPECIFIED COPD TYPE (HCC): Primary | ICD-10-CM

## 2018-02-20 RX ORDER — FLUTICASONE FUROATE AND VILANTEROL TRIFENATATE 100; 25 UG/1; UG/1
POWDER RESPIRATORY (INHALATION)
Qty: 1 EACH | Refills: 5 | Status: SHIPPED | OUTPATIENT
Start: 2018-02-20 | End: 2018-02-21 | Stop reason: SDUPTHER

## 2018-02-20 RX ORDER — ALBUTEROL SULFATE 90 UG/1
2 AEROSOL, METERED RESPIRATORY (INHALATION) EVERY 4 HOURS PRN
Qty: 1 INHALER | Refills: 0 | Status: SHIPPED | OUTPATIENT
Start: 2018-02-20 | End: 2018-08-02 | Stop reason: SDUPTHER

## 2018-02-20 RX ORDER — PREDNISONE 20 MG/1
40 TABLET ORAL DAILY
Qty: 10 TABLET | Refills: 0 | Status: SHIPPED | OUTPATIENT
Start: 2018-02-20 | End: 2018-02-25

## 2018-02-20 NOTE — PROGRESS NOTES
Received a call from Baldemar's visiting nurse regarding increased BURTON and mild SOB at rest above baseline  Dread Aleman saw Emory Safia today and he was visibly SOB after walking to the door to open it for her  He chronically wears oxygen at 4L NC and his saturations were slightly low upon ambulation, but quickly recovered once seated  His breath sounds are diminished without adventitious sounds per her report  He does not have LE swelling or weight gain  He does not have a productive cough above baseline  He does not have any other symptoms at present per their report  He is using his Spiriva and Breo, but does not have a rescue inhaler or nebulizer  He completed steroids a few weeks ago after a hospitalization  He also completed pulmonary rehab in the past and has been doing exercises at home  I have refilled his Ventolin rescue inhaler today and also prescribed Prednisone 40 mg PO daily x 5 days total for him to take  His [facility] RN will convey this to him  She is aware that they should call with any further questions or concerns      GERA Martinez

## 2018-02-21 DIAGNOSIS — J44.9 CHRONIC OBSTRUCTIVE PULMONARY DISEASE, UNSPECIFIED COPD TYPE (HCC): ICD-10-CM

## 2018-02-21 RX ORDER — FLUTICASONE FUROATE AND VILANTEROL TRIFENATATE 100; 25 UG/1; UG/1
POWDER RESPIRATORY (INHALATION)
Qty: 1 EACH | Refills: 5 | Status: SHIPPED | OUTPATIENT
Start: 2018-02-21 | End: 2019-03-06 | Stop reason: SDUPTHER

## 2018-02-22 DIAGNOSIS — J44.9 CHRONIC OBSTRUCTIVE PULMONARY DISEASE, UNSPECIFIED COPD TYPE (HCC): ICD-10-CM

## 2018-02-22 RX ORDER — FLUTICASONE FUROATE AND VILANTEROL TRIFENATATE 100; 25 UG/1; UG/1
POWDER RESPIRATORY (INHALATION)
Refills: 5 | OUTPATIENT
Start: 2018-02-22

## 2018-02-23 DIAGNOSIS — J44.9 CHRONIC OBSTRUCTIVE PULMONARY DISEASE, UNSPECIFIED COPD TYPE (HCC): ICD-10-CM

## 2018-02-23 RX ORDER — FLUTICASONE FUROATE AND VILANTEROL TRIFENATATE 100; 25 UG/1; UG/1
POWDER RESPIRATORY (INHALATION)
Refills: 5 | OUTPATIENT
Start: 2018-02-23

## 2018-02-26 DIAGNOSIS — F32.A DEPRESSION, UNSPECIFIED DEPRESSION TYPE: Primary | ICD-10-CM

## 2018-02-26 RX ORDER — CITALOPRAM 20 MG/1
TABLET ORAL
Qty: 90 TABLET | Refills: 3 | Status: SHIPPED | OUTPATIENT
Start: 2018-02-26 | End: 2018-04-18 | Stop reason: SDUPTHER

## 2018-03-06 ENCOUNTER — LAB REQUISITION (OUTPATIENT)
Dept: LAB | Facility: HOSPITAL | Age: 68
End: 2018-03-06
Payer: MEDICARE

## 2018-03-06 ENCOUNTER — TELEPHONE (OUTPATIENT)
Dept: HEMATOLOGY ONCOLOGY | Facility: CLINIC | Age: 68
End: 2018-03-06

## 2018-03-06 DIAGNOSIS — J96.10 CHRONIC RESPIRATORY FAILURE (HCC): ICD-10-CM

## 2018-03-06 DIAGNOSIS — I47.9 PAROXYSMAL TACHYCARDIA (HCC): ICD-10-CM

## 2018-03-06 DIAGNOSIS — J43.9 PULMONARY EMPHYSEMA (HCC): ICD-10-CM

## 2018-03-06 LAB
BASOPHILS # BLD AUTO: 0.01 THOUSANDS/ΜL (ref 0–0.1)
BASOPHILS NFR BLD AUTO: 0 % (ref 0–1)
EOSINOPHIL # BLD AUTO: 0.03 THOUSAND/ΜL (ref 0–0.61)
EOSINOPHIL NFR BLD AUTO: 1 % (ref 0–6)
ERYTHROCYTE [DISTWIDTH] IN BLOOD BY AUTOMATED COUNT: 15.1 % (ref 11.6–15.1)
FERRITIN SERPL-MCNC: 40 NG/ML (ref 8–388)
HCT VFR BLD AUTO: 36.9 % (ref 36.5–49.3)
HGB BLD-MCNC: 11.7 G/DL (ref 12–17)
LYMPHOCYTES # BLD AUTO: 1.08 THOUSANDS/ΜL (ref 0.6–4.47)
LYMPHOCYTES NFR BLD AUTO: 26 % (ref 14–44)
MCH RBC QN AUTO: 30.2 PG (ref 26.8–34.3)
MCHC RBC AUTO-ENTMCNC: 31.7 G/DL (ref 31.4–37.4)
MCV RBC AUTO: 95 FL (ref 82–98)
MONOCYTES # BLD AUTO: 0.18 THOUSAND/ΜL (ref 0.17–1.22)
MONOCYTES NFR BLD AUTO: 4 % (ref 4–12)
NEUTROPHILS # BLD AUTO: 2.86 THOUSANDS/ΜL (ref 1.85–7.62)
NEUTS SEG NFR BLD AUTO: 69 % (ref 43–75)
NRBC BLD AUTO-RTO: 0 /100 WBCS
PLATELET # BLD AUTO: 206 THOUSANDS/UL (ref 149–390)
PMV BLD AUTO: 10.1 FL (ref 8.9–12.7)
RBC # BLD AUTO: 3.88 MILLION/UL (ref 3.88–5.62)
WBC # BLD AUTO: 4.19 THOUSAND/UL (ref 4.31–10.16)

## 2018-03-06 PROCEDURE — 85025 COMPLETE CBC W/AUTO DIFF WBC: CPT | Performed by: INTERNAL MEDICINE

## 2018-03-06 PROCEDURE — 82728 ASSAY OF FERRITIN: CPT | Performed by: INTERNAL MEDICINE

## 2018-03-06 NOTE — TELEPHONE ENCOUNTER
Called and spoke with Avani HAY, regarding the patient's reoccurring CBC and ferritin blood work  Patient's CBC and ferritin levels are to be drawn Q4 weeks  Hillary Toscano will be drawing the patient's blood work in another 2 weeks, and make the patient aware that he will need transportation to the lab

## 2018-03-06 NOTE — TELEPHONE ENCOUNTER
[facility] #[phone] needs lab slip for recurring labs every 4 weeks they are D/C pt and are requesting a call from you

## 2018-03-07 NOTE — MISCELLANEOUS
History of Present Illness  COPD Subsequent Hospital Discharge Phone Calls: The patient is being contacted for continued follow-up after hospitalization  The patient was discharged from the hospital on 17  I spoke with patient  Patient was identified as medium risk for readmission per risk stratification  The patient was discharged to home  The patient was seen by his PCP on   The patient was seen by his Pulmonologist on 10/4/17  The patient is a former smoker with a pack year history  The patient quit smoking in   mMRC Scale Rating:   III -- Stop for breath after walking about 100 yrds or after a few minutes on level ground  Counseling and topics reviewed:  maintenance inhaler and frequency are Spiriva, Breo, rescue inhaler and frequency are , Duoneb and importance of continued adherence to medication regimen and physician follow-up  Narrative summary:  Patient was contacted as hospital follow-up, second call  He is doing well and back to baseline breathing  He uses O2 at 4 lpm ATC  We reviewed his respiratory medication and follow-up appts  No problems at this time  Current Meds    1  Citalopram Hydrobromide 20 MG Oral Tablet; TAKE 1 TABLET DAILY  Requested for:   2017; Last Rx:2017 Ordered    2  Warfarin Sodium 2 5 MG Oral Tablet; Take 1 to 2 tablets daily by mouth or as directed by   physician; Therapy: 31NQM5958 to ([id number])  Requested for: 13YLN6195; Last   Rx:10Duc6601 Ordered    3  Albuterol Sulfate (2 5 MG/3ML) 0 083% Inhalation Nebulization Solution; USE 1 UNIT   DOSE IN NEBULIZER EVERY 6 HOURS AS NEEDED; Therapy: 45MLA8541 to (Evaluate:24Xze6000)  Requested for: 92MCQ9655; Last   Rx:32Ttk4264 Ordered   4  Breo Ellipta 100-25 MCG/INH Inhalation Aerosol Powder Breath Activated; Take one   inhalation once a day  Rinse her mouth with water after each use;    Therapy: 34DAA6237 to (Devon Morton)  Requested for: 08XOB6370; Last OI:22WXA5346 Ordered   5  Spiriva Respimat 2 5 MCG/ACT Inhalation Aerosol Solution; TAKE 2 INHALATIONS DAILY; Therapy: 04NOC2764 to (Last Rx:92Vim7379)  Requested for: 16Cyi0901 Ordered    6  Furosemide 20 MG Oral Tablet; take 1/2 tablet daily  Requested for: 07Rhp7652; Last   Rx:42Vfi9600 Ordered    7  Ferrous Sulfate 325 (65 Fe) MG Oral Tablet; Take 1 tablet daily; Last Rx:95Nvn7563   Ordered    8  Potassium Chloride Fernanda ER 20 MEQ Oral Tablet Extended Release; take 1 tablet by   mouth every day; Therapy: 75IWF9477 to (Fatuma Serna)  Requested for: 46UZH3666; Last   Rx:09Jan2017 Ordered    9  Folic Acid 1 MG Oral Tablet; take 1 tablet by mouth every day; Therapy: 97YNY0304 to (Evaluate:01Zdu1417)  Requested for: 20Lce8590; Last   Rx:13Qvr8873 Ordered    10  Cartia  MG Oral Capsule Extended Release 24 Hour; TAKE 1 CAPSULE BY    MOUTH EVERY DAY; Therapy: 75WBA8932 to (Evaluate:26Mar2018)  Requested for: 38LXK4238; Last    Rx:37Ejs5089 Ordered    Allergies    1  Penicillins    Future Appointments    Date/Time Provider Specialty Site   10/16/2017 08:45 AM Ever Davis MD Hematology Oncology [facility] MEDICAL ONCOLOGY   01/16/2018 08:30 AM JENNIFER Gomez  Internal Medicine [facility]   11/06/2017 01:40 PM Fahad Ferguson DO Cardiology [facility]   10/04/2017 08:30 AM Jennifer Herrera MD Pulmonary Medicine [facility] PULMONARY [facility]     Signatures   Electronically signed by :  Julian Garcia RT; Sep 27 2017  4:37PM EST                       (Author)

## 2018-03-07 NOTE — PROGRESS NOTES
Message  Pulmonary PALS Program Enrollment Notification: This patient is enrolled in Pulmonary PALS  Pulmonologist name: Isela Major Current Pulmonary PALS RN is: Gage Araujo [phone]   Action Plan sent to Dr Isela Major today for completion, verification        Signatures   Electronically signed by : Alana Sky, ; Sep 29 2017  1:22PM EST                       (Author)

## 2018-03-12 DIAGNOSIS — D64.9 ANEMIA, UNSPECIFIED TYPE: Primary | ICD-10-CM

## 2018-03-12 RX ORDER — FOLIC ACID 1 MG/1
TABLET ORAL
Qty: 30 TABLET | Refills: 0 | Status: SHIPPED | OUTPATIENT
Start: 2018-03-12 | End: 2018-03-25 | Stop reason: SDUPTHER

## 2018-03-19 ENCOUNTER — OFFICE VISIT (OUTPATIENT)
Dept: HEMATOLOGY ONCOLOGY | Facility: CLINIC | Age: 68
End: 2018-03-19
Payer: MEDICARE

## 2018-03-19 VITALS
HEART RATE: 105 BPM | WEIGHT: 143.6 LBS | SYSTOLIC BLOOD PRESSURE: 128 MMHG | BODY MASS INDEX: 22.54 KG/M2 | RESPIRATION RATE: 17 BRPM | OXYGEN SATURATION: 90 % | DIASTOLIC BLOOD PRESSURE: 56 MMHG | HEIGHT: 67 IN | TEMPERATURE: 97 F

## 2018-03-19 DIAGNOSIS — D50.0 IRON DEFICIENCY ANEMIA DUE TO CHRONIC BLOOD LOSS: Primary | ICD-10-CM

## 2018-03-19 PROCEDURE — 99213 OFFICE O/P EST LOW 20 MIN: CPT | Performed by: INTERNAL MEDICINE

## 2018-03-19 RX ORDER — DILTIAZEM HYDROCHLORIDE 180 MG/1
1 CAPSULE, COATED, EXTENDED RELEASE ORAL DAILY
COMMUNITY
Start: 2017-04-03 | End: 2018-06-11 | Stop reason: SDUPTHER

## 2018-03-19 NOTE — PATIENT INSTRUCTIONS
Please continue be due 1 iron tablet twice a day  Blood counts and ferritin every 2 months and visit in 6 months

## 2018-03-19 NOTE — LETTER
March 19, 2018     Soren Mayberry, [address] [facility]  [address] [facility] [address]    Patient: Kelin Pulido   YOB: 1950   Date of Visit: 3/19/2018       Dear Dr Bird Cerda: Thank you for referring Nikki Quach to me for evaluation  Below are my notes for this consultation  If you have questions, please do not hesitate to call me  I look forward to following your patient along with you  Sincerely,        Chani Alvares MD        CC: No Recipients  Chani Alvares MD  3/19/2018  1:52 PM  Sign at close encounter    HPI:  Patient is here with his wife  He is in a wheelchair and he is on nasal oxygen  He is using oxygen 24/7  He walks slowly at home, independently  He is being treated for iron deficiency anemia and presently he is taking oral iron tablet twice a day  Hemoglobin has come up to 11 7 and ferritin 40  He has history of Hemoccult-positive stool  He had GI evaluation  He had blood transfusions, Venofer and oral iron  Hemoglobin had come up to 12  4  Normal hemoglobin is down to 9 9  Stool color is brown  Hemoccult stool test was negative the last time  Patient had 2 admissions in the hospital in September 2017 for COPD with exacerbation  He was admitted again in November 2017 for COPD with exacerbation  Also he runs high blood sugar  No history of diabetes mellitus  He is working with his primary physician  His cardiologist as stopped Coumadin  He follows with lung specialist for COPD  He has less shortness of breath He has minimal nonproductive cough and dyspnea  Has some tiredness  Had MGUS previously  Last serum protein electrophoresis did not show monoclonal spike  Current Outpatient Prescriptions:     albuterol (PROVENTIL HFA) 90 mcg/act inhaler, Inhale 2 puffs every 4 (four) hours as needed for wheezing or shortness of breath, Disp: 1 Inhaler, Rfl: 0    BREO ELLIPTA, INHALE 1 PUFF BY MOUTH EVERY DAY   RINSE MOUTH WITH WATER AFTER EACH USE, Disp: 1 each, Rfl: 5   citalopram (CeleXA) 20 mg tablet, TAKE 1 TABLET BY MOUTH EVERY DAY, Disp: 90 tablet, Rfl: 3    dextromethorphan-guaiFENesin (ROBITUSSIN DM)  mg/5 mL syrup, Take 10 mL by mouth every 4 (four) hours as needed for cough, Disp: 118 mL, Rfl: 0    diltiazem (CARTIA XT) 180 mg 24 hr capsule, Take 1 capsule by mouth daily, Disp: , Rfl:     diltiazem (DILTIAZEM CD) 180 mg 24 hr capsule, Take 180 mg by mouth daily, Disp: , Rfl:     ferrous sulfate 325 (65 Fe) mg tablet, Take 325 mg by mouth daily, Disp: , Rfl:     Fluticasone Furoate-Vilanterol (BREO ELLIPTA IN), Inhale 1 puff daily, Disp: , Rfl:     folic acid (FOLVITE) 1 mg tablet, TAKE 1 TABLET BY MOUTH EVERY DAY, Disp: 30 tablet, Rfl: 0    furosemide (LASIX) 20 mg tablet, Take 1 tablet by mouth daily, Disp: 30 tablet, Rfl: 0    melatonin 3 mg, Take 2 tablets by mouth daily at bedtime as needed (sleep), Disp: 30 tablet, Rfl: 0    potassium chloride (K-DUR,KLOR-CON) 20 mEq tablet, TAKE 1 TABLET BY MOUTH EVERY DAY, Disp: 30 tablet, Rfl: 5    Potassium Chloride (KLOR-CON 10 PO), Take 10 mEq by mouth daily, Disp: , Rfl:     tiotropium (SPIRIVA HANDIHALER) 18 mcg inhalation capsule, Place 1 puff into inhaler and inhale daily, Disp: , Rfl:     Allergies   Allergen Reactions    Penicillins Anaphylaxis       ROS:  03/19/18 Reviewed 13 systems:  Presently no headaches, seizures, dizziness, diplopia, dysphagia, hoarseness, chest pain, palpitations,  hemoptysis, abdominal pain, nausea, vomiting, change in bowel habits, melena, hematuria, fever, chills, bleeding, bone pains, skin rash, weight loss,  numbness,  weakness, claudication and gait problem  No frequent infections  No hot flashes  Not unusually sensitive to heat or cold  No swelling of the ankles  No swollen glands  Patient is anxious   Other symptoms are in HPI        /56 (BP Location: Left arm, Cuff Size: Adult)   Pulse 105   Temp (!) 97 °F (36 1 °C) (Tympanic)   Resp 17   Ht 5' 6 5" (1 689 m) Wt 65 1 kg (143 lb 9 6 oz)   SpO2 90%   BMI 22 83 kg/m²      Physical Exam:  On nasal oxygen  Alert, oriented, not in distress, no icterus, no oral thrush, no palpable neck mass, clear lung fields but prolonged expiration, regular heart rate, abdomen  soft and non tender, no palpable abdominal mass, no ascites, no edema of ankles, no calf tenderness, no focal neurological deficit, no skin rash, no palpable lymphadenopathy, good arterial pulses, no clubbing  Patient is anxious  Performance status 3  IMAGING:  No orders to display       LABS:  Results for orders placed or performed in visit on 03/06/18   CBC and differential   Result Value Ref Range    WBC 4 19 (L) 4 31 - 10 16 Thousand/uL    RBC 3 88 3 88 - 5 62 Million/uL    Hemoglobin 11 7 (L) 12 0 - 17 0 g/dL    Hematocrit 36 9 36 5 - 49 3 %    MCV 95 82 - 98 fL    MCH 30 2 26 8 - 34 3 pg    MCHC 31 7 31 4 - 37 4 g/dL    RDW 15 1 11 6 - 15 1 %    MPV 10 1 8 9 - 12 7 fL    Platelets 011 418 - 440 Thousands/uL    nRBC 0 /100 WBCs    Neutrophils Relative 69 43 - 75 %    Lymphocytes Relative 26 14 - 44 %    Monocytes Relative 4 4 - 12 %    Eosinophils Relative 1 0 - 6 %    Basophils Relative 0 0 - 1 %    Neutrophils Absolute 2 86 1 85 - 7 62 Thousands/µL    Lymphocytes Absolute 1 08 0 60 - 4 47 Thousands/µL    Monocytes Absolute 0 18 0 17 - 1 22 Thousand/µL    Eosinophils Absolute 0 03 0 00 - 0 61 Thousand/µL    Basophils Absolute 0 01 0 00 - 0 10 Thousands/µL   Ferritin   Result Value Ref Range    Ferritin 40 8 - 388 ng/mL         Reviewed and discussed with patient  Assessment and plan:  Patient is here with his wife  He is in a wheelchair and he is on nasal oxygen  He is using oxygen 24/7  He walks slowly at home, independently  He is being treated for iron deficiency anemia and presently he is taking oral iron tablet twice a day  Hemoglobin has come up to 11 7 and ferritin 40  He has history of Hemoccult-positive stool  He had GI evaluation  He had blood transfusions, Venofer and oral iron  Hemoglobin had come up to 12  4  Normal hemoglobin is down to 9 9  Stool color is brown  Hemoccult stool test was negative the last time  Patient had 2 admissions in the hospital in September 2017 for COPD with exacerbation  He was admitted again in November 2017 for COPD with exacerbation  Also he runs high blood sugar  No history of diabetes mellitus  He is working with his primary physician  His cardiologist as stopped Coumadin  He follows with lung specialist for COPD  He has less shortness of breath He has minimal nonproductive cough and dyspnea  Has some tiredness  Had MGUS previously  Last serum protein electrophoresis did not show monoclonal spike  Valarie Arrieta Physical examination and test results are as recorded and discussed  Hematologically stable  He is being continued on 1 iron tablet twice a day  Blood counts will be checked once every 2 months and visit in 6 months  Condition and plan discussed  Questions answered  Any problem related to our speciality he will call sooner  Patient voiced understanding and agreement in the discussion  Counseling / Coordination of Care   Greater than 50% of total time was spent with the patient and / or family counseling and / or coordination of care

## 2018-03-19 NOTE — PROGRESS NOTES
HPI:  Patient is here with his wife  He is in a wheelchair and he is on nasal oxygen  He is using oxygen 24/7  He walks slowly at home, independently  He is being treated for iron deficiency anemia and presently he is taking oral iron tablet twice a day  Hemoglobin has come up to 11 7 and ferritin 40  He has history of Hemoccult-positive stool  He had GI evaluation  He had blood transfusions, Venofer and oral iron  Hemoglobin had come up to 12  4  Normal hemoglobin is down to 9 9  Stool color is brown  Hemoccult stool test was negative the last time  Patient had 2 admissions in the hospital in September 2017 for COPD with exacerbation  He was admitted again in November 2017 for COPD with exacerbation  Also he runs high blood sugar  No history of diabetes mellitus  He is working with his primary physician  His cardiologist as stopped Coumadin  He follows with lung specialist for COPD  He has less shortness of breath He has minimal nonproductive cough and dyspnea  Has some tiredness  Had MGUS previously  Last serum protein electrophoresis did not show monoclonal spike  Current Outpatient Prescriptions:     albuterol (PROVENTIL HFA) 90 mcg/act inhaler, Inhale 2 puffs every 4 (four) hours as needed for wheezing or shortness of breath, Disp: 1 Inhaler, Rfl: 0    BREO ELLIPTA, INHALE 1 PUFF BY MOUTH EVERY DAY   RINSE MOUTH WITH WATER AFTER EACH USE, Disp: 1 each, Rfl: 5    citalopram (CeleXA) 20 mg tablet, TAKE 1 TABLET BY MOUTH EVERY DAY, Disp: 90 tablet, Rfl: 3    dextromethorphan-guaiFENesin (ROBITUSSIN DM)  mg/5 mL syrup, Take 10 mL by mouth every 4 (four) hours as needed for cough, Disp: 118 mL, Rfl: 0    diltiazem (CARTIA XT) 180 mg 24 hr capsule, Take 1 capsule by mouth daily, Disp: , Rfl:     diltiazem (DILTIAZEM CD) 180 mg 24 hr capsule, Take 180 mg by mouth daily, Disp: , Rfl:     ferrous sulfate 325 (65 Fe) mg tablet, Take 325 mg by mouth daily, Disp: , Rfl:     Fluticasone Furoate-Vilanterol (BREO ELLIPTA IN), Inhale 1 puff daily, Disp: , Rfl:     folic acid (FOLVITE) 1 mg tablet, TAKE 1 TABLET BY MOUTH EVERY DAY, Disp: 30 tablet, Rfl: 0    furosemide (LASIX) 20 mg tablet, Take 1 tablet by mouth daily, Disp: 30 tablet, Rfl: 0    melatonin 3 mg, Take 2 tablets by mouth daily at bedtime as needed (sleep), Disp: 30 tablet, Rfl: 0    potassium chloride (K-DUR,KLOR-CON) 20 mEq tablet, TAKE 1 TABLET BY MOUTH EVERY DAY, Disp: 30 tablet, Rfl: 5    Potassium Chloride (KLOR-CON 10 PO), Take 10 mEq by mouth daily, Disp: , Rfl:     tiotropium (SPIRIVA HANDIHALER) 18 mcg inhalation capsule, Place 1 puff into inhaler and inhale daily, Disp: , Rfl:     Allergies   Allergen Reactions    Penicillins Anaphylaxis       ROS:  03/19/18 Reviewed 13 systems:  Presently no headaches, seizures, dizziness, diplopia, dysphagia, hoarseness, chest pain, palpitations,  hemoptysis, abdominal pain, nausea, vomiting, change in bowel habits, melena, hematuria, fever, chills, bleeding, bone pains, skin rash, weight loss,  numbness,  weakness, claudication and gait problem  No frequent infections  No hot flashes  Not unusually sensitive to heat or cold  No swelling of the ankles  No swollen glands  Patient is anxious  Other symptoms are in HPI        /56 (BP Location: Left arm, Cuff Size: Adult)   Pulse 105   Temp (!) 97 °F (36 1 °C) (Tympanic)   Resp 17   Ht 5' 6 5" (1 689 m)   Wt 65 1 kg (143 lb 9 6 oz)   SpO2 90%   BMI 22 83 kg/m²     Physical Exam:  On nasal oxygen  Alert, oriented, not in distress, no icterus, no oral thrush, no palpable neck mass, clear lung fields but prolonged expiration, regular heart rate, abdomen  soft and non tender, no palpable abdominal mass, no ascites, no edema of ankles, no calf tenderness, no focal neurological deficit, no skin rash, no palpable lymphadenopathy, good arterial pulses, no clubbing  Patient is anxious  Performance status 3      IMAGING:  No orders to display       LABS:  Results for orders placed or performed in visit on 03/06/18   CBC and differential   Result Value Ref Range    WBC 4 19 (L) 4 31 - 10 16 Thousand/uL    RBC 3 88 3 88 - 5 62 Million/uL    Hemoglobin 11 7 (L) 12 0 - 17 0 g/dL    Hematocrit 36 9 36 5 - 49 3 %    MCV 95 82 - 98 fL    MCH 30 2 26 8 - 34 3 pg    MCHC 31 7 31 4 - 37 4 g/dL    RDW 15 1 11 6 - 15 1 %    MPV 10 1 8 9 - 12 7 fL    Platelets 705 825 - 155 Thousands/uL    nRBC 0 /100 WBCs    Neutrophils Relative 69 43 - 75 %    Lymphocytes Relative 26 14 - 44 %    Monocytes Relative 4 4 - 12 %    Eosinophils Relative 1 0 - 6 %    Basophils Relative 0 0 - 1 %    Neutrophils Absolute 2 86 1 85 - 7 62 Thousands/µL    Lymphocytes Absolute 1 08 0 60 - 4 47 Thousands/µL    Monocytes Absolute 0 18 0 17 - 1 22 Thousand/µL    Eosinophils Absolute 0 03 0 00 - 0 61 Thousand/µL    Basophils Absolute 0 01 0 00 - 0 10 Thousands/µL   Ferritin   Result Value Ref Range    Ferritin 40 8 - 388 ng/mL         Reviewed and discussed with patient  Assessment and plan:  Patient is here with his wife  He is in a wheelchair and he is on nasal oxygen  He is using oxygen 24/7  He walks slowly at home, independently  He is being treated for iron deficiency anemia and presently he is taking oral iron tablet twice a day  Hemoglobin has come up to 11 7 and ferritin 40  He has history of Hemoccult-positive stool  He had GI evaluation  He had blood transfusions, Venofer and oral iron  Hemoglobin had come up to 12  4  Normal hemoglobin is down to 9 9  Stool color is brown  Hemoccult stool test was negative the last time  Patient had 2 admissions in the hospital in September 2017 for COPD with exacerbation  He was admitted again in November 2017 for COPD with exacerbation  Also he runs high blood sugar  No history of diabetes mellitus  He is working with his primary physician  His cardiologist as stopped Coumadin  He follows with lung specialist for COPD  He has less shortness of breath He has minimal nonproductive cough and dyspnea  Has some tiredness  Had MGUS previously  Last serum protein electrophoresis did not show monoclonal spike  Mariel Garcia Physical examination and test results are as recorded and discussed  Hematologically stable  He is being continued on 1 iron tablet twice a day  Blood counts will be checked once every 2 months and visit in 6 months  Condition and plan discussed  Questions answered  Any problem related to our speciality he will call sooner  Patient voiced understanding and agreement in the discussion  Counseling / Coordination of Care   Greater than 50% of total time was spent with the patient and / or family counseling and / or coordination of care

## 2018-03-19 NOTE — LETTER
March 19, 2018     David Felix, [address] [facility]  [address] [place] [address]    Patient: Mike Rankin   YOB: 1950   Date of Visit: 3/19/2018       Dear Dr Tyrell Brice: Thank you for referring Ángel Shirley to me for evaluation  Below are my notes for this consultation  If you have questions, please do not hesitate to call me  I look forward to following your patient along with you  Sincerely,        Yennifer Patel MD        CC: No Recipients  Ynenifer Patel MD  3/19/2018  1:52 PM  Sign at close encounter    HPI:  Patient is here with his wife  He is in a wheelchair and he is on nasal oxygen  He is using oxygen 24/7  He walks slowly at home, independently  He is being treated for iron deficiency anemia and presently he is taking oral iron tablet twice a day  Hemoglobin has come up to 11 7 and ferritin 40  He has history of Hemoccult-positive stool  He had GI evaluation  He had blood transfusions, Venofer and oral iron  Hemoglobin had come up to 12  4  Normal hemoglobin is down to 9 9  Stool color is brown  Hemoccult stool test was negative the last time  Patient had 2 admissions in the hospital in September 2017 for COPD with exacerbation  He was admitted again in November 2017 for COPD with exacerbation  Also he runs high blood sugar  No history of diabetes mellitus  He is working with his primary physician  His cardiologist as stopped Coumadin  He follows with lung specialist for COPD  He has less shortness of breath He has minimal nonproductive cough and dyspnea  Has some tiredness  Had MGUS previously  Last serum protein electrophoresis did not show monoclonal spike  Current Outpatient Prescriptions:     albuterol (PROVENTIL HFA) 90 mcg/act inhaler, Inhale 2 puffs every 4 (four) hours as needed for wheezing or shortness of breath, Disp: 1 Inhaler, Rfl: 0    BREO ELLIPTA, INHALE 1 PUFF BY MOUTH EVERY DAY   RINSE MOUTH WITH WATER AFTER EACH USE, Disp: 1 each, Rfl: 5   citalopram (CeleXA) 20 mg tablet, TAKE 1 TABLET BY MOUTH EVERY DAY, Disp: 90 tablet, Rfl: 3    dextromethorphan-guaiFENesin (ROBITUSSIN DM)  mg/5 mL syrup, Take 10 mL by mouth every 4 (four) hours as needed for cough, Disp: 118 mL, Rfl: 0    diltiazem (CARTIA XT) 180 mg 24 hr capsule, Take 1 capsule by mouth daily, Disp: , Rfl:     diltiazem (DILTIAZEM CD) 180 mg 24 hr capsule, Take 180 mg by mouth daily, Disp: , Rfl:     ferrous sulfate 325 (65 Fe) mg tablet, Take 325 mg by mouth daily, Disp: , Rfl:     Fluticasone Furoate-Vilanterol (BREO ELLIPTA IN), Inhale 1 puff daily, Disp: , Rfl:     folic acid (FOLVITE) 1 mg tablet, TAKE 1 TABLET BY MOUTH EVERY DAY, Disp: 30 tablet, Rfl: 0    furosemide (LASIX) 20 mg tablet, Take 1 tablet by mouth daily, Disp: 30 tablet, Rfl: 0    melatonin 3 mg, Take 2 tablets by mouth daily at bedtime as needed (sleep), Disp: 30 tablet, Rfl: 0    potassium chloride (K-DUR,KLOR-CON) 20 mEq tablet, TAKE 1 TABLET BY MOUTH EVERY DAY, Disp: 30 tablet, Rfl: 5    Potassium Chloride (KLOR-CON 10 PO), Take 10 mEq by mouth daily, Disp: , Rfl:     tiotropium (SPIRIVA HANDIHALER) 18 mcg inhalation capsule, Place 1 puff into inhaler and inhale daily, Disp: , Rfl:     Allergies   Allergen Reactions    Penicillins Anaphylaxis       ROS:  03/19/18 Reviewed 13 systems:  Presently no headaches, seizures, dizziness, diplopia, dysphagia, hoarseness, chest pain, palpitations,  hemoptysis, abdominal pain, nausea, vomiting, change in bowel habits, melena, hematuria, fever, chills, bleeding, bone pains, skin rash, weight loss,  numbness,  weakness, claudication and gait problem  No frequent infections  No hot flashes  Not unusually sensitive to heat or cold  No swelling of the ankles  No swollen glands  Patient is anxious   Other symptoms are in HPI        /56 (BP Location: Left arm, Cuff Size: Adult)   Pulse 105   Temp (!) 97 °F (36 1 °C) (Tympanic)   Resp 17   Ht 5' 6 5" (1 689 m) Wt 65 1 kg (143 lb 9 6 oz)   SpO2 90%   BMI 22 83 kg/m²      Physical Exam:  On nasal oxygen  Alert, oriented, not in distress, no icterus, no oral thrush, no palpable neck mass, clear lung fields but prolonged expiration, regular heart rate, abdomen  soft and non tender, no palpable abdominal mass, no ascites, no edema of ankles, no calf tenderness, no focal neurological deficit, no skin rash, no palpable lymphadenopathy, good arterial pulses, no clubbing  Patient is anxious  Performance status 3  IMAGING:  No orders to display       LABS:  Results for orders placed or performed in visit on 03/06/18   CBC and differential   Result Value Ref Range    WBC 4 19 (L) 4 31 - 10 16 Thousand/uL    RBC 3 88 3 88 - 5 62 Million/uL    Hemoglobin 11 7 (L) 12 0 - 17 0 g/dL    Hematocrit 36 9 36 5 - 49 3 %    MCV 95 82 - 98 fL    MCH 30 2 26 8 - 34 3 pg    MCHC 31 7 31 4 - 37 4 g/dL    RDW 15 1 11 6 - 15 1 %    MPV 10 1 8 9 - 12 7 fL    Platelets 739 574 - 196 Thousands/uL    nRBC 0 /100 WBCs    Neutrophils Relative 69 43 - 75 %    Lymphocytes Relative 26 14 - 44 %    Monocytes Relative 4 4 - 12 %    Eosinophils Relative 1 0 - 6 %    Basophils Relative 0 0 - 1 %    Neutrophils Absolute 2 86 1 85 - 7 62 Thousands/µL    Lymphocytes Absolute 1 08 0 60 - 4 47 Thousands/µL    Monocytes Absolute 0 18 0 17 - 1 22 Thousand/µL    Eosinophils Absolute 0 03 0 00 - 0 61 Thousand/µL    Basophils Absolute 0 01 0 00 - 0 10 Thousands/µL   Ferritin   Result Value Ref Range    Ferritin 40 8 - 388 ng/mL         Reviewed and discussed with patient  Assessment and plan:  Patient is here with his wife  He is in a wheelchair and he is on nasal oxygen  He is using oxygen 24/7  He walks slowly at home, independently  He is being treated for iron deficiency anemia and presently he is taking oral iron tablet twice a day  Hemoglobin has come up to 11 7 and ferritin 40  He has history of Hemoccult-positive stool  He had GI evaluation  He had blood transfusions, Venofer and oral iron  Hemoglobin had come up to 12  4  Normal hemoglobin is down to 9 9  Stool color is brown  Hemoccult stool test was negative the last time  Patient had 2 admissions in the hospital in September 2017 for COPD with exacerbation  He was admitted again in November 2017 for COPD with exacerbation  Also he runs high blood sugar  No history of diabetes mellitus  He is working with his primary physician  His cardiologist as stopped Coumadin  He follows with lung specialist for COPD  He has less shortness of breath He has minimal nonproductive cough and dyspnea  Has some tiredness  Had MGUS previously  Last serum protein electrophoresis did not show monoclonal spike  Lang Merle Physical examination and test results are as recorded and discussed  Hematologically stable  He is being continued on 1 iron tablet twice a day  Blood counts will be checked once every 2 months and visit in 6 months  Condition and plan discussed  Questions answered  Any problem related to our speciality he will call sooner  Patient voiced understanding and agreement in the discussion  Counseling / Coordination of Care   Greater than 50% of total time was spent with the patient and / or family counseling and / or coordination of care

## 2018-03-21 ENCOUNTER — TELEPHONE (OUTPATIENT)
Dept: INTERNAL MEDICINE CLINIC | Facility: CLINIC | Age: 68
End: 2018-03-21

## 2018-03-21 DIAGNOSIS — J96.10 CHRONIC RESPIRATORY FAILURE, UNSPECIFIED WHETHER WITH HYPOXIA OR HYPERCAPNIA (HCC): Primary | ICD-10-CM

## 2018-03-21 RX ORDER — FUROSEMIDE 20 MG/1
10 TABLET ORAL DAILY
Qty: 30 TABLET | Refills: 5 | Status: SHIPPED | OUTPATIENT
Start: 2018-03-21 | End: 2018-12-10 | Stop reason: SDUPTHER

## 2018-03-25 DIAGNOSIS — D64.9 ANEMIA, UNSPECIFIED TYPE: ICD-10-CM

## 2018-03-25 RX ORDER — FOLIC ACID 1 MG/1
TABLET ORAL
Qty: 30 TABLET | Refills: 5 | Status: SHIPPED | OUTPATIENT
Start: 2018-03-25 | End: 2018-04-09 | Stop reason: SDUPTHER

## 2018-04-03 DIAGNOSIS — J44.9 CHRONIC OBSTRUCTIVE PULMONARY DISEASE, UNSPECIFIED COPD TYPE (HCC): Primary | ICD-10-CM

## 2018-04-03 RX ORDER — PREDNISONE 20 MG/1
40 TABLET ORAL DAILY
Qty: 10 TABLET | Refills: 0 | Status: SHIPPED | OUTPATIENT
Start: 2018-04-03 | End: 2018-04-08

## 2018-04-03 NOTE — PROGRESS NOTES
Subjective:     I received a call from [facility]'s visiting nurse Keri Bryan regarding Beth Love having increased shortness of breath for the last two days  He has mild increased effort with pursed lip breathing at rest   This is slightly worse from his baseline  His cough is dry and he is not producing any mucus  Blood pressure was 148/64 and temperature was 97 1°  Heart rate was 98  Respiratory rate was 22 to 24 and lung sounds were decreased throughout per the visiting nurses assessment  Pulse ox was 88% with activity on his chronic 4 liters of oxygen  At rest, his pulse ox is 90%  He is not having any other complaints  He last had a prednisone burst for five days on February 20th due to increased dyspnea on exertion  Since that time, he has been feeling well  Assessment:     Very severe COPD with acute exacerbation    Plan:     Beth Love will continue his inhalers as previously prescribed  He will have a prednisone burst with 40 milligrams p o  daily x5 days  Our office will call him today to schedule a sick visit  He has difficulty with transportation; therefore, he declined a visit for tomorrow  He will have a visit at our [place] office next week  His visiting nurse is aware that they should call with further questions or concerns      GERA Osullivan

## 2018-04-06 DIAGNOSIS — I47.1 PAROXYSMAL ATRIAL TACHYCARDIA (HCC): Primary | ICD-10-CM

## 2018-04-06 DIAGNOSIS — D50.9 IRON DEFICIENCY ANEMIA: ICD-10-CM

## 2018-04-06 RX ORDER — DILTIAZEM HYDROCHLORIDE 180 MG/1
CAPSULE, EXTENDED RELEASE ORAL
Qty: 30 CAPSULE | Refills: 5 | Status: SHIPPED | OUTPATIENT
Start: 2018-04-06 | End: 2018-10-09 | Stop reason: SDUPTHER

## 2018-04-08 DIAGNOSIS — D64.9 ANEMIA, UNSPECIFIED TYPE: ICD-10-CM

## 2018-04-09 ENCOUNTER — OFFICE VISIT (OUTPATIENT)
Dept: PULMONOLOGY | Facility: CLINIC | Age: 68
End: 2018-04-09
Payer: MEDICARE

## 2018-04-09 VITALS
OXYGEN SATURATION: 89 % | SYSTOLIC BLOOD PRESSURE: 118 MMHG | HEIGHT: 67 IN | WEIGHT: 139 LBS | HEART RATE: 98 BPM | DIASTOLIC BLOOD PRESSURE: 56 MMHG | TEMPERATURE: 98.1 F | BODY MASS INDEX: 21.82 KG/M2

## 2018-04-09 DIAGNOSIS — D64.9 ANEMIA, UNSPECIFIED TYPE: ICD-10-CM

## 2018-04-09 DIAGNOSIS — J96.10 CHRONIC RESPIRATORY FAILURE, UNSPECIFIED WHETHER WITH HYPOXIA OR HYPERCAPNIA (HCC): Primary | ICD-10-CM

## 2018-04-09 DIAGNOSIS — J44.1 COPD EXACERBATION (HCC): ICD-10-CM

## 2018-04-09 PROCEDURE — 99214 OFFICE O/P EST MOD 30 MIN: CPT | Performed by: NURSE PRACTITIONER

## 2018-04-09 RX ORDER — ALBUTEROL SULFATE 2.5 MG/3ML
SOLUTION RESPIRATORY (INHALATION) EVERY 6 HOURS PRN
Refills: 3 | COMMUNITY
Start: 2018-04-07 | End: 2019-04-15 | Stop reason: SDUPTHER

## 2018-04-09 RX ORDER — FOLIC ACID 1 MG/1
1 TABLET ORAL DAILY
Qty: 30 TABLET | Refills: 5 | Status: SHIPPED | OUTPATIENT
Start: 2018-04-09 | End: 2019-04-16 | Stop reason: SDUPTHER

## 2018-04-09 RX ORDER — FOLIC ACID 1 MG/1
TABLET ORAL
Qty: 30 TABLET | Refills: 0 | OUTPATIENT
Start: 2018-04-09

## 2018-04-09 NOTE — ASSESSMENT & PLAN NOTE
He currently maintains on 4L NC  On 4/3 he was noted to have desaturation to 88% on 4LNC with ambulation and O2 saturation of 90% while at rest       While in the office today his oxygen saturation was noted to be  In the 70's  He increased his oxygen on the Pulse tank to 6L and increased to 89% after ambulating to the room and then sitting for a short period of time-at the time he was on his pulse O2 tank  I changed him to our continuous oxygen tank an his oxygen saturation on 4LNC improved to 97%  He currently reports his breathing and SOB to be at his baseline    His wife who accompanied Maria A Albert to the visit reports she has been attempting [facility] for a different oxygen tank to use outside of the home that does not result in desaturation  I spoke with [facility] at [facility] to address the issue  I will place a new prescription for 4 LNC without conserving device for his portable tanks

## 2018-04-09 NOTE — PROGRESS NOTES
Pulmonary Follow Up Note   Tianna Reyna 69 y o  male MRN: [id number]  4/9/2018      Assessment:    COPD exacerbation [facility])  Louisa Serrano currently manages his severe COPD with a home regimen of : Breo, Spiriva, albuterol nebulizer and Proventil rescue inhaler  He wears oxygen at 4 LNC at all times  Prior to this visit the last time he was on Prednisone was in February for increasing SOB and Cough  On 4/3 he was prescribed a 5 day prednisone burst of 40mg for a sudden increase in SOB and cough    He currently reports his breathing to be back at his baseline  He completed his 5 day course of Prednisone  He continues to have a mild cough that is non productive  We spoke about pulmonary rehab, and he is currently not interested, but will contact the office in the event he changes his mind  He will plan to continue to abstain from Nicotine, and continue his current nebulizer and inhaler regimen    He will follow up with Dr Hugo Keene in the office in 2 months    Chronic respiratory failure [facility])  He currently maintains on 4L NC  On 4/3 he was noted to have desaturation to 88% on 4LNC with ambulation and O2 saturation of 90% while at rest       While in the office today his oxygen saturation was noted to be  In the 70's  He increased his oxygen on the Pulse tank to 6L and increased to 89% after ambulating to the room and then sitting for a short period of time-at the time he was on his pulse O2 tank  I changed him to our continuous oxygen tank an his oxygen saturation on 4LNC improved to 97%  He currently reports his breathing and SOB to be at his baseline    His wife who accompanied Louisa Serrano to the visit reports she has been attempting to receive approval from [facility] supply for a different oxygen tank to use outside of the home that does not result in desaturation  I spoke with Claudean Quivers at [facility] to address the issue    I will place a new prescription for 4 LNC without conserving device for his portable tanks  Plan:    Diagnoses and all orders for this visit:    Chronic respiratory failure, unspecified whether with hypoxia or hypercapnia (HCC)    COPD exacerbation ([address] )    Other orders  -     albuterol (2 5 mg/3 mL) 0 083 % nebulizer solution; Take by nebulization every 6 (six) hours as needed        Return in about 2 months (around 6/9/2018), or if symptoms worsen or fail to improve  History of Present Illness   HPI:  Libby Burns is a 69 y o  male who is being in the office today as a sick visit  He has a PMH significant for : Severe COPD and chronic hypoxic respiratory failure on 4 LNC at baseline  He reports an increase in SOB and nonproductive cough that started suddenly  He cannot identify a specific irritation or aggravating event  He denies any recent sick contacts  On 4/3 he began a steroid burst for 5 days of 40 mg Prednisone, which he completed  He reports a significant improvement in symptoms, and feels to be at his baseline respiratory status  He continues to have a nonproductive cough, by denies perceived wheezing  He currently reports: sob and BURTON which are at his baseline  He currently denies: chest pain, pain with inspiration, fevers, chills, night sweats, nausea, vomiting, diarrhea, headache, dizziness, bronchospasm, or hemoptysis  His main concern is his portable oxygen tank, which has a conserving device  He feels that he becomes increasingly short of breath when he is out of his home regardless of activity  Review of Systems   Constitutional: Negative for activity change, appetite change, chills, diaphoresis, fatigue, fever and unexpected weight change  HENT: Negative for postnasal drip, rhinorrhea, sinus pain, sinus pressure, sore throat and trouble swallowing  Respiratory: Positive for cough (at baseline) and shortness of breath (at baseline)  Negative for choking, chest tightness, wheezing and stridor      Cardiovascular: Negative for chest pain and leg swelling  Gastrointestinal: Negative for constipation, diarrhea and nausea  Endocrine: Negative for cold intolerance  Genitourinary: Negative for difficulty urinating  Musculoskeletal: Negative for arthralgias and myalgias  Allergic/Immunologic: Negative for environmental allergies  Neurological: Negative for dizziness, light-headedness and headaches  Hematological: Does not bruise/bleed easily  Psychiatric/Behavioral: Negative for agitation and dysphoric mood  All other systems reviewed and are negative  Historical Information   Past Medical History:   Diagnosis Date    Anxiety     Aortic regurgitation     Atrial flutter (HCC)     Chronic respiratory failure (HCC)     Colon polyp     COPD (chronic obstructive pulmonary disease) (HCC)     GI bleed     Hypertension     Iron deficiency anemia     MGUS (monoclonal gammopathy of unknown significance)     Osteoarthritis of hip     PAC (premature atrial contraction)     Paroxysmal atrial fibrillation (HCC)     Patent foramen ovale     Pulmonary artery hypertension (HCC)      Past Surgical History:   Procedure Laterality Date    APPENDECTOMY      COLON SURGERY      HERNIA REPAIR      JOINT REPLACEMENT      KNEE SURGERY       Family History   Problem Relation Age of Onset    Cancer Mother     Heart attack Father     Sudden death Father        History   Smoking Status    Former Smoker    Packs/day: 1 50    Years: 46 00    Types: Cigarettes    Quit date: 2012   Smokeless Tobacco    Never Used         Meds/Allergies     Current Outpatient Prescriptions:     albuterol (2 5 mg/3 mL) 0 083 % nebulizer solution, Take by nebulization every 6 (six) hours as needed, Disp: , Rfl: 3    albuterol (PROVENTIL HFA) 90 mcg/act inhaler, Inhale 2 puffs every 4 (four) hours as needed for wheezing or shortness of breath, Disp: 1 Inhaler, Rfl: 0    BREO ELLIPTA, INHALE 1 PUFF BY MOUTH EVERY DAY   RINSE MOUTH WITH WATER AFTER EACH USE, Disp: 1 each, Rfl: 5    CARTIA  MG 24 hr capsule, TAKE 1 CAPSULE BY MOUTH EVERY DAY, Disp: 30 capsule, Rfl: 5    citalopram (CeleXA) 20 mg tablet, TAKE 1 TABLET BY MOUTH EVERY DAY, Disp: 90 tablet, Rfl: 3    diltiazem (CARTIA XT) 180 mg 24 hr capsule, Take 1 capsule by mouth daily, Disp: , Rfl:     ferrous sulfate 325 (65 Fe) mg tablet, Take 325 mg by mouth daily, Disp: , Rfl:     folic acid (FOLVITE) 1 mg tablet, Take 1 tablet (1 mg total) by mouth daily, Disp: 30 tablet, Rfl: 5    furosemide (LASIX) 20 mg tablet, Take 0 5 tablets (10 mg total) by mouth daily, Disp: 30 tablet, Rfl: 5    melatonin 3 mg, Take 2 tablets by mouth daily at bedtime as needed (sleep), Disp: 30 tablet, Rfl: 0    potassium chloride (K-DUR,KLOR-CON) 20 mEq tablet, TAKE 1 TABLET BY MOUTH EVERY DAY, Disp: 30 tablet, Rfl: 5    tiotropium (SPIRIVA HANDIHALER) 18 mcg inhalation capsule, Place 1 puff into inhaler and inhale daily, Disp: , Rfl:     dextromethorphan-guaiFENesin (ROBITUSSIN DM)  mg/5 mL syrup, Take 10 mL by mouth every 4 (four) hours as needed for cough, Disp: 118 mL, Rfl: 0  Allergies   Allergen Reactions    Penicillins Anaphylaxis       Vitals: Blood pressure 118/56, pulse 98, temperature 98 1 °F (36 7 °C), temperature source Tympanic, height 5' 7" (1 702 m), weight 63 kg (139 lb), SpO2 (!) 89 %  Body mass index is 21 77 kg/m²  Oxygen Therapy  SpO2: (!) 89 %  Oxygen Therapy: Supplemental oxygen  O2 Delivery Method: Nasal cannula  O2 Flow Rate (L/min): 6 L/min    Physical Exam  Physical Exam   Constitutional: He is oriented to person, place, and time  He appears well-developed and well-nourished  No distress  HENT:   Head: Normocephalic and atraumatic  Eyes: EOM are normal  Pupils are equal, round, and reactive to light  Neck: Normal range of motion  Neck supple  No JVD present  Cardiovascular: Normal rate, regular rhythm and normal heart sounds  Exam reveals no gallop and no friction rub      No murmur heard  Pulmonary/Chest: Effort normal and breath sounds normal  No stridor  No respiratory distress  Decreased breath sounds: distant breath sounds  He has no wheezes  He has no rhonchi  He has no rales  He exhibits no tenderness  Abdominal: Soft  Bowel sounds are normal    Musculoskeletal: He exhibits no edema  Neurological: He is alert and oriented to person, place, and time  Skin: Skin is warm and dry  No rash noted  No erythema  No pallor  Vitals reviewed  Labs: I have personally reviewed pertinent lab results    Lab Results   Component Value Date    WBC 4 19 (L) 03/06/2018    HGB 11 7 (L) 03/06/2018    HCT 36 9 03/06/2018    MCV 95 03/06/2018     03/06/2018     Lab Results   Component Value Date    GLUCOSE 161 (H) 11/11/2017    CALCIUM 8 8 11/11/2017     11/11/2017    K 4 1 11/11/2017    CO2 42 (H) 11/11/2017    CL 98 (L) 11/11/2017    BUN 22 11/11/2017    CREATININE 0 74 11/11/2017     No results found for: IGE  Lab Results   Component Value Date    ALT 17 11/11/2017    AST 10 11/11/2017    ALKPHOS 73 11/11/2017    BILITOT 0 20 11/11/2017       Imaging and other studies: none

## 2018-04-09 NOTE — ASSESSMENT & PLAN NOTE
Kaye Anna currently manages his severe COPD with a home regimen of : Breo, Spiriva, albuterol nebulizer and Proventil rescue inhaler  He wears oxygen at 4 LNC at all times  Prior to this visit the last time he was on Prednisone was in February for increasing SOB and Cough  On 4/3 he was prescribed a 5 day prednisone burst of 40mg for a sudden increase in SOB and cough    He currently reports his breathing to be back at his baseline  He completed his 5 day course of Prednisone  He continues to have a mild cough that is non productive  We spoke about pulmonary rehab, and he is currently not interested, but will contact the office in the event he changes his mind      He will plan to continue to abstain from Nicotine, and continue his current nebulizer and inhaler regimen    He will follow up with Dr Scooby Hunt in the office in 2 months

## 2018-04-18 DIAGNOSIS — F32.A DEPRESSION, UNSPECIFIED DEPRESSION TYPE: ICD-10-CM

## 2018-04-18 RX ORDER — CITALOPRAM 20 MG/1
TABLET ORAL
Qty: 90 TABLET | Refills: 3 | Status: SHIPPED | OUTPATIENT
Start: 2018-04-18 | End: 2019-07-12 | Stop reason: SDUPTHER

## 2018-06-11 ENCOUNTER — OFFICE VISIT (OUTPATIENT)
Dept: PULMONOLOGY | Facility: CLINIC | Age: 68
End: 2018-06-11
Payer: MEDICARE

## 2018-06-11 VITALS
TEMPERATURE: 98 F | DIASTOLIC BLOOD PRESSURE: 60 MMHG | HEART RATE: 81 BPM | HEIGHT: 66 IN | OXYGEN SATURATION: 98 % | WEIGHT: 140 LBS | SYSTOLIC BLOOD PRESSURE: 140 MMHG | BODY MASS INDEX: 22.5 KG/M2

## 2018-06-11 DIAGNOSIS — J44.9 COPD (CHRONIC OBSTRUCTIVE PULMONARY DISEASE) (HCC): Primary | ICD-10-CM

## 2018-06-11 DIAGNOSIS — R06.00 DYSPNEA ON EXERTION: ICD-10-CM

## 2018-06-11 DIAGNOSIS — J96.11 CHRONIC HYPOXEMIC RESPIRATORY FAILURE (HCC): ICD-10-CM

## 2018-06-11 PROCEDURE — 99214 OFFICE O/P EST MOD 30 MIN: CPT | Performed by: INTERNAL MEDICINE

## 2018-06-11 NOTE — PROGRESS NOTES
Office Progress Note - Pulmonary    Meka Escobedo 77 y o  male MRN: [id number]    Encounter: [id number]      Assessment:  · Chronic obstructive pulmonary disease  · Dyspnea on exertion  · Chronic hypoxemic respiratory failure  Plan:   · Breo 200/25, 1 inhalation once a day  · Spiriva Respimat 2 inhalations once a day  · Albuterol rescue inhaler as needed  · Oxygen 24 hr a day  · Follow-up in 4 months  Discussion:   The patient's COPD is in remission  I have maintained him on the Breo 200/25, 1 inhalation once a day and Spiriva Respimat 2 inhalations once a day  He will continue to use the rescue inhaler as needed  I have maintained him on the oxygen 4 liters/minute 24 hr a day  His dyspnea on exertion is due to the COPD and hypoxemia  I will see him in 4 months in a follow-up visit  Subjective: The patient is here for a follow-up visit  He still has dyspnea on exertion  He denies any significant cough, wheezing or sputum production  Denies any chest pain palpitations  He injured his back lifting heavy box  This has limited his activities  He is feeling better though  He is on Breo once a day Spiriva once a day  He has no nocturnal symptoms  He is using the oxygen 24 hr a day  Review of systems:  A 12 point system review is done and aside from what is stated above the rest of the review of systems is negative  Vitals: Blood pressure 140/60, pulse 81, temperature 98 °F (36 7 °C), temperature source Tympanic, height 5' 6" (1 676 m), weight 63 5 kg (140 lb), SpO2 98 %  ,     Physical Exam  Gen: Awake, alert, oriented x 3, no acute distress  HEENT: Mucous membranes moist, no oral lesions, no thrush  NECK: No accessory muscle use, JVP not elevated  Cardiac: Regular, single S1, single S2, no murmurs, no rubs, no gallops  Lungs:  Decreased breath sounds  No wheezing or rhonchi    Abdomen: normoactive bowel sounds, soft nontender, nondistended, no rebound or rigidity, no guarding  Extremities: no cyanosis, no clubbing, no edema  Neuro:  Grossly nonfocal   Skin:  No rash

## 2018-08-02 ENCOUNTER — OFFICE VISIT (OUTPATIENT)
Dept: CARDIOLOGY CLINIC | Facility: CLINIC | Age: 68
End: 2018-08-02
Payer: MEDICARE

## 2018-08-02 VITALS
OXYGEN SATURATION: 90 % | SYSTOLIC BLOOD PRESSURE: 134 MMHG | DIASTOLIC BLOOD PRESSURE: 60 MMHG | HEART RATE: 113 BPM | HEIGHT: 67 IN | BODY MASS INDEX: 22.22 KG/M2 | WEIGHT: 141.6 LBS

## 2018-08-02 DIAGNOSIS — Z86.79 HISTORY OF ATRIAL FLUTTER: ICD-10-CM

## 2018-08-02 DIAGNOSIS — J44.9 CHRONIC OBSTRUCTIVE PULMONARY DISEASE, UNSPECIFIED COPD TYPE (HCC): ICD-10-CM

## 2018-08-02 DIAGNOSIS — J44.9 CHRONIC OBSTRUCTIVE LUNG DISEASE, TYPE A (HCC): Primary | ICD-10-CM

## 2018-08-02 PROCEDURE — 99214 OFFICE O/P EST MOD 30 MIN: CPT | Performed by: INTERNAL MEDICINE

## 2018-08-02 NOTE — PROGRESS NOTES
Follow-up - Cardiology   Roxi Che 77 y o  male MRN: [id number]        Problems    Problem List Items Addressed This Visit     None      Visit Diagnoses     Chronic obstructive lung disease, type A ([address] )    -  Primary    History of atrial flutter                Plan he is having no documented supraventricular tachycardia  Recent Holter shows sinus rhythm  He is not on Coumadin  We will recheck a Holter counter again in 2019  His pulmonary disease is actually quite stable  HPI: Roxi Che is a 77y o  year old male The following day from memory  From a cardiac standpoint he is stable  There is no evidence of documented atrial fibrillation  He is running a sinus tachycardia  Major issue continues to be his pulmonary status  No changes made from a cardiac standpoint            Review of Systems   Constitutional: Negative  Cardiovascular: Negative for palpitations  Neurological: Negative  Hematological: Negative  Psychiatric/Behavioral: Negative  Past Medical History:   Diagnosis Date    Anxiety     Aortic regurgitation     Atrial flutter (HCC)     Chronic respiratory failure (HCC)     Colon polyp     COPD (chronic obstructive pulmonary disease) (HCC)     GI bleed     Hypertension     Iron deficiency anemia     MGUS (monoclonal gammopathy of unknown significance)     Osteoarthritis of hip     PAC (premature atrial contraction)     Paroxysmal atrial fibrillation (HCC)     Patent foramen ovale     Pulmonary artery hypertension (HCC)      History   Alcohol Use No     History   Drug Use No     History   Smoking Status    Former Smoker    Packs/day: 1 50    Years: 42 00    Types: Cigarettes    Start date: 5    Quit date: 2012   Smokeless Tobacco    Never Used       Allergies:   Allergies   Allergen Reactions    Penicillins Anaphylaxis       Medications:     Current Outpatient Prescriptions:     albuterol (2 5 mg/3 mL) 0 083 % nebulizer solution, Take by nebulization every 6 (six) hours as needed, Disp: , Rfl: 3    albuterol (PROVENTIL HFA) 90 mcg/act inhaler, Inhale 2 puffs every 4 (four) hours as needed for wheezing or shortness of breath, Disp: 1 Inhaler, Rfl: 0    BREO ELLIPTA, INHALE 1 PUFF BY MOUTH EVERY DAY  RINSE MOUTH WITH WATER AFTER EACH USE, Disp: 1 each, Rfl: 5    CARTIA  MG 24 hr capsule, TAKE 1 CAPSULE BY MOUTH EVERY DAY, Disp: 30 capsule, Rfl: 5    citalopram (CeleXA) 20 mg tablet, TAKE 1 TABLET BY MOUTH EVERY DAY, Disp: 90 tablet, Rfl: 3    dextromethorphan-guaiFENesin (ROBITUSSIN DM)  mg/5 mL syrup, Take 10 mL by mouth every 4 (four) hours as needed for cough, Disp: 118 mL, Rfl: 0    ferrous sulfate 325 (65 Fe) mg tablet, Take 325 mg by mouth daily, Disp: , Rfl:     folic acid (FOLVITE) 1 mg tablet, Take 1 tablet (1 mg total) by mouth daily, Disp: 30 tablet, Rfl: 5    furosemide (LASIX) 20 mg tablet, Take 0 5 tablets (10 mg total) by mouth daily, Disp: 30 tablet, Rfl: 5    melatonin 3 mg, Take 2 tablets by mouth daily at bedtime as needed (sleep), Disp: 30 tablet, Rfl: 0    potassium chloride (K-DUR,KLOR-CON) 20 mEq tablet, TAKE 1 TABLET BY MOUTH EVERY DAY, Disp: 30 tablet, Rfl: 5    tiotropium (SPIRIVA HANDIHALER) 18 mcg inhalation capsule, Place 1 puff into inhaler and inhale daily, Disp: , Rfl:       Physical Exam   Constitutional: He is oriented to person, place, and time  He appears well-developed  Cardiovascular: Regular rhythm  No murmur heard  Pulmonary/Chest:   Prolonged expiratory phase but no wheezes   Musculoskeletal: He exhibits no edema  Neurological: He is oriented to person, place, and time           Laboratory Studies:  CMP:    NT-proBNP:   Lab Results   Component Value Date    NTBNP 70 08/27/2017      Coags:    Lipid Profile:   Lab Results   Component Value Date    CHOL 205 05/07/2014     Lab Results   Component Value Date    HDL 95 05/07/2014     Lab Results   Component Value Date    LDLCALC 96 2014     Lab Results   Component Value Date    TRIG 69 2014       Cardiac testing:     EKG reviewed personally:     Results for orders placed during the hospital encounter of 17   Echo complete with contrast if indicated    Narrative [address]  [facility], [address] [facility]  [phone]    Transthoracic Echocardiogram  2D, M-mode, Doppler, and Color Doppler    Study date:  29-Aug-2017    Patient: Amaya Maria  MR number: [id number]  Account number: [de-identified]  : 1950  Age: 69 years  Gender: Male  Status: Inpatient  Location: Bedside  Height: 67 in  Weight: 146 lb  BP: 106/ 52 mmHg    Indications: Atrial Tachycardia    Diagnoses: R00 0 - Tachycardia, unspecified    Sonographer:  Aida Siegel RDCS  Interpreting Physician:  Melina Jurado MD  Primary Physician:  Hayley Kennedy MD  Referring Physician:  GERA Johnson  Group:  [facility]  Cardiology Fellow:  Dr Aide Yee:  Size was normal   Systolic function was normal by visual assessment  Ejection fraction was estimated to be 70 %  There were no regional wall motion abnormalities  Doppler parameters were consistent with abnormal left ventricular relaxation (grade 1 diastolic dysfunction)  RIGHT VENTRICLE:  The size was normal   Systolic function was normal     HISTORY: PRIOR HISTORY: COPD, PFO, Atrial Fibrillation    PROCEDURE: The procedure was performed at the bedside  This was a routine study  The transthoracic approach was used  The study included complete 2D imaging, M-mode, complete spectral Doppler, and color Doppler  The heart rate was 57 bpm,  at the start of the study  Images were obtained from the parasternal, apical, subcostal, and suprasternal notch acoustic windows  Echocardiographic views were limited due to low windows and lung interference  This was a technically  difficult study      LEFT VENTRICLE: Size was normal  Systolic function was normal by visual assessment  Ejection fraction was estimated to be 70 %  There were no regional wall motion abnormalities  Wall thickness was normal  DOPPLER: Doppler parameters were  consistent with abnormal left ventricular relaxation (grade 1 diastolic dysfunction)  RIGHT VENTRICLE: The size was normal  Systolic function was normal  Wall thickness was normal     LEFT ATRIUM: Size was normal     RIGHT ATRIUM: Size was normal     MITRAL VALVE: Valve structure was normal  There was normal leaflet separation  DOPPLER: The transmitral velocity was within the normal range  There was no evidence for stenosis  There was no significant regurgitation  AORTIC VALVE: The valve was trileaflet  Leaflets exhibited normal thickness and normal cuspal separation  DOPPLER: Transaortic velocity was within the normal range  There was no evidence for stenosis  There was no significant  regurgitation  TRICUSPID VALVE: The valve structure was normal  There was normal leaflet separation  DOPPLER: The transtricuspid velocity was within the normal range  There was no evidence for stenosis  There was no significant regurgitation  PULMONIC VALVE: Leaflets exhibited normal thickness, no calcification, and normal cuspal separation  DOPPLER: The transpulmonic velocity was within the normal range  There was no significant regurgitation  PERICARDIUM: There was no pericardial effusion  The pericardium was normal in appearance  AORTA: The root exhibited normal size  SYSTEMIC VEINS: IVC: The inferior vena cava was normal in size   Respirophasic changes were normal     MEASUREMENT TABLES    OTHER ECHO MEASUREMENTS  (Reference normals)  Estimated CVP   5 mmHg   (--)    SYSTEM MEASUREMENT TABLES    2D  %FS: 30 89 %  Ao Diam: 2 89 cm  EDV(Teich): 68 72 ml  EF(Teich): 59 12 %  ESV(Teich): 28 09 ml  IVSd: 0 7 cm  LA Area: 13 19 cm2  LA Diam: 2 81 cm  LVEDV MOD A4C: 120 6 ml  LVEF MOD A4C: 66 74 %  LVESV MOD A4C: 40 11 ml  LVIDd: 3 97 cm  LVIDs: 2 74 cm  LVLd A4C: 8 03 cm  LVLs A4C: 6 7 cm  LVPWd: 0 84 cm  RA Area: 9 83 cm2  RVIDd: 2 68 cm  SV MOD A4C: 80 49 ml  SV(Teich): 40 63 ml    MM  TAPSE: 1 78 cm    PW  E': 0 1 m/s  E/E': 9 82  MV A Carlos: 1 4 m/s  MV Dec Estill: 6 59 m/s2  MV DecT: 144 9 ms  MV E Carlos: 0 96 m/s  MV E/A Ratio: 0 68  MV PHT: 42 02 ms  MVA By PHT: 5 24 cm2    [employer] Accredited Echocardiography [facility]    Prepared and electronically signed by    Gina Akbar MD  Signed 29-Aug-2017 15:20:41       No results found for this or any previous visit  No results found for this or any previous visit  No results found for this or any previous visit  Nidhi Mcconnell MD    Portions of the record may have been created with voice recognition software   Occasional wrong word or "sound a like" substitutions may have occurred due to the inherent limitations of voice recognition software   Read the chart carefully and recognize, using context, where substitutions have occurred

## 2018-09-05 ENCOUNTER — TELEPHONE (OUTPATIENT)
Dept: HEMATOLOGY ONCOLOGY | Facility: CLINIC | Age: 68
End: 2018-09-05

## 2018-09-05 NOTE — TELEPHONE ENCOUNTER
Called [facility] to schedule a home draw for his labs  LVM with patient information and informed them to call patient to schedule appt  Will F/U on 09/06/18 to make sure patient has been scheduled  Admission

## 2018-09-05 NOTE — TELEPHONE ENCOUNTER
Called to confirm his next appointment  Asked if he needed lab work prior  Informed him there are orders for lab work  He will have the home care nurse draw them for him

## 2018-09-09 DIAGNOSIS — E87.6 HYPOKALEMIA: ICD-10-CM

## 2018-09-09 RX ORDER — POTASSIUM CHLORIDE 20 MEQ/1
TABLET, EXTENDED RELEASE ORAL
Qty: 30 TABLET | Refills: 5 | Status: SHIPPED | OUTPATIENT
Start: 2018-09-09 | End: 2019-03-17 | Stop reason: SDUPTHER

## 2018-09-19 ENCOUNTER — TELEPHONE (OUTPATIENT)
Dept: HEMATOLOGY ONCOLOGY | Facility: CLINIC | Age: 68
End: 2018-09-19

## 2018-09-19 NOTE — TELEPHONE ENCOUNTER
----- Message from Samm Alarcon sent at 9/5/2018  1:54 PM EDT -----  Regarding: Home Draw  Called [facility] to make mayo home draw was set up for patient prior to 09/21/18 due to F/U appt on that day

## 2018-09-19 NOTE — TELEPHONE ENCOUNTER
Called [facility] and spoke to Jono to confirm that this patient is set up of a home draw due to a previous call to [facility] on 09/05/18  Jono informed me that it had not been set up but she take care of it today

## 2018-09-21 ENCOUNTER — OFFICE VISIT (OUTPATIENT)
Dept: HEMATOLOGY ONCOLOGY | Facility: CLINIC | Age: 68
End: 2018-09-21
Payer: MEDICARE

## 2018-09-21 VITALS
BODY MASS INDEX: 22.16 KG/M2 | WEIGHT: 141.2 LBS | OXYGEN SATURATION: 97 % | HEART RATE: 104 BPM | SYSTOLIC BLOOD PRESSURE: 120 MMHG | DIASTOLIC BLOOD PRESSURE: 62 MMHG | HEIGHT: 67 IN | RESPIRATION RATE: 18 BRPM | TEMPERATURE: 97 F

## 2018-09-21 DIAGNOSIS — D50.0 IRON DEFICIENCY ANEMIA DUE TO CHRONIC BLOOD LOSS: Primary | ICD-10-CM

## 2018-09-21 PROCEDURE — 99213 OFFICE O/P EST LOW 20 MIN: CPT | Performed by: INTERNAL MEDICINE

## 2018-09-21 NOTE — LETTER
September 21, 2018     [address]  47   [address] [address]    Patient: Karlos Alvares   YOB: 1950   Date of Visit: 9/21/2018       Dear Dr Abdiel Khalil: Thank you for referring Colby Morgan to me for evaluation  Below are my notes for this consultation  If you have questions, please do not hesitate to call me  I look forward to following your patient along with you  Sincerely,        Brad Cummins MD        CC: No Recipients  Brad Cummins MD  9/21/2018  1:09 PM  Sign at close encounter  HPI:   Follow-up visit for iron deficiency anemia and patient has been taking 1 iron tablet twice a day  Previously he had blood transfusion and intravenous Venofer  He had GI evaluation for heme-positive stool and there was no malignancy  He has severe COPD and is on oxygen 24/7, 4  L per minute  He has tiredness and exertional dyspnea  He has a wheelchair  Current Outpatient Prescriptions:     albuterol (2 5 mg/3 mL) 0 083 % nebulizer solution, Take by nebulization every 6 (six) hours as needed, Disp: , Rfl: 3    BREO ELLIPTA, INHALE 1 PUFF BY MOUTH EVERY DAY   RINSE MOUTH WITH WATER AFTER EACH USE, Disp: 1 each, Rfl: 5    CARTIA  MG 24 hr capsule, TAKE 1 CAPSULE BY MOUTH EVERY DAY, Disp: 30 capsule, Rfl: 5    citalopram (CeleXA) 20 mg tablet, TAKE 1 TABLET BY MOUTH EVERY DAY, Disp: 90 tablet, Rfl: 3    dextromethorphan-guaiFENesin (ROBITUSSIN DM)  mg/5 mL syrup, Take 10 mL by mouth every 4 (four) hours as needed for cough, Disp: 118 mL, Rfl: 0    ferrous sulfate 325 (65 Fe) mg tablet, Take 325 mg by mouth daily, Disp: , Rfl:     folic acid (FOLVITE) 1 mg tablet, Take 1 tablet (1 mg total) by mouth daily, Disp: 30 tablet, Rfl: 5    furosemide (LASIX) 20 mg tablet, Take 0 5 tablets (10 mg total) by mouth daily, Disp: 30 tablet, Rfl: 5    melatonin 3 mg, Take 2 tablets by mouth daily at bedtime as needed (sleep), Disp: 30 tablet, Rfl: 0    potassium chloride (K-DUR,KLOR-CON) 20 mEq tablet, TAKE 1 TABLET BY MOUTH EVERY DAY, Disp: 30 tablet, Rfl: 5    PROAIR  (90 Base) MCG/ACT inhaler, INHALE 2 PUFFS BY MOUTH EVERY 4 HOURS AS NEEDED FOR WHEEZING OR SHORTNESS OF BREATH, Disp: 8 5 Inhaler, Rfl: 0    tiotropium (SPIRIVA HANDIHALER) 18 mcg inhalation capsule, Place 1 puff into inhaler and inhale daily, Disp: , Rfl:     Allergies   Allergen Reactions    Penicillins Anaphylaxis         ROS:  09/21/18 Reviewed 13 systems:  Presently no headaches, seizures, dizziness, diplopia, dysphagia, hoarseness, chest pain, palpitations,  cough, hemoptysis, abdominal pain, nausea, vomiting, change in bowel habits,  hematuria, fever, chills, bleeding, bone pains, skin rash, weight loss, arthritic symptoms,  weakness, numbness,  claudication   Georgette Ormond No frequent infections  Not unusually sensitive to heat or cold  No swelling of the ankles  No swollen glands  Patient is anxious  Other symptoms are in HPI        /62 (BP Location: Left arm, Patient Position: Sitting, Cuff Size: Adult)   Pulse 104   Temp (!) 97 °F (36 1 °C)   Resp 18   Ht 5' 6 5" (1 689 m)   Wt 64 kg (141 lb 3 2 oz)   SpO2 97%   BMI 22 45 kg/m²      Physical Exam:  Alert, oriented, not in acute distress, on nasal oxygen, no icterus, no oral thrush, no palpable neck mass, clear lung fields but prolonged expiration, regular heart rate, abdomen  soft and non tender, no palpable abdominal mass, no ascites, no edema of ankles, no calf tenderness, no focal neurological deficit but has generalized weakness, no skin rash, no palpable lymphadenopathy, good arterial pulses, no clubbing  Patient is anxious  Performance status 3  He has a wheelchair  IMAGING:        Labs, Imaging, & Other studies:   Hemoglobin 11 7  WBC 4800  Platelets [id number]  Normal differential count  MCV 95  Ferritin 60    All pertinent labs and imaging studies were personally reviewed      Reviewed and discussed with patient  Assessment and plan: Follow-up visit for iron deficiency anemia and patient has been taking 1 iron tablet twice a day  Previously he had blood transfusion and intravenous Venofer  He had GI evaluation for heme-positive stool and there was no malignancy  He has severe COPD and is on oxygen 24/7, 4  L per minute  He has tiredness and exertional dyspnea  He has a wheelchair  Jerone Johny Physical examination and test results are as recorded and discussed  Hematologically stable  He is being continued on 1 iron tablet twice a day  Blood counts will be checked once every 3 months and visit in 12 months  Condition and plan discussed  Questions answered  Any problem related to our speciality he will call sooner  Goal is correction of anemia and iron deficiency  Patient needs assistant in his daily care because of COPD      1  Iron deficiency anemia due to chronic blood loss    - CBC and differential; Standing  - Ferritin; Standing  - CBC and differential  - Ferritin          Patient voiced understanding and agreement in the discussion  Counseling / Coordination of Care   Greater than 50% of total time was spent with the patient and / or family counseling and / or coordination of care

## 2018-09-21 NOTE — PROGRESS NOTES
HPI:   Follow-up visit for iron deficiency anemia and patient has been taking 1 iron tablet twice a day  Previously he had blood transfusion and intravenous Venofer  He had GI evaluation for heme-positive stool and there was no malignancy  He has severe COPD and is on oxygen 24/7, 4  L per minute  He has tiredness and exertional dyspnea  He has a wheelchair  Current Outpatient Prescriptions:     albuterol (2 5 mg/3 mL) 0 083 % nebulizer solution, Take by nebulization every 6 (six) hours as needed, Disp: , Rfl: 3    BREO ELLIPTA, INHALE 1 PUFF BY MOUTH EVERY DAY   RINSE MOUTH WITH WATER AFTER EACH USE, Disp: 1 each, Rfl: 5    CARTIA  MG 24 hr capsule, TAKE 1 CAPSULE BY MOUTH EVERY DAY, Disp: 30 capsule, Rfl: 5    citalopram (CeleXA) 20 mg tablet, TAKE 1 TABLET BY MOUTH EVERY DAY, Disp: 90 tablet, Rfl: 3    dextromethorphan-guaiFENesin (ROBITUSSIN DM)  mg/5 mL syrup, Take 10 mL by mouth every 4 (four) hours as needed for cough, Disp: 118 mL, Rfl: 0    ferrous sulfate 325 (65 Fe) mg tablet, Take 325 mg by mouth daily, Disp: , Rfl:     folic acid (FOLVITE) 1 mg tablet, Take 1 tablet (1 mg total) by mouth daily, Disp: 30 tablet, Rfl: 5    furosemide (LASIX) 20 mg tablet, Take 0 5 tablets (10 mg total) by mouth daily, Disp: 30 tablet, Rfl: 5    melatonin 3 mg, Take 2 tablets by mouth daily at bedtime as needed (sleep), Disp: 30 tablet, Rfl: 0    potassium chloride (K-DUR,KLOR-CON) 20 mEq tablet, TAKE 1 TABLET BY MOUTH EVERY DAY, Disp: 30 tablet, Rfl: 5    PROAIR  (90 Base) MCG/ACT inhaler, INHALE 2 PUFFS BY MOUTH EVERY 4 HOURS AS NEEDED FOR WHEEZING OR SHORTNESS OF BREATH, Disp: 8 5 Inhaler, Rfl: 0    tiotropium (SPIRIVA HANDIHALER) 18 mcg inhalation capsule, Place 1 puff into inhaler and inhale daily, Disp: , Rfl:     Allergies   Allergen Reactions    Penicillins Anaphylaxis         ROS:  09/21/18 Reviewed 13 systems:  Presently no headaches, seizures, dizziness, diplopia, dysphagia, hoarseness, chest pain, palpitations,  cough, hemoptysis, abdominal pain, nausea, vomiting, change in bowel habits,  hematuria, fever, chills, bleeding, bone pains, skin rash, weight loss, arthritic symptoms,  weakness, numbness,  claudication   Princess Sole No frequent infections  Not unusually sensitive to heat or cold  No swelling of the ankles  No swollen glands  Patient is anxious  Other symptoms are in HPI        /62 (BP Location: Left arm, Patient Position: Sitting, Cuff Size: Adult)   Pulse 104   Temp (!) 97 °F (36 1 °C)   Resp 18   Ht 5' 6 5" (1 689 m)   Wt 64 kg (141 lb 3 2 oz)   SpO2 97%   BMI 22 45 kg/m²     Physical Exam:  Alert, oriented, not in acute distress, on nasal oxygen, no icterus, no oral thrush, no palpable neck mass, clear lung fields but prolonged expiration, regular heart rate, abdomen  soft and non tender, no palpable abdominal mass, no ascites, no edema of ankles, no calf tenderness, no focal neurological deficit but has generalized weakness, no skin rash, no palpable lymphadenopathy, good arterial pulses, no clubbing  Patient is anxious  Performance status 3  He has a wheelchair  IMAGING:        Labs, Imaging, & Other studies:   Hemoglobin 11 7  WBC 4800  Platelets [id number]  Normal differential count  MCV 95  Ferritin 60  All pertinent labs and imaging studies were personally reviewed      Reviewed and discussed with patient  Assessment and plan: Follow-up visit for iron deficiency anemia and patient has been taking 1 iron tablet twice a day  Previously he had blood transfusion and intravenous Venofer  He had GI evaluation for heme-positive stool and there was no malignancy  He has severe COPD and is on oxygen 24/7, 4  L per minute  He has tiredness and exertional dyspnea  He has a wheelchair  Princess Sole Physical examination and test results are as recorded and discussed  Hematologically stable  He is being continued on 1 iron tablet twice a day    Blood counts will be checked once every 3 months and visit in 12 months  Condition and plan discussed  Questions answered  Any problem related to our speciality he will call sooner  Goal is correction of anemia and iron deficiency  Patient needs assistant in his daily care because of COPD      1  Iron deficiency anemia due to chronic blood loss    - CBC and differential; Standing  - Ferritin; Standing  - CBC and differential  - Ferritin          Patient voiced understanding and agreement in the discussion  Counseling / Coordination of Care   Greater than 50% of total time was spent with the patient and / or family counseling and / or coordination of care

## 2018-10-09 DIAGNOSIS — I47.1 PAROXYSMAL ATRIAL TACHYCARDIA (HCC): ICD-10-CM

## 2018-10-09 RX ORDER — DILTIAZEM HYDROCHLORIDE 180 MG/1
CAPSULE, EXTENDED RELEASE ORAL
Qty: 30 CAPSULE | Refills: 5 | Status: SHIPPED | OUTPATIENT
Start: 2018-10-09 | End: 2019-04-16 | Stop reason: SDUPTHER

## 2018-11-13 ENCOUNTER — TELEPHONE (OUTPATIENT)
Dept: PULMONOLOGY | Facility: CLINIC | Age: 68
End: 2018-11-13

## 2018-11-13 ENCOUNTER — OFFICE VISIT (OUTPATIENT)
Dept: PULMONOLOGY | Facility: CLINIC | Age: 68
End: 2018-11-13
Payer: MEDICARE

## 2018-11-13 VITALS
SYSTOLIC BLOOD PRESSURE: 132 MMHG | DIASTOLIC BLOOD PRESSURE: 60 MMHG | WEIGHT: 142 LBS | HEART RATE: 96 BPM | BODY MASS INDEX: 22.29 KG/M2 | TEMPERATURE: 97.4 F | OXYGEN SATURATION: 96 % | HEIGHT: 67 IN

## 2018-11-13 DIAGNOSIS — J44.9 COPD (CHRONIC OBSTRUCTIVE PULMONARY DISEASE) (HCC): Primary | ICD-10-CM

## 2018-11-13 DIAGNOSIS — J96.11 CHRONIC HYPOXEMIC RESPIRATORY FAILURE (HCC): ICD-10-CM

## 2018-11-13 DIAGNOSIS — Z23 ENCOUNTER FOR IMMUNIZATION: ICD-10-CM

## 2018-11-13 DIAGNOSIS — R06.00 DOE (DYSPNEA ON EXERTION): ICD-10-CM

## 2018-11-13 PROCEDURE — 99214 OFFICE O/P EST MOD 30 MIN: CPT | Performed by: INTERNAL MEDICINE

## 2018-11-13 PROCEDURE — G0008 ADMIN INFLUENZA VIRUS VAC: HCPCS

## 2018-11-13 PROCEDURE — 90662 IIV NO PRSV INCREASED AG IM: CPT

## 2018-11-13 NOTE — PROGRESS NOTES
Office Progress Note - Pulmonary    Alexandra Nava 77 y o  male MRN: [id number]    Encounter: [id number]      Assessment:  · Chronic obstructive pulmonary disease  · Chronic hypoxemic respiratory failure  · Dyspnea on exertion  Plan:   · Breo 200/1 inhalation once a day  · Spiriva once a day  · Albuterol rescue inhaler as needed  · Oxygen supplement 24 hours a day  · Influenza vaccine  · Follow-up in 4 months  Discussion:   The patient's COPD is in remission  I have maintained him on Breo 200/25 1 inhalation once a day  I have also maintained him on the Spiriva Respimat 2 inhalations once a day  He will continue with the albuterol rescue inhaler as needed  He will continue to use the oxygen 24 hours a day  I have provided him with samples of the Spiriva  He will call L to  samples for the [facility]  I have given him the influenza vaccine today  I will see him in 4 months in a follow-up visit  Subjective: The patient is here for a follow-up visit  He has dyspnea on exertion  It has not changed significantly  He denies significant cough, wheezing or sputum production  Denies chest pain  He has no nocturnal symptoms  He is using Breo once a day and Spiriva once a day  He uses the rescue inhaler once or twice a week  He is on the oxygen 24 hours a day  Review of systems:  A 12 point system review is done and aside from what is stated above the rest of the review of systems is negative  Family history and social history are reviewed  Medications list is reviewed  Vitals: Blood pressure 132/60, pulse 96, temperature (!) 97 4 °F (36 3 °C), temperature source Tympanic, height 5' 6 5" (1 689 m), weight 64 4 kg (142 lb), SpO2 96 %  ,     Physical Exam  Gen: Awake, alert, oriented x 3, no acute distress  HEENT: Mucous membranes moist, no oral lesions, no thrush  NECK: No accessory muscle use, JVP not elevated  Cardiac: Regular, single S1, single S2, no murmurs, no rubs, no gallops  Lungs:   Decreased breath sounds  No wheezing or rhonchi  Abdomen: normoactive bowel sounds, soft nontender, nondistended, no rebound or rigidity, no guarding  Extremities: no cyanosis, no clubbing, no edema  Neuro:  Grossly nonfocal   Skin:  No rash

## 2018-11-13 NOTE — TELEPHONE ENCOUNTER
Pt was in office today  Dr Nidia Fiore was going to give samples of Breo but we did not have  Pt was instructed that the office will call when samples are available  Pt would like us to call his wife, Fadi Seek at [phone] when samples are available for her to

## 2018-11-15 NOTE — TELEPHONE ENCOUNTER
Called patient and informed him that his samples are ready in [place] for him to   He is going to try to get to the office by tomorrow

## 2018-12-10 DIAGNOSIS — J96.10 CHRONIC RESPIRATORY FAILURE, UNSPECIFIED WHETHER WITH HYPOXIA OR HYPERCAPNIA (HCC): ICD-10-CM

## 2018-12-10 RX ORDER — FUROSEMIDE 20 MG/1
TABLET ORAL
Qty: 30 TABLET | Refills: 5 | Status: SHIPPED | OUTPATIENT
Start: 2018-12-10 | End: 2019-12-02 | Stop reason: SDUPTHER

## 2019-02-04 DIAGNOSIS — J44.9 CHRONIC OBSTRUCTIVE PULMONARY DISEASE, UNSPECIFIED COPD TYPE (HCC): Primary | ICD-10-CM

## 2019-02-21 ENCOUNTER — HOSPITAL ENCOUNTER (INPATIENT)
Facility: HOSPITAL | Age: 69
LOS: 5 days | Discharge: HOME HEALTH | DRG: 189 | End: 2019-02-26
Attending: EMERGENCY MEDICINE | Admitting: INTERNAL MEDICINE
Payer: MEDICARE

## 2019-02-21 ENCOUNTER — APPOINTMENT (EMERGENCY)
Dept: RADIOLOGY | Facility: HOSPITAL | Age: 69
DRG: 189 | End: 2019-02-21
Payer: MEDICARE

## 2019-02-21 DIAGNOSIS — J44.1 COPD WITH ACUTE EXACERBATION (HCC): Primary | ICD-10-CM

## 2019-02-21 DIAGNOSIS — R06.03 RESPIRATORY DISTRESS: ICD-10-CM

## 2019-02-21 DIAGNOSIS — J96.01 ACUTE RESPIRATORY FAILURE WITH HYPOXIA AND HYPERCARBIA (HCC): ICD-10-CM

## 2019-02-21 DIAGNOSIS — J96.02 ACUTE RESPIRATORY FAILURE WITH HYPOXIA AND HYPERCARBIA (HCC): ICD-10-CM

## 2019-02-21 PROBLEM — R65.10 SIRS (SYSTEMIC INFLAMMATORY RESPONSE SYNDROME) (HCC): Status: ACTIVE | Noted: 2019-02-21

## 2019-02-21 LAB
ALBUMIN SERPL BCP-MCNC: 3.7 G/DL (ref 3.5–5)
ALP SERPL-CCNC: 88 U/L (ref 46–116)
ALT SERPL W P-5'-P-CCNC: 14 U/L (ref 12–78)
ANION GAP SERPL CALCULATED.3IONS-SCNC: 5 MMOL/L (ref 4–13)
AST SERPL W P-5'-P-CCNC: 14 U/L (ref 5–45)
ATRIAL RATE: 137 BPM
BACTERIA SPT RESP CULT: ABNORMAL
BACTERIA UR QL AUTO: ABNORMAL /HPF
BASE EX.OXY STD BLDV CALC-SCNC: 95.5 % (ref 60–80)
BASE EX.OXY STD BLDV CALC-SCNC: 95.6 % (ref 60–80)
BASE EXCESS BLDV CALC-SCNC: 4.1 MMOL/L
BASE EXCESS BLDV CALC-SCNC: 4.7 MMOL/L
BASOPHILS # BLD MANUAL: 0 THOUSAND/UL (ref 0–0.1)
BASOPHILS NFR MAR MANUAL: 0 % (ref 0–1)
BILIRUB SERPL-MCNC: 0.47 MG/DL (ref 0.2–1)
BILIRUB UR QL STRIP: NEGATIVE
BUN SERPL-MCNC: 12 MG/DL (ref 5–25)
CALCIUM SERPL-MCNC: 9.2 MG/DL (ref 8.3–10.1)
CHLORIDE SERPL-SCNC: 98 MMOL/L (ref 100–108)
CLARITY UR: CLEAR
CO2 SERPL-SCNC: 31 MMOL/L (ref 21–32)
COARSE GRAN CASTS URNS QL MICRO: ABNORMAL /LPF
COLOR UR: YELLOW
CREAT SERPL-MCNC: 0.92 MG/DL (ref 0.6–1.3)
EOSINOPHIL # BLD MANUAL: 0.07 THOUSAND/UL (ref 0–0.4)
EOSINOPHIL NFR BLD MANUAL: 1 % (ref 0–6)
ERYTHROCYTE [DISTWIDTH] IN BLOOD BY AUTOMATED COUNT: 13.2 % (ref 11.6–15.1)
FLUAV AG SPEC QL: NOT DETECTED
FLUBV AG SPEC QL: NOT DETECTED
GFR SERPL CREATININE-BSD FRML MDRD: 84 ML/MIN/1.73SQ M
GLUCOSE SERPL-MCNC: 172 MG/DL (ref 65–140)
GLUCOSE UR STRIP-MCNC: ABNORMAL MG/DL
GRAM STN SPEC: ABNORMAL
HCO3 BLDV-SCNC: 31.1 MMOL/L (ref 24–30)
HCO3 BLDV-SCNC: 35.4 MMOL/L (ref 24–30)
HCT VFR BLD AUTO: 36.3 % (ref 36.5–49.3)
HGB BLD-MCNC: 11.2 G/DL (ref 12–17)
HGB UR QL STRIP.AUTO: ABNORMAL
HYALINE CASTS #/AREA URNS LPF: ABNORMAL /LPF
INR PPP: 1.14 (ref 0.86–1.17)
KETONES UR STRIP-MCNC: ABNORMAL MG/DL
LACTATE SERPL-SCNC: 2 MMOL/L (ref 0.5–2)
LEUKOCYTE ESTERASE UR QL STRIP: ABNORMAL
LYMPHOCYTES # BLD AUTO: 3.19 THOUSAND/UL (ref 0.6–4.47)
LYMPHOCYTES # BLD AUTO: 45 % (ref 14–44)
MACROCYTES BLD QL AUTO: PRESENT
MCH RBC QN AUTO: 31 PG (ref 26.8–34.3)
MCHC RBC AUTO-ENTMCNC: 30.9 G/DL (ref 31.4–37.4)
MCV RBC AUTO: 101 FL (ref 82–98)
MONOCYTES # BLD AUTO: 0.14 THOUSAND/UL (ref 0–1.22)
MONOCYTES NFR BLD: 2 % (ref 4–12)
MUCOUS THREADS UR QL AUTO: ABNORMAL
NEUTROPHILS # BLD MANUAL: 3.55 THOUSAND/UL (ref 1.85–7.62)
NEUTS BAND NFR BLD MANUAL: 3 % (ref 0–8)
NEUTS SEG NFR BLD AUTO: 47 % (ref 43–75)
NITRITE UR QL STRIP: NEGATIVE
NON-SQ EPI CELLS URNS QL MICRO: ABNORMAL /HPF
NRBC BLD AUTO-RTO: 0 /100 WBCS
NT-PROBNP SERPL-MCNC: 100 PG/ML
O2 CT BLDV-SCNC: 15.7 ML/DL
O2 CT BLDV-SCNC: 16.7 ML/DL
P AXIS: 87 DEGREES
PCO2 BLDV: 58.8 MM HG (ref 42–50)
PCO2 BLDV: 90.9 MM HG (ref 42–50)
PH BLDV: 7.21 [PH] (ref 7.3–7.4)
PH BLDV: 7.34 [PH] (ref 7.3–7.4)
PH UR STRIP.AUTO: 5.5 [PH] (ref 4.5–8)
PLATELET # BLD AUTO: 163 THOUSANDS/UL (ref 149–390)
PLATELET # BLD AUTO: 189 THOUSANDS/UL (ref 149–390)
PLATELET BLD QL SMEAR: ADEQUATE
PMV BLD AUTO: 9.3 FL (ref 8.9–12.7)
PMV BLD AUTO: 9.6 FL (ref 8.9–12.7)
PO2 BLDV: 106 MM HG (ref 35–45)
PO2 BLDV: 98.8 MM HG (ref 35–45)
POIKILOCYTOSIS BLD QL SMEAR: PRESENT
POTASSIUM SERPL-SCNC: 4.2 MMOL/L (ref 3.5–5.3)
PR INTERVAL: 166 MS
PROT SERPL-MCNC: 8 G/DL (ref 6.4–8.2)
PROT UR STRIP-MCNC: ABNORMAL MG/DL
PROTHROMBIN TIME: 14.7 SECONDS (ref 11.8–14.2)
QRS AXIS: 73 DEGREES
QRSD INTERVAL: 76 MS
QT INTERVAL: 278 MS
QTC INTERVAL: 419 MS
RBC # BLD AUTO: 3.61 MILLION/UL (ref 3.88–5.62)
RBC #/AREA URNS AUTO: ABNORMAL /HPF
RSV B RNA SPEC QL NAA+PROBE: NOT DETECTED
SODIUM SERPL-SCNC: 134 MMOL/L (ref 136–145)
SP GR UR STRIP.AUTO: 1.03 (ref 1–1.03)
T WAVE AXIS: 76 DEGREES
TOTAL CELLS COUNTED SPEC: 100
TROPONIN I SERPL-MCNC: <0.02 NG/ML
TSH SERPL DL<=0.05 MIU/L-ACNC: 1.13 UIU/ML (ref 0.36–3.74)
UROBILINOGEN UR QL STRIP.AUTO: 1 E.U./DL
VARIANT LYMPHS # BLD AUTO: 2 %
VENTRICULAR RATE: 137 BPM
WBC # BLD AUTO: 7.09 THOUSAND/UL (ref 4.31–10.16)
WBC #/AREA URNS AUTO: ABNORMAL /HPF

## 2019-02-21 PROCEDURE — 87631 RESP VIRUS 3-5 TARGETS: CPT | Performed by: EMERGENCY MEDICINE

## 2019-02-21 PROCEDURE — 71045 X-RAY EXAM CHEST 1 VIEW: CPT

## 2019-02-21 PROCEDURE — 85610 PROTHROMBIN TIME: CPT | Performed by: EMERGENCY MEDICINE

## 2019-02-21 PROCEDURE — 81001 URINALYSIS AUTO W/SCOPE: CPT | Performed by: INTERNAL MEDICINE

## 2019-02-21 PROCEDURE — 94644 CONT INHLJ TX 1ST HOUR: CPT

## 2019-02-21 PROCEDURE — 1123F ACP DISCUSS/DSCN MKR DOCD: CPT | Performed by: INTERNAL MEDICINE

## 2019-02-21 PROCEDURE — 94640 AIRWAY INHALATION TREATMENT: CPT

## 2019-02-21 PROCEDURE — 93005 ELECTROCARDIOGRAM TRACING: CPT

## 2019-02-21 PROCEDURE — 84484 ASSAY OF TROPONIN QUANT: CPT | Performed by: EMERGENCY MEDICINE

## 2019-02-21 PROCEDURE — 94760 N-INVAS EAR/PLS OXIMETRY 1: CPT

## 2019-02-21 PROCEDURE — 99223 1ST HOSP IP/OBS HIGH 75: CPT | Performed by: INTERNAL MEDICINE

## 2019-02-21 PROCEDURE — 96365 THER/PROPH/DIAG IV INF INIT: CPT

## 2019-02-21 PROCEDURE — 82805 BLOOD GASES W/O2 SATURATION: CPT | Performed by: EMERGENCY MEDICINE

## 2019-02-21 PROCEDURE — 96375 TX/PRO/DX INJ NEW DRUG ADDON: CPT

## 2019-02-21 PROCEDURE — 87040 BLOOD CULTURE FOR BACTERIA: CPT | Performed by: EMERGENCY MEDICINE

## 2019-02-21 PROCEDURE — 85027 COMPLETE CBC AUTOMATED: CPT | Performed by: EMERGENCY MEDICINE

## 2019-02-21 PROCEDURE — 80053 COMPREHEN METABOLIC PANEL: CPT | Performed by: EMERGENCY MEDICINE

## 2019-02-21 PROCEDURE — 93010 ELECTROCARDIOGRAM REPORT: CPT | Performed by: INTERNAL MEDICINE

## 2019-02-21 PROCEDURE — 85049 AUTOMATED PLATELET COUNT: CPT | Performed by: INTERNAL MEDICINE

## 2019-02-21 PROCEDURE — 99291 CRITICAL CARE FIRST HOUR: CPT

## 2019-02-21 PROCEDURE — 83605 ASSAY OF LACTIC ACID: CPT | Performed by: EMERGENCY MEDICINE

## 2019-02-21 PROCEDURE — 83880 ASSAY OF NATRIURETIC PEPTIDE: CPT | Performed by: EMERGENCY MEDICINE

## 2019-02-21 PROCEDURE — 36415 COLL VENOUS BLD VENIPUNCTURE: CPT | Performed by: EMERGENCY MEDICINE

## 2019-02-21 PROCEDURE — 85007 BL SMEAR W/DIFF WBC COUNT: CPT | Performed by: EMERGENCY MEDICINE

## 2019-02-21 PROCEDURE — 94660 CPAP INITIATION&MGMT: CPT

## 2019-02-21 PROCEDURE — 84443 ASSAY THYROID STIM HORMONE: CPT | Performed by: INTERNAL MEDICINE

## 2019-02-21 PROCEDURE — 87205 SMEAR GRAM STAIN: CPT | Performed by: INTERNAL MEDICINE

## 2019-02-21 RX ORDER — LANOLIN ALCOHOL/MO/W.PET/CERES
6 CREAM (GRAM) TOPICAL
Status: DISCONTINUED | OUTPATIENT
Start: 2019-02-21 | End: 2019-02-26 | Stop reason: HOSPADM

## 2019-02-21 RX ORDER — CITALOPRAM 20 MG/1
20 TABLET ORAL DAILY
Status: DISCONTINUED | OUTPATIENT
Start: 2019-02-21 | End: 2019-02-26 | Stop reason: HOSPADM

## 2019-02-21 RX ORDER — FLUTICASONE FUROATE AND VILANTEROL 100; 25 UG/1; UG/1
1 POWDER RESPIRATORY (INHALATION) DAILY
Status: DISCONTINUED | OUTPATIENT
Start: 2019-02-21 | End: 2019-02-23

## 2019-02-21 RX ORDER — METHYLPREDNISOLONE SODIUM SUCCINATE 125 MG/2ML
125 INJECTION, POWDER, LYOPHILIZED, FOR SOLUTION INTRAMUSCULAR; INTRAVENOUS ONCE
Status: DISCONTINUED | OUTPATIENT
Start: 2019-02-21 | End: 2019-02-21

## 2019-02-21 RX ORDER — LEVALBUTEROL 1.25 MG/.5ML
1.25 SOLUTION, CONCENTRATE RESPIRATORY (INHALATION)
Status: DISCONTINUED | OUTPATIENT
Start: 2019-02-21 | End: 2019-02-23

## 2019-02-21 RX ORDER — METHYLPREDNISOLONE SODIUM SUCCINATE 40 MG/ML
40 INJECTION, POWDER, LYOPHILIZED, FOR SOLUTION INTRAMUSCULAR; INTRAVENOUS EVERY 8 HOURS SCHEDULED
Status: DISCONTINUED | OUTPATIENT
Start: 2019-02-22 | End: 2019-02-23

## 2019-02-21 RX ORDER — SODIUM CHLORIDE FOR INHALATION 0.9 %
3 VIAL, NEBULIZER (ML) INHALATION
Status: DISCONTINUED | OUTPATIENT
Start: 2019-02-21 | End: 2019-02-22

## 2019-02-21 RX ORDER — POTASSIUM CHLORIDE 20 MEQ/1
20 TABLET, EXTENDED RELEASE ORAL DAILY
Status: DISCONTINUED | OUTPATIENT
Start: 2019-02-21 | End: 2019-02-26 | Stop reason: HOSPADM

## 2019-02-21 RX ORDER — METHYLPREDNISOLONE SODIUM SUCCINATE 125 MG/2ML
125 INJECTION, POWDER, LYOPHILIZED, FOR SOLUTION INTRAMUSCULAR; INTRAVENOUS ONCE
Status: COMPLETED | OUTPATIENT
Start: 2019-02-21 | End: 2019-02-21

## 2019-02-21 RX ORDER — ALBUTEROL SULFATE 2.5 MG/3ML
2.5 SOLUTION RESPIRATORY (INHALATION) EVERY 4 HOURS PRN
Status: DISCONTINUED | OUTPATIENT
Start: 2019-02-21 | End: 2019-02-26 | Stop reason: HOSPADM

## 2019-02-21 RX ORDER — ALBUTEROL SULFATE 90 UG/1
2 AEROSOL, METERED RESPIRATORY (INHALATION) EVERY 4 HOURS PRN
Status: DISCONTINUED | OUTPATIENT
Start: 2019-02-21 | End: 2019-02-26 | Stop reason: HOSPADM

## 2019-02-21 RX ORDER — SODIUM CHLORIDE FOR INHALATION 0.9 %
3 VIAL, NEBULIZER (ML) INHALATION ONCE
Status: COMPLETED | OUTPATIENT
Start: 2019-02-21 | End: 2019-02-21

## 2019-02-21 RX ORDER — AZITHROMYCIN 250 MG/1
500 TABLET, FILM COATED ORAL EVERY 24 HOURS
Status: DISCONTINUED | OUTPATIENT
Start: 2019-02-22 | End: 2019-02-22

## 2019-02-21 RX ORDER — FERROUS SULFATE 325(65) MG
325 TABLET ORAL DAILY
Status: DISCONTINUED | OUTPATIENT
Start: 2019-02-21 | End: 2019-02-26 | Stop reason: HOSPADM

## 2019-02-21 RX ORDER — DILTIAZEM HYDROCHLORIDE 180 MG/1
180 CAPSULE, COATED, EXTENDED RELEASE ORAL DAILY
Status: DISCONTINUED | OUTPATIENT
Start: 2019-02-21 | End: 2019-02-22

## 2019-02-21 RX ORDER — FOLIC ACID 1 MG/1
1 TABLET ORAL DAILY
Status: DISCONTINUED | OUTPATIENT
Start: 2019-02-21 | End: 2019-02-26 | Stop reason: HOSPADM

## 2019-02-21 RX ORDER — FUROSEMIDE 20 MG/1
10 TABLET ORAL DAILY
Status: DISCONTINUED | OUTPATIENT
Start: 2019-02-21 | End: 2019-02-26 | Stop reason: HOSPADM

## 2019-02-21 RX ADMIN — FERROUS SULFATE TAB 325 MG (65 MG ELEMENTAL FE) 325 MG: 325 (65 FE) TAB at 18:32

## 2019-02-21 RX ADMIN — FLUTICASONE FUROATE AND VILANTEROL TRIFENATATE 1 PUFF: 100; 25 POWDER RESPIRATORY (INHALATION) at 18:39

## 2019-02-21 RX ADMIN — CITALOPRAM HYDROBROMIDE 20 MG: 20 TABLET ORAL at 18:32

## 2019-02-21 RX ADMIN — LEVALBUTEROL HYDROCHLORIDE 1.25 MG: 1.25 SOLUTION, CONCENTRATE RESPIRATORY (INHALATION) at 20:08

## 2019-02-21 RX ADMIN — POTASSIUM CHLORIDE 20 MEQ: 1500 TABLET, EXTENDED RELEASE ORAL at 18:33

## 2019-02-21 RX ADMIN — DILTIAZEM HYDROCHLORIDE 180 MG: 180 CAPSULE, COATED, EXTENDED RELEASE ORAL at 18:33

## 2019-02-21 RX ADMIN — TIOTROPIUM BROMIDE 18 MCG: 18 CAPSULE ORAL; RESPIRATORY (INHALATION) at 18:28

## 2019-02-21 RX ADMIN — ISODIUM CHLORIDE 3 ML: 0.03 SOLUTION RESPIRATORY (INHALATION) at 20:08

## 2019-02-21 RX ADMIN — ENOXAPARIN SODIUM 40 MG: 40 INJECTION SUBCUTANEOUS at 18:38

## 2019-02-21 RX ADMIN — FOLIC ACID 1 MG: 1 TABLET ORAL at 18:32

## 2019-02-21 RX ADMIN — ISODIUM CHLORIDE 3 ML: 0.03 SOLUTION RESPIRATORY (INHALATION) at 10:05

## 2019-02-21 RX ADMIN — ALBUTEROL SULFATE 10 MG: 2.5 SOLUTION RESPIRATORY (INHALATION) at 10:05

## 2019-02-21 RX ADMIN — FUROSEMIDE 10 MG: 20 TABLET ORAL at 18:29

## 2019-02-21 RX ADMIN — IPRATROPIUM BROMIDE 1 MG: 0.5 SOLUTION RESPIRATORY (INHALATION) at 10:05

## 2019-02-21 RX ADMIN — METHYLPREDNISOLONE SODIUM SUCCINATE 125 MG: 125 INJECTION, POWDER, FOR SOLUTION INTRAMUSCULAR; INTRAVENOUS at 09:58

## 2019-02-21 RX ADMIN — AZITHROMYCIN MONOHYDRATE 500 MG: 500 INJECTION, POWDER, LYOPHILIZED, FOR SOLUTION INTRAVENOUS at 10:48

## 2019-02-21 NOTE — ASSESSMENT & PLAN NOTE
Known history of COPD with chronic respiratory failure requiring 4 L oxygen at home  Likely trigger viral bronchitis  Positive history of sick contacts  PLAN:  · Management as mentioned above

## 2019-02-21 NOTE — RESPIRATORY THERAPY NOTE
RT Protocol Note  Aaliyah Yoon 77 y o  male MRN: [id number]  Unit/Bed#: [facility] 930-01 Encounter: [id number]    Assessment    Principal Problem:    Acute on chronic respiratory failure with hypoxia and hypercapnia (HCC)  Active Problems:    COPD exacerbation (HCC)    SIRS (systemic inflammatory response syndrome) (HCC)      Home Pulmonary Medications:  Albuterol spirivia       Past Medical History:   Diagnosis Date    Anxiety     Aortic regurgitation     Atrial flutter (HCC)     Chronic respiratory failure (HCC)     Colon polyp     COPD (chronic obstructive pulmonary disease) (HCC)     GI bleed     Hypertension     Iron deficiency anemia     MGUS (monoclonal gammopathy of unknown significance)     Osteoarthritis of hip     PAC (premature atrial contraction)     Paroxysmal atrial fibrillation (HCC)     Patent foramen ovale     Pulmonary artery hypertension (HCC)      Social History     Socioeconomic History    Marital status: /Civil Union     Spouse name: None    Number of children: None    Years of education: None    Highest education level: None   Occupational History    None   Social Needs    Financial resource strain: None    Food insecurity:     Worry: None     Inability: None    Transportation needs:     Medical: None     Non-medical: None   Tobacco Use    Smoking status: Former Smoker     Packs/day: 1 50     Years: 42 00     Pack years: 63 00     Types: Cigarettes     Start date:      Last attempt to quit:      Years since quittin 1    Smokeless tobacco: Never Used   Substance and Sexual Activity    Alcohol use: No    Drug use: No    Sexual activity: Not Currently   Lifestyle    Physical activity:     Days per week: None     Minutes per session: None    Stress: None   Relationships    Social connections:     Talks on phone: None     Gets together: None     Attends Hoah[employer]ism service: None     Active member of club or organization: None     Attends meetings of clubs or organizations: None     Relationship status: None    Intimate partner violence:     Fear of current or ex partner: None     Emotionally abused: None     Physically abused: None     Forced sexual activity: None   Other Topics Concern    None   Social History Narrative    None       Subjective         Objective    Physical Exam:   Assessment Type: (P) Assess only  General Appearance: (P) Awake, Alert  Respiratory Pattern: (P) Normal  Chest Assessment: (P) Chest expansion symmetrical  Bilateral Breath Sounds: (P) Diminished  Cough: Non-productive    Vitals:  Blood pressure 104/70, pulse (!) 118, temperature 98 2 °F (36 8 °C), resp  rate 20, SpO2 96 %  Imaging and other studies: I have personally reviewed pertinent reports  Plan    Respiratory Plan: (P) Moderate/Severe Distress pathway        Resp Comments: (P) pt admitted with exacerbation of COPD, will order q6 txs , pt takes udns prn @ home, pt uses spirivia will oder xopenex/saline spirivia is ordered here  pt is seen by Dr Landon Snyder as an outpt

## 2019-02-21 NOTE — H&P
H&P- Elma Burke 1950, 77 y o  male MRN: [id number]    Unit/Bed#: [facility] 930-01 Encounter: [id number]    Primary Care Provider: Song Gurrola MD   Date and time admitted to hospital: 2/21/2019  9:34 AM        * Acute on chronic respiratory failure with hypoxia and hypercapnia (HCC)  Assessment & Plan  · Likely related to underlying COPD exacerbation  · Briefly required BiPAP in the ER, now off BiPAP and on 4 L nasal cannula  · Note that he is on 3 - 4 L oxygen at home  PLAN:  · O2 supplementation as needed to maintain O2 sat between 80-92%  · Solu-Medrol 40 mg t i d  IV   · Azithromycin for 5 day course, check procalcitonin, if procalcitonin is high , will add ceftriaxone  · Continue with bronchodilators  · Continue with Breo and Spiriva  · Respiratory protocol  · Send sputum cultures  · Follow influenza swab results  · If patient is lethargic or needs BiPAP or high-flow nasal cannula>> will consult pulmonology  Follows up with Dr Farshad Dang outpatient    COPD exacerbation [facility])  Assessment & Plan  Known history of COPD with chronic respiratory failure requiring 4 L oxygen at home  Likely trigger viral bronchitis  Positive history of sick contacts  PLAN:  · Management as mentioned above  SIRS (systemic inflammatory response syndrome) (HCC)  Assessment & Plan  Likely relate dot underlying COPD exacerbation  Chest x-ray negative for evidence of pneumonia, shows chronic emphysematous changes  PLAN:  · Follow blood culture results  · Check UA and procalcitonin  · Sputum cultures  · Azithromycin as mentioned above  Macrocytic anemia  Assessment & Plan  Hemoglobin at baseline between 10-11  Continue folic acid and iron supplementation    Check vitamin B12 and TSH        VTE Prophylaxis: Enoxaparin (Lovenox)  / sequential compression device   Code Status: DNR DNI  POLST: POLST is not applicable to this patient    Anticipated Length of Stay:  Patient will be admitted on an Inpatient basis with an anticipated length of stay of  more than 2 midnights  Justification for Hospital Stay:  COPD exacerbation    Total Time for Visit, including Counseling / Coordination of Care: 45 minutes  Greater than 50% of this total time spent on direct patient counseling and coordination of care  Chief Complaint:   Shortness of breath and cough    History of Present Illness:    Edmond Norwood is a 77 y o  male with known history of COPD on 4 L oxygen at home presented with cough and shortness of breath which has been going on for 2 days  Patient says that he has history of COPD and uses 4 L oxygen at baseline with his rescue inhaler and other therapy for COPD  He says that since yesterday he started feeling short of breath and started having cough with clear sputum production since this morning  He had increased O2 requirement which prompted him to come to ER  In ER, patient was initially hypoxic and hypercarbic requiring BiPAP for a brief period of time with resolution of hypercarbia  Hypoxia also improved with nebulizer treatment and steroids  Patient was weaned off BiPAP and was on nasal cannula in the ER  During my interaction with patient in ER, he reported that he is feeling much better than at the time of admission  He noted history of sick contacts with his grandkids  Denied any fever or chills at home but had low-grade fever in ER  Received flu vaccination this year       Review of Systems:    Review of Systems   Constitutional: Positive for fatigue  Negative for chills and fever  HENT: Positive for congestion  Negative for postnasal drip and sore throat  Eyes: Negative for visual disturbance  Respiratory: Positive for cough, shortness of breath and wheezing  Gastrointestinal: Negative for abdominal pain, diarrhea, nausea and vomiting  Genitourinary: Negative for dysuria, frequency and urgency  Musculoskeletal: Negative for neck pain     Neurological: Negative for weakness, numbness and headaches  Past Medical and Surgical History:     Past Medical History:   Diagnosis Date    Anxiety     Aortic regurgitation     Atrial flutter (HCC)     Chronic respiratory failure (HCC)     Colon polyp     COPD (chronic obstructive pulmonary disease) (HCC)     GI bleed     Hypertension     Iron deficiency anemia     MGUS (monoclonal gammopathy of unknown significance)     Osteoarthritis of hip     PAC (premature atrial contraction)     Paroxysmal atrial fibrillation (HCC)     Patent foramen ovale     Pulmonary artery hypertension (HCC)        Past Surgical History:   Procedure Laterality Date    APPENDECTOMY      COLON SURGERY      HERNIA REPAIR      JOINT REPLACEMENT      KNEE SURGERY         Meds/Allergies:    Prior to Admission medications    Medication Sig Start Date End Date Taking? Authorizing Provider   albuterol (2 5 mg/3 mL) 0 083 % nebulizer solution Take by nebulization every 6 (six) hours as needed 4/7/18  Yes Historical Provider, MD RINA THOMASON INHALE 1 PUFF BY MOUTH EVERY DAY   RINSE MOUTH WITH WATER AFTER EACH USE 2/21/18  Yes GERA Castillo   CARTIA  MG 24 hr capsule TAKE 1 CAPSULE BY MOUTH EVERY DAY 10/9/18  Yes Colby Casillas MD   citalopram (CeleXA) 20 mg tablet TAKE 1 TABLET BY MOUTH EVERY DAY 4/18/18  Yes Colby Casillas MD   folic acid (FOLVITE) 1 mg tablet Take 1 tablet (1 mg total) by mouth daily 4/9/18  Yes Colby Casillas MD   furosemide (LASIX) 20 mg tablet TAKE 1/2 TABLET BY MOUTH DAILY 12/10/18  Yes Colby Casillas MD   melatonin 3 mg Take 2 tablets by mouth daily at bedtime as needed (sleep) 11/11/17  Yes Eileen Oliveros MD   potassium chloride (K-DUR,KLOR-CON) 20 mEq tablet TAKE 1 TABLET BY MOUTH EVERY DAY 9/9/18  Yes Colby Casillas MD   PROAIR  (97 Base) MCG/ACT inhaler INHALE 2 PUFFS BY MOUTH EVERY 4 HOURS AS NEEDED FOR WHEEZING OR SHORTNESS OF BREATH 8/3/18  Yes GERA Castillo   tiotropium (SPIRIVA RESPIMAT) 2 5 MCG/ACT AERS inhaler Inhale 2 puffs daily 19  Yes GERA Masterson   ferrous sulfate 325 (65 Fe) mg tablet Take 325 mg by mouth daily    Historical Provider, MD     I have reviewed home medications with patient personally  Allergies: Allergies   Allergen Reactions    Penicillins Anaphylaxis       Social History:     Marital Status: /Civil Union     Substance Use History:   Social History     Substance and Sexual Activity   Alcohol Use No     Social History     Tobacco Use   Smoking Status Former Smoker    Packs/day: 1 50    Years: 42 00    Pack years: 63 00    Types: Cigarettes    Start date: 5    Last attempt to quit:     Years since quittin 1   Smokeless Tobacco Never Used     Social History     Substance and Sexual Activity   Drug Use No       Family History:    non-contributory    Physical Exam:     Vitals:   Blood Pressure: 122/60 (19 1828)  Pulse: (!) 118 (19 1629)  Temperature: 98 2 °F (36 8 °C) (19 162)  Temp Source: Rectal (19 0958)  Respirations: 20 (19 162)  SpO2: 96 % (19)    Physical Exam   Constitutional: He is oriented to person, place, and time  No distress  Eyes: Pupils are equal, round, and reactive to light  Cardiovascular: Regular rhythm and normal heart sounds  No murmur heard  Tachycardia   Pulmonary/Chest: No respiratory distress  He has wheezes  He has no rales  Breath sounds bilaterally decreased with wheezing   Abdominal: Soft  Bowel sounds are normal  He exhibits no distension  There is no tenderness  Musculoskeletal: He exhibits no edema  Neurological: He is alert and oriented to person, place, and time  No focal deficits   Skin: Skin is warm  Additional Data:     Lab Results: I have personally reviewed pertinent reports        Results from last 7 days   Lab Units 19  0943   WBC Thousand/uL 7 09   HEMOGLOBIN g/dL 11 2*   HEMATOCRIT % 36 3*   PLATELETS Thousands/uL 189   BANDS PCT % 3 LYMPHO PCT % 45*   MONO PCT % 2*   EOS PCT % 1     Results from last 7 days   Lab Units 02/21/19  0943   SODIUM mmol/L 134*   POTASSIUM mmol/L 4 2   CHLORIDE mmol/L 98*   CO2 mmol/L 31   BUN mg/dL 12   CREATININE mg/dL 0 92   ANION GAP mmol/L 5   CALCIUM mg/dL 9 2   ALBUMIN g/dL 3 7   TOTAL BILIRUBIN mg/dL 0 47   ALK PHOS U/L 88   ALT U/L 14   AST U/L 14   GLUCOSE RANDOM mg/dL 172*     Results from last 7 days   Lab Units 02/21/19  0943   INR  1 14             Results from last 7 days   Lab Units 02/21/19  0943   LACTIC ACID mmol/L 2 0       Imaging: I have personally reviewed pertinent reports  X-ray chest 1 view portable   Final Result by Femi Turner MD (02/21 1027)      Emphysematous changes are noted  No focal consolidation, pleural effusion, or pneumothorax  Workstation performed: [id number]             EKG, Pathology, and Other Studies Reviewed on Admission:   · EKG: results reviewed    Allscripts / Epic Records Reviewed: Yes     ** Please Note: This note has been constructed using a voice recognition system   **

## 2019-02-21 NOTE — ASSESSMENT & PLAN NOTE
Likely relate dot underlying COPD exacerbation  Chest x-ray negative for evidence of pneumonia, shows chronic emphysematous changes  PLAN:  · Follow blood culture results  · Check UA and procalcitonin  · Sputum cultures  · Azithromycin as mentioned above

## 2019-02-21 NOTE — ASSESSMENT & PLAN NOTE
· Likely related to underlying COPD exacerbation  · Briefly required BiPAP in the ER, now off BiPAP and on 4 L nasal cannula  · Note that he is on 3 - 4 L oxygen at home  PLAN:  · O2 supplementation as needed to maintain O2 sat between 80-92%  · Solu-Medrol 40 mg t i d  IV   · Azithromycin for 5 day course, check procalcitonin, if procalcitonin is high , will add ceftriaxone  · Continue with bronchodilators  · Continue with Breo and Spiriva  · Respiratory protocol  · Send sputum cultures  · Follow influenza swab results  · If patient is lethargic or needs BiPAP or high-flow nasal cannula>> will consult pulmonology    Follows up with Dr Valarie Bowman outpatient

## 2019-02-21 NOTE — ED PROVIDER NOTES
Emergency Department Note- Elena Myles 77 y o  male MRN: [id number]    Unit/Bed#: ED 20 Encounter: [id number]        History of Present Illness   HPI:  Elena Myles is a 77 y o  male who presents with COPD exacerbation  Patient with a history of COPD on 4 L at home not a current smoker presents today with increased shortness of breath cough with sputum production  Patient states he started getting more short of breath last night and this morning was having a lot of difficulty breathing  Per EMS patient was satting in the 46s upon arrival and look cyanotic he was immediately placed on CPAP and brought to the emergency department  He patient sats came into the 90s with the CPAP and he feels improved  Patient denies chest pain abdominal pain nausea vomiting diarrhea no urinary symptoms no headache no lightheadedness no dizziness no numbness tingling or weakness in his extremities  Patient states he has been compliant with his medications and is not on any chronic steroids  REVIEW OF SYSTEMS    Constitutional: Negative for chills, positive fatigue and negative fever  HENT: Negative for ear pain, sore throat and trouble swallowing  Eyes: Negative for photophobia, pain and visual disturbance  Respiratory:  Positive for cough, negative chest tightness and positive shortness of breath  Cardiovascular: Negative for chest pain and palpitations  Gastrointestinal: Negative for abdominal pain, constipation, diarrhea, nausea and vomiting  Genitourinary: Negative for dysuria, flank pain, frequency and hematuria  Musculoskeletal: Negative for back pain and neck pain  Skin: Negative for color change and rash  Neurological: Negative for dizziness, weakness, light-headedness and headaches  Psychiatric/Behavioral: Negative for confusion  The patient is not nervous/anxious      All systems reviewed and negative except as noted above or in HPI         Historical Information   Past Medical History: Diagnosis Date    Anxiety     Aortic regurgitation     Atrial flutter (HCC)     Chronic respiratory failure (HCC)     Colon polyp     COPD (chronic obstructive pulmonary disease) (HCC)     GI bleed     Hypertension     Iron deficiency anemia     MGUS (monoclonal gammopathy of unknown significance)     Osteoarthritis of hip     PAC (premature atrial contraction)     Paroxysmal atrial fibrillation (HCC)     Patent foramen ovale     Pulmonary artery hypertension (HCC)      Past Surgical History:   Procedure Laterality Date    APPENDECTOMY      COLON SURGERY      HERNIA REPAIR      JOINT REPLACEMENT      KNEE SURGERY       Social History   Social History     Substance and Sexual Activity   Alcohol Use No     Social History     Substance and Sexual Activity   Drug Use No     Social History     Tobacco Use   Smoking Status Former Smoker    Packs/day: 1 50    Years: 42 00    Pack years: 63 00    Types: Cigarettes    Start date:     Last attempt to quit:     Years since quittin 1   Smokeless Tobacco Never Used     Family History:   Family History   Problem Relation Age of Onset    Cancer Mother     Heart attack Father     Sudden death Father        Meds/Allergies     (Not in a hospital admission)  Allergies   Allergen Reactions    Penicillins Anaphylaxis       Objective   Vitals: Blood pressure (!) 172/77, pulse (!) 116, resp  rate (!) 38, SpO2 96 %      PHYSICAL EXAM     General Appearance: alert and oriented, nad, non toxic appearing  Skin:  Warm, dry, intact  HEENT: atraumatic, normocephalic, eomi, perll   Neck: Supple, no JVD, no lymphadenopathy, trachea midline, no bruit  Cardiac: rrr, no murmurs, rub, gallops  Pulmonary: lungs cta, decreased breath sounds with bilateral wheezes no rales, rhonchi  Gastrointestinal: abdomen soft nontender, good bs, no mass or bruits, no cva tenderness  Extremities: no pedal edema, good pulses, no calf tenderness, no clubbing, no cyanosis  Neuro:  no focal motor or sensory deficits, cn intact  Psych:  Normal mood and affect, normal judgement and insight      Lab Results: Lab Results: I have personally reviewed pertinent lab results  Imaging: I have personally reviewed pertinent reports  EKG, Pathology, and Other Studies: I have personally reviewed pertinent films in PACS    Assessment/Plan     ED Medical Decision Making:  Patient with COPD exacerbation now requiring BiPAP  Will do a cardiac and septic workup with lactate blood cultures check a rapid flu  Will check a VBG chest x-ray and EKG  Will give the patient a heart nebulizer and Solu-Medrol and reassess  I discussed patient's code status with him and he is DNR/ DNI  ECG 12 Lead Documentation  Date/Time: today/date: 2/21/2019  Performed by: Sherlyn Dow  Authorized by: Sherlyn Dow     ECG reviewed by me, the ED Provider: yes    Patient location:  ED   Previous ECG:  Compared to current, no change   Rate:  ECG rate assessment: normal    Rhythm: sinus rhythm    Ectopy:  : none    QRS axis:  Normal  Intervals: normal   Q waves: None   ST segments:  Normal  T waves: normal      Impression:  Sinus tachycardia        CC time for this patient is 35minutes not including time for procedures documented elsewhere or time spent teaching  Portions of the record may have been created with voice recognition software  Occasional wrong word or "sound a like" substitutions may have occurred due to the inherent limitations of voice recognition software  Read the chart carefully and recognize, using context, where substitutions have occurred  Tamir Handy MD  02/21/19 2413    Patient did not tolerate the BiPAP and wanted us to remove it  Patient respiratory rate and work of breathing did improve  Patient's pCO2 improved  Patient will be admitted to level to step-down       Tamir Handy MD  02/21/19 3347

## 2019-02-22 PROBLEM — I10 HYPERTENSION: Status: ACTIVE | Noted: 2019-02-22

## 2019-02-22 PROBLEM — I16.0 HYPERTENSIVE URGENCY: Status: ACTIVE | Noted: 2019-02-22

## 2019-02-22 LAB
BACTERIA UR QL AUTO: ABNORMAL /HPF
BILIRUB UR QL STRIP: NEGATIVE
CLARITY UR: CLEAR
COLOR UR: YELLOW
GLUCOSE UR STRIP-MCNC: ABNORMAL MG/DL
HGB UR QL STRIP.AUTO: NEGATIVE
HYALINE CASTS #/AREA URNS LPF: ABNORMAL /LPF
KETONES UR STRIP-MCNC: ABNORMAL MG/DL
LEUKOCYTE ESTERASE UR QL STRIP: ABNORMAL
NITRITE UR QL STRIP: NEGATIVE
NON-SQ EPI CELLS URNS QL MICRO: ABNORMAL /HPF
PH UR STRIP.AUTO: 6 [PH] (ref 4.5–8)
PROCALCITONIN SERPL-MCNC: 1.83 NG/ML
PROT UR STRIP-MCNC: ABNORMAL MG/DL
RBC #/AREA URNS AUTO: ABNORMAL /HPF
SP GR UR STRIP.AUTO: 1.03 (ref 1–1.03)
UROBILINOGEN UR QL STRIP.AUTO: 0.2 E.U./DL
WBC #/AREA URNS AUTO: ABNORMAL /HPF

## 2019-02-22 PROCEDURE — 94760 N-INVAS EAR/PLS OXIMETRY 1: CPT

## 2019-02-22 PROCEDURE — 87633 RESP VIRUS 12-25 TARGETS: CPT | Performed by: HOSPITALIST

## 2019-02-22 PROCEDURE — 84145 PROCALCITONIN (PCT): CPT | Performed by: INTERNAL MEDICINE

## 2019-02-22 PROCEDURE — 94640 AIRWAY INHALATION TREATMENT: CPT

## 2019-02-22 PROCEDURE — 81001 URINALYSIS AUTO W/SCOPE: CPT | Performed by: INTERNAL MEDICINE

## 2019-02-22 PROCEDURE — 99232 SBSQ HOSP IP/OBS MODERATE 35: CPT | Performed by: HOSPITALIST

## 2019-02-22 RX ORDER — SODIUM CHLORIDE FOR INHALATION 0.9 %
3 VIAL, NEBULIZER (ML) INHALATION EVERY 4 HOURS PRN
Status: DISCONTINUED | OUTPATIENT
Start: 2019-02-22 | End: 2019-02-26 | Stop reason: HOSPADM

## 2019-02-22 RX ORDER — DILTIAZEM HYDROCHLORIDE 120 MG/1
120 CAPSULE, COATED, EXTENDED RELEASE ORAL DAILY
Status: DISCONTINUED | OUTPATIENT
Start: 2019-02-23 | End: 2019-02-26 | Stop reason: HOSPADM

## 2019-02-22 RX ORDER — LEVOFLOXACIN 500 MG/1
500 TABLET, FILM COATED ORAL EVERY 24 HOURS
Status: DISCONTINUED | OUTPATIENT
Start: 2019-02-22 | End: 2019-02-23

## 2019-02-22 RX ORDER — LEVOFLOXACIN 500 MG/1
500 TABLET, FILM COATED ORAL EVERY 24 HOURS
Status: DISCONTINUED | OUTPATIENT
Start: 2019-02-22 | End: 2019-02-22

## 2019-02-22 RX ADMIN — LEVALBUTEROL HYDROCHLORIDE 1.25 MG: 1.25 SOLUTION, CONCENTRATE RESPIRATORY (INHALATION) at 00:27

## 2019-02-22 RX ADMIN — AZITHROMYCIN 500 MG: 250 TABLET, FILM COATED ORAL at 11:19

## 2019-02-22 RX ADMIN — LEVALBUTEROL HYDROCHLORIDE 1.25 MG: 1.25 SOLUTION, CONCENTRATE RESPIRATORY (INHALATION) at 07:42

## 2019-02-22 RX ADMIN — POTASSIUM CHLORIDE 20 MEQ: 1500 TABLET, EXTENDED RELEASE ORAL at 09:21

## 2019-02-22 RX ADMIN — ENOXAPARIN SODIUM 40 MG: 40 INJECTION SUBCUTANEOUS at 09:22

## 2019-02-22 RX ADMIN — CITALOPRAM HYDROBROMIDE 20 MG: 20 TABLET ORAL at 09:21

## 2019-02-22 RX ADMIN — IPRATROPIUM BROMIDE 0.5 MG: 0.5 SOLUTION RESPIRATORY (INHALATION) at 19:26

## 2019-02-22 RX ADMIN — FLUTICASONE FUROATE AND VILANTEROL TRIFENATATE 1 PUFF: 100; 25 POWDER RESPIRATORY (INHALATION) at 09:24

## 2019-02-22 RX ADMIN — FOLIC ACID 1 MG: 1 TABLET ORAL at 09:21

## 2019-02-22 RX ADMIN — METHYLPREDNISOLONE SODIUM SUCCINATE 40 MG: 40 INJECTION, POWDER, FOR SOLUTION INTRAMUSCULAR; INTRAVENOUS at 13:37

## 2019-02-22 RX ADMIN — IPRATROPIUM BROMIDE 0.5 MG: 0.5 SOLUTION RESPIRATORY (INHALATION) at 13:39

## 2019-02-22 RX ADMIN — ISODIUM CHLORIDE 3 ML: 0.03 SOLUTION RESPIRATORY (INHALATION) at 00:27

## 2019-02-22 RX ADMIN — LEVALBUTEROL HYDROCHLORIDE 1.25 MG: 1.25 SOLUTION, CONCENTRATE RESPIRATORY (INHALATION) at 19:26

## 2019-02-22 RX ADMIN — ISODIUM CHLORIDE 3 ML: 0.03 SOLUTION RESPIRATORY (INHALATION) at 07:42

## 2019-02-22 RX ADMIN — FERROUS SULFATE TAB 325 MG (65 MG ELEMENTAL FE) 325 MG: 325 (65 FE) TAB at 09:21

## 2019-02-22 RX ADMIN — MELATONIN 6 MG: at 21:36

## 2019-02-22 RX ADMIN — METHYLPREDNISOLONE SODIUM SUCCINATE 40 MG: 40 INJECTION, POWDER, FOR SOLUTION INTRAMUSCULAR; INTRAVENOUS at 05:27

## 2019-02-22 RX ADMIN — TIOTROPIUM BROMIDE 18 MCG: 18 CAPSULE ORAL; RESPIRATORY (INHALATION) at 09:24

## 2019-02-22 RX ADMIN — DILTIAZEM HYDROCHLORIDE 180 MG: 180 CAPSULE, COATED, EXTENDED RELEASE ORAL at 09:22

## 2019-02-22 RX ADMIN — LEVOFLOXACIN 500 MG: 500 TABLET, FILM COATED ORAL at 12:04

## 2019-02-22 RX ADMIN — METHYLPREDNISOLONE SODIUM SUCCINATE 40 MG: 40 INJECTION, POWDER, FOR SOLUTION INTRAMUSCULAR; INTRAVENOUS at 21:36

## 2019-02-22 RX ADMIN — FUROSEMIDE 10 MG: 20 TABLET ORAL at 09:21

## 2019-02-22 RX ADMIN — LEVALBUTEROL HYDROCHLORIDE 1.25 MG: 1.25 SOLUTION, CONCENTRATE RESPIRATORY (INHALATION) at 13:39

## 2019-02-22 NOTE — UTILIZATION REVIEW
Initial Clinical Review    Admission: Date/Time/Statement: 2/21/19 @ 1258   Orders Placed This Encounter   Procedures    Inpatient Admission     Standing Status:   Standing     Number of Occurrences:   1     Order Specific Question:   Admitting Physician     Answer:   Ranjit Freitas [[id number]]     Order Specific Question:   Level of Care     Answer:   Level 2 Stepdown / HOT [14]     Order Specific Question:   Estimated length of stay     Answer:   More than 2 Midnights     Order Specific Question:   Certification     Answer:   I certify that inpatient services are medically necessary for this patient for a duration of greater than two midnights  See H&P and MD Progress Notes for additional information about the patient's course of treatment  ED: Date/Time/Mode of Arrival:   ED Arrival Information     Expected Arrival Acuity Means of Arrival Escorted By Service Admission Type    - 2/21/2019 09:33 [facility] Sebastian Hospitalist Emergency    Arrival Complaint    sob        Chief Complaint:   Chief Complaint   Patient presents with    Shortness of Breath     hx of copd,sob at home  Pulse ox at home 50%  Normally wears 4 L home o2  Placed on bipap by EMS for work of breathing and RR     History of Illness: Monico Cross is a 77 y o  male with known history of COPD on 4 L oxygen at home presented with cough and shortness of breath which has been going on for 2 days  Patient says that he has history of COPD and uses 4 L oxygen at baseline with his rescue inhaler and other therapy for COPD  He says that since yesterday he started feeling short of breath and started having cough with clear sputum production since this morning  He had increased O2 requirement which prompted him to come to ER  In ER, patient was initially hypoxic and hypercarbic requiring BiPAP for a brief period of time with resolution of hypercarbia  Hypoxia also improved with nebulizer treatment and steroids    Patient was weaned off BiPAP and was on nasal cannula in the ER  During my interaction with patient in ER, he reported that he is feeling much better than at the time of admission  He noted history of sick contacts with his grandkids  Denied any fever or chills at home but had low-grade fever in ER  Received flu vaccination this year              ED Vital Signs:   ED Triage Vitals   Temperature Pulse Respirations Blood Pressure SpO2   02/21/19 0958 02/21/19 0937 02/21/19 0937 02/21/19 0937 02/21/19 0933   (!) 100 9 °F (38 3 °C) (!) 116 (!) 38 (!) 172/77 (!) 50 %      Temp Source Heart Rate Source Patient Position - Orthostatic VS BP Location FiO2 (%)   02/21/19 0958 -- -- 02/21/19 0937 --   Rectal   Left arm       Pain Score       02/21/19 0937       No Pain        Wt Readings from Last 1 Encounters:   02/22/19 65 2 kg (143 lb 11 8 oz)     Vital Signs (abnormal):    above    Pertinent Labs/Diagnostic Test Results:    ABG    PCO2     58 8     PO2     98 8      HCO3    31 1      H/H  11 2/36 3  NA  134  Chloride   98  CXR:     Emphysematous changes are noted   No focal consolidation, pleural effusion, or pneumothorax      ED Treatment:   Medication Administration from 02/21/2019 0933 to 02/21/2019 1532       Date/Time Order Dose Route Action Action by Comments     02/21/2019 1005 albuterol inhalation solution 10 mg 10 mg Nebulization Given Sandra Wesley, RT      02/21/2019 1005 ipratropium (ATROVENT) 0 02 % inhalation solution 1 mg 1 mg Nebulization Given Jenniffer Wesley, RT      02/21/2019 1005 sodium chloride 0 9 % inhalation solution 3 mL 3 mL Nebulization [facility]      02/21/2019 0958 methylPREDNISolone sodium succinate (Solu-MEDROL) injection 125 mg 125 mg Intravenous Given Dee Zamudio RN      02/21/2019 1150 azithromycin (ZITHROMAX) 500 mg in sodium chloride 0 9% 250mL IVPB 500 mg 0 mg Intravenous Stopped Dee Zamudio RN      02/21/2019 1048 azithromycin (ZITHROMAX) 500 mg in sodium chloride 0 9% 250mL IVPB 500 mg 500 mg Intravenous New Bag Dariel Prado RN         Past Medical/Surgical History: Active Ambulatory Problems     Diagnosis Date Noted    Acute on chronic respiratory failure with hypoxia and hypercapnia (HCC)     Macrocytic anemia     Pulmonary artery hypertension (HCC)     Aortic regurgitation     Low TSH level 08/28/2017    [facility] 08[facility]31/2017    COPD exacerbation ([facility] ) 04/09/2018     Resolved Ambulatory Problems     Diagnosis Date Noted    Atrial flutter ([facility] )     Severe COPD with acute exacerbation 08/27/2017    Supratherapeutic INR 11/05/2017     Past Medical History:   Diagnosis Date    Anxiety     Aortic regurgitation     Atrial flutter (HCC)     Chronic respiratory failure (HCC)     Colon polyp     COPD (chronic obstructive pulmonary disease) (HCC)     GI bleed     Hypertension     Iron deficiency anemia     MGUS (monoclonal gammopathy of unknown significance)     Osteoarthritis of hip     PAC (premature atrial contraction)     Paroxysmal atrial fibrillation (HCC)     Patent foramen ovale     Pulmonary artery hypertension (HCC)      Admitting Diagnosis: Respiratory distress [R06 03]  SOB (shortness of breath) [R06 02]  COPD with acute exacerbation (HCC) [J44 1]  Acute respiratory failure with hypoxia and hypercarbia (HCC) [J96 01, J96 02]  Age/Sex: 77 y o  male     Assessment/Plan: Acute on chronic respiratory failure with hypoxia and hypercapnia (HCC)  Assessment & Plan  · Likely related to underlying COPD exacerbation  · Briefly required BiPAP in the ER, now off BiPAP and on 4 L nasal cannula  · Note that he is on 3 - 4 L oxygen at home      PLAN:  · O2 supplementation as needed to maintain O2 sat between 80-92%  · Solu-Medrol 40 mg t i d  IV   · Azithromycin for 5 day course, check procalcitonin, if procalcitonin is high , will add ceftriaxone    · Continue with bronchodilators  · Continue with Breo and Spiriva  · Respiratory protocol  · Send sputum cultures  · Follow influenza swab results  · If patient is lethargic or needs BiPAP or high-flow nasal cannula>> will consult pulmonology  Follows up with Dr Isela Major outpatient     COPD exacerbation [facility])  Assessment & Plan  Known history of COPD with chronic respiratory failure requiring 4 L oxygen at home  Likely trigger viral bronchitis  Positive history of sick contacts  PLAN:  · Management as mentioned above      SIRS (systemic inflammatory response syndrome) (HCC)  Assessment & Plan  Likely relate dot underlying COPD exacerbation  Chest x-ray negative for evidence of pneumonia, shows chronic emphysematous changes      PLAN:  · Follow blood culture results  · Check UA and procalcitonin  · Sputum cultures  · Azithromycin as mentioned above      Macrocytic anemia  Assessment & Plan  Hemoglobin at baseline between 10-11  Continue folic acid and iron supplementation  Check vitamin B12 and TSH   Anticipated Length of Stay:  Patient will be admitted on an Inpatient basis with an anticipated length of stay of  more than 2 midnights     Justification for Hospital Stay:  COPD exacerbation                      Admission Orders:    IP  2/21   @   1259  Scheduled Meds:   Current Facility-Administered Medications:  albuterol 2 puff Inhalation Q4H PRN Brandie Colmenares MD   albuterol 2 5 mg Nebulization Q4H PRN Brandie Colmenares MD   citalopram 20 mg Oral Daily Brandie Colmenares MD   diltiazem 180 mg Oral Daily Brandie Colmenares MD   enoxaparin 40 mg Subcutaneous Daily Brandie Colmenares MD   ferrous sulfate 325 mg Oral Daily Brandie Colmenares MD   fluticasone-vilanterol 1 puff Inhalation Daily Brandie Colmenares MD   folic acid 1 mg Oral Daily Brandie Colmenares MD   furosemide 10 mg Oral Daily Brandie Colmenares MD   ipratropium 0 5 mg Nebulization TID Janeth Sims MD   levalbuterol 1 25 mg Nebulization Q6H Brandie Colmenares MD   levofloxacin 500 mg Oral Q24H Janeth Sims MD   melatonin 6 mg Oral HS PRN Griffin Layton MD   methylPREDNISolone sodium succinate 40 mg Intravenous [facility] Griffin Layton MD   potassium chloride 20 mEq Oral Daily Griffin Layton MD   sodium chloride 3 mL Nebulization Q4H PRN Elizabeth Walters MD     Continuous Infusions:    PRN Meds:   albuterol    albuterol    melatonin    sodium chloride     Cardiac  Diet  Sputum c/s  BiPap - now  Transitioned to  5  L  NC

## 2019-02-22 NOTE — PLAN OF CARE
Problem: Potential for Falls  Goal: Patient will remain free of falls  Description  INTERVENTIONS:  - Assess patient frequently for physical needs  -  Identify cognitive and physical deficits and behaviors that affect risk of falls    -  [place] fall precautions as indicated by assessment   - Educate patient/family on patient safety including physical limitations  - Instruct patient to call for assistance with activity based on assessment  - Modify environment to reduce risk of injury  - Consider OT/PT consult to assist with strengthening/mobility  Outcome: Progressing     Problem: RESPIRATORY - ADULT  Goal: Achieves optimal ventilation and oxygenation  Description  INTERVENTIONS:  - Assess for changes in respiratory status  - Assess for changes in mentation and behavior  - Position to facilitate oxygenation and minimize respiratory effort  - Oxygen administration by appropriate delivery method based on oxygen saturation (per order) or ABGs  - Initiate smoking cessation education as indicated  - Encourage broncho-pulmonary hygiene including cough, deep breathe, Incentive Spirometry  - Assess the need for suctioning and aspirate as needed  - Assess and instruct to report SOB or any respiratory difficulty  - Respiratory Therapy support as indicated  Outcome: Progressing     Problem: SKIN/TISSUE INTEGRITY - ADULT  Goal: Skin integrity remains intact  Description  INTERVENTIONS  - Identify patients at risk for skin breakdown  - Assess and monitor skin integrity  - Assess and monitor nutrition and hydration status  - Monitor labs (i e  albumin)  - Assess for incontinence   - Turn and reposition patient  - Assist with mobility/ambulation  - Relieve pressure over bony prominences  - Avoid friction and shearing  - Provide appropriate hygiene as needed including keeping skin clean and dry  - Evaluate need for skin moisturizer/barrier cream  - Collaborate with interdisciplinary team (i e  Nutrition, Rehabilitation, etc ) - Patient/family teaching  Outcome: Progressing     Problem: MUSCULOSKELETAL - ADULT  Goal: Maintain or return mobility to safest level of function  Description  INTERVENTIONS:  - Assess patient's ability to carry out ADLs; assess patient's baseline for ADL function and identify physical deficits which impact ability to perform ADLs (bathing, care of mouth/teeth, toileting, grooming, dressing, etc )  - Assess/evaluate cause of self-care deficits   - Assess range of motion  - Assess patient's mobility; develop plan if impaired  - Assess patient's need for assistive devices and provide as appropriate  - Encourage maximum independence but intervene and supervise when necessary  - Involve family in performance of ADLs  - Assess for home care needs following discharge   - Request OT consult to assist with ADL evaluation and planning for discharge  - Provide patient education as appropriate  Outcome: Progressing     Problem: DISCHARGE PLANNING - CARE MANAGEMENT  Goal: Discharge to post-acute care or home with appropriate resources  Description  INTERVENTIONS:  - Conduct assessment to determine patient/family and health care team treatment goals, and need for post-acute services based on payer coverage, community resources, and patient preferences, and barriers to discharge  - Address psychosocial, clinical, and financial barriers to discharge as identified in assessment in conjunction with the patient/family and health care team  - Arrange appropriate level of post-acute services according to patient's   needs and preference and payer coverage in collaboration with the physician and health care team  - Communicate with and update the patient/family, physician, and health care team regarding progress on the discharge plan  - Arrange appropriate transportation to post-acute venues  -Anticipate return to home with [address]   Outcome: Progressing

## 2019-02-22 NOTE — ASSESSMENT & PLAN NOTE
Hemoglobin at baseline between 10-11  Continue folic acid and iron supplementation    Check vitamin B12 and TSH

## 2019-02-22 NOTE — PLAN OF CARE
Problem: DISCHARGE PLANNING - CARE MANAGEMENT  Goal: Discharge to post-acute care or home with appropriate resources  Description  INTERVENTIONS:  - Conduct assessment to determine patient/family and health care team treatment goals, and need for post-acute services based on payer coverage, community resources, and patient preferences, and barriers to discharge  - Address psychosocial, clinical, and financial barriers to discharge as identified in assessment in conjunction with the patient/family and health care team  - Arrange appropriate level of post-acute services according to patient's   needs and preference and payer coverage in collaboration with the physician and health care team  - Communicate with and update the patient/family, physician, and health care team regarding progress on the discharge plan  - Arrange appropriate transportation to post-acute venues  -Anticipate return to home with [facility]  services   Outcome: Progressing

## 2019-02-22 NOTE — PLAN OF CARE
Problem: Potential for Falls  Goal: Patient will remain free of falls  Description  INTERVENTIONS:  - Assess patient frequently for physical needs  -  Identify cognitive and physical deficits and behaviors that affect risk of falls    -  [place] fall precautions as indicated by assessment   - Educate patient/family on patient safety including physical limitations  - Instruct patient to call for assistance with activity based on assessment  - Modify environment to reduce risk of injury  - Consider OT/PT consult to assist with strengthening/mobility  Outcome: Progressing     Problem: RESPIRATORY - ADULT  Goal: Achieves optimal ventilation and oxygenation  Description  INTERVENTIONS:  - Assess for changes in respiratory status  - Assess for changes in mentation and behavior  - Position to facilitate oxygenation and minimize respiratory effort  - Oxygen administration by appropriate delivery method based on oxygen saturation (per order) or ABGs  - Initiate smoking cessation education as indicated  - Encourage broncho-pulmonary hygiene including cough, deep breathe, Incentive Spirometry  - Assess the need for suctioning and aspirate as needed  - Assess and instruct to report SOB or any respiratory difficulty  - Respiratory Therapy support as indicated  Outcome: Progressing     Problem: SKIN/TISSUE INTEGRITY - ADULT  Goal: Skin integrity remains intact  Description  INTERVENTIONS  - Identify patients at risk for skin breakdown  - Assess and monitor skin integrity  - Assess and monitor nutrition and hydration status  - Monitor labs (i e  albumin)  - Assess for incontinence   - Turn and reposition patient  - Assist with mobility/ambulation  - Relieve pressure over bony prominences  - Avoid friction and shearing  - Provide appropriate hygiene as needed including keeping skin clean and dry  - Evaluate need for skin moisturizer/barrier cream  - Collaborate with interdisciplinary team (i e  Nutrition, Rehabilitation, etc ) - Patient/family teaching  Outcome: Progressing     Problem: MUSCULOSKELETAL - ADULT  Goal: Maintain or return mobility to safest level of function  Description  INTERVENTIONS:  - Assess patient's ability to carry out ADLs; assess patient's baseline for ADL function and identify physical deficits which impact ability to perform ADLs (bathing, care of mouth/teeth, toileting, grooming, dressing, etc )  - Assess/evaluate cause of self-care deficits   - Assess range of motion  - Assess patient's mobility; develop plan if impaired  - Assess patient's need for assistive devices and provide as appropriate  - Encourage maximum independence but intervene and supervise when necessary  - Involve family in performance of ADLs  - Assess for home care needs following discharge   - Request OT consult to assist with ADL evaluation and planning for discharge  - Provide patient education as appropriate  Outcome: Progressing

## 2019-02-22 NOTE — SOCIAL WORK
Met with pt and discussed role of CM  Pt lives with his wife in a 3-story home with 2 steps at entrance  Pt is independent in ADLs  Pt has DME including: home O2 ([employer]), nebulizer, cane, and RW  Pt currently open to [facility] and is agreeable with referral for resumption of care  Referral made via [facility]  Pt has hx STR at  SNF  Preference for pharmacy is [facility] on [address]  No MH/D&A tx hx  No POA  Main contact: Wife- Dorothea Monge ([phone])  CM reviewed d/c planning process including the following: identifying help at home, patient preference for d/c planning needs, Discharge Lounge, [facility] Meds to Bed program, availability of treatment team to discuss questions or concerns patient and/or family may have regarding understanding medications and recognizing signs and symptoms once discharged  CM also encouraged patient to follow up with all recommended appointments after discharge  Patient advised of importance for patient and family to participate in managing patients medical well being  Patient/caregiver received discharge checklist  Content reviewed  Patient/caregiver encouraged to participate in discharge plan of care prior to discharge home

## 2019-02-22 NOTE — ASSESSMENT & PLAN NOTE
Likely relate dot underlying COPD exacerbation with virla /bacterial bronchitis  CXR neg ofor PNA  UA neg  Flu PCR neg  F/u resp viral pathogen panel

## 2019-02-22 NOTE — ASSESSMENT & PLAN NOTE
· Likely related to underlying COPD exacerbation  · Briefly required BiPAP in the ER, now off BiPAP and on 4 L nasal cannula  · Note that he is on 3 - 4 L oxygen at home      PLAN:  · O2 supplementation as needed to maintain O2 sat between 80-92%  · Continue Solu-Medrol 40 mg t i d  IV   · procalcitonin elevated - will treat with levaquin 500 mg  · Nebs QID  · Continue with Breo  · Flu PCR neg, check viral pathogen  · Follows up with Dr Elana Mcmahon outpatient

## 2019-02-23 LAB
ADENOVIRUS: NOT DETECTED
ANION GAP SERPL CALCULATED.3IONS-SCNC: 3 MMOL/L (ref 4–13)
BASOPHILS # BLD AUTO: 0 THOUSANDS/ΜL (ref 0–0.1)
BASOPHILS NFR BLD AUTO: 0 % (ref 0–1)
BUN SERPL-MCNC: 23 MG/DL (ref 5–25)
C PNEUM DNA SPEC QL NAA+PROBE: NOT DETECTED
CALCIUM SERPL-MCNC: 9.8 MG/DL (ref 8.3–10.1)
CHLORIDE SERPL-SCNC: 100 MMOL/L (ref 100–108)
CO2 SERPL-SCNC: 35 MMOL/L (ref 21–32)
CREAT SERPL-MCNC: 0.73 MG/DL (ref 0.6–1.3)
EOSINOPHIL # BLD AUTO: 0 THOUSAND/ΜL (ref 0–0.61)
EOSINOPHIL NFR BLD AUTO: 0 % (ref 0–6)
ERYTHROCYTE [DISTWIDTH] IN BLOOD BY AUTOMATED COUNT: 13.2 % (ref 11.6–15.1)
FLUAV H1 RNA SPEC QL NAA+PROBE: NOT DETECTED
FLUAV H3 RNA SPEC QL NAA+PROBE: NOT DETECTED
FLUAV RNA SPEC QL NAA+PROBE: NOT DETECTED
FLUBV RNA SPEC QL NAA+PROBE: NOT DETECTED
GFR SERPL CREATININE-BSD FRML MDRD: 94 ML/MIN/1.73SQ M
GLUCOSE SERPL-MCNC: 170 MG/DL (ref 65–140)
HBOV DNA SPEC QL NAA+PROBE: NOT DETECTED
HCOV 229E RNA SPEC QL NAA+PROBE: NOT DETECTED
HCOV HKU1 RNA SPEC QL NAA+PROBE: NOT DETECTED
HCOV NL63 RNA SPEC QL NAA+PROBE: NOT DETECTED
HCOV OC43 RNA SPEC QL NAA+PROBE: NOT DETECTED
HCT VFR BLD AUTO: 29.9 % (ref 36.5–49.3)
HGB BLD-MCNC: 9.5 G/DL (ref 12–17)
HPIV1 RNA SPEC QL NAA+PROBE: NOT DETECTED
HPIV2 RNA SPEC QL NAA+PROBE: NOT DETECTED
HPIV3 RNA SPEC QL NAA+PROBE: NOT DETECTED
HPIV4 RNA SPEC QL NAA+PROBE: NOT DETECTED
IMM GRANULOCYTES # BLD AUTO: 0.03 THOUSAND/UL (ref 0–0.2)
IMM GRANULOCYTES NFR BLD AUTO: 1 % (ref 0–2)
LYMPHOCYTES # BLD AUTO: 0.23 THOUSANDS/ΜL (ref 0.6–4.47)
LYMPHOCYTES NFR BLD AUTO: 6 % (ref 14–44)
M PNEUMO DNA SPEC QL NAA+PROBE: NOT DETECTED
MCH RBC QN AUTO: 31.9 PG (ref 26.8–34.3)
MCHC RBC AUTO-ENTMCNC: 31.8 G/DL (ref 31.4–37.4)
MCV RBC AUTO: 100 FL (ref 82–98)
METAPNEUMOVIRUS: NOT DETECTED
MONOCYTES # BLD AUTO: 0.11 THOUSAND/ΜL (ref 0.17–1.22)
MONOCYTES NFR BLD AUTO: 3 % (ref 4–12)
NEUTROPHILS # BLD AUTO: 3.47 THOUSANDS/ΜL (ref 1.85–7.62)
NEUTS SEG NFR BLD AUTO: 90 % (ref 43–75)
NRBC BLD AUTO-RTO: 0 /100 WBCS
PLATELET # BLD AUTO: 190 THOUSANDS/UL (ref 149–390)
PMV BLD AUTO: 10 FL (ref 8.9–12.7)
POTASSIUM SERPL-SCNC: 4.5 MMOL/L (ref 3.5–5.3)
PROCALCITONIN SERPL-MCNC: 1.05 NG/ML
RBC # BLD AUTO: 2.98 MILLION/UL (ref 3.88–5.62)
RHINOVIRUS RNA SPEC QL NAA+PROBE: NOT DETECTED
RSV A RNA SPEC QL NAA+PROBE: NOT DETECTED
RSV B RNA SPEC QL NAA+PROBE: NOT DETECTED
SODIUM SERPL-SCNC: 138 MMOL/L (ref 136–145)
WBC # BLD AUTO: 3.84 THOUSAND/UL (ref 4.31–10.16)

## 2019-02-23 PROCEDURE — 84145 PROCALCITONIN (PCT): CPT | Performed by: HOSPITALIST

## 2019-02-23 PROCEDURE — 94640 AIRWAY INHALATION TREATMENT: CPT

## 2019-02-23 PROCEDURE — 94760 N-INVAS EAR/PLS OXIMETRY 1: CPT

## 2019-02-23 PROCEDURE — 99232 SBSQ HOSP IP/OBS MODERATE 35: CPT | Performed by: HOSPITALIST

## 2019-02-23 PROCEDURE — 99222 1ST HOSP IP/OBS MODERATE 55: CPT | Performed by: INTERNAL MEDICINE

## 2019-02-23 PROCEDURE — 80048 BASIC METABOLIC PNL TOTAL CA: CPT | Performed by: HOSPITALIST

## 2019-02-23 PROCEDURE — 85025 COMPLETE CBC W/AUTO DIFF WBC: CPT | Performed by: HOSPITALIST

## 2019-02-23 RX ORDER — METHYLPREDNISOLONE SODIUM SUCCINATE 40 MG/ML
40 INJECTION, POWDER, LYOPHILIZED, FOR SOLUTION INTRAMUSCULAR; INTRAVENOUS EVERY 6 HOURS SCHEDULED
Status: DISCONTINUED | OUTPATIENT
Start: 2019-02-23 | End: 2019-02-24

## 2019-02-23 RX ORDER — PANTOPRAZOLE SODIUM 40 MG/1
40 TABLET, DELAYED RELEASE ORAL
Status: DISCONTINUED | OUTPATIENT
Start: 2019-02-24 | End: 2019-02-26 | Stop reason: HOSPADM

## 2019-02-23 RX ORDER — LEVALBUTEROL 1.25 MG/.5ML
1.25 SOLUTION, CONCENTRATE RESPIRATORY (INHALATION)
Status: DISCONTINUED | OUTPATIENT
Start: 2019-02-24 | End: 2019-02-24

## 2019-02-23 RX ORDER — BUDESONIDE 0.5 MG/2ML
0.5 INHALANT ORAL
Status: DISCONTINUED | OUTPATIENT
Start: 2019-02-23 | End: 2019-02-24

## 2019-02-23 RX ADMIN — LEVALBUTEROL HYDROCHLORIDE 1.25 MG: 1.25 SOLUTION, CONCENTRATE RESPIRATORY (INHALATION) at 00:30

## 2019-02-23 RX ADMIN — FUROSEMIDE 10 MG: 20 TABLET ORAL at 08:50

## 2019-02-23 RX ADMIN — IPRATROPIUM BROMIDE 0.5 MG: 0.5 SOLUTION RESPIRATORY (INHALATION) at 19:22

## 2019-02-23 RX ADMIN — METHYLPREDNISOLONE SODIUM SUCCINATE 40 MG: 40 INJECTION, POWDER, FOR SOLUTION INTRAMUSCULAR; INTRAVENOUS at 14:15

## 2019-02-23 RX ADMIN — ALBUTEROL SULFATE 2 PUFF: 90 AEROSOL, METERED RESPIRATORY (INHALATION) at 18:43

## 2019-02-23 RX ADMIN — POTASSIUM CHLORIDE 20 MEQ: 1500 TABLET, EXTENDED RELEASE ORAL at 08:49

## 2019-02-23 RX ADMIN — FERROUS SULFATE TAB 325 MG (65 MG ELEMENTAL FE) 325 MG: 325 (65 FE) TAB at 08:50

## 2019-02-23 RX ADMIN — IPRATROPIUM BROMIDE 0.5 MG: 0.5 SOLUTION RESPIRATORY (INHALATION) at 13:51

## 2019-02-23 RX ADMIN — FLUTICASONE FUROATE AND VILANTEROL TRIFENATATE 1 PUFF: 100; 25 POWDER RESPIRATORY (INHALATION) at 08:50

## 2019-02-23 RX ADMIN — LEVOFLOXACIN 500 MG: 500 TABLET, FILM COATED ORAL at 12:23

## 2019-02-23 RX ADMIN — METHYLPREDNISOLONE SODIUM SUCCINATE 40 MG: 40 INJECTION, POWDER, FOR SOLUTION INTRAMUSCULAR; INTRAVENOUS at 20:48

## 2019-02-23 RX ADMIN — ALBUTEROL SULFATE 2 PUFF: 90 AEROSOL, METERED RESPIRATORY (INHALATION) at 09:50

## 2019-02-23 RX ADMIN — METHYLPREDNISOLONE SODIUM SUCCINATE 40 MG: 40 INJECTION, POWDER, FOR SOLUTION INTRAMUSCULAR; INTRAVENOUS at 05:21

## 2019-02-23 RX ADMIN — FOLIC ACID 1 MG: 1 TABLET ORAL at 08:49

## 2019-02-23 RX ADMIN — DILTIAZEM HYDROCHLORIDE 120 MG: 120 CAPSULE, COATED, EXTENDED RELEASE ORAL at 08:53

## 2019-02-23 RX ADMIN — BUDESONIDE 0.5 MG: 0.5 INHALANT RESPIRATORY (INHALATION) at 19:22

## 2019-02-23 RX ADMIN — IPRATROPIUM BROMIDE 0.5 MG: 0.5 SOLUTION RESPIRATORY (INHALATION) at 00:30

## 2019-02-23 RX ADMIN — IPRATROPIUM BROMIDE 0.5 MG: 0.5 SOLUTION RESPIRATORY (INHALATION) at 08:50

## 2019-02-23 RX ADMIN — ENOXAPARIN SODIUM 40 MG: 40 INJECTION SUBCUTANEOUS at 08:50

## 2019-02-23 RX ADMIN — LEVALBUTEROL HYDROCHLORIDE 1.25 MG: 1.25 SOLUTION, CONCENTRATE RESPIRATORY (INHALATION) at 19:22

## 2019-02-23 RX ADMIN — LEVALBUTEROL HYDROCHLORIDE 1.25 MG: 1.25 SOLUTION, CONCENTRATE RESPIRATORY (INHALATION) at 08:50

## 2019-02-23 RX ADMIN — LEVALBUTEROL HYDROCHLORIDE 1.25 MG: 1.25 SOLUTION, CONCENTRATE RESPIRATORY (INHALATION) at 13:51

## 2019-02-23 RX ADMIN — CITALOPRAM HYDROBROMIDE 20 MG: 20 TABLET ORAL at 08:49

## 2019-02-23 NOTE — PROGRESS NOTES
Progress Note - Jimbo Richardson 1950, 77 y o  male MRN: [id number]    Unit/Bed#: [facility] 930-01 Encounter: [id number]    Primary Care Provider: Colby Casillas MD   Date and time admitted to hospital: 2/21/2019  9:34 AM        Hypertensive urgency  Assessment & Plan  · POA now hypotension  · Decrease cardizem to 120 mg  · Cont lasix 10 mg with steroids    [facility]systemic inflammatory response syndrome) ([facility] )  Assessment & Plan  Likely relate dot underlying COPD exacerbation with virla /bacterial bronchitis  CXR neg ofor PNA  UA neg  Flu PCR neg  F/u resp viral pathogen panel    COPD exacerbation (HCC)  Assessment & Plan  Known history of COPD with chronic respiratory failure requiring 4 L oxygen at home  Likely trigger viral bronchitis  Positive history of sick contacts  PLAN:  · Management as mentioned above  Macrocytic anemia  Assessment & Plan  Hemoglobin at baseline between 10-11  Continue folic acid and iron supplementation  * Acute on chronic respiratory failure with hypoxia and hypercapnia (HCC)  Assessment & Plan  · Likely related to underlying COPD exacerbation  · Briefly required BiPAP in the ER, now off BiPAP and on 4 L nasal cannula  · Note that he is on 3 - 4 L oxygen at home      PLAN:  · Appears to be using accesory muscle at rest, no significant wheezing but diminished all over  · O2 supplementation as needed to maintain O2 sat between 88-90%  · increase Solu-Medrol 40 mg QID  · procalcitonin elevated - will treat with levaquin 500 mg  · Nebs QID  · Hold breo, add pulmocort  · Flu PCR neg, check viral pathogen  · Follows up with Dr Norbert Espinoza outpatient - consult pulmonary here    [facility]  Patient: Jimbo Richardson 77 y o  male   MRN: [id number]  PCP: Colby Casillas MD  Unit/Bed#: [facility] 930-01 Encounter: [id number]  Date Of Visit: 02/23/19    Assessment:    Principal Problem:    Acute on chronic respiratory failure with hypoxia and hypercapnia ([facility] )  Active Problems:    Macrocytic anemia    COPD exacerbation (HCC)    SIRS (systemic inflammatory response syndrome) (HCC)    Hypertensive urgency      VTE Pharmacologic Prophylaxis:   Pharmacologic: Enoxaparin (Lovenox)  Mechanical VTE Prophylaxis in Place: Yes    Patient Centered Rounds: I have performed bedside rounds with nursing staff today  Discussions with Specialists or Other Care Team Provider:     Education and Discussions with Family / Patient: patient    Time Spent for Care: 30 minutes  More than 50% of total time spent on counseling and coordination of care as described above  Current Length of Stay: 2 day(s)    Current Patient Status: Inpatient   Certification Statement: The patient will continue to require additional inpatient hospital stay due to IV steroids, monitor resp status    Discharge Plan / Estimated Discharge Date: once stable    Code Status: Level 3 - DNAR and DNI      Subjective:   Feels ok, breathing is the same    Objective:     Vitals:   Temp (24hrs), Av 9 °F (36 6 °C), Min:97 7 °F (36 5 °C), Max:98 06 °F (36 7 °C)    Temp:  [97 7 °F (36 5 °C)-98 06 °F (36 7 °C)] 97 9 °F (36 6 °C)  HR:  [84-98] 98  Resp:  [16-22] 19  BP: ()/(50-66) 117/52  SpO2:  [90 %-98 %] 97 %  Body mass index is 22 12 kg/m²  Input and Output Summary (last 24 hours): Intake/Output Summary (Last 24 hours) at 2019 1455  Last data filed at 2019 1233  Gross per 24 hour   Intake 1330 ml   Output 850 ml   Net 480 ml       Physical Exam:     Physical Exam   Constitutional: He is oriented to person, place, and time  He appears well-developed and well-nourished  HENT:   Head: Normocephalic and atraumatic  Mouth/Throat: Oropharynx is clear and moist    Cardiovascular: Normal rate and regular rhythm  Exam reveals no friction rub  No murmur heard  Pulmonary/Chest: Effort normal and breath sounds normal  No stridor  No respiratory distress     Using accessory muscles  Diminished expiration, faint wheeze   Abdominal: Soft  He exhibits no distension  There is no tenderness  Musculoskeletal: He exhibits no edema  Neurological: He is alert and oriented to person, place, and time  Vitals reviewed  Additional Data:     Labs:    Results from last 7 days   Lab Units 02/23/19  0642   WBC Thousand/uL 3 84*   HEMOGLOBIN g/dL 9 5*   HEMATOCRIT % 29 9*   PLATELETS Thousands/uL 190   NEUTROS PCT % 90*   LYMPHS PCT % 6*   MONOS PCT % 3*   EOS PCT % 0     Results from last 7 days   Lab Units 02/23/19  0642 02/21/19  0943   POTASSIUM mmol/L 4 5 4 2   CHLORIDE mmol/L 100 98*   CO2 mmol/L 35* 31   BUN mg/dL 23 12   CREATININE mg/dL 0 73 0 92   CALCIUM mg/dL 9 8 9 2   ALK PHOS U/L  --  88   ALT U/L  --  14   AST U/L  --  14     Results from last 7 days   Lab Units 02/21/19  0943   INR  1 14       * I Have Reviewed All Lab Data Listed Above  * Additional Pertinent Lab Tests Reviewed: All Labs Within Last 24 Hours Reviewed    Imaging:    Imaging Reports Reviewed Today Include:   Imaging Personally Reviewed by Myself Includes:      Recent Cultures (last 7 days):     Results from last 7 days   Lab Units 02/21/19  1921 02/21/19  1009 02/21/19  0945   BLOOD CULTURE   --   --  No Growth at 48 hrs  No Growth at 48 hrs  SPUTUM CULTURE  Test not performed  Suggest repeat specimen  --   --    GRAM STAIN RESULT  >10 squamous epithelial cells/lpf, indicating orophayngeal contamination  *  1+ Polys*  2+ Gram positive cocci in pairs*  2+ Gram negative rods*  1+ Gram positive rods*  Rare Budding yeast*  --   --    INFLUENZA B PCR   --  Not Detected  --    RSV PCR   --  Not Detected  --        Last 24 Hours Medication List:     Current Facility-Administered Medications:  albuterol 2 puff Inhalation Q4H PRN Daron Lindquist MD   albuterol 2 5 mg Nebulization Q4H PRN Daron Lindquist MD   budesonide 0 5 mg Nebulization Q12H Berta Childress MD   citalopram 20 mg Oral Daily Daron Lindquist MD   diltiazem 120 mg Oral Daily Vida HANNON MD Maggy   enoxaparin 40 mg Subcutaneous Daily Ayah Salinas MD   ferrous sulfate 325 mg Oral Daily Ayah Salinas MD   folic acid 1 mg Oral Daily Ayah Salinas MD   furosemide 10 mg Oral Daily Ayah Salinas MD   ipratropium 0 5 mg Nebulization Q6H Vida Winter MD   levalbuterol 1 25 mg Nebulization Q6H Ayah Salinas MD   levofloxacin 500 mg Oral Q24H Vida Winter MD   melatonin 6 mg Oral HS PRN Ayah Salinas MD   methylPREDNISolone sodium succinate 40 mg Intravenous Q6H Marquita Selby MD   [START ON 2/24/2019] pantoprazole 40 mg Oral Early Morning Kayleen Leone MD   potassium chloride 20 mEq Oral Daily Ayah Salinas MD   sodium chloride 3 mL Nebulization Q4H PRN Kayleen Leone MD        Today, Patient Was Seen By: Kayleen Leone MD    ** Please Note: This note has been constructed using a voice recognition system   **

## 2019-02-23 NOTE — ASSESSMENT & PLAN NOTE
· Likely related to underlying COPD exacerbation  · Briefly required BiPAP in the ER, now off BiPAP and on 4 L nasal cannula  · Note that he is on 3 - 4 L oxygen at home      PLAN:  · Appears to be using accesory muscle at rest, no significant wheezing but diminished all over  · O2 supplementation as needed to maintain O2 sat between 88-90%  · increase Solu-Medrol 40 mg QID  · procalcitonin elevated - will treat with levaquin 500 mg  · Nebs QID  · Hold breo, add pulmocort  · Flu PCR neg, check viral pathogen  · Follows up with Dr Len Sims outpatient - consult pulmonary here

## 2019-02-23 NOTE — PROGRESS NOTES
Progress Note - Thomas Garcia 1950, 77 y o  male MRN: [id number]    Unit/Bed#: [facility] 930-01 Encounter: [id number]    Primary Care Provider: Amaris Agudelo MD   Date and time admitted to hospital: 2/21/2019  9:34 AM    Take off step down as respiratory status stable    Hypertensive urgency  Assessment & Plan  · POA now hypotension  · Decrease cardizem to 120 mg  · Cont lasix 10 mg with steroids    SIRS (systemic inflammatory response syndrome) (HCC)  Assessment & Plan  Likely relate dot underlying COPD exacerbation with virla /bacterial bronchitis  CXR neg ofor PNA  UA neg  Flu PCR neg  F/u resp viral pathogen panel    COPD exacerbation (HCC)  Assessment & Plan  Known history of COPD with chronic respiratory failure requiring 4 L oxygen at home  Likely trigger viral bronchitis  Positive history of sick contacts  PLAN:  · Management as mentioned above  Macrocytic anemia  Assessment & Plan  Hemoglobin at baseline between 10-11  Continue folic acid and iron supplementation  * Acute on chronic respiratory failure with hypoxia and hypercapnia (HCC)  Assessment & Plan  · Likely related to underlying COPD exacerbation  · Briefly required BiPAP in the ER, now off BiPAP and on 4 L nasal cannula  · Note that he is on 3 - 4 L oxygen at home      PLAN:  · O2 supplementation as needed to maintain O2 sat between 80-92%  · Continue Solu-Medrol 40 mg t i d  IV   · procalcitonin elevated - will treat with levaquin 500 mg  · Nebs QID  · Continue with Breo  · Flu PCR neg, check viral pathogen  · Follows up with Dr Jensen Palma Internal Medicine Progress Note  Patient: Thomas Garcia 77 y o  male   MRN: [id number]  PCP: Amaris Agudelo MD  Unit/Bed#: [facility] 930-01 Encounter: [id number]  Date Of Visit: 02/22/19    Assessment:    Principal Problem:    Acute on chronic respiratory failure with hypoxia and hypercapnia (HCC)  Active Problems:    Macrocytic anemia    COPD exacerbation ([address] )    SIRS (systemic inflammatory response syndrome) (HCC)    Hypertensive urgency      VTE Pharmacologic Prophylaxis:   Pharmacologic: Enoxaparin (Lovenox)  Mechanical VTE Prophylaxis in Place: Yes    Patient Centered Rounds: I have performed bedside rounds with nursing staff today  Discussions with Specialists or Other Care Team Provider:     Education and Discussions with Family / Patient: patient    Time Spent for Care: 30 minutes  More than 50% of total time spent on counseling and coordination of care as described above  Current Length of Stay: 1 day(s)    Current Patient Status: Inpatient   Certification Statement: The patient will continue to require additional inpatient hospital stay due to IV meds    Discharge Plan / Estimated Discharge Date: 1-2 days    Code Status: Level 3 - DNAR and DNI      Subjective:   Feels little better, hasnt ambulated yet    Objective:     Vitals:   Temp (24hrs), Av 9 °F (36 6 °C), Min:97 5 °F (36 4 °C), Max:98 2 °F (36 8 °C)    Temp:  [97 5 °F (36 4 °C)-98 2 °F (36 8 °C)] 98 06 °F (36 7 °C)  HR:  [] 88  Resp:  [16-19] 16  BP: ()/(43-71) 97/50  SpO2:  [96 %-100 %] 97 %  Body mass index is 22 85 kg/m²  Input and Output Summary (last 24 hours): Intake/Output Summary (Last 24 hours) at 2019 1900  Last data filed at 2019 1801  Gross per 24 hour   Intake 305 ml   Output 1110 ml   Net -805 ml       Physical Exam:     Physical Exam   Constitutional: He appears well-developed and well-nourished  HENT:   Head: Normocephalic and atraumatic  Mouth/Throat: Oropharynx is clear and moist    Cardiovascular: Normal rate and regular rhythm  Exam reveals no friction rub  No murmur heard  Pulmonary/Chest: Effort normal    Diminished all over, faint wheeze   Abdominal: Soft  He exhibits no distension  There is no tenderness  Musculoskeletal: He exhibits no edema  Neurological: He is alert  Vitals reviewed        Additional Data:     Labs:    Results from last 7 days Lab Units 02/21/19  1920 02/21/19  0943   WBC Thousand/uL  --  7 09   HEMOGLOBIN g/dL  --  11 2*   HEMATOCRIT %  --  36 3*   PLATELETS Thousands/uL 163 189   LYMPHO PCT %  --  45*   MONO PCT %  --  2*   EOS PCT %  --  1     Results from last 7 days   Lab Units 02/21/19  0943   POTASSIUM mmol/L 4 2   CHLORIDE mmol/L 98*   CO2 mmol/L 31   BUN mg/dL 12   CREATININE mg/dL 0 92   CALCIUM mg/dL 9 2   ALK PHOS U/L 88   ALT U/L 14   AST U/L 14     Results from last 7 days   Lab Units 02/21/19  0943   INR  1 14       * I Have Reviewed All Lab Data Listed Above  * Additional Pertinent Lab Tests Reviewed: All Labs Within Last 24 Hours Reviewed    Imaging:    Imaging Reports Reviewed Today Include:   Imaging Personally Reviewed by Myself Includes:      Recent Cultures (last 7 days):     Results from last 7 days   Lab Units 02/21/19  1921 02/21/19  1009 02/21/19  0945   BLOOD CULTURE   --   --  No Growth at 24 hrs  No Growth at 24 hrs  SPUTUM CULTURE  Test not performed  Suggest repeat specimen  --   --    GRAM STAIN RESULT  >10 squamous epithelial cells/lpf, indicating orophayngeal contamination  *  1+ Polys*  2+ Gram positive cocci in pairs*  2+ Gram negative rods*  1+ Gram positive rods*  Rare Budding yeast*  --   --    INFLUENZA B PCR   --  Not Detected  --    RSV PCR   --  Not Detected  --        Last 24 Hours Medication List:     Current Facility-Administered Medications:  albuterol 2 puff Inhalation Q4H PRN Mimi Kraft MD   albuterol 2 5 mg Nebulization Q4H PRN Mimi Kraft MD   citalopram 20 mg Oral Daily MD Kenzie Tovar ON 2/23/2019] diltiazem 120 mg Oral Daily Dana Hernandez MD   enoxaparin 40 mg Subcutaneous Daily Mimi Kraft MD   ferrous sulfate 325 mg Oral Daily Mimi Kraft MD   fluticasone-vilanterol 1 puff Inhalation Daily Mimi Kraft MD   folic acid 1 mg Oral Daily Mimi Kraft MD   furosemide 10 mg Oral Daily Mimi Kraft MD   ipratropium 0 5 mg Nebulization TID Vida Diana Chavis MD   levalbuterol 1 25 mg Nebulization Q6H Sarath Bruno MD   levofloxacin 500 mg Oral Q24H Lisandro Barroso MD   melatonin 6 mg Oral HS PRN Sarath Bruno MD   methylPREDNISolone sodium succinate 40 mg Intravenous [facility] Sarath Bruno MD   potassium chloride 20 mEq Oral Daily Sarath Bruno MD   sodium chloride 3 mL Nebulization Q4H PRN Lisandro Barroso MD        Today, Patient Was Seen By: Lisandro Barroso MD    ** Please Note: This note has been constructed using a voice recognition system   **

## 2019-02-23 NOTE — CONSULTS
Pulmonary Consultation   Julieta Orozco 77 y o  male MRN: [id number]  Unit/Bed#: [facility] 930-01 Encounter: [id number]      Reason for consultation: COPD exacerbation    Requesting physician: Dr Cristhian Diallo    Impressions/Recommendations:    1  Acute on chronic hypoxic respiratory failure-improving  1  Titrate oxygen maintain SpO2 greater than equal to 88%  2  Currently on 5 L nasal cannula, baseline is 4 L nasal cannula  3  Pulmonary toilet: Incentive spirometry, increasing ambulation as tolerated  2  Elevated procalcitonin  1  No clinical signs of acute infection: afebrile, CXR clear, no leukocytosis  2  Discontinue levaquin, no clinical signs of infection  3  Follow sputum culture  4  Monitor daily temperatures and WBC  3  Severe COPD with acute exacerbation likely secondary to viral bronchitis  1  Decrease steroids to 40 mg b i d   2  Continue Breo daily  3  Continue Xopenex/Atrovent q 6 hours  4  Home regimen: breo, spiriva, albuterol nebs/MDI PRN    *outpatient pulmonary follow up pending hospital course    History of Present Illness   HPI:  Julieta Orozco is a 77 y o  male seen in consult for COPD exacerbation  His past medical history significant for severe Chronic obstructive pulmonary disease, estimated 63 pack year smoking history quitting in 2012, pulmonary artery hypertension, paroxysmal atrial fibrillation, MGUS, iron-deficiency anemia, hypertension, aortic regurgitation in chronic hypoxic respiratory failure  He presented with a 1 day history of increasing shortness of breath, and severe fatigue  He also reports a mild increase in cough that is productive of scant amounts of cloudy white sputum  He arrived to the ED via EMS with reports of SpO2 in the 50's on baseline O2 of 4 LNC  He was placed on BiPAP for brief time and was transitioned to NC at 4-5L as [place] could not tolerate BiPAP therapy  Chest x-ray 02/21/2018 negative for acute process, emphysematous changes noted    Procalcitonin elevated to 1 83 and started on Levaquin  From a pulmonary standpoint Suma Velasco has a diagnosis of very severe COPD and follows outpatient with Dr Ramon Del Castillo  Presented daily regimen of Breo, Spiriva, and albuterol rescue inhaler 1 time daily  He has an albuterol nebulizer that he uses very infrequently-reports maybe every 1-2 weeks  At baseline he is able to walk 1-2 blocks at the must rest, in currently is only able to walk from the bed to the bathroom while hospitalized without significant increase in shortness of breath  At baseline he maintains on 4 L nasal cannula  He denies current symptoms of GERD, obstructive sleep apnea or dysphagia  Review of systems:  12 point review of systems was completed and was otherwise negative except as listed in HPI        Historical Information   Past Medical History:   Diagnosis Date    Anxiety     Aortic regurgitation     Atrial flutter (HCC)     Chronic respiratory failure (HCC)     Colon polyp     COPD (chronic obstructive pulmonary disease) (HCC)     GI bleed     Hypertension     Iron deficiency anemia     MGUS (monoclonal gammopathy of unknown significance)     Osteoarthritis of hip     PAC (premature atrial contraction)     Paroxysmal atrial fibrillation (HCC)     Patent foramen ovale     Pulmonary artery hypertension (HCC)      Past Surgical History:   Procedure Laterality Date    APPENDECTOMY      COLON SURGERY      HERNIA REPAIR      JOINT REPLACEMENT      KNEE SURGERY       Family History   Problem Relation Age of Onset    Cancer Mother     Heart attack Father     Sudden death Father        Occupational history:  Noncontributory    Tobacco history:  60 pack year smoking history quitting 2012    Meds/Allergies   Current Facility-Administered Medications   Medication Dose Route Frequency    albuterol (PROVENTIL HFA,VENTOLIN HFA) inhaler 2 puff  2 puff Inhalation Q4H PRN    albuterol inhalation solution 2 5 mg  2 5 mg Nebulization Q4H PRN    citalopram (CeleXA) tablet 20 mg  20 mg Oral Daily    diltiazem (CARDIZEM CD) 24 hr capsule 120 mg  120 mg Oral Daily    enoxaparin (LOVENOX) subcutaneous injection 40 mg  40 mg Subcutaneous Daily    ferrous sulfate tablet 325 mg  325 mg Oral Daily    fluticasone-vilanterol (BREO ELLIPTA) 100-25 mcg/inh inhaler 1 puff  1 puff Inhalation Daily    folic acid (FOLVITE) tablet 1 mg  1 mg Oral Daily    furosemide (LASIX) tablet 10 mg  10 mg Oral Daily    ipratropium (ATROVENT) 0 02 % inhalation solution 0 5 mg  0 5 mg Nebulization Q6H    levalbuterol (XOPENEX) inhalation solution 1 25 mg  1 25 mg Nebulization Q6H    levofloxacin (LEVAQUIN) tablet 500 mg  500 mg Oral Q24H    melatonin tablet 6 mg  6 mg Oral HS PRN    methylPREDNISolone sodium succinate (Solu-MEDROL) injection 40 mg  40 mg Intravenous Q8H [address]    potassium chloride (K-DUR,KLOR-CON) CR tablet 20 mEq  20 mEq Oral Daily    sodium chloride 0 9 % inhalation solution 3 mL  3 mL Nebulization Q4H PRN     Medications Prior to Admission   Medication    albuterol (2 5 mg/3 mL) 0 083 % nebulizer solution    BREO ELLIPTA    CARTIA  MG 24 hr capsule    citalopram (CeleXA) 20 mg tablet    folic acid (FOLVITE) 1 mg tablet    furosemide (LASIX) 20 mg tablet    melatonin 3 mg    potassium chloride (K-DUR,KLOR-CON) 20 mEq tablet    PROAIR  (90 Base) MCG/ACT inhaler    tiotropium (SPIRIVA RESPIMAT) 2 5 MCG/ACT AERS inhaler    ferrous sulfate 325 (65 Fe) mg tablet     Allergies   Allergen Reactions    Penicillins Anaphylaxis       Vitals: Blood pressure 117/52, pulse 98, temperature 97 9 °F (36 6 °C), resp  rate 19, weight 63 1 kg (139 lb 1 8 oz), SpO2 97 %  , 5L nasal cannula, Body mass index is 22 12 kg/m²        Intake/Output Summary (Last 24 hours) at 2/23/2019 1431  Last data filed at 2/23/2019 1233  Gross per 24 hour   Intake 1330 ml   Output 850 ml   Net 480 ml       Physical exam:    General Appearance:    Alert, cooperative, no conversational dyspnea or accessory     muscle use       Head/eyes:    Normocephalic, without obvious abnormality, atraumatic,         PERRL, extraocular muscles intact, no scleral icterus    Nose:   Nares normal, septum midline, mucosa normal, no drainage    or sinus tenderness   Throat:   Moist mucous membranes, no thrush   Neck:   Supple, trachea midline, no adenopathy; no carotid    bruit or JVD   Lungs:     Distant breath sounds bilaterally, faint and expiratory wheezes noted bilaterally   Chest Wall:    No tenderness or deformity    Heart:    Regular rate and rhythm, S1 and S2 normal, no murmur, rub   or gallop   Abdomen:     Soft, non-tender, bowel sounds active all four quadrants,     no masses, no organomegaly   Extremities:   Extremities normal, atraumatic, no cyanosis or edema   Skin:   Warm, dry, turgor normal, no rashes or lesions   Lymph nodes:   Cervical and supraclavicular nodes normal   Neurologic:   CNII-XII intact, normal strength, non-focal         Labs: I have personally reviewed pertinent lab results  , CBC:   Lab Results   Component Value Date    WBC 3 84 (L) 2019    HGB 9 5 (L) 2019    HCT 29 9 (L) 2019     (H) 2019     2019    MCH 31 9 2019    MCHC 31 8 2019    RDW 13 2 2019    MPV 10 0 2019    NRBC 0 2019   , CMP:   Lab Results   Component Value Date    SODIUM 138 2019    K 4 5 2019     2019    CO2 35 (H) 2019    BUN 23 2019    CREATININE 0 73 2019    CALCIUM 9 8 2019    EGFR 94 2019     Procalcitonin 1 05  Imaging and other studies: I have personally reviewed pertinent reports     and I have personally reviewed pertinent films in PACS   Chest x-ray:  2019  Positive emphysematous changes  No acute cardiopulmonary process    Pulmonary function testin  FEV1/FVC 39%  FEV1 18%  FVC 35% predicted  Residual volume to 97% predicted  % predicted    EKG, Pathology, and Other Studies: I have personally reviewed pertinent reports     and EKG 02/23/2019 sinus tachycardia with PVCs    Code Status: Level 3 - DNAR and DNI      Richie Claudio

## 2019-02-23 NOTE — PLAN OF CARE
Problem: Potential for Falls  Goal: Patient will remain free of falls  Description  INTERVENTIONS:  - Assess patient frequently for physical needs  -  Identify cognitive and physical deficits and behaviors that affect risk of falls    -  [place] fall precautions as indicated by assessment   - Educate patient/family on patient safety including physical limitations  - Instruct patient to call for assistance with activity based on assessment  - Modify environment to reduce risk of injury  - Consider OT/PT consult to assist with strengthening/mobility  Outcome: Progressing     Problem: RESPIRATORY - ADULT  Goal: Achieves optimal ventilation and oxygenation  Description  INTERVENTIONS:  - Assess for changes in respiratory status  - Assess for changes in mentation and behavior  - Position to facilitate oxygenation and minimize respiratory effort  - Oxygen administration by appropriate delivery method based on oxygen saturation (per order) or ABGs  - Initiate smoking cessation education as indicated  - Encourage broncho-pulmonary hygiene including cough, deep breathe, Incentive Spirometry  - Assess the need for suctioning and aspirate as needed  - Assess and instruct to report SOB or any respiratory difficulty  - Respiratory Therapy support as indicated  Outcome: Progressing     Problem: SKIN/TISSUE INTEGRITY - ADULT  Goal: Skin integrity remains intact  Description  INTERVENTIONS  - Identify patients at risk for skin breakdown  - Assess and monitor skin integrity  - Assess and monitor nutrition and hydration status  - Monitor labs (i e  albumin)  - Assess for incontinence   - Turn and reposition patient  - Assist with mobility/ambulation  - Relieve pressure over bony prominences  - Avoid friction and shearing  - Provide appropriate hygiene as needed including keeping skin clean and dry  - Evaluate need for skin moisturizer/barrier cream  - Collaborate with interdisciplinary team (i e  Nutrition, Rehabilitation, etc ) - Patient/family teaching  Outcome: Progressing     Problem: MUSCULOSKELETAL - ADULT  Goal: Maintain or return mobility to safest level of function  Description  INTERVENTIONS:  - Assess patient's ability to carry out ADLs; assess patient's baseline for ADL function and identify physical deficits which impact ability to perform ADLs (bathing, care of mouth/teeth, toileting, grooming, dressing, etc )  - Assess/evaluate cause of self-care deficits   - Assess range of motion  - Assess patient's mobility; develop plan if impaired  - Assess patient's need for assistive devices and provide as appropriate  - Encourage maximum independence but intervene and supervise when necessary  - Involve family in performance of ADLs  - Assess for home care needs following discharge   - Request OT consult to assist with ADL evaluation and planning for discharge  - Provide patient education as appropriate  Outcome: Progressing     Problem: DISCHARGE PLANNING - CARE MANAGEMENT  Goal: Discharge to post-acute care or home with appropriate resources  Description  INTERVENTIONS:  - Conduct assessment to determine patient/family and health care team treatment goals, and need for post-acute services based on payer coverage, community resources, and patient preferences, and barriers to discharge  - Address psychosocial, clinical, and financial barriers to discharge as identified in assessment in conjunction with the patient/family and health care team  - Arrange appropriate level of post-acute services according to patient's   needs and preference and payer coverage in collaboration with the physician and health care team  - Communicate with and update the patient/family, physician, and health care team regarding progress on the discharge plan  - Arrange appropriate transportation to post-acute venues  -Anticipate return to home with [address]   Outcome: Progressing

## 2019-02-24 PROCEDURE — 97165 OT EVAL LOW COMPLEX 30 MIN: CPT

## 2019-02-24 PROCEDURE — G8978 MOBILITY CURRENT STATUS: HCPCS

## 2019-02-24 PROCEDURE — 94760 N-INVAS EAR/PLS OXIMETRY 1: CPT

## 2019-02-24 PROCEDURE — 99232 SBSQ HOSP IP/OBS MODERATE 35: CPT | Performed by: HOSPITALIST

## 2019-02-24 PROCEDURE — G8988 SELF CARE GOAL STATUS: HCPCS

## 2019-02-24 PROCEDURE — 94640 AIRWAY INHALATION TREATMENT: CPT

## 2019-02-24 PROCEDURE — 99232 SBSQ HOSP IP/OBS MODERATE 35: CPT | Performed by: NURSE PRACTITIONER

## 2019-02-24 PROCEDURE — 97162 PT EVAL MOD COMPLEX 30 MIN: CPT

## 2019-02-24 PROCEDURE — G8979 MOBILITY GOAL STATUS: HCPCS

## 2019-02-24 PROCEDURE — G8987 SELF CARE CURRENT STATUS: HCPCS

## 2019-02-24 PROCEDURE — G8989 SELF CARE D/C STATUS: HCPCS

## 2019-02-24 RX ORDER — LEVALBUTEROL 1.25 MG/.5ML
1.25 SOLUTION, CONCENTRATE RESPIRATORY (INHALATION) 4 TIMES DAILY
Status: DISCONTINUED | OUTPATIENT
Start: 2019-02-24 | End: 2019-02-24

## 2019-02-24 RX ORDER — PREDNISONE 20 MG/1
40 TABLET ORAL DAILY
Status: DISCONTINUED | OUTPATIENT
Start: 2019-02-25 | End: 2019-02-26 | Stop reason: HOSPADM

## 2019-02-24 RX ORDER — LEVALBUTEROL 1.25 MG/.5ML
1.25 SOLUTION, CONCENTRATE RESPIRATORY (INHALATION)
Status: DISCONTINUED | OUTPATIENT
Start: 2019-02-24 | End: 2019-02-26 | Stop reason: HOSPADM

## 2019-02-24 RX ORDER — FLUTICASONE FUROATE AND VILANTEROL 100; 25 UG/1; UG/1
1 POWDER RESPIRATORY (INHALATION) DAILY
Status: DISCONTINUED | OUTPATIENT
Start: 2019-02-24 | End: 2019-02-26 | Stop reason: HOSPADM

## 2019-02-24 RX ORDER — METHYLPREDNISOLONE SODIUM SUCCINATE 40 MG/ML
40 INJECTION, POWDER, LYOPHILIZED, FOR SOLUTION INTRAMUSCULAR; INTRAVENOUS EVERY 12 HOURS SCHEDULED
Status: DISCONTINUED | OUTPATIENT
Start: 2019-02-24 | End: 2019-02-24

## 2019-02-24 RX ADMIN — LEVALBUTEROL 1.25 MG: 1.25 SOLUTION, CONCENTRATE RESPIRATORY (INHALATION) at 14:15

## 2019-02-24 RX ADMIN — ALBUTEROL SULFATE 2 PUFF: 90 AEROSOL, METERED RESPIRATORY (INHALATION) at 08:15

## 2019-02-24 RX ADMIN — PANTOPRAZOLE SODIUM 40 MG: 40 TABLET, DELAYED RELEASE ORAL at 06:17

## 2019-02-24 RX ADMIN — METHYLPREDNISOLONE SODIUM SUCCINATE 40 MG: 40 INJECTION, POWDER, FOR SOLUTION INTRAMUSCULAR; INTRAVENOUS at 08:21

## 2019-02-24 RX ADMIN — IPRATROPIUM BROMIDE 0.5 MG: 0.5 SOLUTION RESPIRATORY (INHALATION) at 19:21

## 2019-02-24 RX ADMIN — DILTIAZEM HYDROCHLORIDE 120 MG: 120 CAPSULE, COATED, EXTENDED RELEASE ORAL at 08:22

## 2019-02-24 RX ADMIN — IPRATROPIUM BROMIDE 0.5 MG: 0.5 SOLUTION RESPIRATORY (INHALATION) at 14:15

## 2019-02-24 RX ADMIN — FLUTICASONE FUROATE AND VILANTEROL TRIFENATATE 1 PUFF: 100; 25 POWDER RESPIRATORY (INHALATION) at 10:04

## 2019-02-24 RX ADMIN — METHYLPREDNISOLONE SODIUM SUCCINATE 40 MG: 40 INJECTION, POWDER, FOR SOLUTION INTRAMUSCULAR; INTRAVENOUS at 03:55

## 2019-02-24 RX ADMIN — LEVALBUTEROL 1.25 MG: 1.25 SOLUTION, CONCENTRATE RESPIRATORY (INHALATION) at 07:56

## 2019-02-24 RX ADMIN — FOLIC ACID 1 MG: 1 TABLET ORAL at 08:22

## 2019-02-24 RX ADMIN — FUROSEMIDE 10 MG: 20 TABLET ORAL at 08:20

## 2019-02-24 RX ADMIN — ENOXAPARIN SODIUM 40 MG: 40 INJECTION SUBCUTANEOUS at 08:22

## 2019-02-24 RX ADMIN — FERROUS SULFATE TAB 325 MG (65 MG ELEMENTAL FE) 325 MG: 325 (65 FE) TAB at 08:21

## 2019-02-24 RX ADMIN — LEVALBUTEROL 1.25 MG: 1.25 SOLUTION, CONCENTRATE RESPIRATORY (INHALATION) at 19:21

## 2019-02-24 RX ADMIN — ALBUTEROL SULFATE 2 PUFF: 90 AEROSOL, METERED RESPIRATORY (INHALATION) at 18:10

## 2019-02-24 RX ADMIN — CITALOPRAM HYDROBROMIDE 20 MG: 20 TABLET ORAL at 08:21

## 2019-02-24 RX ADMIN — POTASSIUM CHLORIDE 20 MEQ: 1500 TABLET, EXTENDED RELEASE ORAL at 08:21

## 2019-02-24 RX ADMIN — IPRATROPIUM BROMIDE 0.5 MG: 0.5 SOLUTION RESPIRATORY (INHALATION) at 07:55

## 2019-02-24 RX ADMIN — BUDESONIDE 0.5 MG: 0.5 INHALANT RESPIRATORY (INHALATION) at 07:55

## 2019-02-24 NOTE — PROGRESS NOTES
Progress Note - Ivis Thornton 1950, 77 y o  male MRN: [id number]    Unit/Bed#: [facility] 930-01 Encounter: [id number]    Primary Care Provider: Michael Hampton MD   Date and time admitted to hospital: 2/21/2019  9:34 AM        Hypertensive urgency  Assessment & Plan  · POA now hypotension  · Decrease cardizem to 120 mg  · Cont lasix 10 mg with steroids    SIRS (systemic inflammatory response syndrome) (HCC)  Assessment & Plan  Likely relate dot underlying COPD exacerbation with virla /bacterial bronchitis  CXR neg for PNA  UA neg  PCR neg  Resp viral pathogen panel negative    COPD exacerbation (HCC)  Assessment & Plan  Known history of COPD with chronic respiratory failure requiring 4 L oxygen at home  Likely trigger viral bronchitis  Positive history of sick contacts  PLAN:  · Management as mentioned above  Macrocytic anemia  Assessment & Plan  Hemoglobin at baseline between 10-11  Continue folic acid and iron supplementation      * Acute on chronic respiratory failure with hypoxia and hypercapnia ([facility])  Assessment & Plan  · Likely related to underlying COPD exacerbation  · Briefly required BiPAP in the ER, now off BiPAP    PLAN:  · Appears to be using accesory muscle at rest, no significant wheezing but diminished all over  · O2 supplementation as needed to maintain O2 sat between 88-90%, was on 5 L ,try 4 L (on 4L at baseline)  · Per PUlm decrease solumedrol to 40 IV BID  · procalcitonin elevated but no clinical evidence of infection hence Pulm recs DCing antibiotic  · Nebs QID  · Cont breo  · Flu PCR & resp viral panel negative  · Follows up with Dr Gladys Arellano Internal Medicine Progress Note  Patient: Ivis Thornton 77 y o  male   MRN: [id number]  PCP: Michael Hampton MD  Unit/Bed#: [facility] 930-01 Encounter: [id number]  Date Of Visit: 02/24/19    Assessment:    Principal Problem:    Acute on chronic respiratory failure with hypoxia and hypercapnia ([address] )  Active Problems: Macrocytic anemia    COPD exacerbation (HCC)    SIRS (systemic inflammatory response syndrome) (HCC)    Hypertensive urgency      VTE Pharmacologic Prophylaxis:   Pharmacologic: Enoxaparin (Lovenox)  Mechanical VTE Prophylaxis in Place: Yes    Patient Centered Rounds: I have performed bedside rounds with nursing staff today  Discussions with Specialists or Other Care Team Provider:     Education and Discussions with Family / Patient: patient    Time Spent for Care: 30 minutes  More than 50% of total time spent on counseling and coordination of care as described above  Current Length of Stay: 3 day(s)    Current Patient Status: Inpatient   Certification Statement: The patient will continue to require additional inpatient hospital stay due to monitor resp status    Discharge Plan / Estimated Discharge Date: 1-2 days    Code Status: Level 3 - DNAR and DNI      Subjective:   Feels ok, breathing little better, not yet back to baseline, walked with PT & to bathroom    Objective:     Vitals:   Temp (24hrs), Av °F (36 7 °C), Min:97 9 °F (36 6 °C), Max:98 2 °F (36 8 °C)    Temp:  [97 9 °F (36 6 °C)-98 2 °F (36 8 °C)] 97 9 °F (36 6 °C)  HR:  [] 93  Resp:  [16-20] 20  BP: (116-118)/(55-58) 118/58  SpO2:  [96 %-99 %] 97 %  Body mass index is 21 82 kg/m²  Input and Output Summary (last 24 hours): Intake/Output Summary (Last 24 hours) at 2019 1346  Last data filed at 2019 1251  Gross per 24 hour   Intake 560 ml   Output 1211 ml   Net -651 ml       Physical Exam:     Physical Exam   Constitutional: He is oriented to person, place, and time  He appears well-developed and well-nourished  HENT:   Head: Normocephalic and atraumatic  Mouth/Throat: Oropharynx is clear and moist    Cardiovascular: Normal rate and regular rhythm  Exam reveals no friction rub  No murmur heard  Pulmonary/Chest: Effort normal and breath sounds normal  No stridor  No respiratory distress  Abdominal: Soft   He exhibits no distension  There is no tenderness  Musculoskeletal: He exhibits no edema  Neurological: He is alert and oriented to person, place, and time  Vitals reviewed  Additional Data:     Labs:    Results from last 7 days   Lab Units 02/23/19  0642   WBC Thousand/uL 3 84*   HEMOGLOBIN g/dL 9 5*   HEMATOCRIT % 29 9*   PLATELETS Thousands/uL 190   NEUTROS PCT % 90*   LYMPHS PCT % 6*   MONOS PCT % 3*   EOS PCT % 0     Results from last 7 days   Lab Units 02/23/19  0642 02/21/19  0943   POTASSIUM mmol/L 4 5 4 2   CHLORIDE mmol/L 100 98*   CO2 mmol/L 35* 31   BUN mg/dL 23 12   CREATININE mg/dL 0 73 0 92   CALCIUM mg/dL 9 8 9 2   ALK PHOS U/L  --  88   ALT U/L  --  14   AST U/L  --  14     Results from last 7 days   Lab Units 02/21/19  0943   INR  1 14       * I Have Reviewed All Lab Data Listed Above  * Additional Pertinent Lab Tests Reviewed: No New Labs Available For Today    Imaging:    Imaging Reports Reviewed Today Include:   Imaging Personally Reviewed by Myself Includes:      Recent Cultures (last 7 days):     Results from last 7 days   Lab Units 02/21/19  1921 02/21/19  1009 02/21/19  0945   BLOOD CULTURE   --   --  No Growth at 72 hrs  No Growth at 72 hrs  SPUTUM CULTURE  Test not performed  Suggest repeat specimen  --   --    GRAM STAIN RESULT  >10 squamous epithelial cells/lpf, indicating orophayngeal contamination  *  1+ Polys*  2+ Gram positive cocci in pairs*  2+ Gram negative rods*  1+ Gram positive rods*  Rare Budding yeast*  --   --    INFLUENZA B PCR   --  Not Detected  --    RSV PCR   --  Not Detected  --        Last 24 Hours Medication List:     Current Facility-Administered Medications:  albuterol 2 puff Inhalation Q4H PRN Garima Werner MD   albuterol 2 5 mg Nebulization Q4H PRN Garima Werner MD   citalopram 20 mg Oral Daily Garima Werner MD   diltiazem 120 mg Oral Daily Rach Newman MD   enoxaparin 40 mg Subcutaneous Daily Garima Werner MD   ferrous sulfate 325 mg Oral Daily Indira Ambriz MD   fluticasone-vilanterol 1 puff Inhalation Daily Essence Martinez MD   folic acid 1 mg Oral Daily Indira Ambriz MD   furosemide 10 mg Oral Daily Indira Ambriz MD   ipratropium 0 5 mg Nebulization Q6H Vida Winter MD   levalbuterol 1 25 mg Nebulization Q6H Essence Martinez MD   melatonin 6 mg Oral HS PRN Indira Ambriz MD   methylPREDNISolone sodium succinate 40 mg Intravenous Q12H [address] Vida Winter MD   pantoprazole 40 mg Oral Early Morning Essence Martinez MD   potassium chloride 20 mEq Oral Daily Indira Ambriz MD   sodium chloride 3 mL Nebulization Q4H PRN Essence Martinez MD        Today, Patient Was Seen By: Essence Martinez MD    ** Please Note: This note has been constructed using a voice recognition system   **

## 2019-02-24 NOTE — PLAN OF CARE
Problem: PHYSICAL THERAPY ADULT  Goal: Performs mobility at highest level of function for planned discharge setting  See evaluation for individualized goals  Description  Treatment/Interventions: Functional transfer training, LE strengthening/ROM, Elevations, Therapeutic exercise, Endurance training, Patient/family training, Equipment eval/education, Bed mobility, Gait training, Compensatory technique education  Equipment Recommended: Walker(patient owns RW at home )       See flowsheet documentation for full assessment, interventions and recommendations  Note:   Prognosis: Fair  Problem List: Decreased endurance  Assessment: Patient is a 77year old male presenting with SOB and increased sputum production and was found to be in respiratory distress with acute respiratory failure with hypoxia and hypercarbia  He presents with an additional problem listwhich includes COPD, pulmonary artery HTN, tachycardia, SIRS, and anxiety  PT consulted to assist with safe d/c planning  PT eval performed with ambulation orders  PTA, patient living at home with his spouse in a multi level home with 2 SAUMYA and a FF to second floor bed and bath  He reports using 4L O2 at home and a RW PRN  He denies any falls and is (I) with ADLs and functional mobility  He admits to sedentary lifestyle 2* poor tolerance to activity and labored breathing  During session, patient presented with labored breathing with conversation and activity on 5L reading within 91-95% with rest and activity with tachycardia noted unchanged by rest or activity  He was able to perform activity at S but required [place] at the end of session with urgency to sit 2* appeared exhaustion  With urgency, patient with poor safety awareness and presents at an increased risk of falls  Patient also requires teaching on proper use of DME  Anticipate safe d/c home with family support and recommended use of previously owned [address] [place] [address] 2* energy conservation   Will continue to benefit from skilled PT intervention during hospital stay to progress functional mobility (I) and safety  Barriers to Discharge: Inaccessible home environment, Decreased caregiver support  Barriers to Discharge Comments: FF to living space; wife works- patient alone during the day   Recommendation: Home with family support          See flowsheet documentation for full assessment

## 2019-02-24 NOTE — PLAN OF CARE
Problem: Potential for Falls  Goal: Patient will remain free of falls  Description  INTERVENTIONS:  - Assess patient frequently for physical needs  -  Identify cognitive and physical deficits and behaviors that affect risk of falls    -  [place] fall precautions as indicated by assessment   - Educate patient/family on patient safety including physical limitations  - Instruct patient to call for assistance with activity based on assessment  - Modify environment to reduce risk of injury  - Consider OT/PT consult to assist with strengthening/mobility  2/24/2019 1104 by Anyi Turner RN  Outcome: Progressing  2/24/2019 1104 by Anyi Turner RN  Outcome: Progressing  2/24/2019 1104 by Anyi Turner RN  Outcome: Progressing     Problem: RESPIRATORY - ADULT  Goal: Achieves optimal ventilation and oxygenation  Description  INTERVENTIONS:  - Assess for changes in respiratory status  - Assess for changes in mentation and behavior  - Position to facilitate oxygenation and minimize respiratory effort  - Oxygen administration by appropriate delivery method based on oxygen saturation (per order) or ABGs  - Initiate smoking cessation education as indicated  - Encourage broncho-pulmonary hygiene including cough, deep breathe, Incentive Spirometry  - Assess the need for suctioning and aspirate as needed  - Assess and instruct to report SOB or any respiratory difficulty  - Respiratory Therapy support as indicated  2/24/2019 1104 by Anyi Turner RN  Outcome: Progressing  2/24/2019 1104 by Anyi Turner RN  Outcome: Progressing  2/24/2019 1104 by Anyi Turner RN  Outcome: Progressing     Problem: SKIN/TISSUE INTEGRITY - ADULT  Goal: Skin integrity remains intact  Description  INTERVENTIONS  - Identify patients at risk for skin breakdown  - Assess and monitor skin integrity  - Assess and monitor nutrition and hydration status  - Monitor labs (i e  albumin)  - Assess for incontinence   - Turn and reposition patient  - Assist with mobility/ambulation  - Relieve pressure over bony prominences  - Avoid friction and shearing  - Provide appropriate hygiene as needed including keeping skin clean and dry  - Evaluate need for skin moisturizer/barrier cream  - Collaborate with interdisciplinary team (i e  Nutrition, Rehabilitation, etc )   - Patient/family teaching  2/24/2019 1104 by Michelle Culver RN  Outcome: Progressing  2/24/2019 1104 by Michelle Culver RN  Outcome: Progressing  2/24/2019 1104 by Michelle Culver RN  Outcome: Progressing     Problem: MUSCULOSKELETAL - ADULT  Goal: Maintain or return mobility to safest level of function  Description  INTERVENTIONS:  - Assess patient's ability to carry out ADLs; assess patient's baseline for ADL function and identify physical deficits which impact ability to perform ADLs (bathing, care of mouth/teeth, toileting, grooming, dressing, etc )  - Assess/evaluate cause of self-care deficits   - Assess range of motion  - Assess patient's mobility; develop plan if impaired  - Assess patient's need for assistive devices and provide as appropriate  - Encourage maximum independence but intervene and supervise when necessary  - Involve family in performance of ADLs  - Assess for home care needs following discharge   - Request OT consult to assist with ADL evaluation and planning for discharge  - Provide patient education as appropriate  2/24/2019 1104 by Michelle Culver RN  Outcome: Progressing  2/24/2019 1104 by Michelle Culver RN  Outcome: Progressing  2/24/2019 1104 by Michelle Culver RN  Outcome: Progressing     Problem: DISCHARGE PLANNING - CARE MANAGEMENT  Goal: Discharge to post-acute care or home with appropriate resources  Description  INTERVENTIONS:  - Conduct assessment to determine patient/family and health care team treatment goals, and need for post-acute services based on payer coverage, community resources, and patient preferences, and barriers to discharge  - Address psychosocial, clinical, and financial barriers to discharge as identified in assessment in conjunction with the patient/family and health care team  - Arrange appropriate level of post-acute services according to patient's   needs and preference and payer coverage in collaboration with the physician and health care team  - Communicate with and update the patient/family, physician, and health care team regarding progress on the discharge plan  - Arrange appropriate transportation to post-acute venues  -Anticipate return to home with [place]  services   2/24/2019 1104 by Karan Wolf, RN  Outcome: Progressing  2/24/2019 1104 by Karan Wolf, RN  Outcome: Progressing  2/24/2019 1104 by Karan Wolf, RN  Outcome: Progressing

## 2019-02-24 NOTE — RESPIRATORY THERAPY NOTE
pt stated he did not want to be woken up for neb treatment during the middle of the night  Pt aware to call for PRN neb or MDI if needed during the night   Will change UDN's to TID and PRN

## 2019-02-24 NOTE — ASSESSMENT & PLAN NOTE
· Likely related to underlying COPD exacerbation  · Briefly required BiPAP in the ER, now off BiPAP    PLAN:  · Appears to be using accesory muscle at rest, no significant wheezing but diminished all over  · O2 supplementation as needed to maintain O2 sat between 88-90%, was on 5 L ,try 4 L (on 4L at baseline)  · Per PUlm decrease solumedrol to 40 IV BID  · procalcitonin elevated but no clinical evidence of infection hence Pulm recs DCing antibiotic  · Nebs QID  · Cont breo  · Flu PCR & resp viral panel negative  · Follows up with Dr Dorothea Reis outpatient

## 2019-02-24 NOTE — RESPIRATORY THERAPY NOTE
RT Protocol Note  Lluvia Villegas 77 y o  male MRN: [id number]  Unit/Bed#: [facility] 930-01 Encounter: [id number]    Assessment    Principal Problem:    Acute on chronic respiratory failure with hypoxia and hypercapnia (HCC)  Active Problems:    Macrocytic anemia    COPD exacerbation (HCC)    SIRS (systemic inflammatory response syndrome) (HCC)    Hypertensive urgency      Home Pulmonary Medications:  Albuterol        Past Medical History:   Diagnosis Date    Anxiety     Aortic regurgitation     Atrial flutter (HCC)     Chronic respiratory failure (HCC)     Colon polyp     COPD (chronic obstructive pulmonary disease) (HCC)     GI bleed     Hypertension     Iron deficiency anemia     MGUS (monoclonal gammopathy of unknown significance)     Osteoarthritis of hip     PAC (premature atrial contraction)     Paroxysmal atrial fibrillation (HCC)     Patent foramen ovale     Pulmonary artery hypertension (HCC)      Social History     Socioeconomic History    Marital status: /Civil Union     Spouse name: None    Number of children: None    Years of education: None    Highest education level: None   Occupational History    None   Social Needs    Financial resource strain: None    Food insecurity:     Worry: None     Inability: None    Transportation needs:     Medical: None     Non-medical: None   Tobacco Use    Smoking status: Former Smoker     Packs/day: 1 50     Years: 42 00     Pack years: 63 00     Types: Cigarettes     Start date:      Last attempt to quit:      Years since quittin 1    Smokeless tobacco: Never Used   Substance and Sexual Activity    Alcohol use: Not Currently    Drug use: No    Sexual activity: Not Currently   Lifestyle    Physical activity:     Days per week: None     Minutes per session: None    Stress: None   Relationships    Social connections:     Talks on phone: None     Gets together: None     Attends Tenriism service: None     Active member of club or organization: None     Attends meetings of clubs or organizations: None     Relationship status: None    Intimate partner violence:     Fear of current or ex partner: None     Emotionally abused: None     Physically abused: None     Forced sexual activity: None   Other Topics Concern    None   Social History Narrative    None       Subjective         Objective    Physical Exam:   Assessment Type: (P) Assess only  General Appearance: (P) Awake, Alert  Respiratory Pattern: (P) Dyspnea with exertion  Chest Assessment: (P) Chest expansion symmetrical  Bilateral Breath Sounds: (P) Diminished  Cough: Non-productive    Vitals:  Blood pressure 118/58, pulse 93, temperature 97 9 °F (36 6 °C), resp  rate 20, height 5' 7" (1 702 m), weight 63 2 kg (139 lb 5 3 oz), SpO2 97 %  Imaging and other studies: I have personally reviewed pertinent reports  Plan    Respiratory Plan: Moderate/Severe Distress pathway        Resp Comments: (P) chaneg udn to q6 pt is being seen by pulmonary   pulmonary notes  from 2/23 states to continue udns q6

## 2019-02-24 NOTE — ASSESSMENT & PLAN NOTE
Likely relate dot underlying COPD exacerbation with virla /bacterial bronchitis  CXR neg for PNA  UA neg  PCR neg  Resp viral pathogen panel negative

## 2019-02-24 NOTE — PROGRESS NOTES
Progress Note - Pulmonary   Veleria Hillrose 77 y o  male MRN: [id number]  Unit/Bed#: [facility] 930-01 Encounter: [id number]      Assessment/Plan:  1  Acute on chronic hypoxic respiratory failure-improving  1  Currently oxygenating well on 4 L nasal cannula which is his baseline requirements  2  Titrate oxygen maintain SpO2 greater than equal to 88%  3  Pulmonary toilet:  Incentive spirometry, out of bed with increasing ambulation as tolerated  2  Elevated procalcitonin  1  Continue monitor off antibiotics  2  Daily temperatures and WBC  3  Initial sputum culture with oralpharyngeal contamination-to me for repeat sputum culture at this time  3  Very Severe Chronic obstructive pulmonary disease with acute exacerbation likely secondary by bronchitis  1  Discontinue Solu-Medrol start prednisone taper tomorrow 40 mg daily with reduction by 10 mg every 3 days  2  Breo daily  3  Xopenex/Atrovent q 6 hours  4  Home regimen:  Breo, Spiriva, albuterol nebs/MDI p r n  Subjective:     Bruna Rinne was seen sitting on the side of bed upon entering the room  He denies acute overnight events reports he is feeling better today  His shortness of breath she was still above his baseline although his breathing is easier  Denies significant sputum production today or increasing cough  Denies:  Chest pain or pain inspiration of fevers, chills, night sweats, nausea vomiting diarrhea headache, dizziness, bronchospasm hemoptysis    Objective:         Vitals: Blood pressure 118/58, pulse 93, temperature 97 9 °F (36 6 °C), resp  rate 20, height 5' 7" (1 702 m), weight 63 2 kg (139 lb 5 3 oz), SpO2 97 %  , 4 L nasal cannula, Body mass index is 21 82 kg/m²        Intake/Output Summary (Last 24 hours) at 2/24/2019 1542  Last data filed at 2/24/2019 1330  Gross per 24 hour   Intake 800 ml   Output 1111 ml   Net -311 ml         Physical Exam  Gen: Awake, alert, oriented x 3, no acute distress  HEENT: Mucous membranes moist, no oral lesions, no thrush, oxygen via NC  NECK: No accessory muscle use, JVP not elevated  Cardiac: Regular, single S1, single S2, no murmurs, no rubs, no gallops  Lungs: distant clear breath sounds, no wheezes, rhonchi or rales  Abdomen: normoactive bowel sounds, soft nontender, nondistended, no rebound or rigidity, no guarding  Extremities: no cyanosis, no clubbing, no edema    Labs: none  Imaging and other studies: none      GERA Das

## 2019-02-24 NOTE — OCCUPATIONAL THERAPY NOTE
[address]  Evaluation     Patient Name: Silva Segovia  WFNZE'Y Date: 2/24/2019  Problem List  Patient Active Problem List   Diagnosis    Acute on chronic respiratory failure with hypoxia and hypercapnia (HCC)    Macrocytic anemia    Pulmonary artery hypertension (HCC)    Aortic regurgitation    Low TSH level    Paroxysmal atrial tachycardia (HCC)    COPD exacerbation (HCC)    SIRS (systemic inflammatory response syndrome) ([address] )    Hypertensive urgency     Past Medical History  Past Medical History:   Diagnosis Date    Anxiety     Aortic regurgitation     Atrial flutter (HCC)     Chronic respiratory failure (HCC)     Colon polyp     COPD (chronic obstructive pulmonary disease) (HCC)     GI bleed     Hypertension     Iron deficiency anemia     MGUS (monoclonal gammopathy of unknown significance)     Osteoarthritis of hip     PAC (premature atrial contraction)     Paroxysmal atrial fibrillation (HCC)     Patent foramen ovale     Pulmonary artery hypertension ([address] )      Past Surgical History  Past Surgical History:   Procedure Laterality Date    APPENDECTOMY      COLON SURGERY      HERNIA REPAIR      JOINT REPLACEMENT      KNEE SURGERY           02/24/19 0913   Note Type   Note type Eval only   Restrictions/Precautions   Weight Bearing Precautions Per Order No   Other Precautions Multiple lines;O2;Telemetry; Fall Risk   Pain Assessment   Pain Assessment No/denies pain   Pain Score No Pain   Home Living   Type of [address] [place] [address] Multi-level;1/2 bath on main level;Bed/bath upstairs   Bathroom Shower/Tub Tub/shower unit   Bathroom Toilet Standard   Bathroom Equipment Shower chair   [address] [place]  (O2)   Additional Comments Pt lives with his wife in a 2 story home     Prior Function   Level of [place] Independent with ADLs and functional mobility   Lives With Spouse   Receives Help From Family   ADL Assistance Independent IADLs Independent   Falls in the last 6 months 0   Vocational Retired   Comments  Pt was I w/  ADLS and IADLS, drove, & required no use of DME PTA     Lifestyle   Autonomy Pt reports he was I w/ ADL and IADLs PTA    Reciprocal Relationships Pt lives with his wife who works full time but can assist around her schedule as needed   Service to Others Pt is retired   Psychosocial   Psychosocial (WDL) [address]  7  3 [facility] 7  [address] 5  Supervision/Setup   [address] 5  Supervision/Setup   [address] 6  Modified independent   [address] 6  Modified independent   [address]  5  Supervision/Setup   Bed Mobility   Supine to Sit 7  Independent   Transfers   Sit to [address] to Sit 5  Supervision   Stand pivot 5  Supervision   Functional Mobility   Functional Mobility 5  Supervision   Additional items Rolling walker   Balance   Static Sitting Normal   Dynamic Sitting Good   Static Standing Fair   Dynamic Standing [id number] -   Activity Tolerance   Activity Tolerance Patient limited by fatigue   Medical Staff Made Aware PT [facility] confirmed pt appropriate for session    RUE Assessment   RUE Assessment WFL   LUE Assessment   LUE Assessment WFL   Hand Function   Gross Motor Coordination Functional   Fine Motor Coordination Functional   Cognition   Overall Cognitive Status WFL   Arousal/Participation Alert   Attention Within functional limits   Orientation Level Oriented X4   Memory Within functional limits   Following Commands Follows all commands and directions without difficulty   Assessment   Limitation Decreased endurance   Prognosis Good   Assessment Pt is a 76 y/o male seen for OT eval s/p adm to [facility] w/ cough and shortness of breath dx'd w/ hypertensive urgency, SIRS,  Macrocytic anemia and acute on chronic respiratory failure with hypoxia and hypercapnia  Comorbidities include a h/o COPD on 4 L oxygen, anxiety, aortic regurgitation, HTN, iron deficiency anemia, MGUS, OA, PAC, a-fib, and pulmonary artery HTN  Pt with active OT orders and ambulate  orders  Pt lives with his wife in a 2 story home  Pt was I w/  ADLS and IADLS, drove, & required no use of DME PTA  Pt is currently performing ADLs with supervision mod I, functional transfers with S and functional mobility using rw with S  Pt is limited 2* SOB, [place], fatigue and decreased endurance  Pt scored overall 75/100 on the Barthel Index  Based on the aforementioned OT evaluation, functional performance deficits, and assessments, pt has been identified as a low  complexity evaluation  Anticipate pt will be able to D/C home with family support once medically stable  Recommend perform bathing with supervision for safety  Educated pt regarding safety in the home, energy conservation and activity engagement  Pt with no concerns for D/C and all questions answered at this time  Pt no longer requires acute care skilled OT services  Recommend pt participate in ADLs and ambulate hallways with non-OT staff  Please re-consult if changes occur  D/C OT      Goals   Patient Goals To go home    Plan   OT Frequency Eval only   Recommendation   Recommendation   (Restorative Therapy for mobility )   OT Discharge Recommendation Home with family support   OT - OK to Discharge Yes   Barthel Index   Feeding 10   Bathing 5   Grooming Score 5   Dressing Score 10   Bladder Score 10   Bowels Score 10   Toilet Use Score 10   Transfers (Bed/Chair) Score 15   Mobility (Level Surface) Score 0  (tolerated less than 50)   Stairs Score 0  (not tested)   Barthel Index Score 75         Alin Theodore MS, OTR/L

## 2019-02-24 NOTE — PHYSICAL THERAPY NOTE
Physical Therapy Evaluation     Patient's Name: Aaliyah Yoon    Admitting Diagnosis  Respiratory distress [R06 03]  SOB (shortness of breath) [R06 02]  COPD with acute exacerbation (HCC) [J44 1]  Acute respiratory failure with hypoxia and hypercarbia (HCC) [J96 01, J96 02]    Problem List  Patient Active Problem List   Diagnosis    Acute on chronic respiratory failure with hypoxia and hypercapnia (HCC)    Macrocytic anemia    Pulmonary artery hypertension (HCC)    Aortic regurgitation    Low TSH level    Paroxysmal atrial tachycardia (HCC)    COPD exacerbation (HCC)    SIRS (systemic inflammatory response syndrome) ([address] )    Hypertensive urgency       Past Medical History  Past Medical History:   Diagnosis Date    Anxiety     Aortic regurgitation     Atrial flutter (HCC)     Chronic respiratory failure (HCC)     Colon polyp     COPD (chronic obstructive pulmonary disease) (HCC)     GI bleed     Hypertension     Iron deficiency anemia     MGUS (monoclonal gammopathy of unknown significance)     Osteoarthritis of hip     PAC (premature atrial contraction)     Paroxysmal atrial fibrillation (HCC)     Patent foramen ovale     Pulmonary artery hypertension (HCC)        Past Surgical History  Past Surgical History:   Procedure Laterality Date    APPENDECTOMY      COLON SURGERY      HERNIA REPAIR      JOINT REPLACEMENT      KNEE SURGERY            02/24/19 0916   Note Type   Note type Eval only   Pain Assessment   Pain Assessment No/denies pain   Pain Score No Pain   Home Living   Type of [address] [place] [address] Multi-level;1/2 bath on main level;Bed/bath upstairs  (2 SAUMYA; FF to bed and bath )   Bathroom Shower/Tub Tub/shower unit   Bathroom Toilet Standard   Bathroom Equipment Shower chair   [address] Walker  (supplemental O2)   Additional Comments Patient uses 4L O2 at home and uses a RW PRN   His bed and full bathroom are located on the second floor with a FF to access  He denies any recent use of DME or falls  Prior Function   Level of [place] Independent with ADLs and functional mobility   Lives With Spouse  (works during the day full time )   Receives Help From Other (Comment)  (denies any additional support )   ADL Assistance Independent   IADLs Independent   Falls in the last 6 months 0   Vocational Retired   Restrictions/Precautions   [facility] Bearing Precautions Per Order No  (ambulate patient orders)   Other Precautions Multiple lines;O2;Telemetry; Fall Risk  (5L O2 during session )   General   Family/Caregiver Present No   Cognition   Overall Cognitive Status WFL   Arousal/Participation Cooperative   Orientation Level Oriented X4   Memory Within functional limits   Following Commands Follows all commands and directions without difficulty   Comments Patient overall pleasant and cooperative  Conversation limited 2* labored breathing      RUE Assessment   RUE Assessment WFL   LUE Assessment   LUE Assessment WFL   RLE Assessment   RLE Assessment WFL  (grossly 4/5 )   LLE Assessment   LLE Assessment WFL  (grossly 4/5 )   Coordination   Movements are Fluid and Coordinated 0   Coordination and Movement Description bradykinetic    Light Touch   RLE Light Touch Grossly intact   LLE Light Touch Grossly intact   Proprioception   RLE Proprioception Grossly intact   LLE Proprioception Grossly Intact   Bed Mobility   Additional Comments Patient sitting EOB upon arrival and remained EOB at the end of session    Transfers   Sit to Stand 5  Supervision   Stand to Sit 5  Supervision   Stand pivot 5  Supervision   Ambulation/Elevation   Gait pattern Decreased foot clearance   Gait Assistance 4  Minimal assist  (CGA)   Additional items Verbal cues   Assistive Device Rolling walker   Distance 60'   Stair Management Assistance Not tested   Balance   Static Sitting Normal   Dynamic Sitting Good   Static Standing Fair   Dynamic Standing Fair   Ambulatory Fair - Endurance Deficit   Endurance Deficit Yes   Endurance Deficit Description Patient overall with [place]UR[place] and with conversation  Patient using 4L at home and 5 L during session  Sats reading 120bpm and 94% at rest with overall 91-95% and 125bpm with activity  At the end of sesion, monitor reading 151bpm for brief second but returned to baseline  Patient overall appearing labored and SOB  Activity Tolerance   Activity Tolerance Patient limited by fatigue   Medical Staff [address] [facility],    Nurse Made Aware PROSPER Aden    Assessment   Prognosis Fair   Problem List Decreased endurance   Assessment Patient is a 77year old male presenting with SOB and increased sputum production and was found to be in respiratory distress with acute respiratory failure with hypoxia and hypercarbia  He presents with an additional problem listwhich includes COPD, pulmonary artery HTN, tachycardia, SIRS, and anxiety  PT consulted to assist with safe d/c planning  PT eval performed with ambulation orders  PTA, patient living at home with his spouse in a multi level home with 2 SAUMYA and a FF to second floor bed and bath  He reports using 4L O2 at home and a RW PRN  He denies any falls and is (I) with ADLs and functional mobility  He admits to sedentary lifestyle 2* poor tolerance to activity and labored breathing  During session, patient presented with labored breathing with conversation and activity on 5L reading within 91-95% with rest and activity with tachycardia noted unchanged by rest or activity  He was able to perform activity at S but required [place] at the end of session with urgency to sit 2* appeared exhaustion  With urgency, patient with poor safety awareness and presents at an increased risk of falls  Patient also requires teaching on proper use of DME  Anticipate safe d/c home with family support and recommended use of previously owned [address] [place] [address] 2* energy conservation   Will continue to benefit from skilled PT intervention during hospital stay to progress functional mobility (I) and safety  Barriers to Discharge Inaccessible home environment;Decreased caregiver support   Barriers to Discharge Comments FF to living space; wife works- patient alone during the day    Goals   Patient Goals Patient would like to go home    STG Expiration Date 03/10/19   Short Term Goal #1 Pt will be mod(I) with rolling R and L and supine<->sit transfers for ease with OOB transfer  Pt will be mod(I) with sit<->stand to promote (I) with toileting  Pt will be mod(I) with ambualtion of household distances (appx 50') to reduce caregiver burden  Pt will be S with community distance ambulaiton using least restrictive AD to increase tolerance to activity  Pt will be S ascending and descending FF stairs to gain access into and around home  Pt will particiapte in HEP consisitng of balance, strength, ROM and coordinaiton tasks to increase activitiy, improve (I) and decrease pain  Patient will rate household activity at <4/10 on modified RPE  Treatment Day 0   Plan   Treatment/Interventions Functional transfer training;LE strengthening/ROM; Elevations; Therapeutic exercise; Endurance training;Patient/family training;Equipment eval/education; Bed mobility;Gait training; Compensatory technique education   PT Frequency Other (Comment)  (3-5x/wk )   Recommendation   Recommendation Home with family support   Equipment Recommended Walker  (patient owns RW at home )   Barthel Index   Feeding 10   Bathing 0   Grooming Score 5   Dressing Score 10   Bladder Score 10   Bowels Score 10   Toilet Use Score 10   Transfers (Bed/Chair) Score 15   Mobility (Level Surface) Score 0   Stairs Score 0   Barthel Index Score 70         Anitha Tompkins, PT

## 2019-02-24 NOTE — PLAN OF CARE
Problem: Potential for Falls  Goal: Patient will remain free of falls  Description  INTERVENTIONS:  - Assess patient frequently for physical needs  -  Identify cognitive and physical deficits and behaviors that affect risk of falls    -  [place] fall precautions as indicated by assessment   - Educate patient/family on patient safety including physical limitations  - Instruct patient to call for assistance with activity based on assessment  - Modify environment to reduce risk of injury  - Consider OT/PT consult to assist with strengthening/mobility  Outcome: Progressing     Problem: RESPIRATORY - ADULT  Goal: Achieves optimal ventilation and oxygenation  Description  INTERVENTIONS:  - Assess for changes in respiratory status  - Assess for changes in mentation and behavior  - Position to facilitate oxygenation and minimize respiratory effort  - Oxygen administration by appropriate delivery method based on oxygen saturation (per order) or ABGs  - Initiate smoking cessation education as indicated  - Encourage broncho-pulmonary hygiene including cough, deep breathe, Incentive Spirometry  - Assess the need for suctioning and aspirate as needed  - Assess and instruct to report SOB or any respiratory difficulty  - Respiratory Therapy support as indicated  Outcome: Progressing     Problem: SKIN/TISSUE INTEGRITY - ADULT  Goal: Skin integrity remains intact  Description  INTERVENTIONS  - Identify patients at risk for skin breakdown  - Assess and monitor skin integrity  - Assess and monitor nutrition and hydration status  - Monitor labs (i e  albumin)  - Assess for incontinence   - Turn and reposition patient  - Assist with mobility/ambulation  - Relieve pressure over bony prominences  - Avoid friction and shearing  - Provide appropriate hygiene as needed including keeping skin clean and dry  - Evaluate need for skin moisturizer/barrier cream  - Collaborate with interdisciplinary team (i e  Nutrition, Rehabilitation, etc ) - Patient/family teaching  Outcome: Progressing     Problem: MUSCULOSKELETAL - ADULT  Goal: Maintain or return mobility to safest level of function  Description  INTERVENTIONS:  - Assess patient's ability to carry out ADLs; assess patient's baseline for ADL function and identify physical deficits which impact ability to perform ADLs (bathing, care of mouth/teeth, toileting, grooming, dressing, etc )  - Assess/evaluate cause of self-care deficits   - Assess range of motion  - Assess patient's mobility; develop plan if impaired  - Assess patient's need for assistive devices and provide as appropriate  - Encourage maximum independence but intervene and supervise when necessary  - Involve family in performance of ADLs  - Assess for home care needs following discharge   - Request OT consult to assist with ADL evaluation and planning for discharge  - Provide patient education as appropriate  Outcome: Progressing     Problem: DISCHARGE PLANNING - CARE MANAGEMENT  Goal: Discharge to post-acute care or home with appropriate resources  Description  INTERVENTIONS:  - Conduct assessment to determine patient/family and health care team treatment goals, and need for post-acute services based on payer coverage, community resources, and patient preferences, and barriers to discharge  - Address psychosocial, clinical, and financial barriers to discharge as identified in assessment in conjunction with the patient/family and health care team  - Arrange appropriate level of post-acute services according to patient's   needs and preference and payer coverage in collaboration with the physician and health care team  - Communicate with and update the patient/family, physician, and health care team regarding progress on the discharge plan  - Arrange appropriate transportation to post-acute venues  -Anticipate return to home with [address]   Outcome: Progressing

## 2019-02-25 LAB
ANION GAP SERPL CALCULATED.3IONS-SCNC: 2 MMOL/L (ref 4–13)
BUN SERPL-MCNC: 23 MG/DL (ref 5–25)
CALCIUM SERPL-MCNC: 9.6 MG/DL (ref 8.3–10.1)
CHLORIDE SERPL-SCNC: 98 MMOL/L (ref 100–108)
CO2 SERPL-SCNC: 40 MMOL/L (ref 21–32)
CREAT SERPL-MCNC: 0.7 MG/DL (ref 0.6–1.3)
GFR SERPL CREATININE-BSD FRML MDRD: 96 ML/MIN/1.73SQ M
GLUCOSE SERPL-MCNC: 156 MG/DL (ref 65–140)
POTASSIUM SERPL-SCNC: 4.4 MMOL/L (ref 3.5–5.3)
SODIUM SERPL-SCNC: 140 MMOL/L (ref 136–145)

## 2019-02-25 PROCEDURE — 99232 SBSQ HOSP IP/OBS MODERATE 35: CPT | Performed by: HOSPITALIST

## 2019-02-25 PROCEDURE — 99232 SBSQ HOSP IP/OBS MODERATE 35: CPT | Performed by: INTERNAL MEDICINE

## 2019-02-25 PROCEDURE — 80048 BASIC METABOLIC PNL TOTAL CA: CPT | Performed by: HOSPITALIST

## 2019-02-25 PROCEDURE — 94640 AIRWAY INHALATION TREATMENT: CPT

## 2019-02-25 PROCEDURE — 94760 N-INVAS EAR/PLS OXIMETRY 1: CPT

## 2019-02-25 RX ORDER — AZITHROMYCIN 250 MG/1
250 TABLET, FILM COATED ORAL EVERY 24 HOURS
Status: DISCONTINUED | OUTPATIENT
Start: 2019-02-26 | End: 2019-02-25

## 2019-02-25 RX ORDER — AZITHROMYCIN 250 MG/1
500 TABLET, FILM COATED ORAL EVERY 24 HOURS
Status: DISCONTINUED | OUTPATIENT
Start: 2019-02-25 | End: 2019-02-25

## 2019-02-25 RX ADMIN — PANTOPRAZOLE SODIUM 40 MG: 40 TABLET, DELAYED RELEASE ORAL at 05:31

## 2019-02-25 RX ADMIN — IPRATROPIUM BROMIDE 0.5 MG: 0.5 SOLUTION RESPIRATORY (INHALATION) at 14:43

## 2019-02-25 RX ADMIN — FUROSEMIDE 10 MG: 20 TABLET ORAL at 08:57

## 2019-02-25 RX ADMIN — LEVALBUTEROL 1.25 MG: 1.25 SOLUTION, CONCENTRATE RESPIRATORY (INHALATION) at 07:57

## 2019-02-25 RX ADMIN — PREDNISONE 40 MG: 20 TABLET ORAL at 08:57

## 2019-02-25 RX ADMIN — LEVALBUTEROL 1.25 MG: 1.25 SOLUTION, CONCENTRATE RESPIRATORY (INHALATION) at 14:43

## 2019-02-25 RX ADMIN — DILTIAZEM HYDROCHLORIDE 120 MG: 120 CAPSULE, COATED, EXTENDED RELEASE ORAL at 08:58

## 2019-02-25 RX ADMIN — ALBUTEROL SULFATE 2 PUFF: 90 AEROSOL, METERED RESPIRATORY (INHALATION) at 19:36

## 2019-02-25 RX ADMIN — POTASSIUM CHLORIDE 20 MEQ: 1500 TABLET, EXTENDED RELEASE ORAL at 08:57

## 2019-02-25 RX ADMIN — ENOXAPARIN SODIUM 40 MG: 40 INJECTION SUBCUTANEOUS at 08:56

## 2019-02-25 RX ADMIN — FOLIC ACID 1 MG: 1 TABLET ORAL at 08:56

## 2019-02-25 RX ADMIN — IPRATROPIUM BROMIDE 0.5 MG: 0.5 SOLUTION RESPIRATORY (INHALATION) at 07:57

## 2019-02-25 RX ADMIN — CITALOPRAM HYDROBROMIDE 20 MG: 20 TABLET ORAL at 08:57

## 2019-02-25 RX ADMIN — IPRATROPIUM BROMIDE 0.5 MG: 0.5 SOLUTION RESPIRATORY (INHALATION) at 19:00

## 2019-02-25 RX ADMIN — FLUTICASONE FUROATE AND VILANTEROL TRIFENATATE 1 PUFF: 100; 25 POWDER RESPIRATORY (INHALATION) at 08:58

## 2019-02-25 RX ADMIN — LEVALBUTEROL 1.25 MG: 1.25 SOLUTION, CONCENTRATE RESPIRATORY (INHALATION) at 19:00

## 2019-02-25 RX ADMIN — FERROUS SULFATE TAB 325 MG (65 MG ELEMENTAL FE) 325 MG: 325 (65 FE) TAB at 08:57

## 2019-02-25 NOTE — ASSESSMENT & PLAN NOTE
Likely relate dot underlying COPD exacerbation with viral /bacterial bronchitis  CXR neg for PNA  UA neg  PCR neg  Resp viral pathogen panel negative

## 2019-02-25 NOTE — PLAN OF CARE
Problem: Potential for Falls  Goal: Patient will remain free of falls  Description  INTERVENTIONS:  - Assess patient frequently for physical needs  -  Identify cognitive and physical deficits and behaviors that affect risk of falls    -  [place] fall precautions as indicated by assessment   - Educate patient/family on patient safety including physical limitations  - Instruct patient to call for assistance with activity based on assessment  - Modify environment to reduce risk of injury  - Consider OT/PT consult to assist with strengthening/mobility  Outcome: Progressing     Problem: RESPIRATORY - ADULT  Goal: Achieves optimal ventilation and oxygenation  Description  INTERVENTIONS:  - Assess for changes in respiratory status  - Assess for changes in mentation and behavior  - Position to facilitate oxygenation and minimize respiratory effort  - Oxygen administration by appropriate delivery method based on oxygen saturation (per order) or ABGs  - Initiate smoking cessation education as indicated  - Encourage broncho-pulmonary hygiene including cough, deep breathe, Incentive Spirometry  - Assess the need for suctioning and aspirate as needed  - Assess and instruct to report SOB or any respiratory difficulty  - Respiratory Therapy support as indicated  Outcome: Progressing     Problem: SKIN/TISSUE INTEGRITY - ADULT  Goal: Skin integrity remains intact  Description  INTERVENTIONS  - Identify patients at risk for skin breakdown  - Assess and monitor skin integrity  - Assess and monitor nutrition and hydration status  - Monitor labs (i e  albumin)  - Assess for incontinence   - Turn and reposition patient  - Assist with mobility/ambulation  - Relieve pressure over bony prominences  - Avoid friction and shearing  - Provide appropriate hygiene as needed including keeping skin clean and dry  - Evaluate need for skin moisturizer/barrier cream  - Collaborate with interdisciplinary team (i e  Nutrition, Rehabilitation, etc ) - Patient/family teaching  Outcome: Progressing     Problem: MUSCULOSKELETAL - ADULT  Goal: Maintain or return mobility to safest level of function  Description  INTERVENTIONS:  - Assess patient's ability to carry out ADLs; assess patient's baseline for ADL function and identify physical deficits which impact ability to perform ADLs (bathing, care of mouth/teeth, toileting, grooming, dressing, etc )  - Assess/evaluate cause of self-care deficits   - Assess range of motion  - Assess patient's mobility; develop plan if impaired  - Assess patient's need for assistive devices and provide as appropriate  - Encourage maximum independence but intervene and supervise when necessary  - Involve family in performance of ADLs  - Assess for home care needs following discharge   - Request OT consult to assist with ADL evaluation and planning for discharge  - Provide patient education as appropriate  Outcome: Progressing     Problem: DISCHARGE PLANNING - CARE MANAGEMENT  Goal: Discharge to post-acute care or home with appropriate resources  Description  INTERVENTIONS:  - Conduct assessment to determine patient/family and health care team treatment goals, and need for post-acute services based on payer coverage, community resources, and patient preferences, and barriers to discharge  - Address psychosocial, clinical, and financial barriers to discharge as identified in assessment in conjunction with the patient/family and health care team  - Arrange appropriate level of post-acute services according to patient's   needs and preference and payer coverage in collaboration with the physician and health care team  - Communicate with and update the patient/family, physician, and health care team regarding progress on the discharge plan  - Arrange appropriate transportation to post-acute venues  -Anticipate return to home with [facility] services   Outcome: Progressing

## 2019-02-25 NOTE — PROGRESS NOTES
Progress Note - Pulmonary   Halle Gallardo 77 y o  male MRN: [id number]  [facility] Encounter: [id number]      Assessment/Plan:  1  Chronic hypoxic respiratory failure        *  At baseline 4LNC  Keep saturations greater than or equal to 88%        *  Incentive spirometry Q1hr, OOB as able, increase activity as able  2  Very severe COPD with acute exacerbation        *  Improved and started prednisone this morning        *  Plan to taper by 10mg every 3 days        *  Continue Breo 100        *  Continue Xopenex/Atrovent Q6hrs  3  Acute bronchitis        *  Not feeling as well today        *  Due to elevated procalcitonin and severe underlying COPD, will start azithromycin        *  Repeat sputum culture pending    -Discussed with Dr Odalis Jenkins  -Home pulmonary regimen includes:  Breo 100, Spiriva, albuterol nebs/MDI prn  -Outpatient pulmonary follow up as per D/C instructions    Subjective:   Mr Rui Lezama is seen lying in bed this morning  He is lying flat on his home amount of supplemental oxygen which is 4 L nasal cannula  He tells me he isn't feeling as well today and does not feel as though he is ready to go home  He did work with therapy yesterday and they felt he was stable from their standpoint to be discharged to home  He denies any significant changes in his breathing and just states I do not feel like I have any energy today    He denies resting shortness of breath, significant cough, fever, chest pain or bronchospasm  Objective:     Vitals: Blood pressure 114/50, pulse 81, temperature 98 4 °F (36 9 °C), temperature source Oral, resp  rate 20, height 5' 7" (1 702 m), weight 63 2 kg (139 lb 5 3 oz), SpO2 94 %  , 4LNC, Body mass index is 21 82 kg/m²        Intake/Output Summary (Last 24 hours) at 2/25/2019 1018  Last data filed at 2/25/2019 0901  Gross per 24 hour   Intake 480 ml   Output 1045 ml   Net -565 ml         Physical Exam  Gen: Awake, alert, oriented x 3, no acute distress  HEENT: Mucous membranes moist, no oral lesions, no thrush, wearing O2 via NC  NECK: No accessory muscle use, JVP not elevated  Cardiac: Regular, single S1, single S2, no murmurs, no rubs, no gallops  Lungs:  Very decreased breath sounds throughout bilaterally  No wheezes, rhonchi or rales at present  Abdomen: normoactive bowel sounds, soft nontender, nondistended, no rebound or rigidity, no guarding  Extremities: no cyanosis, no clubbing, no edema  Wearing SCDs bilaterally    Labs: I have personally reviewed pertinent lab results  , ABG: No results found for: PHART, XAE5QVN, PO2ART, SGE3DWB, A0NITSSE, BEART, SOURCE, BNP: No results found for: BNP, CBC: No results found for: WBC, HGB, HCT, MCV, PLT, ADJUSTEDWBC, MCH, MCHC, RDW, MPV, NRBC, CMP:   Lab Results   Component Value Date    SODIUM 140 02/25/2019    K 4 4 02/25/2019    CL 98 (L) 02/25/2019    CO2 40 (H) 02/25/2019    BUN 23 02/25/2019    CREATININE 0 70 02/25/2019    CALCIUM 9 6 02/25/2019    EGFR 96 02/25/2019   , PT/INR: No results found for: PT, INR, Troponin: No results found for: TROPONINI     Respiratory pathogen profile normal    Procalcitonin February 23, 2019 elevated at 1 05    Sputum culture contaminated, repeat pending    Imaging and other studies: I have personally reviewed pertinent films in PACS    [facility] 2/21/19  No consolidations or effusions noted  Emphysema Beryle Server, PA-C

## 2019-02-25 NOTE — RESTORATIVE TECHNICIAN NOTE
Restorative Specialist Mobility Note       Activity: Ambulate in gallardo, Ambulate in room, Bathroom privileges, Chair, Stand at bedside, Dangle(Educated/encouraged pt to ambulate with assistance 3-4 x's/day   Pt callbell, phone/tray within reach )     Assistive Device: Front wheel walker(NC O2 on 4L in room and 6L for ambulating)          Luigi Solis BS, Restorative Technician, [employer]

## 2019-02-25 NOTE — PROGRESS NOTES
Progress Note - Yane Nava 1950, 77 y o  male MRN: [id number]    Unit/Bed#: [facility] 930-01 Encounter: [id number]    Primary Care Provider: Portia Lorenzo MD   Date and time admitted to hospital: 2/21/2019  9:34 AM        Hypertensive urgency  Assessment & Plan  · POA now hypotension  · Decreased cardizem to 120 mg  · Cont lasix 10 mg with steroids    SIRS (systemic inflammatory response syndrome) ([facility] )  Assessment & Plan  Likely relate dot underlying COPD exacerbation with viral /bacterial bronchitis  CXR neg for PNA  UA neg  PCR neg  Resp viral pathogen panel negative    COPD exacerbation (HCC)  Assessment & Plan  Known history of COPD with chronic respiratory failure requiring 4 L oxygen at home  Likely trigger viral bronchitis  Positive history of sick contacts  PLAN:  · Management as mentioned above  Macrocytic anemia  Assessment & Plan  Hemoglobin at baseline between 10-11  Continue folic acid and iron supplementation      * Acute on chronic respiratory failure with hypoxia and hypercapnia (HCC)  Assessment & Plan  · Likely related to underlying COPD exacerbation  · Briefly required BiPAP in the ER, now off BiPAP    PLAN:  · no significant wheezing but diminished all over  · O2 supplementation as needed to maintain O2 sat between 88-90%, now on 4 L (on 4L at baseline)  · solumedrol changed to PO prednisone 40 mg today  · procalcitonin elevated but no clinical evidence of infection hence Pulm recs DCing antibiotic  · Nebs QID  · Cont breo  · Flu PCR & resp viral panel negative  · Follows up with Dr Radha Mcneal outpatient  · Pt not feeling good today, will reassess tomorrow        [facility] Internal Medicine Progress Note  Patient: Yane Nava 77 y o  male   MRN: [id number]  PCP: Portia Lorenzo MD  [facility] Encounter: [id number]  Date Of Visit: 02/25/19    Assessment:    Principal Problem:    Acute on chronic respiratory failure with hypoxia and hypercapnia ([facility] )  Active Problems: Macrocytic anemia    COPD exacerbation (HCC)    SIRS (systemic inflammatory response syndrome) (HCC)    Hypertensive urgency      VTE Pharmacologic Prophylaxis:   Pharmacologic: Enoxaparin (Lovenox)  Mechanical VTE Prophylaxis in Place: Yes    Patient Centered Rounds: I have performed bedside rounds with nursing staff today  Discussions with Specialists or Other Care Team Provider: Pulm    Education and Discussions with Family / Patient: myrna    Time Spent for Care: 30 minutes  More than 50% of total time spent on counseling and coordination of care as described above  Current Length of Stay: 4 day(s)    Current Patient Status: Inpatient   Certification Statement: The patient will continue to require additional inpatient hospital stay due to monitor resp status    Discharge Plan / Estimated Discharge Date: anticipate tomorrow    Code Status: Level 3 - DNAR and DNI      Subjective:   Not feeling well overall, feeling very weak, breathing is ok    Objective:     Vitals:   Temp (24hrs), Av 5 °F (36 9 °C), Min:98 4 °F (36 9 °C), Max:98 6 °F (37 °C)    Temp:  [98 4 °F (36 9 °C)-98 6 °F (37 °C)] 98 4 °F (36 9 °C)  HR:  [] 81  Resp:  [20] 20  BP: (114-117)/(50-62) 114/50  SpO2:  [93 %-97 %] 94 %  Body mass index is 21 82 kg/m²  Input and Output Summary (last 24 hours): Intake/Output Summary (Last 24 hours) at 2019 1323  Last data filed at 2019 1313  Gross per 24 hour   Intake 870 ml   Output 1365 ml   Net -495 ml       Physical Exam:     Physical Exam   Constitutional: He is oriented to person, place, and time  He appears well-developed and well-nourished  HENT:   Head: Normocephalic and atraumatic  Mouth/Throat: Oropharynx is clear and moist    Cardiovascular: Normal rate and regular rhythm  Exam reveals no friction rub  No murmur heard  Pulmonary/Chest: Effort normal  No stridor  No respiratory distress  He has no wheezes  Diminished all over   Abdominal: Soft   He exhibits no distension  There is no tenderness  Musculoskeletal: He exhibits no edema  Neurological: He is alert and oriented to person, place, and time  Vitals reviewed  Additional Data:     Labs:    Results from last 7 days   Lab Units 02/23/19  0642   WBC Thousand/uL 3 84*   HEMOGLOBIN g/dL 9 5*   HEMATOCRIT % 29 9*   PLATELETS Thousands/uL 190   NEUTROS PCT % 90*   LYMPHS PCT % 6*   MONOS PCT % 3*   EOS PCT % 0     Results from last 7 days   Lab Units 02/25/19  0557  02/21/19  0943   POTASSIUM mmol/L 4 4   < > 4 2   CHLORIDE mmol/L 98*   < > 98*   CO2 mmol/L 40*   < > 31   BUN mg/dL 23   < > 12   CREATININE mg/dL 0 70   < > 0 92   CALCIUM mg/dL 9 6   < > 9 2   ALK PHOS U/L  --   --  88   ALT U/L  --   --  14   AST U/L  --   --  14    < > = values in this interval not displayed  Results from last 7 days   Lab Units 02/21/19  0943   INR  1 14       * I Have Reviewed All Lab Data Listed Above  * Additional Pertinent Lab Tests Reviewed: All Labs Within Last 24 Hours Reviewed    Imaging:    Imaging Reports Reviewed Today Include:   Imaging Personally Reviewed by Myself Includes:      Recent Cultures (last 7 days):     Results from last 7 days   Lab Units 02/21/19  1921 02/21/19  1009 02/21/19  0945   BLOOD CULTURE   --   --  No Growth After 4 Days  No Growth After 4 Days  SPUTUM CULTURE  Test not performed  Suggest repeat specimen  --   --    GRAM STAIN RESULT  >10 squamous epithelial cells/lpf, indicating orophayngeal contamination  *  1+ Polys*  2+ Gram positive cocci in pairs*  2+ Gram negative rods*  1+ Gram positive rods*  Rare Budding yeast*  --   --    INFLUENZA B PCR   --  Not Detected  --    RSV PCR   --  Not Detected  --        Last 24 Hours Medication List:     Current Facility-Administered Medications:  albuterol 2 puff Inhalation Q4H PRN Mimi Kraft MD   albuterol 2 5 mg Nebulization Q4H PRN Mimi Kraft MD   citalopram 20 mg Oral Daily Mimi Kraft MD   diltiazem 120 mg Oral Daily Lisa Jacobo MD   enoxaparin 40 mg Subcutaneous Daily Crispin Smallwood MD   ferrous sulfate 325 mg Oral Daily Crispin Smallwood MD   fluticasone-vilanterol 1 puff Inhalation Daily Lisa Jacobo MD   folic acid 1 mg Oral Daily Crispin Smallwood MD   furosemide 10 mg Oral Daily Crispin Smallwood MD   ipratropium 0 5 mg Nebulization Q6H Vida Winter MD   levalbuterol 1 25 mg Nebulization Q6H Lisa Jacobo MD   melatonin 6 mg Oral HS PRN Crispin Smallwood MD   pantoprazole 40 mg Oral Early Morning Lisa Jacobo MD   potassium chloride 20 mEq Oral Daily Crispin Smallwood MD   predniSONE 40 mg Oral Daily GERA Cummings   sodium chloride 3 mL Nebulization Q4H PRN Lisa Jacobo MD        Today, Patient Was Seen By: Lisa Jacobo MD    ** Please Note: This note has been constructed using a voice recognition system   **

## 2019-02-26 ENCOUNTER — TRANSITIONAL CARE MANAGEMENT (OUTPATIENT)
Dept: INTERNAL MEDICINE CLINIC | Facility: CLINIC | Age: 69
End: 2019-02-26

## 2019-02-26 VITALS
HEART RATE: 88 BPM | WEIGHT: 139.33 LBS | HEIGHT: 67 IN | RESPIRATION RATE: 20 BRPM | OXYGEN SATURATION: 95 % | BODY MASS INDEX: 21.87 KG/M2 | DIASTOLIC BLOOD PRESSURE: 70 MMHG | TEMPERATURE: 98.06 F | SYSTOLIC BLOOD PRESSURE: 140 MMHG

## 2019-02-26 LAB
BACTERIA BLD CULT: NORMAL
BACTERIA BLD CULT: NORMAL

## 2019-02-26 PROCEDURE — 94760 N-INVAS EAR/PLS OXIMETRY 1: CPT

## 2019-02-26 PROCEDURE — 99238 HOSP IP/OBS DSCHRG MGMT 30/<: CPT | Performed by: INTERNAL MEDICINE

## 2019-02-26 PROCEDURE — 99232 SBSQ HOSP IP/OBS MODERATE 35: CPT | Performed by: INTERNAL MEDICINE

## 2019-02-26 PROCEDURE — 94640 AIRWAY INHALATION TREATMENT: CPT

## 2019-02-26 RX ORDER — PANTOPRAZOLE SODIUM 40 MG/1
40 TABLET, DELAYED RELEASE ORAL
Qty: 30 TABLET | Refills: 0 | Status: SHIPPED | OUTPATIENT
Start: 2019-02-27 | End: 2019-06-07 | Stop reason: ALTCHOICE

## 2019-02-26 RX ORDER — PREDNISONE 10 MG/1
40 TABLET ORAL DAILY
Qty: 30 TABLET | Refills: 0 | Status: SHIPPED | OUTPATIENT
Start: 2019-02-26 | End: 2019-03-01

## 2019-02-26 RX ADMIN — LEVALBUTEROL 1.25 MG: 1.25 SOLUTION, CONCENTRATE RESPIRATORY (INHALATION) at 13:45

## 2019-02-26 RX ADMIN — ENOXAPARIN SODIUM 40 MG: 40 INJECTION SUBCUTANEOUS at 09:06

## 2019-02-26 RX ADMIN — IPRATROPIUM BROMIDE 0.5 MG: 0.5 SOLUTION RESPIRATORY (INHALATION) at 08:15

## 2019-02-26 RX ADMIN — PANTOPRAZOLE SODIUM 40 MG: 40 TABLET, DELAYED RELEASE ORAL at 06:05

## 2019-02-26 RX ADMIN — IPRATROPIUM BROMIDE 0.5 MG: 0.5 SOLUTION RESPIRATORY (INHALATION) at 13:45

## 2019-02-26 RX ADMIN — LEVALBUTEROL 1.25 MG: 1.25 SOLUTION, CONCENTRATE RESPIRATORY (INHALATION) at 08:15

## 2019-02-26 RX ADMIN — FLUTICASONE FUROATE AND VILANTEROL TRIFENATATE 1 PUFF: 100; 25 POWDER RESPIRATORY (INHALATION) at 09:06

## 2019-02-26 RX ADMIN — POTASSIUM CHLORIDE 20 MEQ: 1500 TABLET, EXTENDED RELEASE ORAL at 09:06

## 2019-02-26 RX ADMIN — FOLIC ACID 1 MG: 1 TABLET ORAL at 09:06

## 2019-02-26 RX ADMIN — CITALOPRAM HYDROBROMIDE 20 MG: 20 TABLET ORAL at 09:07

## 2019-02-26 RX ADMIN — FERROUS SULFATE TAB 325 MG (65 MG ELEMENTAL FE) 325 MG: 325 (65 FE) TAB at 09:06

## 2019-02-26 RX ADMIN — PREDNISONE 40 MG: 20 TABLET ORAL at 09:07

## 2019-02-26 RX ADMIN — FUROSEMIDE 10 MG: 20 TABLET ORAL at 09:06

## 2019-02-26 RX ADMIN — DILTIAZEM HYDROCHLORIDE 120 MG: 120 CAPSULE, COATED, EXTENDED RELEASE ORAL at 09:06

## 2019-02-26 NOTE — ASSESSMENT & PLAN NOTE
Physician Documentation                                                                           

 [facility]                                                                

Name: Barak Perry                                                                                

Age: 74 yrs                                                                                       

Sex: Male                                                                                         

: 1943                                                                                   

MRN: [id number]                                                                                   

Arrival Date: 2018                                                                          

Time: 20:24                                                                                       

Account#: [id number]                                                                            

Bed 16                                                                                            

Private MD:                                                                                       

ED Physician Faisal Delatorre                                                                             

HPI:                                                                                              

                                                                                             

20:49 This 74 yrs old  Male presents to ER via Ambulatory with complaints of Near    pkl 

      Syncope, Slurred Speech, Weakness.                                                          

20:49 The patient has experienced near-syncope, felt faint. Onset: The symptoms/episode       pkl 

      began/occurred 1 hour(s) ago. Context: the episode(s) was witnessed, by family, son.        

      Associated signs and symptoms: Pertinent positives: diaphoresis, unsteady gait and          

      slurred speech. Current symptoms: Patient said he is much better now. No longer felt        

      faint, no slurred speech. no unsteady gait..                                                

                                                                                                  

Historical:                                                                                       

- Allergies:                                                                                      

20:38 No Known Allergies;                                                                     tl2 

- Home Meds:                                                                                      

20:38 amlodipine 5 mg tab 1 tab once daily [Active]; losartan-hydrochlorothiazide 100-12.5 mg tl2 

      oral tab 1 tab once daily [Active]; metoprolol succinate 50 mg oral Tb24 1 tab once         

      daily [Active]; aspirin 81 mg Oral TbEC 1 tab once daily [Active];                          

- PMHx:                                                                                           

20:38 Hypertension;                                                                           tl2 

- PSHx:                                                                                           

20:38 bladder stone removed;                                                                  tl2 

                                                                                                  

- Immunization history:: Adult Immunizations up to date.                                          

- Social history:: Smoking status: Patient/guardian denies using tobacco.                         

- Ebola Screening: : No symptoms or risks identified at this time.                                

                                                                                                  

                                                                                                  

ROS:                                                                                              

20:49 Eyes: Negative for injury, pain, redness, and discharge, ENT: Negative for injury,      pkl 

      pain, and discharge, Neck: Negative for injury, pain, and swelling, Cardiovascular:         

      Negative for chest pain, palpitations, and edema, Respiratory: Negative for shortness       

      of breath, cough, wheezing, and pleuritic chest pain, Abdomen/GI: Negative for              

      abdominal pain, nausea, vomiting, diarrhea, and constipation, Back: Negative for injury     

      and pain, : Negative for injury, bleeding, discharge, and swelling, MS/Extremity:         

      Negative for injury and deformity, Skin: Negative for injury, rash, and discoloration.      

20:49 Neuro: Positive for gait disturbance, speech changes, near syncope.                         

                                                                                                  

Exam:                                                                                             

20:49 Abdomen/GI: Exam negative for                                                           pkl 

20:49 Head/Face:  Normocephalic, atraumatic. Eyes:  Pupils equal round and reactive to light,     

      extra-ocular motions intact.  Lids and lashes normal.  Conjunctiva and sclera are           

      non-icteric and not injected.  Cornea within normal limits.  Periorbital areas with no      

      swelling, redness, or edema. ENT:  Nares patent. No nasal discharge, no septal              

      abnormalities noted.  Tympanic membranes are normal and external auditory canals are        

      clear.  Oropharynx with no redness, swelling, or masses, exudates, or evidence of           

      obstruction, uvula midline.  Mucous membranes moist. Neck:  Trachea midline, no             

      thyromegaly or masses palpated, and no cervical lymphadenopathy.  Supple, full range of     

      motion without nuchal rigidity, or vertebral point tenderness.  No Meningismus.             

      Chest/axilla:  Normal chest wall appearance and motion.  Nontender with no deformity.       

      No lesions are appreciated. Cardiovascular:  Regular rate and rhythm with a normal S1       

      and S2.  No gallops, murmurs, or rubs.  Normal PMI, no JVD.  No pulse deficits.             

      Respiratory:  Lungs have equal breath sounds bilaterally, clear to auscultation and         

      percussion.  No rales, rhonchi or wheezes noted.  No increased work of breathing, no        

      retractions or nasal flaring. Abdomen/GI:  Soft, non-tender, with normal bowel sounds.      

      No distension or tympany.  No guarding or rebound.  No evidence of tenderness               

      throughout. Back:  No spinal tenderness.  No costovertebral tenderness.  Full range of      

      motion. Skin:  Warm, dry with normal turgor.  Normal color with no rashes, no lesions,      

      and no evidence of cellulitis. MS/ Extremity:  Pulses equal, no cyanosis.                   

      Neurovascular intact.  Full, normal range of motion.                                        

20:49 Neuro: Orientation: appropriate for stated age, Mentation: is normal, Memory: is            

      normal, Cranial nerves: grossly normal, Cerebellar function: normal finger to nose          

      testing, heel to shin testing is normal, Motor: is normal, Sensation: is normal, Gait:      

      is steady, at a normal pace, without difficulty.                                            

                                                                                                  

Vital Signs:                                                                                      

20:38  / 89; Pulse 78; Resp 18; Temp 98.2(O); Pulse Ox 95% on R/A; Weight 97.52 kg;     tl2 

      Height 6 ft. 1 in. (185.42 cm); Pain 0/10;                                                  

21:20  / 69; Pulse 75; Resp 18; Pulse Ox 97% ;                                          wh  

22:42  / 86; Pulse 72; Resp 18; Pulse Ox 95% on R/A;                                    tl2 

23:30  / 69; Pulse 64; Resp 14; Pulse Ox 95% ;                                          bp  

20:38 Body Mass Index 28.37 (97.52 kg, 185.42 cm)                                             tl2 

                                                                                                  

MDM:                                                                                              

20:35 Patient medically screened.                                                             pkl 

22:14 Data reviewed: vital signs, nurses notes, lab test result(s), EKG, radiologic studies,  pkl 

      CT scan, plain films.                                                                       

                                                                                                  

                                                                                             

20:57 Order name: Basic Metabolic Panel; Complete Time: 21:45                                 pkl 

                                                                                             

20:57 Order name: CBC with Diff; Complete Time: 21:45                                         pkl 

                                                                                             

20:57 Order name: Ckmb; Complete Time: 21:45                                                  pkl 

                                                                                             

20:57 Order name: CPK; Complete Time: 21:45                                                   pkl 

                                                                                             

20:57 Order name: LFT's; Complete Time: 21:45                                                 pkl 

                                                                                             

20:57 Order name: Magnesium; Complete Time: 21:45                                             pkl 

                                                                                             

20:57 Order name: NT PRO-BNP; Complete Time: 21:45                                            pkl 

                                                                                             

20:57 Order name: PT-INR; Complete Time: 21:45                                                pkl 

                                                                                             

20:57 Order name: Ptt, Activated; Complete Time: 21:45                                        pkl 

                                                                                             

20:57 Order name: Troponin (emerg Dept Use Only); Complete Time: 21:45                        pkl 

                                                                                             

20:57 Order name: XRAY Chest (1 view); Complete Time: 02:15                                   pkl 

                                                                                             

20:58 Order name: CT Head Brain wo Cont; Complete Time: 02:15                                 pkl 

                                                                                             

21:38 Order name: Urine Dipstick--Ancillary (enter results); Complete Time: 21:45             rg2 

                                                                                             

20:57 Order name: EKG; Complete Time: 20:58                                                   pkl 

                                                                                             

20:57 Order name: Cardiac monitoring; Complete Time: 21:06                                    pkl 

                                                                                             

20:57 Order name: EKG - Nurse/Tech; Complete Time: 21:06                                      pkl 

                                                                                             

20:57 Order name: IV Saline Lock; Complete Time: 21:06                                        pkl 

                                                                                             

20:57 Order name: Labs collected and sent; Complete Time: 21:06                               pkl 

                                                                                             

20:57 Order name: O2 Per Protocol; Complete Time: 21:06                                       pkl 

                                                                                             

20:57 Order name: O2 Sat Monitoring; Complete Time: 21:06                                     pkl 

                                                                                             

20:57 Order name: Urine Dipstick-Ancillary (obtain specimen); Complete Time: 21:35            pkl 

                                                                                                  

Administered Medications:                                                                         

21:35 Drug: NS 0.9% 1000 ml Route: IV; Rate: 100 ml/hr; Site: left antecubital;                 

22:06 Drug: K-Lyte Effervescent Tablet 50 mEq Route: PO;                                        

                                                                                                  

                                                                                                  

Disposition:                                                                                      

18 22:24 Hospitalization ordered by Yanira Hull for Observation. Preliminary            

  diagnosis is Near syncope. Transient ischemic attack.                                           

- Bed requested for Telemetry/MedSurg (observation).                                              

- Status is Observation.                                                                      bp  

- Condition is Stable.                                                                            

- Problem is new.                                                                                 

- Symptoms have improved.                                                                         

UTI on Admission? No                                                                              

                                                                                                  

                                                                                                  

                                                                                                  

Signatures:                                                                                       

Dispatcher [employer]                           EDMS                                                 

Jessy Sanchez RN RN mw Lam, Pin, MD MD   [facility]                                                  

Brook Crump RN                        RN   [facility]                                                  

Caprice Madden                                                                                   

Yemi Haddad RN                      RN   bp                                                   

                                                                                                  

Corrections: (The following items were deleted from the chart)                                    

22:45 22:24 Hospitalization Ordered by Yanira Hull MD for Observation. Preliminary         mw  

      diagnosis is Near syncope. Transient ischemic attack. Bed requested for                     

      Telemetry/MedSurg (observation). Status is Observation. Condition is Stable. Problem is     

      new. Symptoms have improved. UTI on Admission? No. pkl                                      

23:55 22:45 2018 22:24 Hospitalization Ordered by Yanira Hull MD for Observation.    bp  

      Preliminary diagnosis is Near syncope. Transient ischemic attack. Bed requested for         

      Telemetry/MedSurg (observation). Status is Observation. Condition is Stable. Problem is     

      new. Symptoms have improved. UTI on Admission? No. mw                                       

                                                                                                  

************************************************************************************************** · Likely related to underlying COPD exacerbation  · Briefly required BiPAP in the ER, now off BiPAP    PLAN:  · no significant wheezing but diminished all over  · O2 supplementation as needed to maintain O2 sat between 88-90%, now on 4 L (on 4L at baseline)  · solumedrol changed to PO prednisone 40 mg  · procalcitonin elevated but no clinical evidence of infection hence Pulm recs DCing antibiotic  · Nebs QID  · Cont breo  · Flu PCR & resp viral panel negative  · Follows up with Dr Nahid Davis outpatient

## 2019-02-26 NOTE — PROGRESS NOTES
Progress Note - [facility] Setting 1950, 77 y o  male MRN: [id number]    Unit/Bed#: [facility] 930-01 Encounter: [id number]    Primary Care Provider: Marga Rodriguez MD   Date and time admitted to hospital: 2/21/2019  9:34 AM        * Acute on chronic respiratory failure with hypoxia and hypercapnia ([facility] [address] )  Assessment & Plan  · Likely related to underlying COPD exacerbation  · Briefly required BiPAP in the ER, now off BiPAP    PLAN:  · no significant wheezing but diminished all over  · O2 supplementation as needed to maintain O2 sat between 88-90%, now on 4 L (on 4L at baseline)  · solumedrol changed to PO prednisone 40 mg  · procalcitonin elevated but no clinical evidence of infection hence Pulm recs DCing antibiotic  · Nebs QID  · Cont breo  · Flu PCR & resp viral panel negative  · Follows up with Dr Paras Barajas outpatient      Hypertensive urgency  Assessment & Plan  · POA now hypotension  · Decreased cardizem to 120 mg  · Cont lasix 10 mg with steroids    SIRS (systemic inflammatory response syndrome) ([facility] [address] )  Assessment & Plan  Likely relate dot underlying COPD exacerbation with viral /bacterial bronchitis  CXR neg for PNA  UA neg  PCR neg  Resp viral pathogen panel negative    COPD exacerbation (HCC)  Assessment & Plan  Known history of COPD with chronic respiratory failure requiring 4 L oxygen at home  Likely trigger viral bronchitis  Positive history of sick contacts  PLAN:  · Management as mentioned above  Macrocytic anemia  Assessment & Plan  Hemoglobin at baseline between 10-11  Continue folic acid and iron supplementation  VTE Pharmacologic Prophylaxis:   Pharmacologic: Enoxaparin (Lovenox)  Mechanical VTE Prophylaxis in Place: No    Patient Centered Rounds: I have performed bedside rounds with nursing staff today  Time Spent for Care: 15 minutes  More than 50% of total time spent on counseling and coordination of care as described above      Current Length of Stay: 5 day(s)    Current Patient Status: Inpatient   Certification Statement: The patient will continue to require additional inpatient hospital stay due to Continue pulmonary treatment  Monitor response to steroids  Code Status: Level 3 - DNAR and DNI      Subjective:   No acute distress    Objective:     Vitals:   Temp (24hrs), Av 1 °F (36 7 °C), Min:98 06 °F (36 7 °C), Max:98 2 °F (36 8 °C)    Temp:  [98 06 °F (36 7 °C)-98 2 °F (36 8 °C)] 98 06 °F (36 7 °C)  HR:  [] 88  Resp:  [18-20] 20  BP: (109-140)/(52-70) 140/70  SpO2:  [94 %-99 %] 97 %  Body mass index is 21 82 kg/m²  Input and Output Summary (last 24 hours): Intake/Output Summary (Last 24 hours) at 2019 1157  Last data filed at 2019 0817  Gross per 24 hour   Intake 660 ml   Output 1640 ml   Net -980 ml       Physical Exam:     Physical Exam   Constitutional: He appears well-developed and well-nourished  HENT:   Head: Normocephalic  Eyes: Pupils are equal, round, and reactive to light  Neck: Normal range of motion  Neck supple  Cardiovascular: Normal rate  Pulmonary/Chest: Effort normal and breath sounds normal  No stridor  No respiratory distress  He has no wheezes  He has no rales  He exhibits no tenderness  Abdominal: Soft  He exhibits no distension  There is no tenderness  There is no guarding  Musculoskeletal: Normal range of motion  He exhibits no edema  Neurological: He displays normal reflexes  No cranial nerve deficit  Coordination normal    Skin: Skin is warm and dry         Additional Data:     Labs:    Results from last 7 days   Lab Units 19  0642   WBC Thousand/uL 3 84*   HEMOGLOBIN g/dL 9 5*   HEMATOCRIT % 29 9*   PLATELETS Thousands/uL 190   NEUTROS PCT % 90*   LYMPHS PCT % 6*   MONOS PCT % 3*   EOS PCT % 0     Results from last 7 days   Lab Units 19  0557  19  0943   POTASSIUM mmol/L 4 4   < > 4 2   CHLORIDE mmol/L 98*   < > 98*   CO2 mmol/L 40*   < > 31   BUN mg/dL 23   < > 12   CREATININE mg/dL 0 70 < > 0 92   CALCIUM mg/dL 9 6   < > 9 2   ALK PHOS U/L  --   --  88   ALT U/L  --   --  14   AST U/L  --   --  14    < > = values in this interval not displayed  Results from last 7 days   Lab Units 02/21/19  0943   INR  1 14       * I Have Reviewed All Lab Data Listed Above  * Additional Pertinent Lab Tests Reviewed: All Labs Within Last 24 Hours Reviewed      Recent Cultures (last 7 days):     Results from last 7 days   Lab Units 02/21/19  1921 02/21/19  1009 02/21/19  0945   BLOOD CULTURE   --   --  No Growth After 4 Days  No Growth After 4 Days  SPUTUM CULTURE  Test not performed  Suggest repeat specimen  --   --    GRAM STAIN RESULT  >10 squamous epithelial cells/lpf, indicating orophayngeal contamination  *  1+ Polys*  2+ Gram positive cocci in pairs*  2+ Gram negative rods*  1+ Gram positive rods*  Rare Budding yeast*  --   --    INFLUENZA B PCR   --  Not Detected  --    RSV PCR   --  Not Detected  --        Last 24 Hours Medication List:     Current Facility-Administered Medications:  albuterol 2 puff Inhalation Q4H PRN Ktae Boles MD   albuterol 2 5 mg Nebulization Q4H PRN Kate Boles MD   citalopram 20 mg Oral Daily Kate Boles MD   diltiazem 120 mg Oral Daily Jessy Keith MD   enoxaparin 40 mg Subcutaneous Daily Kate Boles MD   ferrous sulfate 325 mg Oral Daily Kate Boles MD   fluticasone-vilanterol 1 puff Inhalation Daily Jessy Keith MD   folic acid 1 mg Oral Daily Kate Boles MD   furosemide 10 mg Oral Daily Kate Boles MD   ipratropium 0 5 mg Nebulization Q6H Vida Winter MD   levalbuterol 1 25 mg Nebulization Q6H Jessy Keith MD   melatonin 6 mg Oral HS PRN Kate Boles MD   pantoprazole 40 mg Oral Early Morning Jessy Keith MD   potassium chloride 20 mEq Oral Daily Kate Boles MD   predniSONE 40 mg Oral Daily Elida Husbands, CRNP   sodium chloride 3 mL Nebulization Q4H PRN Jessy Keith MD        Today, Patient Was Seen By: Angel Luis Boyd DO    ** Please Note: Dictation voice to text software may have been used in the creation of this document   **

## 2019-02-26 NOTE — ASSESSMENT & PLAN NOTE
Likely secondary to COPD exacerbation  Patient on prednisone 40  Will continue with taper  Will continue with meds as outlined by Pulmonary  Tentative discharge today

## 2019-02-26 NOTE — PLAN OF CARE
Problem: Potential for Falls  Goal: Patient will remain free of falls  Description  INTERVENTIONS:  - Assess patient frequently for physical needs  -  Identify cognitive and physical deficits and behaviors that affect risk of falls    -  [place] fall precautions as indicated by assessment   - Educate patient/family on patient safety including physical limitations  - Instruct patient to call for assistance with activity based on assessment  - Modify environment to reduce risk of injury  - Consider OT/PT consult to assist with strengthening/mobility  Outcome: Progressing     Problem: RESPIRATORY - ADULT  Goal: Achieves optimal ventilation and oxygenation  Description  INTERVENTIONS:  - Assess for changes in respiratory status  - Assess for changes in mentation and behavior  - Position to facilitate oxygenation and minimize respiratory effort  - Oxygen administration by appropriate delivery method based on oxygen saturation (per order) or ABGs  - Initiate smoking cessation education as indicated  - Encourage broncho-pulmonary hygiene including cough, deep breathe, Incentive Spirometry  - Assess the need for suctioning and aspirate as needed  - Assess and instruct to report SOB or any respiratory difficulty  - Respiratory Therapy support as indicated  Outcome: Progressing     Problem: SKIN/TISSUE INTEGRITY - ADULT  Goal: Skin integrity remains intact  Description  INTERVENTIONS  - Identify patients at risk for skin breakdown  - Assess and monitor skin integrity  - Assess and monitor nutrition and hydration status  - Monitor labs (i e  albumin)  - Assess for incontinence   - Turn and reposition patient  - Assist with mobility/ambulation  - Relieve pressure over bony prominences  - Avoid friction and shearing  - Provide appropriate hygiene as needed including keeping skin clean and dry  - Evaluate need for skin moisturizer/barrier cream  - Collaborate with interdisciplinary team (i e  Nutrition, Rehabilitation, etc ) - Patient/family teaching  Outcome: Progressing     Problem: MUSCULOSKELETAL - ADULT  Goal: Maintain or return mobility to safest level of function  Description  INTERVENTIONS:  - Assess patient's ability to carry out ADLs; assess patient's baseline for ADL function and identify physical deficits which impact ability to perform ADLs (bathing, care of mouth/teeth, toileting, grooming, dressing, etc )  - Assess/evaluate cause of self-care deficits   - Assess range of motion  - Assess patient's mobility; develop plan if impaired  - Assess patient's need for assistive devices and provide as appropriate  - Encourage maximum independence but intervene and supervise when necessary  - Involve family in performance of ADLs  - Assess for home care needs following discharge   - Request OT consult to assist with ADL evaluation and planning for discharge  - Provide patient education as appropriate  Outcome: Progressing     Problem: DISCHARGE PLANNING - CARE MANAGEMENT  Goal: Discharge to post-acute care or home with appropriate resources  Description  INTERVENTIONS:  - Conduct assessment to determine patient/family and health care team treatment goals, and need for post-acute services based on payer coverage, community resources, and patient preferences, and barriers to discharge  - Address psychosocial, clinical, and financial barriers to discharge as identified in assessment in conjunction with the patient/family and health care team  - Arrange appropriate level of post-acute services according to patient's   needs and preference and payer coverage in collaboration with the physician and health care team  - Communicate with and update the patient/family, physician, and health care team regarding progress on the discharge plan  - Arrange appropriate transportation to post-acute venues  -Anticipate return to home with [facility] services   Outcome: Progressing

## 2019-02-26 NOTE — PLAN OF CARE
Problem: Potential for Falls  Goal: Patient will remain free of falls  Description  INTERVENTIONS:  - Assess patient frequently for physical needs  -  Identify cognitive and physical deficits and behaviors that affect risk of falls    -  [place] fall precautions as indicated by assessment   - Educate patient/family on patient safety including physical limitations  - Instruct patient to call for assistance with activity based on assessment  - Modify environment to reduce risk of injury  - Consider OT/PT consult to assist with strengthening/mobility  Outcome: Progressing     Problem: RESPIRATORY - ADULT  Goal: Achieves optimal ventilation and oxygenation  Description  INTERVENTIONS:  - Assess for changes in respiratory status  - Assess for changes in mentation and behavior  - Position to facilitate oxygenation and minimize respiratory effort  - Oxygen administration by appropriate delivery method based on oxygen saturation (per order) or ABGs  - Initiate smoking cessation education as indicated  - Encourage broncho-pulmonary hygiene including cough, deep breathe, Incentive Spirometry  - Assess the need for suctioning and aspirate as needed  - Assess and instruct to report SOB or any respiratory difficulty  - Respiratory Therapy support as indicated  Outcome: Progressing     Problem: SKIN/TISSUE INTEGRITY - ADULT  Goal: Skin integrity remains intact  Description  INTERVENTIONS  - Identify patients at risk for skin breakdown  - Assess and monitor skin integrity  - Assess and monitor nutrition and hydration status  - Monitor labs (i e  albumin)  - Assess for incontinence   - Turn and reposition patient  - Assist with mobility/ambulation  - Relieve pressure over bony prominences  - Avoid friction and shearing  - Provide appropriate hygiene as needed including keeping skin clean and dry  - Evaluate need for skin moisturizer/barrier cream  - Collaborate with interdisciplinary team (i e  Nutrition, Rehabilitation, etc ) - Patient/family teaching  Outcome: Progressing     Problem: MUSCULOSKELETAL - ADULT  Goal: Maintain or return mobility to safest level of function  Description  INTERVENTIONS:  - Assess patient's ability to carry out ADLs; assess patient's baseline for ADL function and identify physical deficits which impact ability to perform ADLs (bathing, care of mouth/teeth, toileting, grooming, dressing, etc )  - Assess/evaluate cause of self-care deficits   - Assess range of motion  - Assess patient's mobility; develop plan if impaired  - Assess patient's need for assistive devices and provide as appropriate  - Encourage maximum independence but intervene and supervise when necessary  - Involve family in performance of ADLs  - Assess for home care needs following discharge   - Request OT consult to assist with ADL evaluation and planning for discharge  - Provide patient education as appropriate  Outcome: Progressing     Problem: DISCHARGE PLANNING - CARE MANAGEMENT  Goal: Discharge to post-acute care or home with appropriate resources  Description  INTERVENTIONS:  - Conduct assessment to determine patient/family and health care team treatment goals, and need for post-acute services based on payer coverage, community resources, and patient preferences, and barriers to discharge  - Address psychosocial, clinical, and financial barriers to discharge as identified in assessment in conjunction with the patient/family and health care team  - Arrange appropriate level of post-acute services according to patient's   needs and preference and payer coverage in collaboration with the physician and health care team  - Communicate with and update the patient/family, physician, and health care team regarding progress on the discharge plan  - Arrange appropriate transportation to post-acute venues  -Anticipate return to home with [address]   Outcome: Progressing

## 2019-02-26 NOTE — MEDICAL STUDENT
MEDICAL STUDENT  Inpatient Progress Note for TRAINING ONLY  Not Part of Legal Medical Record       [facility] Internal Medicine Progress Note  Patient: Ranjith Ledesma 77 y o  male   MRN: [id number]  PCP: Ava Rodriguez MD  Unit/Bed#: [facility] 930-01 Encounter: [id number]  Date Of Visit: 02/26/19    Assessment & Plan:    Principal Problem:    Acute on chronic respiratory failure with hypoxia and hypercapnia (HCC)  Active Problems:    Macrocytic anemia    COPD exacerbation (HCC)    SIRS (systemic inflammatory response syndrome) (HCC)    Hypertensive urgency    COPD Exacerbation  · 2/2 viral bronchitis  Resolving, patient is currently at baseline on 4L at 98%  · Continue Breo 100  Restart Spiriva at home  Continue nebs while inpatient  · Continue prednisone 40mg PO, Day 2 of 3  Then taper by 10mg q3d  · Encourage pulm toilet  · Pulmonology following, appreciate recommendations  · Clear for discharge today  Viral bronchitis  · Resolving, patient afebrile and denying significant cough today  · Respiratory panel neg, flu neg  Sputum culture contaminated  Hypertensive urgency  · Blood pressures have been within normal limits  · Continue monitoring vitals  · Continue cardizem 120mg, Lasix 10mg daily  Macrocytic anemia  · HgB 9 5 on AM CBC, from 11 2 yesterday  Possibly 2/2 to hemodilution  · Continue folate  · Consider checking B12 outpatient  VTE Pharmacologic Prophylaxis:   Pharmacologic: Enoxaparin (Lovenox)  Mechanical VTE Prophylaxis in Place: Yes      Time Spent for Care: 45 minutes  More than 50% of total time spent on counseling and coordination of care as described above  Current Length of Stay: 5 day(s)    Current Patient Status: Inpatient   Certification Statement: Discharge today    Discharge Plan / Estimated Discharge Date: Today to home     Code Status: Level 3 - DNAR and DNI      Subjective:   Mr Brant Bob reports feeling short of breath and fatigued   He asked to see Dr Yanet Urena today  Denies having any chest pain, abdominal pain  Had a BM two days ago  No issues urinating  Ambulating with a walker  Objective:     Vitals:   Temp (24hrs), Av 1 °F (36 7 °C), Min:98 06 °F (36 7 °C), Max:98 2 °F (36 8 °C)    Temp:  [98 06 °F (36 7 °C)-98 2 °F (36 8 °C)] 98 06 °F (36 7 °C)  HR:  [] 88  Resp:  [18-20] 20  BP: (109-140)/(52-70) 140/70  SpO2:  [94 %-99 %] 97 %  Body mass index is 21 82 kg/m²  Input and Output Summary (last 24 hours): Intake/Output Summary (Last 24 hours) at 2019 1153  Last data filed at 2019 0817  Gross per 24 hour   Intake 660 ml   Output 1640 ml   Net -980 ml       Physical Exam:     Physical Exam   Constitutional: He is oriented to person, place, and time  He appears well-developed and well-nourished  No distress  HENT:   Head: Normocephalic and atraumatic  Neck: Normal range of motion  No JVD present  Cardiovascular: Normal rate, regular rhythm, normal heart sounds and intact distal pulses  Exam reveals no gallop and no friction rub  No murmur heard  Pulmonary/Chest: Effort normal  No respiratory distress  He has wheezes  Abdominal: Soft  Bowel sounds are normal  He exhibits no distension  There is no tenderness  Musculoskeletal: Normal range of motion  He exhibits no edema  Neurological: He is alert and oriented to person, place, and time  Skin: Skin is warm  Capillary refill takes less than 2 seconds  He is not diaphoretic  Vitals reviewed        Additional Data:     Labs:    Results from last 7 days   Lab Units 19  0642   WBC Thousand/uL 3 84*   HEMOGLOBIN g/dL 9 5*   HEMATOCRIT % 29 9*   PLATELETS Thousands/uL 190   NEUTROS PCT % 90*   LYMPHS PCT % 6*   MONOS PCT % 3*   EOS PCT % 0     Results from last 7 days   Lab Units 19  0557  19  0943   POTASSIUM mmol/L 4 4   < > 4 2   CHLORIDE mmol/L 98*   < > 98*   CO2 mmol/L 40*   < > 31   BUN mg/dL 23   < > 12   CREATININE mg/dL 0 70   < > 0 92   CALCIUM mg/dL 9 6   < > 9 2   ALK PHOS U/L  --   --  88   ALT U/L  --   --  14   AST U/L  --   --  14    < > = values in this interval not displayed  Results from last 7 days   Lab Units 02/21/19  0943   INR  1 14       * I Have Reviewed All Lab Data Listed Above  * Additional Pertinent Lab Tests Reviewed: All Labs Within Last 24 Hours Reviewed    Imaging:    Imaging Reports Reviewed Today Include: none  Imaging Personally Reviewed by Myself Includes:  none    Recent Cultures (last 7 days):     Results from last 7 days   Lab Units 02/21/19  1921 02/21/19  1009 02/21/19  0945   BLOOD CULTURE   --   --  No Growth After 4 Days  No Growth After 4 Days  SPUTUM CULTURE  Test not performed  Suggest repeat specimen  --   --    GRAM STAIN RESULT  >10 squamous epithelial cells/lpf, indicating orophayngeal contamination  *  1+ Polys*  2+ Gram positive cocci in pairs*  2+ Gram negative rods*  1+ Gram positive rods*  Rare Budding yeast*  --   --    INFLUENZA B PCR   --  Not Detected  --    RSV PCR   --  Not Detected  --        Last 24 Hours Medication List:     Current Facility-Administered Medications:  albuterol 2 puff Inhalation Q4H PRN Sarath Bruno MD   albuterol 2 5 mg Nebulization Q4H PRN Sarath Bruno MD   citalopram 20 mg Oral Daily Sarath Bruno MD   diltiazem 120 mg Oral Daily Lisandro Barroso MD   enoxaparin 40 mg Subcutaneous Daily Sarath Bruno MD   ferrous sulfate 325 mg Oral Daily Sarath Bruno MD   fluticasone-vilanterol 1 puff Inhalation Daily Lisandro Barroso MD   folic acid 1 mg Oral Daily Sarath Bruno MD   furosemide 10 mg Oral Daily Sarath Bruno MD   ipratropium 0 5 mg Nebulization Q6H Vida Winter MD   levalbuterol 1 25 mg Nebulization Q6H Vida Winter MD   melatonin 6 mg Oral HS PRN Sarath Bruno MD   pantoprazole 40 mg Oral Early Morning Lisandro Barroso MD   potassium chloride 20 mEq Oral Daily Sarath Bruno MD   predniSONE 40 mg Oral Daily Alejandro Kumar, CRNP   sodium chloride 3 mL Nebulization Q4H PRN Lisa Jacobo MD        Today, Patient Was Seen By: Alee Driscoll    ** Please Note: This note has been constructed using a voice recognition system   **

## 2019-02-26 NOTE — DISCHARGE SUMMARY
Discharge- Kathleen Barnett 1950, 77 y o  male MRN: [id number]    [facility] Encounter: [id number]    Primary Care Provider: Valentina Bueno MD   Date and time admitted to hospital: 2/21/2019  9:34 AM        * Acute on chronic respiratory failure with hypoxia and hypercapnia ([address] )  Assessment & Plan  Likely secondary to COPD exacerbation  Patient on prednisone 40  Will continue with taper  Will continue with meds as outlined by Pulmonary  Tentative discharge today  Hypertensive urgency  Assessment & Plan  · POA now hypotension  · Decreased cardizem to 120 mg  · Cont lasix 10 mg with steroids    SIRS (systemic inflammatory response syndrome) (HCC)  Assessment & Plan  Likely relate dot underlying COPD exacerbation with viral /bacterial bronchitis  CXR neg for PNA  UA neg  PCR neg  Resp viral pathogen panel negative    COPD exacerbation (HCC)  Assessment & Plan  Known history of COPD with chronic respiratory failure requiring 4 L oxygen at home  Likely trigger viral bronchitis  Positive history of sick contacts  PLAN:  · Management as mentioned above  Macrocytic anemia  Assessment & Plan  Hemoglobin at baseline between 10-11  Continue folic acid and iron supplementation                  Resolved Problems  Date Reviewed: 4/9/2018    None          Admission Date:   Admission Orders (From admission, onward)    Ordered        02/21/19 1258  Inpatient Admission  Once     Order ID Start Status   [id number] 02/21/19 1259 Completed                Admitting Diagnosis: Respiratory distress [R06 03]  SOB (shortness of breath) [R06 02]  COPD with acute exacerbation (HCC) [J44 1]  Acute respiratory failure with hypoxia and hypercarbia (HCC) [J96 01, J96 02]    HPI:  This is a 64-year-old male with known history of COPD who presents to the hospital past medical history of of tobacco abuse with a 63 pack year smoking history who quit in 2012, pulmonary artery hypertension, paroxysmal atrial fibrillation, MGUS, iron deficiency anemia hypertension aortic regurgitation with chronic hypoxic respiratory failure who presents to the hospital with a 1 day history of worsening shortness of breath and severe fatigue  The patient presented to the hospital for further workup evaluation  He is on chronic oxygen at home at 4 L  He was placed on BiPAP on initial presentation  He was admitted with acute decompensated COPD exacerbation  His pulmonary regimen is as follows:  --Presented daily regimen of Breo, Spiriva, and albuterol rescue inhaler 1 time daily  At baseline is able to walk 1 to 2 blocks at a time  · Acute respiratory failure on chronic respiratory failure with associated COPD history  Patient was admitted for COPD exacerbation likely secondary to viral etiology  · Patient was cleared for discharge by Pulmonary  · Continue with steroids  · Continue with homes respiratory treatments  · Outpatient follow-up with Pulmonary  Procedures Performed: No orders of the defined types were placed in this encounter  Condition at Discharge: fair         Discharge instructions/Information to patient and family:   See after visit summary for information provided to patient and family  Provisions for Follow-Up Care:  See after visit summary for information related to follow-up care and any pertinent home health orders  PCP: Josee Nagel MD    Disposition: Home    Planned Readmission: No    Discharge Statement   I spent 35 minutes discharging the patient  This time was spent on the day of discharge  I had direct contact with the patient on the day of discharge  Additional documentation is required if more than 30 minutes were spent on discharge  Discharge Medications:  See after visit summary for reconciled discharge medications provided to patient and family

## 2019-02-26 NOTE — PROGRESS NOTES
Progress Note - Pulmonary   Dayna Vasquez 77 y o  male MRN: [id number]  Unit/Bed#: [facility] 930-01 Encounter: [id number]      Assessment/Plan:  1  Chronic hypoxic respiratory failure        *  At baseline 4LNC  Keep saturations greater than or equal to 88%        *  Incentive spirometry Q1hr, OOB as able, increase activity as able  2  Very severe COPD with acute exacerbation        *  Improved and started prednisone 2/25/19        *  Plan to taper by 10mg every 3 days        *  Continue Breo 100        *  Continue Xopenex/Atrovent Q6hrs  3  Acute bronchitis        *  Procalcitonin minimally elevated  Continue to monitor off antibiotics  Afebrile              -Home pulmonary regimen includes:  Breo 100, Spiriva, Albuterol Nebs/MDI as needed  -Outpatient pulmonary follow up as per D/C instructions  Hopeful D/C later today  -Discussed with Dr Jack Calzada      Subjective:   Mr Anton Prince is seen sitting on the edge of the bed this morning  He continues to remain on baseline amount of oxygen at 4 L  He still states he is horrible    He denies any significant change in his breathing but states I just have no energy    He is unsure if he is going home later today  He denies chest pain, resting shortness of breath, cough, fever or bronchospasm  He has chronic dyspnea on exertion which is about at its baseline  Objective:     Vitals: Blood pressure 140/70, pulse 88, temperature 98 06 °F (36 7 °C), resp  rate 20, height 5' 7" (1 702 m), weight 63 2 kg (139 lb 5 3 oz), SpO2 97 %  , 4LNC, Body mass index is 21 82 kg/m²        Intake/Output Summary (Last 24 hours) at 2/26/2019 1020  Last data filed at 2/26/2019 0817  Gross per 24 hour   Intake 660 ml   Output 1640 ml   Net -980 ml         Physical Exam  Gen: Awake, alert, oriented x 3, no acute distress  HEENT: Mucous membranes moist, no oral lesions, no thrush, wearing O2 via nasal cannula  NECK: No accessory muscle use, JVP not elevated  Cardiac: Regular, single S1, single S2, no murmurs, no rubs, no gallops  Lungs:  Significantly decreased breath sounds throughout bilaterally  At present, no wheezes, rhonchi or rales noted  Abdomen: normoactive bowel sounds, soft nontender, nondistended, no rebound or rigidity, no guarding  Extremities: no cyanosis, no clubbing, no edema, wearing SCDs bilaterally    Labs: I have personally reviewed pertinent lab results  , ABG: No results found for: PHART, FCY5IJG, PO2ART, GIZ0DYK, O1ZTQHSO, BEART, SOURCE, BNP: No results found for: BNP, CBC: No results found for: WBC, HGB, HCT, MCV, PLT, ADJUSTEDWBC, MCH, MCHC, RDW, MPV, NRBC, CMP: No results found for: SODIUM, K, CL, CO2, ANIONGAP, BUN, CREATININE, GLUCOSE, CALCIUM, AST, ALT, ALKPHOS, PROT, BILITOT, EGFR, PT/INR: No results found for: PT, INR, Troponin: No results found for: TROPONINI     Imaging and other studies: I have personally reviewed pertinent films in PACS    No new pulmonary imaging since February 21, 2018    [place], [place]

## 2019-02-27 DIAGNOSIS — J44.9 CHRONIC OBSTRUCTIVE PULMONARY DISEASE, UNSPECIFIED COPD TYPE (HCC): ICD-10-CM

## 2019-02-27 RX ORDER — FLUTICASONE FUROATE AND VILANTEROL TRIFENATATE 100; 25 UG/1; UG/1
POWDER RESPIRATORY (INHALATION)
Qty: 60 INHALER | Refills: 5 | Status: SHIPPED | OUTPATIENT
Start: 2019-02-27 | End: 2019-07-17

## 2019-03-01 ENCOUNTER — OFFICE VISIT (OUTPATIENT)
Dept: INTERNAL MEDICINE CLINIC | Facility: CLINIC | Age: 69
End: 2019-03-01
Payer: MEDICARE

## 2019-03-01 VITALS
HEART RATE: 104 BPM | HEIGHT: 67 IN | TEMPERATURE: 98 F | OXYGEN SATURATION: 93 % | DIASTOLIC BLOOD PRESSURE: 60 MMHG | BODY MASS INDEX: 21.66 KG/M2 | SYSTOLIC BLOOD PRESSURE: 132 MMHG | WEIGHT: 138 LBS

## 2019-03-01 DIAGNOSIS — I10 ESSENTIAL HYPERTENSION: ICD-10-CM

## 2019-03-01 DIAGNOSIS — K21.9 GASTROESOPHAGEAL REFLUX DISEASE WITHOUT ESOPHAGITIS: ICD-10-CM

## 2019-03-01 DIAGNOSIS — J44.9 CHRONIC OBSTRUCTIVE PULMONARY DISEASE, UNSPECIFIED COPD TYPE (HCC): Primary | ICD-10-CM

## 2019-03-01 PROBLEM — R65.10 SIRS (SYSTEMIC INFLAMMATORY RESPONSE SYNDROME) (HCC): Status: RESOLVED | Noted: 2019-02-21 | Resolved: 2019-03-01

## 2019-03-01 PROBLEM — J44.1 COPD EXACERBATION (HCC): Status: RESOLVED | Noted: 2018-04-09 | Resolved: 2019-03-01

## 2019-03-01 PROCEDURE — 99495 TRANSJ CARE MGMT MOD F2F 14D: CPT | Performed by: INTERNAL MEDICINE

## 2019-03-01 NOTE — PROGRESS NOTES
Assessment/Plan:    Essential hypertension  Controlled, continue current medications    COPD (chronic obstructive pulmonary disease) (HCC)  Continue inhalers and follow up Pulmonary    Gastroesophageal reflux disease without esophagitis  Looks like the pantoprazole was started while in the hospital, patient can take this for 2 weeks, and then decrease it to 1 tablet every other day for week or so, then stop       Diagnoses and all orders for this visit:    Chronic obstructive pulmonary disease, unspecified COPD type ([facility] [address] )    Essential hypertension    Gastroesophageal reflux disease without esophagitis          Subjective:   TCM Call (since 1/29/2019)     Date and time call was made  2/26/2019  4:02 PM    Hospital care reviewed  Records reviewed    Patient was hospitialized at  [facility]    Date of Admission  02/21/19    Date of discharge  02/26/19    Diagnosis  Respiratory failure    Disposition  Home    Were the patients medications reviewed and updated  No    Current Symptoms  Shortness of breath      TCM Call (since 1/29/2019)     Should patient be enrolled in anticoag monitoring? No    Scheduled for follow up? Yes    Patients specialists  Pulmonlolgist    Pulmonologist name  Goldyalfred Royal    Did you obtain your prescribed medications  Yes    Do you need help managing your prescriptions or medications  No    I have advised the patient to call PCP with any new or worsening symptoms  Abbie Romero,     Are you recieving any outpatient services  No    Are you recieving home care services  Yes    Types of home care services  Nurse visit    Are you using any community resources  No    Have you fallen in the last 12 months  Yes    How many times  2, tripped on cord to his machine    Interperter language line needed  No    Counseling  Patient             Patient ID: Tony Nichols is a 77 y o  male  I reviewed patient's hospitalization for COPD exacerbation    He is normally on 4 L of oxygen at home, and was placed on BiPAP while in the hospital   One of the factors may be in a possible viral bronchitis worsening his baseline COPD  Since home, patient still does not feel quite back to baseline, he has little more short of breath than normal, a little more fatigued and weak  Patient is not having fevers chills or sweats  He is on a 4-4 5 L at home now  He is on an oral steroid taper  The following portions of the patient's history were reviewed and updated as appropriate: allergies, current medications, past family history, past medical history, past social history, past surgical history and problem list     Review of Systems   Constitutional: Positive for fatigue  Negative for chills and fever  HENT: Negative for congestion, nosebleeds, postnasal drip, sore throat and trouble swallowing  Eyes: Negative for pain  Respiratory: Positive for shortness of breath  Negative for cough, chest tightness and wheezing  Cardiovascular: Negative for chest pain, palpitations and leg swelling  Gastrointestinal: Negative for abdominal pain, constipation, diarrhea, nausea and vomiting  Endocrine: Negative for polydipsia and polyuria  Genitourinary: Negative for dysuria, flank pain and hematuria  Musculoskeletal: Negative for arthralgias  Skin: Negative for rash  Neurological: Positive for weakness  Negative for dizziness, tremors and headaches  Hematological: Does not bruise/bleed easily  Psychiatric/Behavioral: Positive for dysphoric mood (mild)  Negative for confusion  The patient is not nervous/anxious  Objective:      /60   Pulse 104   Temp 98 °F (36 7 °C)   Ht 5' 7" (1 702 m)   Wt 62 6 kg (138 lb)   SpO2 93%   BMI 21 61 kg/m²          Physical Exam   Constitutional: He is oriented to person, place, and time  He appears well-developed and well-nourished  No distress  HENT:   Head: Normocephalic and atraumatic     Right Ear: External ear normal    Left Ear: External ear normal    Eyes: Conjunctivae are normal  No scleral icterus  Neck: Normal range of motion  Neck supple  No tracheal deviation present  No thyromegaly present  Cardiovascular: Regular rhythm and normal heart sounds  Tachycardia present  No murmur heard  Pulmonary/Chest: Effort normal and breath sounds normal  No respiratory distress  He has no wheezes  He has no rales  Abdominal: Soft  Bowel sounds are normal  There is no tenderness  There is no rebound and no guarding  Musculoskeletal: He exhibits no edema  Lymphadenopathy:     He has no cervical adenopathy  Neurological: He is alert and oriented to person, place, and time  Psychiatric: He has a normal mood and affect  His behavior is normal  Judgment and thought content normal    Vitals reviewed

## 2019-03-01 NOTE — PATIENT INSTRUCTIONS
Problem List Items Addressed This Visit        Digestive    Gastroesophageal reflux disease without esophagitis     Looks like the pantoprazole was started while in the hospital, patient can take this for 2 weeks, and then decrease it to 1 tablet every other day for week or so, then stop            Respiratory    COPD (chronic obstructive pulmonary disease) ([facility] [address] ) - Primary     Continue inhalers and follow up Pulmonary            Cardiovascular and Mediastinum    Essential hypertension     Controlled, continue current medications

## 2019-03-01 NOTE — ASSESSMENT & PLAN NOTE
Looks like the pantoprazole was started while in the hospital, patient can take this for 2 weeks, and then decrease it to 1 tablet every other day for week or so, then stop

## 2019-03-06 ENCOUNTER — OFFICE VISIT (OUTPATIENT)
Dept: PULMONOLOGY | Facility: CLINIC | Age: 69
End: 2019-03-06
Payer: MEDICARE

## 2019-03-06 VITALS
HEIGHT: 67 IN | OXYGEN SATURATION: 96 % | DIASTOLIC BLOOD PRESSURE: 56 MMHG | SYSTOLIC BLOOD PRESSURE: 132 MMHG | WEIGHT: 140 LBS | HEART RATE: 103 BPM | TEMPERATURE: 97.7 F | BODY MASS INDEX: 21.97 KG/M2

## 2019-03-06 DIAGNOSIS — J96.11 CHRONIC HYPOXEMIC RESPIRATORY FAILURE (HCC): ICD-10-CM

## 2019-03-06 DIAGNOSIS — J44.9 COPD (CHRONIC OBSTRUCTIVE PULMONARY DISEASE) (HCC): Primary | ICD-10-CM

## 2019-03-06 DIAGNOSIS — R06.00 DOE (DYSPNEA ON EXERTION): ICD-10-CM

## 2019-03-06 PROCEDURE — 99214 OFFICE O/P EST MOD 30 MIN: CPT | Performed by: INTERNAL MEDICINE

## 2019-03-06 NOTE — PROGRESS NOTES
Office Progress Note - Pulmonary    Mikey Villatoro 77 y o  male MRN: [id number]    Encounter: [id number]      Assessment:  · COPD with acute exacerbation  · Chronic hypoxemic respiratory failure  · Dyspnea on exertion  Plan:   · Breo 200/25, 1 inhalation once a day  · Spiriva once a day  · Oxygen supplement 24 hours a day  · Regular exercise to improve stamina  · Follow-up in 6 months  Discussion:   The patient has COPD acute exacerbation is resolved  Currently he is back to his baseline  I have maintained him on the Breo 200/25, 1 inhalation once a day  Also I have maintained him on the Spiriva once a day  He will use the albuterol rescue inhaler as needed as well as the nebulizer treatment  He will keep the oxygen 24 hours a day  I have recommended to start taking regular walks and exercise to improve stamina  I have provided him with samples of Breo  I will see him in 6 months in a follow-up visit  Subjective: The patient is here for a hospital follow-up  The patient was admitted to [facility] 3 weeks ago for acute COPD exacerbation  The patient was treated with steroids and bronchodilators  His symptoms have markedly improved  He was discharged on prednisone taper  He has finished the prednisone  He is using Breo 200/25, 1 inhalation once a day  Also using Spiriva once a day  He is using the nebulizer albuterol treatments once a day  He has no nocturnal symptoms  He is on oxygen 24 hours a day  He has good appetite  Review of systems:  A 12 point system review is done and aside from what is stated above the rest of the review of systems is negative  Family history and social history are reviewed  Medications list is reviewed  Vitals: Blood pressure 132/56, pulse 103, temperature 97 7 °F (36 5 °C), temperature source Tympanic, height 5' 7" (1 702 m), weight 63 5 kg (140 lb), SpO2 96 %  ,     Physical Exam  Gen: Awake, alert, oriented x 3, no acute distress  HEENT: Mucous membranes moist, no oral lesions, no thrush  NECK: No accessory muscle use, JVP not elevated  Cardiac: Regular, single S1, single S2, no murmurs, no rubs, no gallops  Lungs:  Decreased breath sounds  No wheezing or rhonchi  Abdomen: normoactive bowel sounds, soft nontender, nondistended, no rebound or rigidity, no guarding  Extremities: no cyanosis, no clubbing, no edema  Neuro:  Grossly nonfocal   Skin:  No rash

## 2019-03-12 ENCOUNTER — HOSPITAL ENCOUNTER (OUTPATIENT)
Dept: NON INVASIVE DIAGNOSTICS | Facility: CLINIC | Age: 69
Discharge: HOME | End: 2019-03-12
Payer: MEDICARE

## 2019-03-12 DIAGNOSIS — Z86.79 HISTORY OF ATRIAL FLUTTER: ICD-10-CM

## 2019-03-12 DIAGNOSIS — J44.9 CHRONIC OBSTRUCTIVE LUNG DISEASE, TYPE A (HCC): ICD-10-CM

## 2019-03-12 PROCEDURE — 93226 XTRNL ECG REC<48 HR SCAN A/R: CPT

## 2019-03-12 PROCEDURE — 93225 XTRNL ECG REC<48 HRS REC: CPT

## 2019-03-17 DIAGNOSIS — E87.6 HYPOKALEMIA: ICD-10-CM

## 2019-03-18 RX ORDER — POTASSIUM CHLORIDE 20 MEQ/1
TABLET, EXTENDED RELEASE ORAL
Qty: 30 TABLET | Refills: 5 | Status: SHIPPED | OUTPATIENT
Start: 2019-03-18 | End: 2019-09-18 | Stop reason: SDUPTHER

## 2019-03-19 PROCEDURE — 93227 XTRNL ECG REC<48 HR R&I: CPT | Performed by: INTERNAL MEDICINE

## 2019-04-02 ENCOUNTER — OFFICE VISIT (OUTPATIENT)
Dept: CARDIOLOGY CLINIC | Facility: CLINIC | Age: 69
End: 2019-04-02
Payer: MEDICARE

## 2019-04-02 VITALS
BODY MASS INDEX: 22.71 KG/M2 | DIASTOLIC BLOOD PRESSURE: 68 MMHG | WEIGHT: 144.7 LBS | HEART RATE: 120 BPM | OXYGEN SATURATION: 96 % | SYSTOLIC BLOOD PRESSURE: 142 MMHG | HEIGHT: 67 IN

## 2019-04-02 DIAGNOSIS — J44.9 CHRONIC OBSTRUCTIVE LUNG DISEASE, TYPE A (HCC): Primary | ICD-10-CM

## 2019-04-02 DIAGNOSIS — Z86.79 HISTORY OF ATRIAL FLUTTER: ICD-10-CM

## 2019-04-02 PROCEDURE — 99214 OFFICE O/P EST MOD 30 MIN: CPT | Performed by: INTERNAL MEDICINE

## 2019-04-03 ENCOUNTER — APPOINTMENT (OUTPATIENT)
Dept: LAB | Facility: CLINIC | Age: 69
End: 2019-04-03
Payer: MEDICARE

## 2019-04-03 DIAGNOSIS — J44.9 CHRONIC OBSTRUCTIVE LUNG DISEASE, TYPE A (HCC): ICD-10-CM

## 2019-04-03 LAB
BASOPHILS # BLD AUTO: 0.01 THOUSANDS/ΜL (ref 0–0.1)
BASOPHILS NFR BLD AUTO: 0 % (ref 0–1)
EOSINOPHIL # BLD AUTO: 0.03 THOUSAND/ΜL (ref 0–0.61)
EOSINOPHIL NFR BLD AUTO: 1 % (ref 0–6)
ERYTHROCYTE [DISTWIDTH] IN BLOOD BY AUTOMATED COUNT: 14.1 % (ref 11.6–15.1)
HCT VFR BLD AUTO: 35.5 % (ref 36.5–49.3)
HGB BLD-MCNC: 11 G/DL (ref 12–17)
IMM GRANULOCYTES # BLD AUTO: 0.03 THOUSAND/UL (ref 0–0.2)
IMM GRANULOCYTES NFR BLD AUTO: 1 % (ref 0–2)
LYMPHOCYTES # BLD AUTO: 1.04 THOUSANDS/ΜL (ref 0.6–4.47)
LYMPHOCYTES NFR BLD AUTO: 31 % (ref 14–44)
MCH RBC QN AUTO: 31.2 PG (ref 26.8–34.3)
MCHC RBC AUTO-ENTMCNC: 31 G/DL (ref 31.4–37.4)
MCV RBC AUTO: 101 FL (ref 82–98)
MONOCYTES # BLD AUTO: 0.17 THOUSAND/ΜL (ref 0.17–1.22)
MONOCYTES NFR BLD AUTO: 5 % (ref 4–12)
NEUTROPHILS # BLD AUTO: 2.11 THOUSANDS/ΜL (ref 1.85–7.62)
NEUTS SEG NFR BLD AUTO: 62 % (ref 43–75)
NRBC BLD AUTO-RTO: 0 /100 WBCS
PLATELET # BLD AUTO: 238 THOUSANDS/UL (ref 149–390)
PMV BLD AUTO: 9.6 FL (ref 8.9–12.7)
RBC # BLD AUTO: 3.53 MILLION/UL (ref 3.88–5.62)
WBC # BLD AUTO: 3.39 THOUSAND/UL (ref 4.31–10.16)

## 2019-04-03 PROCEDURE — 85025 COMPLETE CBC W/AUTO DIFF WBC: CPT

## 2019-04-03 PROCEDURE — 36415 COLL VENOUS BLD VENIPUNCTURE: CPT

## 2019-04-15 DIAGNOSIS — J44.9 CHRONIC OBSTRUCTIVE PULMONARY DISEASE, UNSPECIFIED COPD TYPE (HCC): Primary | ICD-10-CM

## 2019-04-15 RX ORDER — ALBUTEROL SULFATE 2.5 MG/3ML
2.5 SOLUTION RESPIRATORY (INHALATION) EVERY 6 HOURS PRN
Qty: 125 VIAL | Refills: 5 | Status: SHIPPED | OUTPATIENT
Start: 2019-04-15 | End: 2020-05-10

## 2019-04-16 DIAGNOSIS — D64.9 ANEMIA, UNSPECIFIED TYPE: ICD-10-CM

## 2019-04-16 DIAGNOSIS — I47.1 PAROXYSMAL ATRIAL TACHYCARDIA (HCC): ICD-10-CM

## 2019-04-16 RX ORDER — DILTIAZEM HYDROCHLORIDE 180 MG/1
CAPSULE, EXTENDED RELEASE ORAL
Qty: 30 CAPSULE | Refills: 5 | Status: SHIPPED | OUTPATIENT
Start: 2019-04-16 | End: 2019-10-21 | Stop reason: SDUPTHER

## 2019-04-16 RX ORDER — FOLIC ACID 1 MG/1
TABLET ORAL
Qty: 30 TABLET | Refills: 5 | Status: SHIPPED | OUTPATIENT
Start: 2019-04-16 | End: 2019-10-21 | Stop reason: SDUPTHER

## 2019-05-28 DIAGNOSIS — J44.9 CHRONIC OBSTRUCTIVE PULMONARY DISEASE, UNSPECIFIED COPD TYPE (HCC): ICD-10-CM

## 2019-05-28 RX ORDER — ALBUTEROL SULFATE 90 UG/1
2 AEROSOL, METERED RESPIRATORY (INHALATION) EVERY 4 HOURS PRN
Qty: 8.5 INHALER | Refills: 5 | Status: SHIPPED | OUTPATIENT
Start: 2019-05-28 | End: 2019-12-31 | Stop reason: HOSPADM

## 2019-06-07 ENCOUNTER — OFFICE VISIT (OUTPATIENT)
Dept: INTERNAL MEDICINE CLINIC | Facility: CLINIC | Age: 69
End: 2019-06-07
Payer: MEDICARE

## 2019-06-07 VITALS
OXYGEN SATURATION: 95 % | SYSTOLIC BLOOD PRESSURE: 130 MMHG | BODY MASS INDEX: 22.26 KG/M2 | DIASTOLIC BLOOD PRESSURE: 62 MMHG | WEIGHT: 141.8 LBS | HEART RATE: 118 BPM | HEIGHT: 67 IN | RESPIRATION RATE: 16 BRPM

## 2019-06-07 DIAGNOSIS — K21.9 GASTROESOPHAGEAL REFLUX DISEASE WITHOUT ESOPHAGITIS: ICD-10-CM

## 2019-06-07 DIAGNOSIS — F41.9 ANXIETY: Primary | ICD-10-CM

## 2019-06-07 DIAGNOSIS — I10 ESSENTIAL HYPERTENSION: ICD-10-CM

## 2019-06-07 DIAGNOSIS — D53.9 MACROCYTIC ANEMIA: ICD-10-CM

## 2019-06-07 DIAGNOSIS — J44.9 CHRONIC OBSTRUCTIVE PULMONARY DISEASE, UNSPECIFIED COPD TYPE (HCC): ICD-10-CM

## 2019-06-07 PROCEDURE — 99214 OFFICE O/P EST MOD 30 MIN: CPT | Performed by: INTERNAL MEDICINE

## 2019-07-12 DIAGNOSIS — F32.A DEPRESSION, UNSPECIFIED DEPRESSION TYPE: ICD-10-CM

## 2019-07-12 RX ORDER — CITALOPRAM 20 MG/1
20 TABLET ORAL DAILY
Qty: 90 TABLET | Refills: 3 | Status: SHIPPED | OUTPATIENT
Start: 2019-07-12 | End: 2020-01-14 | Stop reason: SDUPTHER

## 2019-07-17 ENCOUNTER — OFFICE VISIT (OUTPATIENT)
Dept: PULMONOLOGY | Facility: CLINIC | Age: 69
End: 2019-07-17
Payer: MEDICARE

## 2019-07-17 VITALS
SYSTOLIC BLOOD PRESSURE: 128 MMHG | TEMPERATURE: 97.5 F | OXYGEN SATURATION: 97 % | HEART RATE: 91 BPM | HEIGHT: 67 IN | RESPIRATION RATE: 18 BRPM | WEIGHT: 142 LBS | BODY MASS INDEX: 22.29 KG/M2 | DIASTOLIC BLOOD PRESSURE: 54 MMHG

## 2019-07-17 DIAGNOSIS — J44.9 COPD (CHRONIC OBSTRUCTIVE PULMONARY DISEASE) (HCC): Primary | ICD-10-CM

## 2019-07-17 DIAGNOSIS — R06.00 DOE (DYSPNEA ON EXERTION): ICD-10-CM

## 2019-07-17 DIAGNOSIS — J96.11 CHRONIC HYPOXEMIC RESPIRATORY FAILURE (HCC): ICD-10-CM

## 2019-07-17 PROCEDURE — 1123F ACP DISCUSS/DSCN MKR DOCD: CPT | Performed by: INTERNAL MEDICINE

## 2019-07-17 PROCEDURE — 99213 OFFICE O/P EST LOW 20 MIN: CPT | Performed by: INTERNAL MEDICINE

## 2019-07-17 RX ORDER — FLUTICASONE FUROATE AND VILANTEROL 200; 25 UG/1; UG/1
1 POWDER RESPIRATORY (INHALATION) DAILY
Qty: 1 INHALER | Refills: 5 | Status: SHIPPED | OUTPATIENT
Start: 2019-07-17 | End: 2019-11-12 | Stop reason: SDUPTHER

## 2019-07-17 NOTE — PROGRESS NOTES
Office Progress Note - Pulmonary    Yuliet St 68 y o  male MRN: [id number]    Encounter: [id number]      Assessment:   Chronic obstructive pulmonary disease without acute exacerbation   Chronic hypoxemic respiratory failure   Dyspnea on exertion  Plan:     Breo 200/25, 1 inhalation once a day   Spiriva Respimat 2 inhalations once a day   Albuterol rescue inhaler as needed   Oxygen supplement 24 hours a day   Regular exercise to maintain stamina   Follow-up in 4 months  Discussion:   The patient's COPD is in remission  I have maintained him on the Breo 200/25, 1 inhalation once a day and Spiriva Respimat 2 inhalations once a day  He will use the albuterol rescue inhaler as needed  I have provided him with samples of the Breo and Spiriva  He will continue with the regular walks/exercise to improve stamina  Will see him in 4 months in a follow-up visit  Subjective: The patient is here for a follow-up visit  He has dyspnea on exertion which has not changed  He has occasional cough but no sputum production  Denies chest pain or palpitations  He is using Breo 100/25, 1 inhalation once a day and Spiriva Respimat 2 inhalations once a day  He rarely needs to use his rescue inhaler  He is using the oxygen 24 hours a day  No nocturnal symptoms  Review of systems:  A 12 point system review is done and aside from what is stated above the rest of the review of systems is negative  Family history and social history are reviewed  Medications list is reviewed  Vitals: Blood pressure 128/54, pulse 91, temperature 97 5 °F (36 4 °C), resp  rate 18, height 5' 7" (1 702 m), weight 64 4 kg (142 lb), SpO2 97 %  ,     Physical Exam  Gen: Awake, alert, oriented x 3, no acute distress  HEENT: Mucous membranes moist, no oral lesions, no thrush  NECK: No accessory muscle use, JVP not elevated  Cardiac: Regular, single S1, single S2, no murmurs, no rubs, no gallops  Lungs:  Decreased breath sounds  No wheezing or rhonchi  Abdomen: normoactive bowel sounds, soft nontender, nondistended, no rebound or rigidity, no guarding  Extremities: no cyanosis, no clubbing, no edema  Neuro:  Grossly nonfocal   Skin:  No rash

## 2019-09-18 ENCOUNTER — LAB REQUISITION (OUTPATIENT)
Dept: LAB | Facility: HOSPITAL | Age: 69
End: 2019-09-18
Payer: MEDICARE

## 2019-09-18 DIAGNOSIS — E87.6 HYPOKALEMIA: ICD-10-CM

## 2019-09-18 DIAGNOSIS — D50.0 IRON DEFICIENCY ANEMIA DUE TO CHRONIC BLOOD LOSS: ICD-10-CM

## 2019-09-18 LAB
BASOPHILS # BLD AUTO: 0 THOUSANDS/ΜL (ref 0–0.1)
BASOPHILS NFR BLD AUTO: 0 % (ref 0–1)
EOSINOPHIL # BLD AUTO: 0.04 THOUSAND/ΜL (ref 0–0.61)
EOSINOPHIL NFR BLD AUTO: 1 % (ref 0–6)
ERYTHROCYTE [DISTWIDTH] IN BLOOD BY AUTOMATED COUNT: 14 % (ref 11.6–15.1)
FERRITIN SERPL-MCNC: 42 NG/ML (ref 8–388)
HCT VFR BLD AUTO: 33.8 % (ref 36.5–49.3)
HGB BLD-MCNC: 10.8 G/DL (ref 12–17)
IMM GRANULOCYTES # BLD AUTO: 0.01 THOUSAND/UL (ref 0–0.2)
IMM GRANULOCYTES NFR BLD AUTO: 0 % (ref 0–2)
LYMPHOCYTES # BLD AUTO: 1.9 THOUSANDS/ΜL (ref 0.6–4.47)
LYMPHOCYTES NFR BLD AUTO: 56 % (ref 14–44)
MCH RBC QN AUTO: 32 PG (ref 26.8–34.3)
MCHC RBC AUTO-ENTMCNC: 32 G/DL (ref 31.4–37.4)
MCV RBC AUTO: 100 FL (ref 82–98)
MONOCYTES # BLD AUTO: 0.14 THOUSAND/ΜL (ref 0.17–1.22)
MONOCYTES NFR BLD AUTO: 4 % (ref 4–12)
NEUTROPHILS # BLD AUTO: 1.32 THOUSANDS/ΜL (ref 1.85–7.62)
NEUTS SEG NFR BLD AUTO: 39 % (ref 43–75)
NRBC BLD AUTO-RTO: 0 /100 WBCS
PLATELET # BLD AUTO: 172 THOUSANDS/UL (ref 149–390)
PMV BLD AUTO: 9.1 FL (ref 8.9–12.7)
RBC # BLD AUTO: 3.37 MILLION/UL (ref 3.88–5.62)
WBC # BLD AUTO: 3.41 THOUSAND/UL (ref 4.31–10.16)

## 2019-09-18 PROCEDURE — 82728 ASSAY OF FERRITIN: CPT | Performed by: INTERNAL MEDICINE

## 2019-09-18 PROCEDURE — 85025 COMPLETE CBC W/AUTO DIFF WBC: CPT | Performed by: INTERNAL MEDICINE

## 2019-09-18 RX ORDER — POTASSIUM CHLORIDE 20 MEQ/1
TABLET, EXTENDED RELEASE ORAL
Qty: 30 TABLET | Refills: 5 | Status: SHIPPED | OUTPATIENT
Start: 2019-09-18 | End: 2020-04-13

## 2019-09-23 ENCOUNTER — OFFICE VISIT (OUTPATIENT)
Dept: HEMATOLOGY ONCOLOGY | Facility: CLINIC | Age: 69
End: 2019-09-23
Payer: MEDICARE

## 2019-09-23 ENCOUNTER — OFFICE VISIT (OUTPATIENT)
Dept: INTERNAL MEDICINE CLINIC | Facility: CLINIC | Age: 69
End: 2019-09-23
Payer: MEDICARE

## 2019-09-23 VITALS
OXYGEN SATURATION: 94 % | HEART RATE: 135 BPM | RESPIRATION RATE: 22 BRPM | BODY MASS INDEX: 22.21 KG/M2 | SYSTOLIC BLOOD PRESSURE: 152 MMHG | DIASTOLIC BLOOD PRESSURE: 70 MMHG | WEIGHT: 141.5 LBS | HEIGHT: 67 IN | TEMPERATURE: 97.1 F

## 2019-09-23 VITALS
OXYGEN SATURATION: 91 % | TEMPERATURE: 97.6 F | SYSTOLIC BLOOD PRESSURE: 114 MMHG | HEART RATE: 103 BPM | DIASTOLIC BLOOD PRESSURE: 58 MMHG

## 2019-09-23 DIAGNOSIS — R00.0 TACHYCARDIA: Primary | ICD-10-CM

## 2019-09-23 DIAGNOSIS — J44.9 CHRONIC OBSTRUCTIVE PULMONARY DISEASE, UNSPECIFIED COPD TYPE (HCC): ICD-10-CM

## 2019-09-23 DIAGNOSIS — I47.1 PAROXYSMAL ATRIAL TACHYCARDIA (HCC): ICD-10-CM

## 2019-09-23 DIAGNOSIS — D72.819 LEUKOPENIA, UNSPECIFIED TYPE: ICD-10-CM

## 2019-09-23 DIAGNOSIS — I47.1 PAROXYSMAL ATRIAL TACHYCARDIA (HCC): Primary | ICD-10-CM

## 2019-09-23 DIAGNOSIS — D50.0 IRON DEFICIENCY ANEMIA DUE TO CHRONIC BLOOD LOSS: ICD-10-CM

## 2019-09-23 PROCEDURE — 99214 OFFICE O/P EST MOD 30 MIN: CPT | Performed by: INTERNAL MEDICINE

## 2019-09-23 PROCEDURE — 93000 ELECTROCARDIOGRAM COMPLETE: CPT | Performed by: INTERNAL MEDICINE

## 2019-09-23 NOTE — PATIENT INSTRUCTIONS
Please go to Dr Carlos Salazar  office today for EKG and evaluation    Increase iron tablet to twice a day

## 2019-09-23 NOTE — PROGRESS NOTES
Assessment/Plan:    Paroxysmal atrial tachycardia (HCC)  EKG today shows sinus tachycardia, his elevated heart rate at oncology may have been different, or may have been related to exertion from walking into the office  Patient has a pulse ox which does monitoring, and if he is having more frequent rapid heart rate episodes, then I recommend re-evaluation with Cardiology  Continue cardia    COPD (chronic obstructive pulmonary disease) (HCC)  Continue inhalers       Diagnoses and all orders for this visit:    Tachycardia  -     POCT ECG    Paroxysmal atrial tachycardia (HCC)    Chronic obstructive pulmonary disease, unspecified COPD type (HCC)          Subjective:      Patient ID: Silver Reilly is a 68 y o  male  Rapid heart rate:  Patient was over at oncology is office and is heart rate was in the 130s  The left his office come directly here, he had a pulse ox on his finger and his heart rate did come down to the 60s, them back up to the 90s and has been hanging around there  Patient denies any chest pain, no palpitations, he is chronically short of breath  When his heart rate was in 130s, that was right after he got out of his wheelchair and walked from the office into the exam room over there  His last beta agonist treatment was this morning  Patient denies missing doses of cardia  Patient also does have a history of paroxysmal atrial tachycardia  The following portions of the patient's history were reviewed and updated as appropriate: allergies, current medications, past family history, past medical history, past social history, past surgical history and problem list     Review of Systems   Constitutional: Positive for fatigue (chronic)  Negative for chills and fever  HENT: Negative for congestion, nosebleeds, postnasal drip, sore throat and trouble swallowing  Eyes: Negative for pain  Respiratory: Positive for shortness of breath (chronic)  Negative for cough, chest tightness and wheezing  Cardiovascular: Negative for chest pain, palpitations and leg swelling  Gastrointestinal: Negative for abdominal pain, constipation, diarrhea, nausea and vomiting  Endocrine: Negative for polydipsia and polyuria  Genitourinary: Negative for dysuria, flank pain and hematuria  Musculoskeletal: Negative for arthralgias and myalgias  Skin: Negative for rash  Neurological: Negative for dizziness, tremors and headaches  Hematological: Does not bruise/bleed easily  Psychiatric/Behavioral: Negative for confusion and dysphoric mood  The patient is not nervous/anxious  Objective:      /58   Pulse 103   Temp 97 6 °F (36 4 °C)   SpO2 91%          Physical Exam   Constitutional: He is oriented to person, place, and time  He appears well-developed and well-nourished  No distress  HENT:   Head: Normocephalic and atraumatic  Right Ear: External ear normal    Left Ear: External ear normal    Eyes: Conjunctivae are normal  No scleral icterus  Neck: Normal range of motion  Neck supple  No tracheal deviation present  No thyromegaly present  Cardiovascular: Normal rate, regular rhythm and normal heart sounds  No murmur heard  Pulmonary/Chest: Effort normal and breath sounds normal  No respiratory distress  He has no wheezes  He has no rales  Low amount of air movement   Abdominal: Soft  Bowel sounds are normal  There is no tenderness  There is no rebound and no guarding  Musculoskeletal: He exhibits no edema  Lymphadenopathy:     He has no cervical adenopathy  Neurological: He is alert and oriented to person, place, and time  Psychiatric: He has a normal mood and affect  His behavior is normal  Judgment and thought content normal    Vitals reviewed

## 2019-09-23 NOTE — PROGRESS NOTES
HPI:  Patient is here with his wife  He is on continuous nasal oxygen 4 liters/minute  He has COPD  Has exertional dyspnea  Has tiredness  He has been taking oral iron, 1 tablet daily for iron deficiency anemia secondary to history of chronic GI blood loss  He had GI evaluation for heme-positive stool and there was no malignancy  He has a wheelchair                   Current Outpatient Medications:     albuterol (2 5 mg/3 mL) 0 083 % nebulizer solution, Take 1 vial (2 5 mg total) by nebulization every 6 (six) hours as needed for wheezing or shortness of breath, Disp: 125 vial, Rfl: 5    albuterol (PROAIR HFA) 90 mcg/act inhaler, Inhale 2 puffs every 4 (four) hours as needed for wheezing or shortness of breath, Disp: 8 5 Inhaler, Rfl: 5    CARTIA  MG 24 hr capsule, TAKE 1 CAPSULE BY MOUTH EVERY DAY, Disp: 30 capsule, Rfl: 5    citalopram (CeleXA) 20 mg tablet, Take 1 tablet (20 mg total) by mouth daily, Disp: 90 tablet, Rfl: 3    ferrous sulfate 325 (65 Fe) mg tablet, Take 325 mg by mouth daily, Disp: , Rfl:     fluticasone-vilanterol (BREO ELLIPTA) 200-25 MCG/INH inhaler, Inhale 1 puff daily Rinse mouth after use , Disp: 1 Inhaler, Rfl: 5    folic acid (FOLVITE) 1 mg tablet, TAKE 1 TABLET BY MOUTH EVERY DAY, Disp: 30 tablet, Rfl: 5    furosemide (LASIX) 20 mg tablet, TAKE 1/2 TABLET BY MOUTH DAILY, Disp: 30 tablet, Rfl: 5    tiotropium (SPIRIVA RESPIMAT) 2 5 MCG/ACT AERS inhaler, Inhale 2 puffs daily, Disp: 1 Inhaler, Rfl: 5    melatonin 3 mg, Take 2 tablets by mouth daily at bedtime as needed (sleep) (Patient not taking: Reported on 9/23/2019), Disp: 30 tablet, Rfl: 0    potassium chloride (K-DUR,KLOR-CON) 20 mEq tablet, TAKE 1 TABLET BY MOUTH EVERY DAY (Patient not taking: Reported on 9/23/2019), Disp: 30 tablet, Rfl: 5    Allergies   Allergen Reactions    Penicillins Anaphylaxis        No history exists         ROS:  09/23/19 Reviewed 13 systems:  Presently no other neurological, cardiac, pulmonary, GI and  symptoms other than mentioned above  No other  fever, chills, bleeding, bone pains, skin rash, weight loss, arthritic symptoms,  weakness, numbness,  claudication and gait problem  No frequent infections  Not unusually sensitive to heat or cold  No swelling of the ankles  No swollen glands  Patient is anxious  Other symptoms are in HPI        /70 (BP Location: Left arm, Patient Position: Sitting, Cuff Size: Adult)   Pulse (!) 135   Temp (!) 97 1 °F (36 2 °C)   Resp 22   Ht 5' 6 5" (1 689 m)   Wt 64 2 kg (141 lb 8 oz)   SpO2 94%   BMI 22 50 kg/m²     Physical Exam:    Patient is alert and oriented  He is not in distress  Vital signs are as above  There is no scleral icterus,   no oral thrush, no palpable neck mass, clear lung fields, regular heart rate? and rate has been fluctuating between 98 and 135, abdomen  soft and non tender, no palpable abdominal mass, no ascites, no edema of ankles, no calf tenderness, no focal neurological deficit, no skin rash, no palpable lymphadenopathy, good arterial pulses, no clubbing  Patient is anxious  Performance status 3      IMAGING:      LABS:  Results for orders placed or performed in visit on 09/18/19   CBC and differential   Result Value Ref Range    WBC 3 41 (L) 4 31 - 10 16 Thousand/uL    RBC 3 37 (L) 3 88 - 5 62 Million/uL    Hemoglobin 10 8 (L) 12 0 - 17 0 g/dL    Hematocrit 33 8 (L) 36 5 - 49 3 %     (H) 82 - 98 fL    MCH 32 0 26 8 - 34 3 pg    MCHC 32 0 31 4 - 37 4 g/dL    RDW 14 0 11 6 - 15 1 %    MPV 9 1 8 9 - 12 7 fL    Platelets 101 542 - 948 Thousands/uL    nRBC 0 /100 WBCs    Neutrophils Relative 39 (L) 43 - 75 %    Immat GRANS % 0 0 - 2 %    Lymphocytes Relative 56 (H) 14 - 44 %    Monocytes Relative 4 4 - 12 %    Eosinophils Relative 1 0 - 6 %    Basophils Relative 0 0 - 1 %    Neutrophils Absolute 1 32 (L) 1 85 - 7 62 Thousands/µL    Immature Grans Absolute 0 01 0 00 - 0 20 Thousand/uL    Lymphocytes Absolute 1 90 0 60 - 4 47 Thousands/µL    Monocytes Absolute 0 14 (L) 0 17 - 1 22 Thousand/µL    Eosinophils Absolute 0 04 0 00 - 0 61 Thousand/µL    Basophils Absolute 0 00 0 00 - 0 10 Thousands/µL   Ferritin   Result Value Ref Range    Ferritin 42 8 - 388 ng/mL     Labs, Imaging, & Other studies:   All pertinent labs and imaging studies were personally reviewed    Lab Results   Component Value Date     06/02/2015    K 4 4 02/25/2019    CL 98 (L) 02/25/2019    CO2 40 (H) 02/25/2019    ANIONGAP -2 (L) 06/02/2015    BUN 23 02/25/2019    CREATININE 0 70 02/25/2019    GLUCOSE 216 (H) 06/02/2015    CALCIUM 9 6 02/25/2019    AST 14 02/21/2019    ALT 14 02/21/2019    ALKPHOS 88 02/21/2019    PROT 5 8 (L) 06/02/2015    PROT 6 0 (L) 06/02/2015    BILITOT 0 22 06/02/2015    EGFR 96 02/25/2019     Lab Results   Component Value Date    WBC 3 41 (L) 09/18/2019    HGB 10 8 (L) 09/18/2019    HCT 33 8 (L) 09/18/2019     (H) 09/18/2019     09/18/2019    Absolute granulocyte 1320  No immature cells  Reviewed and discussed with patient  Assessment and plan:    Patient is here with his wife  He is on continuous nasal oxygen 4 liters/minute  He has COPD  Has exertional dyspnea  Has tiredness  He has been taking oral iron, 1 tablet daily for iron deficiency anemia secondary to history of chronic GI blood loss  He had GI evaluation for heme-positive stool and there was no malignancy  He has a wheelchair         Physical examination and test results are as recorded and discussed      There is drop in hemoglobin and neutrophil count   He is being continued on 1 iron tablet but twice a day   Blood counts will be checked prior to next visit in 2 months  Also stool checked for blood  From our office patient will go to office of his primary physician for EKG and evaluation because of cardiac arrhythmia    Condition and plan discussed   Questions answered    Patient needs assistant in his daily care because of COPD   Discussed the importance of eating healthy foods  1  Paroxysmal atrial tachycardia ([address] )      2  Iron deficiency anemia due to chronic blood loss    - Iron Panel (Includes Ferritin, Iron Sat%, Iron, and TIBC); Future  - CBC and differential; Future  - Reticulocytes; Future  - Occult Blood, Fecal Immunochemical; Future    3  Leukopenia, unspecified type          Patient voiced understanding and agreement in the discussion  Counseling / Coordination of Care   Greater than 50% of total time was spent with the patient and / or family counseling and / or coordination of care

## 2019-09-23 NOTE — PATIENT INSTRUCTIONS
Problem List Items Addressed This Visit        Respiratory    COPD (chronic obstructive pulmonary disease) ([facility] [address] )     Continue inhalers            Cardiovascular and Mediastinum    Paroxysmal atrial tachycardia (HCC)     EKG today shows sinus tachycardia, his elevated heart rate at oncology may have been different, or may have been related to exertion from walking into the office  Patient has a pulse ox which does monitoring, and if he is having more frequent rapid heart rate episodes, then I recommend re-evaluation with Cardiology    Continue cardia           Other Visit Diagnoses     Tachycardia    -  Primary    Relevant Orders    POCT ECG (Completed)

## 2019-09-23 NOTE — ASSESSMENT & PLAN NOTE
EKG today shows sinus tachycardia, his elevated heart rate at oncology may have been different, or may have been related to exertion from walking into the office  Patient has a pulse ox which does monitoring, and if he is having more frequent rapid heart rate episodes, then I recommend re-evaluation with Cardiology    Continue juan

## 2019-10-11 ENCOUNTER — OFFICE VISIT (OUTPATIENT)
Dept: INTERNAL MEDICINE CLINIC | Facility: CLINIC | Age: 69
End: 2019-10-11
Payer: MEDICARE

## 2019-10-11 VITALS
BODY MASS INDEX: 22.13 KG/M2 | HEART RATE: 99 BPM | DIASTOLIC BLOOD PRESSURE: 62 MMHG | WEIGHT: 141 LBS | TEMPERATURE: 98 F | HEIGHT: 67 IN | OXYGEN SATURATION: 96 % | SYSTOLIC BLOOD PRESSURE: 132 MMHG

## 2019-10-11 DIAGNOSIS — Z23 NEEDS FLU SHOT: ICD-10-CM

## 2019-10-11 DIAGNOSIS — Z00.00 MEDICARE ANNUAL WELLNESS VISIT, SUBSEQUENT: Primary | ICD-10-CM

## 2019-10-11 DIAGNOSIS — Z12.5 SCREENING FOR PROSTATE CANCER: ICD-10-CM

## 2019-10-11 PROCEDURE — G0008 ADMIN INFLUENZA VIRUS VAC: HCPCS

## 2019-10-11 PROCEDURE — 90662 IIV NO PRSV INCREASED AG IM: CPT

## 2019-10-11 PROCEDURE — G0439 PPPS, SUBSEQ VISIT: HCPCS | Performed by: INTERNAL MEDICINE

## 2019-10-11 NOTE — PATIENT INSTRUCTIONS
Problem List Items Addressed This Visit        Other    Medicare annual wellness visit, subsequent - Primary     Discussed preventative health, cancer screening, immunizations, and safety issues  I recommend flu shot today  PSA ordered for next labs  I recommend getting the Shingrix shot to help prevent Shingles  You can get it a pharmacy, and they can administer it there  It is a two shot series with the second shot needed between 2-6 months after the first shot  I would not recommend getting the shot before an important or fun event in case you were to have a reaction to the shot like a sore arm or flu-like symptoms  I make the same recommendation about any shot, as people can have a reaction to any shot             Other Visit Diagnoses     Screening for prostate cancer        Relevant Orders    PSA, Total Screen    Needs flu shot        Relevant Orders    influenza vaccine, [id number], high-dose, PF 0 5 mL (FLUZONE HIGH-DOSE)

## 2019-10-11 NOTE — PROGRESS NOTES
Assessment and Plan:     Problem List Items Addressed This Visit        Other    Medicare annual wellness visit, subsequent - Primary     Discussed preventative health, cancer screening, immunizations, and safety issues  I recommend flu shot today  PSA ordered for next labs  I recommend getting the Shingrix shot to help prevent Shingles  You can get it a pharmacy, and they can administer it there  It is a two shot series with the second shot needed between 2-6 months after the first shot  I would not recommend getting the shot before an important or fun event in case you were to have a reaction to the shot like a sore arm or flu-like symptoms  I make the same recommendation about any shot, as people can have a reaction to any shot  Other Visit Diagnoses     Screening for prostate cancer        Relevant Orders    PSA, Total Screen    Needs flu shot        Relevant Orders    influenza vaccine, [id number], high-dose, PF 0 5 mL (FLUZONE HIGH-DOSE)           Preventive health issues were discussed with patient, and age appropriate screening tests were ordered as noted in patient's After Visit Summary  Personalized health advice and appropriate referrals for health education or preventive services given if needed, as noted in patient's After Visit Summary       History of Present Illness:     Patient presents for Medicare Annual Wellness visit    Patient Care Team:  Neema Vital MD as PCP - MD Richmond Philip MD George Meals, MD     Problem List:     Patient Active Problem List   Diagnosis    Macrocytic anemia    Pulmonary artery hypertension ([facility] )    Aortic regurgitation    COPD (chronic obstructive pulmonary disease) (HCC)    Low TSH level    Paroxysmal atrial tachycardia ([facility] )    Essential hypertension    Gastroesophageal reflux disease without esophagitis    Anxiety    Iron deficiency anemia due to chronic blood loss    Leukopenia    Medicare annual wellness visit, subsequent      Past Medical and Surgical History:     Past Medical History:   Diagnosis Date    Anxiety     Aortic regurgitation     Atrial flutter (HCC)     Chronic respiratory failure (HCC)     Colon polyp     COPD (chronic obstructive pulmonary disease) (HCC)     Deep venous thrombosis of distal lower extremity (HCC)     Diverticulitis     GI bleed     Hypertension     Iron deficiency anemia     MGUS (monoclonal gammopathy of unknown significance)     Osteoarthritis of hip     PAC (premature atrial contraction)     Paroxysmal atrial fibrillation (HCC)     Patent foramen ovale     Pulmonary artery hypertension (HCC)     Renal failure     Sinus tachycardia      Past Surgical History:   Procedure Laterality Date    APPENDECTOMY      COLON SURGERY      HERNIA REPAIR      JOINT REPLACEMENT      KNEE SURGERY        Family History:     Family History   Problem Relation Age of Onset    Cancer Mother     Heart attack Father     Sudden death Father     Arthritis Family     Diabetes Family       Social History:     Social History     Socioeconomic History    Marital status: /Civil Union     Spouse name: None    Number of children: None    Years of education: None    Highest education level: None   Occupational History    None   Social Needs    Financial resource strain: None    Food insecurity:     Worry: None     Inability: None    Transportation needs:     Medical: None     Non-medical: None   Tobacco Use    Smoking status: Former Smoker     Packs/day: 1 50     Years: 42 00     Pack years: 63 00     Types: Cigarettes     Start date:      Last attempt to quit: 2012     Years since quittin 7    Smokeless tobacco: Never Used   Substance and Sexual Activity    Alcohol use: Not Currently    Drug use: No    Sexual activity: Not Currently   Lifestyle    Physical activity:     Days per week: None     Minutes per session: None    Stress: None   Relationships    Social connections:     Talks on phone: None     Gets together: None     Attends Hoahaoism service: None     Active member of club or organization: None     Attends meetings of clubs or organizations: None     Relationship status: None    Intimate partner violence:     Fear of current or ex partner: None     Emotionally abused: None     Physically abused: None     Forced sexual activity: None   Other Topics Concern    None   Social History Narrative    Daily coffee consumption; 1 serving       Medications and Allergies:     Current Outpatient Medications   Medication Sig Dispense Refill    albuterol (2 5 mg/3 mL) 0 083 % nebulizer solution Take 1 vial (2 5 mg total) by nebulization every 6 (six) hours as needed for wheezing or shortness of breath 125 vial 5    albuterol (PROAIR HFA) 90 mcg/act inhaler Inhale 2 puffs every 4 (four) hours as needed for wheezing or shortness of breath 8 5 Inhaler 5    CARTIA  MG 24 hr capsule TAKE 1 CAPSULE BY MOUTH EVERY DAY 30 capsule 5    citalopram (CeleXA) 20 mg tablet Take 1 tablet (20 mg total) by mouth daily 90 tablet 3    ferrous sulfate 325 (65 Fe) mg tablet Take 325 mg by mouth daily      fluticasone-vilanterol (BREO ELLIPTA) 200-25 MCG/INH inhaler Inhale 1 puff daily Rinse mouth after use  1 Inhaler 5    folic acid (FOLVITE) 1 mg tablet TAKE 1 TABLET BY MOUTH EVERY DAY 30 tablet 5    furosemide (LASIX) 20 mg tablet TAKE 1/2 TABLET BY MOUTH DAILY 30 tablet 5    melatonin 3 mg Take 2 tablets by mouth daily at bedtime as needed (sleep) 30 tablet 0    potassium chloride (K-DUR,KLOR-CON) 20 mEq tablet TAKE 1 TABLET BY MOUTH EVERY DAY 30 tablet 5    tiotropium (SPIRIVA RESPIMAT) 2 5 MCG/ACT AERS inhaler Inhale 2 puffs daily 1 Inhaler 5     No current facility-administered medications for this visit        Allergies   Allergen Reactions    Penicillins Anaphylaxis      Immunizations:     Immunization History   Administered Date(s) Administered    Influenza Split High Dose Preservative Free IM 09/07/2016, 12/09/2016, 09/13/2017    Influenza TIV (IM) 1950    Influenza, high dose seasonal 0 5 mL 11/13/2018    Pneumococcal Conjugate 13-Valent 04/19/2017    Pneumococcal Polysaccharide PPV23 01/01/2013      Health Maintenance:         Topic Date Due    CRC Screening: Colonoscopy  1950    CRC Screening: Cologuard  05/14/1998    Hepatitis C Screening  Completed         Topic Date Due    Pneumococcal Vaccine: 65+ Years (2 of 2 - PPSV23) 04/19/2018    INFLUENZA VACCINE  07/01/2019      Medicare Health Risk Assessment:     /62   Pulse 99   Temp 98 °F (36 7 °C)   Ht 5' 6 5" (1 689 m)   Wt 64 kg (141 lb)   SpO2 96%   BMI 22 42 kg/m²      Tal Frame is here for his Subsequent Wellness visit  Health Risk Assessment:   Patient rates overall health as good  Patient feels that their physical health rating is much better  Eyesight was rated as same  Hearing was rated as same  Patient feels that their emotional and mental health rating is same  Pain experienced in the last 7 days has been none  Patient states that he has experienced no weight loss or gain in last 6 months  Depression Screening:   PHQ-2 Score: 0  PHQ-9 Score: 0      Fall Risk Screening: In the past year, patient has experienced: no history of falling in past year      Home Safety:  Patient has trouble with stairs inside or outside of their home  Patient has working smoke alarms and has working carbon monoxide detector  Home safety hazards include: none  Nutrition:   Current diet is Regular  Medications:   Patient is currently taking over-the-counter supplements  OTC medications include: see medication list  Patient is able to manage medications       Activities of Daily Living (ADLs)/Instrumental Activities of Daily Living (IADLs):   Walk and transfer into and out of bed and chair?: Yes  Dress and groom yourself?: Yes    Bathe or shower yourself?: Yes Feed yourself? Yes  Do your laundry/housekeeping?: No  Manage your money, pay your bills and track your expenses?: No  Make your own meals?: Yes    Do your own shopping?: No    ADL comments: WIFE DUE TO COPD    Durable Medical Equipment Suppliers  oxygen    Previous Hospitalizations:   Any hospitalizations or ED visits within the last 12 months?: Yes    How many hospitalizations have you had in the last year?: 1-2    Advance Care Planning:   Living will: Yes    Durable POA for healthcare: Yes    Advanced directive: Yes      Cognitive Screening:   Provider or family/friend/caregiver concerned regarding cognition?: No    PREVENTIVE SCREENINGS        Diabetes Screening:     General: Screening Current      Colorectal Cancer Screening:       Due for: Cologuard      Prostate Cancer Screening:      Due for: PSA      Osteoporosis Screening:    General: Screening Not Indicated      Abdominal Aortic Aneurysm (AAA) Screening:    Risk factors include: age between 73-69 yo and tobacco use        General: Screening Current      Lung Cancer Screening:     General: Screening Not Indicated      Hepatitis C Screening:    General: Screening Current    Other Counseling Topics:   Car/seat belt/driving safety, skin self-exam and sunscreen         Mellisa Horowitz MD

## 2019-10-11 NOTE — ASSESSMENT & PLAN NOTE
Discussed preventative health, cancer screening, immunizations, and safety issues  I recommend flu shot today  PSA ordered for next labs  I recommend getting the Shingrix shot to help prevent Shingles  You can get it a pharmacy, and they can administer it there  It is a two shot series with the second shot needed between 2-6 months after the first shot  I would not recommend getting the shot before an important or fun event in case you were to have a reaction to the shot like a sore arm or flu-like symptoms  I make the same recommendation about any shot, as people can have a reaction to any shot

## 2019-10-21 DIAGNOSIS — D64.9 ANEMIA, UNSPECIFIED TYPE: ICD-10-CM

## 2019-10-21 DIAGNOSIS — I47.1 PAROXYSMAL ATRIAL TACHYCARDIA (HCC): ICD-10-CM

## 2019-10-21 RX ORDER — DILTIAZEM HYDROCHLORIDE 180 MG/1
CAPSULE, EXTENDED RELEASE ORAL
Qty: 30 CAPSULE | Refills: 5 | Status: SHIPPED | OUTPATIENT
Start: 2019-10-21 | End: 2020-05-22

## 2019-10-21 RX ORDER — FOLIC ACID 1 MG/1
TABLET ORAL
Qty: 30 TABLET | Refills: 5 | Status: SHIPPED | OUTPATIENT
Start: 2019-10-21 | End: 2020-05-22

## 2019-10-23 ENCOUNTER — APPOINTMENT (OUTPATIENT)
Dept: LAB | Facility: HOSPITAL | Age: 69
End: 2019-10-23
Payer: MEDICARE

## 2019-10-23 DIAGNOSIS — Z12.5 SCREENING FOR PROSTATE CANCER: ICD-10-CM

## 2019-10-23 LAB — PSA SERPL-MCNC: 1.2 NG/ML (ref 0–4)

## 2019-10-23 PROCEDURE — 36415 COLL VENOUS BLD VENIPUNCTURE: CPT

## 2019-10-23 PROCEDURE — G0103 PSA SCREENING: HCPCS

## 2019-11-05 ENCOUNTER — TELEPHONE (OUTPATIENT)
Dept: HEMATOLOGY ONCOLOGY | Facility: CLINIC | Age: 69
End: 2019-11-05

## 2019-11-05 NOTE — TELEPHONE ENCOUNTER
Patient's wife called to get confirmation on the lab order on Occult Blood, Fecal Immunochemical  She would like to know if the visiting nurse can draw this blood work or if patient needs to go to a [facility] lab  She would prefer if the patient can have it done by the visiting nurse  Please call back and advise  Thank you

## 2019-11-05 NOTE — TELEPHONE ENCOUNTER
Spoke to Jess Scott and advised him to contact his visiting nurse in regards to the fecal sample he will need to provide  Jess Scott voiced understanding  I advised him to call us back if he has any further questions

## 2019-11-12 ENCOUNTER — OFFICE VISIT (OUTPATIENT)
Dept: PULMONOLOGY | Facility: CLINIC | Age: 69
End: 2019-11-12
Payer: MEDICARE

## 2019-11-12 VITALS
RESPIRATION RATE: 16 BRPM | SYSTOLIC BLOOD PRESSURE: 140 MMHG | WEIGHT: 141 LBS | BODY MASS INDEX: 22.13 KG/M2 | DIASTOLIC BLOOD PRESSURE: 60 MMHG | HEART RATE: 76 BPM | HEIGHT: 67 IN | OXYGEN SATURATION: 97 %

## 2019-11-12 DIAGNOSIS — J44.9 COPD (CHRONIC OBSTRUCTIVE PULMONARY DISEASE) (HCC): Primary | ICD-10-CM

## 2019-11-12 DIAGNOSIS — J96.11 CHRONIC HYPOXEMIC RESPIRATORY FAILURE (HCC): ICD-10-CM

## 2019-11-12 DIAGNOSIS — J44.9 CHRONIC OBSTRUCTIVE PULMONARY DISEASE, UNSPECIFIED COPD TYPE (HCC): ICD-10-CM

## 2019-11-12 DIAGNOSIS — R06.00 DOE (DYSPNEA ON EXERTION): ICD-10-CM

## 2019-11-12 PROCEDURE — 99213 OFFICE O/P EST LOW 20 MIN: CPT | Performed by: INTERNAL MEDICINE

## 2019-11-12 RX ORDER — FLUTICASONE FUROATE AND VILANTEROL 200; 25 UG/1; UG/1
1 POWDER RESPIRATORY (INHALATION) DAILY
Qty: 1 INHALER | Refills: 5 | Status: SHIPPED | OUTPATIENT
Start: 2019-11-12 | End: 2019-12-26

## 2019-11-12 NOTE — PROGRESS NOTES
Office Progress Note - Pulmonary    Olinda Lancaster 68 y o  male MRN: [id number]    Encounter: [id number]      Assessment:   Chronic obstructive pulmonary disease   Dyspnea on exertion   Chronic hypoxemic respiratory failure  Plan:     Spiriva Respimat 2 inhalations once a day   Breo 200/25, 1 inhalation once a day   Albuterol rescue inhaler her 2 inhalations 4 times a day as needed   Oxygen supplement 24 hours a day   Follow-up in 4 months  Discussion:   The patient's dyspnea on exertion is because of his COPD and deconditioning  His COPD is in remission  I have maintained him on the Spiriva Respimat 2 inhalations once a day and the Breo 200/25, 1 inhalation once a day  He will use the albuterol rescue inhaler and nebulizer 4 times a day as needed  He will use the oxygen supplement 24 hours a day  I have provided him with samples of the Breo and Spiriva  He has received the influenza vaccine already for this season  I will see him again in a follow-up visit in 4 months  Subjective: The patient is here for a follow-up visit  He has dyspnea on exertion which he feels at times worse than before  He has occasional cough  No significant sputum production  Denies any chest pain or palpitations  He is able to manage his daily activities without difficulty if he is pacing himself  He is using the oxygen 24 hours a day  He has no nocturnal symptoms  He is on the Spiriva Respimat 2 inhalations once a day and Breo 200/25, 1 inhalation once a day  He is using albuterol nebulizer treatments 4 times a day  Review of systems:  A 12 point system review is done and aside from what is stated above the rest of the review of systems is negative  Family history and social history are reviewed  Medications list is reviewed  Vitals: Blood pressure 140/60, pulse 76, resp  rate 16, height 5' 6 5" (1 689 m), weight 64 kg (141 lb), SpO2 97 %  ,     Physical Exam  Gen: Awake, alert, oriented x 3, no acute distress  HEENT: Mucous membranes moist, no oral lesions, no thrush  NECK: No accessory muscle use, JVP not elevated  Cardiac: Regular, single S1, single S2, no murmurs, no rubs, no gallops  Lungs:  Decreased breath sounds  No wheezing or rhonchi  Abdomen: normoactive bowel sounds, soft nontender, nondistended, no rebound or rigidity, no guarding  Extremities: no cyanosis, no clubbing, no edema  Neuro:  Grossly nonfocal   Skin:  No rash      Lab Results   Component Value Date    WBC 3 41 (L) 09/18/2019    HGB 10 8 (L) 09/18/2019    HCT 33 8 (L) 09/18/2019     (H) 09/18/2019     09/18/2019

## 2019-11-15 ENCOUNTER — TELEPHONE (OUTPATIENT)
Dept: INTERNAL MEDICINE CLINIC | Facility: CLINIC | Age: 69
End: 2019-11-15

## 2019-11-15 DIAGNOSIS — J06.9 UPPER RESPIRATORY TRACT INFECTION, UNSPECIFIED TYPE: Primary | ICD-10-CM

## 2019-11-15 RX ORDER — METHYLPREDNISOLONE 4 MG/1
TABLET ORAL
Qty: 21 EACH | Refills: 0 | Status: SHIPPED | OUTPATIENT
Start: 2019-11-15 | End: 2019-11-27 | Stop reason: HOSPADM

## 2019-11-15 RX ORDER — AZITHROMYCIN 250 MG/1
250 TABLET, FILM COATED ORAL EVERY 24 HOURS
Qty: 6 TABLET | Refills: 0 | Status: SHIPPED | OUTPATIENT
Start: 2019-11-15 | End: 2019-11-20

## 2019-11-15 NOTE — TELEPHONE ENCOUNTER
Pt's wife called in stating pt has a cold (was seen by pulmonology Tuesday) and is asking if you can send in steroids and anything else you think would be good for him since he has COPD he declines fast  Please advise ty

## 2019-11-22 ENCOUNTER — HOSPITAL ENCOUNTER (INPATIENT)
Facility: HOSPITAL | Age: 69
LOS: 5 days | Discharge: HOME HEALTH | DRG: 189 | End: 2019-11-27
Attending: EMERGENCY MEDICINE | Admitting: HOSPITALIST
Payer: MEDICARE

## 2019-11-22 ENCOUNTER — APPOINTMENT (EMERGENCY)
Dept: RADIOLOGY | Facility: HOSPITAL | Age: 69
DRG: 189 | End: 2019-11-22
Payer: MEDICARE

## 2019-11-22 ENCOUNTER — APPOINTMENT (INPATIENT)
Dept: NON INVASIVE DIAGNOSTICS | Facility: HOSPITAL | Age: 69
DRG: 189 | End: 2019-11-22
Payer: MEDICARE

## 2019-11-22 DIAGNOSIS — J06.9 VIRAL URI WITH COUGH: ICD-10-CM

## 2019-11-22 DIAGNOSIS — J44.1 COPD WITH ACUTE EXACERBATION (HCC): Primary | ICD-10-CM

## 2019-11-22 DIAGNOSIS — J96.11 CHRONIC RESPIRATORY FAILURE WITH HYPOXIA (HCC): ICD-10-CM

## 2019-11-22 PROBLEM — D50.0 IRON DEFICIENCY ANEMIA DUE TO CHRONIC BLOOD LOSS: Chronic | Status: ACTIVE | Noted: 2019-09-23

## 2019-11-22 PROBLEM — E87.2 RESPIRATORY ACIDOSIS: Status: ACTIVE | Noted: 2019-11-22

## 2019-11-22 PROBLEM — F41.9 ANXIETY: Chronic | Status: ACTIVE | Noted: 2019-06-07

## 2019-11-22 PROBLEM — J96.22 ACUTE ON CHRONIC RESPIRATORY FAILURE WITH HYPERCAPNIA (HCC): Status: ACTIVE | Noted: 2019-11-22

## 2019-11-22 PROBLEM — Z91.89 AT RISK FOR VENOUS THROMBOEMBOLISM (VTE): Status: ACTIVE | Noted: 2019-11-22

## 2019-11-22 PROBLEM — I10 ESSENTIAL HYPERTENSION: Chronic | Status: ACTIVE | Noted: 2019-02-22

## 2019-11-22 LAB
ALBUMIN SERPL BCP-MCNC: 4.1 G/DL (ref 3.5–5)
ALP SERPL-CCNC: 88 U/L (ref 46–116)
ALT SERPL W P-5'-P-CCNC: 15 U/L (ref 12–78)
ANION GAP SERPL CALCULATED.3IONS-SCNC: 1 MMOL/L (ref 4–13)
ANION GAP SERPL CALCULATED.3IONS-SCNC: 6 MMOL/L (ref 4–13)
AST SERPL W P-5'-P-CCNC: 14 U/L (ref 5–45)
ATRIAL RATE: 119 BPM
ATRIAL RATE: 125 BPM
BASE EX.OXY STD BLDV CALC-SCNC: 51.8 % (ref 60–80)
BASE EX.OXY STD BLDV CALC-SCNC: 97.1 % (ref 60–80)
BASE EXCESS BLDV CALC-SCNC: 10.6 MMOL/L
BASE EXCESS BLDV CALC-SCNC: 9 MMOL/L
BASOPHILS # BLD AUTO: 0.02 THOUSANDS/ΜL (ref 0–0.1)
BASOPHILS NFR BLD AUTO: 0 % (ref 0–1)
BILIRUB SERPL-MCNC: 0.29 MG/DL (ref 0.2–1)
BUN SERPL-MCNC: 24 MG/DL (ref 5–25)
BUN SERPL-MCNC: 24 MG/DL (ref 5–25)
CALCIUM SERPL-MCNC: 9.4 MG/DL (ref 8.3–10.1)
CALCIUM SERPL-MCNC: 9.6 MG/DL (ref 8.3–10.1)
CHLORIDE SERPL-SCNC: 97 MMOL/L (ref 100–108)
CHLORIDE SERPL-SCNC: 99 MMOL/L (ref 100–108)
CO2 SERPL-SCNC: 37 MMOL/L (ref 21–32)
CO2 SERPL-SCNC: 39 MMOL/L (ref 21–32)
CREAT SERPL-MCNC: 0.91 MG/DL (ref 0.6–1.3)
CREAT SERPL-MCNC: 1.05 MG/DL (ref 0.6–1.3)
EOSINOPHIL # BLD AUTO: 0.03 THOUSAND/ΜL (ref 0–0.61)
EOSINOPHIL NFR BLD AUTO: 0 % (ref 0–6)
ERYTHROCYTE [DISTWIDTH] IN BLOOD BY AUTOMATED COUNT: 13.3 % (ref 11.6–15.1)
ERYTHROCYTE [DISTWIDTH] IN BLOOD BY AUTOMATED COUNT: 13.5 % (ref 11.6–15.1)
EST. AVERAGE GLUCOSE BLD GHB EST-MCNC: 120 MG/DL
FLUAV RNA NPH QL NAA+PROBE: NORMAL
FLUBV RNA NPH QL NAA+PROBE: NORMAL
GFR SERPL CREATININE-BSD FRML MDRD: 71 ML/MIN/1.73SQ M
GFR SERPL CREATININE-BSD FRML MDRD: 84 ML/MIN/1.73SQ M
GLUCOSE SERPL-MCNC: 168 MG/DL (ref 65–140)
GLUCOSE SERPL-MCNC: 168 MG/DL (ref 65–140)
GLUCOSE SERPL-MCNC: 189 MG/DL (ref 65–140)
GLUCOSE SERPL-MCNC: 221 MG/DL (ref 65–140)
HBA1C MFR BLD: 5.8 % (ref 4.2–6.3)
HCO3 BLDV-SCNC: 37.8 MMOL/L (ref 24–30)
HCO3 BLDV-SCNC: 40.2 MMOL/L (ref 24–30)
HCT VFR BLD AUTO: 33.5 % (ref 36.5–49.3)
HCT VFR BLD AUTO: 37.4 % (ref 36.5–49.3)
HGB BLD-MCNC: 10.2 G/DL (ref 12–17)
HGB BLD-MCNC: 11.4 G/DL (ref 12–17)
IMM GRANULOCYTES # BLD AUTO: 0.12 THOUSAND/UL (ref 0–0.2)
IMM GRANULOCYTES NFR BLD AUTO: 2 % (ref 0–2)
LYMPHOCYTES # BLD AUTO: 0.92 THOUSANDS/ΜL (ref 0.6–4.47)
LYMPHOCYTES NFR BLD AUTO: 13 % (ref 14–44)
MCH RBC QN AUTO: 31.4 PG (ref 26.8–34.3)
MCH RBC QN AUTO: 32 PG (ref 26.8–34.3)
MCHC RBC AUTO-ENTMCNC: 30.4 G/DL (ref 31.4–37.4)
MCHC RBC AUTO-ENTMCNC: 30.5 G/DL (ref 31.4–37.4)
MCV RBC AUTO: 103 FL (ref 82–98)
MCV RBC AUTO: 105 FL (ref 82–98)
MONOCYTES # BLD AUTO: 0.2 THOUSAND/ΜL (ref 0.17–1.22)
MONOCYTES NFR BLD AUTO: 3 % (ref 4–12)
NEUTROPHILS # BLD AUTO: 5.57 THOUSANDS/ΜL (ref 1.85–7.62)
NEUTS SEG NFR BLD AUTO: 82 % (ref 43–75)
NRBC BLD AUTO-RTO: 0 /100 WBCS
O2 CT BLDV-SCNC: 16.6 ML/DL
O2 CT BLDV-SCNC: 9.1 ML/DL
P AXIS: 79 DEGREES
P AXIS: 90 DEGREES
PCO2 BLDV: 64.4 MM HG (ref 42–50)
PCO2 BLDV: 99.9 MM HG (ref 42–50)
PH BLDV: 7.22 [PH] (ref 7.3–7.4)
PH BLDV: 7.39 [PH] (ref 7.3–7.4)
PLATELET # BLD AUTO: 240 THOUSANDS/UL (ref 149–390)
PLATELET # BLD AUTO: 281 THOUSANDS/UL (ref 149–390)
PMV BLD AUTO: 9.6 FL (ref 8.9–12.7)
PMV BLD AUTO: 9.6 FL (ref 8.9–12.7)
PO2 BLDV: 135 MM HG (ref 35–45)
PO2 BLDV: 35.7 MM HG (ref 35–45)
POTASSIUM SERPL-SCNC: 4.3 MMOL/L (ref 3.5–5.3)
POTASSIUM SERPL-SCNC: 4.4 MMOL/L (ref 3.5–5.3)
PR INTERVAL: 144 MS
PR INTERVAL: 196 MS
PROCALCITONIN SERPL-MCNC: 0.07 NG/ML
PROCALCITONIN SERPL-MCNC: 0.13 NG/ML
PROT SERPL-MCNC: 7.7 G/DL (ref 6.4–8.2)
QRS AXIS: -16 DEGREES
QRS AXIS: -70 DEGREES
QRSD INTERVAL: 82 MS
QRSD INTERVAL: 88 MS
QT INTERVAL: 304 MS
QT INTERVAL: 317 MS
QTC INTERVAL: 438 MS
QTC INTERVAL: 446 MS
RBC # BLD AUTO: 3.25 MILLION/UL (ref 3.88–5.62)
RBC # BLD AUTO: 3.56 MILLION/UL (ref 3.88–5.62)
RSV RNA NPH QL NAA+PROBE: NORMAL
SODIUM SERPL-SCNC: 139 MMOL/L (ref 136–145)
SODIUM SERPL-SCNC: 140 MMOL/L (ref 136–145)
T WAVE AXIS: 81 DEGREES
T WAVE AXIS: 86 DEGREES
TROPONIN I SERPL-MCNC: 0.02 NG/ML
VENTRICULAR RATE: 119 BPM
VENTRICULAR RATE: 125 BPM
WBC # BLD AUTO: 5.58 THOUSAND/UL (ref 4.31–10.16)
WBC # BLD AUTO: 6.86 THOUSAND/UL (ref 4.31–10.16)

## 2019-11-22 PROCEDURE — 84145 PROCALCITONIN (PCT): CPT | Performed by: HOSPITALIST

## 2019-11-22 PROCEDURE — 99223 1ST HOSP IP/OBS HIGH 75: CPT | Performed by: INTERNAL MEDICINE

## 2019-11-22 PROCEDURE — 82948 REAGENT STRIP/BLOOD GLUCOSE: CPT

## 2019-11-22 PROCEDURE — 93306 TTE W/DOPPLER COMPLETE: CPT | Performed by: INTERNAL MEDICINE

## 2019-11-22 PROCEDURE — 93010 ELECTROCARDIOGRAM REPORT: CPT | Performed by: INTERNAL MEDICINE

## 2019-11-22 PROCEDURE — 85025 COMPLETE CBC W/AUTO DIFF WBC: CPT | Performed by: EMERGENCY MEDICINE

## 2019-11-22 PROCEDURE — 82805 BLOOD GASES W/O2 SATURATION: CPT | Performed by: EMERGENCY MEDICINE

## 2019-11-22 PROCEDURE — 80048 BASIC METABOLIC PNL TOTAL CA: CPT | Performed by: HOSPITALIST

## 2019-11-22 PROCEDURE — 36415 COLL VENOUS BLD VENIPUNCTURE: CPT | Performed by: EMERGENCY MEDICINE

## 2019-11-22 PROCEDURE — 94644 CONT INHLJ TX 1ST HOUR: CPT

## 2019-11-22 PROCEDURE — 87631 RESP VIRUS 3-5 TARGETS: CPT | Performed by: HOSPITALIST

## 2019-11-22 PROCEDURE — 99285 EMERGENCY DEPT VISIT HI MDM: CPT | Performed by: EMERGENCY MEDICINE

## 2019-11-22 PROCEDURE — 84484 ASSAY OF TROPONIN QUANT: CPT | Performed by: EMERGENCY MEDICINE

## 2019-11-22 PROCEDURE — 83036 HEMOGLOBIN GLYCOSYLATED A1C: CPT | Performed by: INTERNAL MEDICINE

## 2019-11-22 PROCEDURE — 84145 PROCALCITONIN (PCT): CPT | Performed by: EMERGENCY MEDICINE

## 2019-11-22 PROCEDURE — 94640 AIRWAY INHALATION TREATMENT: CPT

## 2019-11-22 PROCEDURE — 80053 COMPREHEN METABOLIC PANEL: CPT | Performed by: EMERGENCY MEDICINE

## 2019-11-22 PROCEDURE — 99223 1ST HOSP IP/OBS HIGH 75: CPT | Performed by: HOSPITALIST

## 2019-11-22 PROCEDURE — 99232 SBSQ HOSP IP/OBS MODERATE 35: CPT | Performed by: INTERNAL MEDICINE

## 2019-11-22 PROCEDURE — 94760 N-INVAS EAR/PLS OXIMETRY 1: CPT

## 2019-11-22 PROCEDURE — 94660 CPAP INITIATION&MGMT: CPT

## 2019-11-22 PROCEDURE — 93306 TTE W/DOPPLER COMPLETE: CPT

## 2019-11-22 PROCEDURE — 99285 EMERGENCY DEPT VISIT HI MDM: CPT

## 2019-11-22 PROCEDURE — 93005 ELECTROCARDIOGRAM TRACING: CPT

## 2019-11-22 PROCEDURE — 71046 X-RAY EXAM CHEST 2 VIEWS: CPT

## 2019-11-22 PROCEDURE — 85027 COMPLETE CBC AUTOMATED: CPT | Performed by: HOSPITALIST

## 2019-11-22 RX ORDER — GUAIFENESIN 600 MG
600 TABLET, EXTENDED RELEASE 12 HR ORAL 2 TIMES DAILY
Status: DISCONTINUED | OUTPATIENT
Start: 2019-11-22 | End: 2019-11-27 | Stop reason: HOSPADM

## 2019-11-22 RX ORDER — AZITHROMYCIN 250 MG/1
500 TABLET, FILM COATED ORAL EVERY 24 HOURS
Status: COMPLETED | OUTPATIENT
Start: 2019-11-23 | End: 2019-11-24

## 2019-11-22 RX ORDER — POLYETHYLENE GLYCOL 3350 17 G/17G
17 POWDER, FOR SOLUTION ORAL DAILY PRN
Status: DISCONTINUED | OUTPATIENT
Start: 2019-11-22 | End: 2019-11-27 | Stop reason: HOSPADM

## 2019-11-22 RX ORDER — METHYLPREDNISOLONE SOD SUCC 125 MG
1 VIAL (EA) INJECTION ONCE
Status: COMPLETED | OUTPATIENT
Start: 2019-11-22 | End: 2019-11-22

## 2019-11-22 RX ORDER — POTASSIUM CHLORIDE 20 MEQ/1
20 TABLET, EXTENDED RELEASE ORAL DAILY
Status: DISCONTINUED | OUTPATIENT
Start: 2019-11-22 | End: 2019-11-27 | Stop reason: HOSPADM

## 2019-11-22 RX ORDER — IPRATROPIUM BROMIDE AND ALBUTEROL SULFATE 2.5; .5 MG/3ML; MG/3ML
3 SOLUTION RESPIRATORY (INHALATION)
Status: DISCONTINUED | OUTPATIENT
Start: 2019-11-22 | End: 2019-11-22

## 2019-11-22 RX ORDER — ALBUTEROL SULFATE 90 UG/1
2 AEROSOL, METERED RESPIRATORY (INHALATION) EVERY 4 HOURS PRN
Status: DISCONTINUED | OUTPATIENT
Start: 2019-11-22 | End: 2019-11-27 | Stop reason: HOSPADM

## 2019-11-22 RX ORDER — LEVALBUTEROL 1.25 MG/.5ML
1.25 SOLUTION, CONCENTRATE RESPIRATORY (INHALATION)
Status: DISCONTINUED | OUTPATIENT
Start: 2019-11-22 | End: 2019-11-25

## 2019-11-22 RX ORDER — DOCUSATE SODIUM 100 MG/1
100 CAPSULE, LIQUID FILLED ORAL 2 TIMES DAILY
Status: DISCONTINUED | OUTPATIENT
Start: 2019-11-22 | End: 2019-11-27 | Stop reason: HOSPADM

## 2019-11-22 RX ORDER — SODIUM CHLORIDE 9 MG/ML
75 INJECTION, SOLUTION INTRAVENOUS CONTINUOUS
Status: DISPENSED | OUTPATIENT
Start: 2019-11-22 | End: 2019-11-22

## 2019-11-22 RX ORDER — FUROSEMIDE 20 MG/1
10 TABLET ORAL DAILY
Status: DISCONTINUED | OUTPATIENT
Start: 2019-11-22 | End: 2019-11-27 | Stop reason: HOSPADM

## 2019-11-22 RX ORDER — FORMOTEROL FUMARATE 20 UG/2ML
20 SOLUTION RESPIRATORY (INHALATION)
Status: DISCONTINUED | OUTPATIENT
Start: 2019-11-22 | End: 2019-11-27 | Stop reason: HOSPADM

## 2019-11-22 RX ORDER — ALBUTEROL SULFATE 2.5 MG/3ML
2 SOLUTION RESPIRATORY (INHALATION) ONCE
Status: COMPLETED | OUTPATIENT
Start: 2019-11-22 | End: 2019-11-22

## 2019-11-22 RX ORDER — ALBUTEROL SULFATE 2.5 MG/3ML
2.5 SOLUTION RESPIRATORY (INHALATION) EVERY 4 HOURS PRN
Status: DISCONTINUED | OUTPATIENT
Start: 2019-11-22 | End: 2019-11-22

## 2019-11-22 RX ORDER — METHYLPREDNISOLONE SODIUM SUCCINATE 40 MG/ML
40 INJECTION, POWDER, LYOPHILIZED, FOR SOLUTION INTRAMUSCULAR; INTRAVENOUS EVERY 8 HOURS
Status: DISCONTINUED | OUTPATIENT
Start: 2019-11-22 | End: 2019-11-23

## 2019-11-22 RX ORDER — SODIUM CHLORIDE FOR INHALATION 0.9 %
3 VIAL, NEBULIZER (ML) INHALATION ONCE
Status: COMPLETED | OUTPATIENT
Start: 2019-11-22 | End: 2019-11-22

## 2019-11-22 RX ORDER — LANOLIN ALCOHOL/MO/W.PET/CERES
3 CREAM (GRAM) TOPICAL
Status: DISCONTINUED | OUTPATIENT
Start: 2019-11-22 | End: 2019-11-27 | Stop reason: HOSPADM

## 2019-11-22 RX ORDER — AZITHROMYCIN 250 MG/1
500 TABLET, FILM COATED ORAL EVERY 24 HOURS
Status: DISCONTINUED | OUTPATIENT
Start: 2019-11-22 | End: 2019-11-22

## 2019-11-22 RX ORDER — DILTIAZEM HYDROCHLORIDE 180 MG/1
180 CAPSULE, COATED, EXTENDED RELEASE ORAL DAILY
Status: DISCONTINUED | OUTPATIENT
Start: 2019-11-22 | End: 2019-11-27 | Stop reason: HOSPADM

## 2019-11-22 RX ORDER — BUDESONIDE 0.5 MG/2ML
0.5 INHALANT ORAL
Status: DISCONTINUED | OUTPATIENT
Start: 2019-11-22 | End: 2019-11-27 | Stop reason: HOSPADM

## 2019-11-22 RX ORDER — FERROUS SULFATE 325(65) MG
325 TABLET ORAL DAILY
Status: DISCONTINUED | OUTPATIENT
Start: 2019-11-22 | End: 2019-11-27 | Stop reason: HOSPADM

## 2019-11-22 RX ORDER — SENNOSIDES 8.6 MG
1 TABLET ORAL DAILY
Status: DISCONTINUED | OUTPATIENT
Start: 2019-11-22 | End: 2019-11-27 | Stop reason: HOSPADM

## 2019-11-22 RX ORDER — FLUTICASONE FUROATE AND VILANTEROL 200; 25 UG/1; UG/1
1 POWDER RESPIRATORY (INHALATION) DAILY
Status: DISCONTINUED | OUTPATIENT
Start: 2019-11-22 | End: 2019-11-22

## 2019-11-22 RX ORDER — SODIUM CHLORIDE FOR INHALATION 0.9 %
VIAL, NEBULIZER (ML) INHALATION
Status: DISPENSED
Start: 2019-11-22 | End: 2019-11-23

## 2019-11-22 RX ORDER — ALBUTEROL SULFATE 2.5 MG/3ML
2.5 SOLUTION RESPIRATORY (INHALATION) EVERY 4 HOURS
Status: DISCONTINUED | OUTPATIENT
Start: 2019-11-22 | End: 2019-11-22

## 2019-11-22 RX ORDER — CITALOPRAM 20 MG/1
20 TABLET ORAL DAILY
Status: DISCONTINUED | OUTPATIENT
Start: 2019-11-22 | End: 2019-11-27 | Stop reason: HOSPADM

## 2019-11-22 RX ORDER — FOLIC ACID 1 MG/1
1000 TABLET ORAL DAILY
Status: DISCONTINUED | OUTPATIENT
Start: 2019-11-22 | End: 2019-11-27 | Stop reason: HOSPADM

## 2019-11-22 RX ADMIN — FUROSEMIDE 10 MG: 20 TABLET ORAL at 08:01

## 2019-11-22 RX ADMIN — GUAIFENESIN 600 MG: 600 TABLET, EXTENDED RELEASE ORAL at 08:01

## 2019-11-22 RX ADMIN — ENOXAPARIN SODIUM 40 MG: 40 INJECTION SUBCUTANEOUS at 08:00

## 2019-11-22 RX ADMIN — CITALOPRAM HYDROBROMIDE 20 MG: 20 TABLET ORAL at 08:01

## 2019-11-22 RX ADMIN — DOCUSATE SODIUM 100 MG: 100 CAPSULE, LIQUID FILLED ORAL at 18:01

## 2019-11-22 RX ADMIN — BUDESONIDE 0.5 MG: 0.5 INHALANT RESPIRATORY (INHALATION) at 10:23

## 2019-11-22 RX ADMIN — IPRATROPIUM BROMIDE 0.5 MG: 0.5 SOLUTION RESPIRATORY (INHALATION) at 19:39

## 2019-11-22 RX ADMIN — GUAIFENESIN 600 MG: 600 TABLET, EXTENDED RELEASE ORAL at 21:11

## 2019-11-22 RX ADMIN — LEVALBUTEROL HYDROCHLORIDE 1.25 MG: 1.25 SOLUTION, CONCENTRATE RESPIRATORY (INHALATION) at 07:35

## 2019-11-22 RX ADMIN — FORMOTEROL FUMARATE DIHYDRATE 20 MCG: 20 SOLUTION RESPIRATORY (INHALATION) at 19:39

## 2019-11-22 RX ADMIN — DILTIAZEM HYDROCHLORIDE 180 MG: 180 CAPSULE, COATED, EXTENDED RELEASE ORAL at 08:03

## 2019-11-22 RX ADMIN — POTASSIUM CHLORIDE 20 MEQ: 1500 TABLET, EXTENDED RELEASE ORAL at 08:01

## 2019-11-22 RX ADMIN — FERROUS SULFATE TAB 325 MG (65 MG ELEMENTAL FE) 325 MG: 325 (65 FE) TAB at 08:01

## 2019-11-22 RX ADMIN — IPRATROPIUM BROMIDE 0.5 MG: 0.5 SOLUTION RESPIRATORY (INHALATION) at 13:06

## 2019-11-22 RX ADMIN — ALBUTEROL SULFATE 10 MG: 2.5 SOLUTION RESPIRATORY (INHALATION) at 01:16

## 2019-11-22 RX ADMIN — FLUTICASONE FUROATE AND VILANTEROL TRIFENATATE 1 PUFF: 200; 25 POWDER RESPIRATORY (INHALATION) at 08:01

## 2019-11-22 RX ADMIN — METHYLPREDNISOLONE SODIUM SUCCINATE 40 MG: 40 INJECTION, POWDER, FOR SOLUTION INTRAMUSCULAR; INTRAVENOUS at 04:07

## 2019-11-22 RX ADMIN — INSULIN LISPRO 1 UNITS: 100 INJECTION, SOLUTION INTRAVENOUS; SUBCUTANEOUS at 12:03

## 2019-11-22 RX ADMIN — IPRATROPIUM BROMIDE 1 MG: 0.5 SOLUTION RESPIRATORY (INHALATION) at 01:16

## 2019-11-22 RX ADMIN — IPRATROPIUM BROMIDE 0.5 MG: 0.5 SOLUTION RESPIRATORY (INHALATION) at 07:35

## 2019-11-22 RX ADMIN — FOLIC ACID 1000 MCG: 1 TABLET ORAL at 08:01

## 2019-11-22 RX ADMIN — MELATONIN 3 MG: 3 TAB ORAL at 21:11

## 2019-11-22 RX ADMIN — LEVALBUTEROL HYDROCHLORIDE 1.25 MG: 1.25 SOLUTION, CONCENTRATE RESPIRATORY (INHALATION) at 13:05

## 2019-11-22 RX ADMIN — AZITHROMYCIN 500 MG: 250 TABLET, FILM COATED ORAL at 05:47

## 2019-11-22 RX ADMIN — DOCUSATE SODIUM 100 MG: 100 CAPSULE, LIQUID FILLED ORAL at 08:01

## 2019-11-22 RX ADMIN — LEVALBUTEROL HYDROCHLORIDE 1.25 MG: 1.25 SOLUTION, CONCENTRATE RESPIRATORY (INHALATION) at 19:39

## 2019-11-22 RX ADMIN — ISODIUM CHLORIDE 3 ML: 0.03 SOLUTION RESPIRATORY (INHALATION) at 01:16

## 2019-11-22 RX ADMIN — METHYLPREDNISOLONE SODIUM SUCCINATE 40 MG: 40 INJECTION, POWDER, FOR SOLUTION INTRAMUSCULAR; INTRAVENOUS at 18:01

## 2019-11-22 RX ADMIN — SENNOSIDES 8.6 MG: 8.6 TABLET, FILM COATED ORAL at 08:01

## 2019-11-22 RX ADMIN — METHYLPREDNISOLONE SODIUM SUCCINATE 40 MG: 40 INJECTION, POWDER, FOR SOLUTION INTRAMUSCULAR; INTRAVENOUS at 10:14

## 2019-11-22 RX ADMIN — BUDESONIDE 0.5 MG: 0.5 INHALANT RESPIRATORY (INHALATION) at 19:39

## 2019-11-22 RX ADMIN — SODIUM CHLORIDE 75 ML/HR: 0.9 INJECTION, SOLUTION INTRAVENOUS at 03:54

## 2019-11-22 NOTE — ASSESSMENT & PLAN NOTE
Secondary to COPD exacerbation, with  metabolic alkalosis compensation  Management as per pulmonologist

## 2019-11-22 NOTE — ASSESSMENT & PLAN NOTE
Patient with COPD chronic respiratory failure 4 L O2 via nasal cannula dependent follows Dr Juaquin Velazquez of pulmonology presented from home for evaluation of acute onset of shortness of breath with tachypnea X 2 hours prior arrival   Due to respiratory distress patient needed to be placed on BiPAP VBG showed respiratory acidosis : pH 7 22, CO2 99 9, bicarb 40 3  He just completed course of steroid a day before and supposed to start erythromycin  After treatment with Solu-Medrol and nebs he felt some improvement and was able to be switched back to nasal cannula  ER asked to admit him to step-down due to possible need of BiPAP overnight  Patient needs to be re-evaluated in a   And downgraded to [facility] if no need of BiPAP  Patient switched back to nasal cannula ==> Will repeat VBG  He follows Dr Juaquin Velazquez who last time evaluated him on November 12, recommended to continue Spiriva Breo Ellipta albuterol, supplemental oxygen 24 hour   patient does not report getting worse sputum or fever, however he supposed to start erythromycin, will initiate azithromycin  due to anaphylactic reaction to penicillin  He had a few days of URI symptoms and most likely virus infection triggered his COPD exacerbation  Will check sputum culture Gram stain  Follow up on procalcitonin level  Start Solu-Medrol IV, continue Breo Ellipta, start duo nebs, hold Spiriva  Further management as per pulmonologist recommendations  Respiratory protocol  Last echo done in August of 2017 reported preserved ejection fraction to be 70% ,no wall motion abnormality, grade 1 diastolic dysfunction    Will update echo  Consult to pulmonologist placed

## 2019-11-22 NOTE — RESPIRATORY THERAPY NOTE
RT Protocol Note  David Crawford 68 y o  male MRN: [id number]  Unit/Bed#: [facility] Encounter: [id number]    Assessment    Active Problems:    * No active hospital problems   *      Home Pulmonary Medications:  Albuterol PRN/MDI, Spiriva, Breo         Past Medical History:   Diagnosis Date    Anxiety     Aortic regurgitation     Atrial flutter (HCC)     Chronic respiratory failure (HCC)     Colon polyp     COPD (chronic obstructive pulmonary disease) (HCC)     Deep venous thrombosis of distal lower extremity (HCC)     Diverticulitis     GI bleed     Hypertension     Iron deficiency anemia     MGUS (monoclonal gammopathy of unknown significance)     Osteoarthritis of hip     PAC (premature atrial contraction)     Paroxysmal atrial fibrillation (HCC)     Patent foramen ovale     Pulmonary artery hypertension (HCC)     Renal failure     Sinus tachycardia      Social History     Socioeconomic History    Marital status: /Civil Union     Spouse name: None    Number of children: None    Years of education: None    Highest education level: None   Occupational History    None   Social Needs    Financial resource strain: None    Food insecurity:     Worry: None     Inability: None    Transportation needs:     Medical: None     Non-medical: None   Tobacco Use    Smoking status: Former Smoker     Packs/day: 1 50     Years: 42 00     Pack years: 63 00     Types: Cigarettes     Start date:      Last attempt to quit: 2012     Years since quittin 8    Smokeless tobacco: Never Used   Substance and Sexual Activity    Alcohol use: Not Currently    Drug use: No    Sexual activity: Not Currently   Lifestyle    Physical activity:     Days per week: None     Minutes per session: None    Stress: None   Relationships    Social connections:     Talks on phone: None     Gets together: None     Attends Anabaptist service: None     Active member of club or organization: None     Attends meetings of clubs or organizations: None     Relationship status: None    Intimate partner violence:     Fear of current or ex partner: None     Emotionally abused: None     Physically abused: None     Forced sexual activity: None   Other Topics Concern    None   Social History Narrative    Daily coffee consumption; 1 serving       Subjective         Objective    Physical Exam:   Assessment Type: (P) Assess only  General Appearance: (P) Awake, Alert  Respiratory Pattern: (P) Dyspnea at rest  Chest Assessment: (P) Chest expansion symmetrical  Bilateral Breath Sounds: (P) Diminished, Expiratory wheezes  O2 Device: (P) Bipap    Vitals:  Blood pressure 170/89, pulse 97, temperature 97 9 °F (36 6 °C), temperature source Oral, resp  rate 22, weight 64 kg (141 lb), SpO2 97 %  Imaging and other studies: I have personally reviewed pertinent reports        O2 Device: (P) Bipap     Plan    Respiratory Plan: (P) Vent/NIV/HFNC        Resp Comments: pt started on heart neb at this time

## 2019-11-22 NOTE — ED PROVIDER NOTES
History  Chief Complaint   Patient presents with    Shortness of Breath     pt c/o sob, denies CP, hx copd     41-year-old male with past medical history of chronic hypoxic respiratory failure secondary to COPD with baseline oxygen requirement of 4 L via nasal cannula, MGUS, prior DVT not on anticoagulation who is presenting with shortness of breath  Patient states that his breathing acutely worsened 1 hour prior to arrival which is why he called EMS  He states that he has had URI symptoms for past week  The symptoms include nasal congestion, rhinorrhea, and chest congestion with a dry cough  Patient denies any productive cough, increase in sputum, or hemoptysis  Patient denies any fevers, shaking chills, or myalgias  Patient has no chest pain  No nausea, vomiting, or abdominal pain  Patient denies any leg swelling  He states that he has been compliant with medications  Patient reports that he has numerous sick contacts; his grandchildren often have URIs and he feels that he frequently catches cold from them  Patient most recently was admitted to this facility in February 2019 with COPD exacerbation  He was treated and discharged without complication  Patient was last seen by his pulmonologist on November 12, 2019  At that visit, no medication changes were made  Per most recent echocardiogram from August 29, 2017, patient had an ejection fraction of 70% with grade 1 diastolic dysfunction  He had no significant valvular abnormalities  Per patient's daughter, patient has been on azithromycin and was recently started on oral corticosteroids  Prior to Admission Medications   Prescriptions Last Dose Informant Patient Reported? Taking?    CARTIA  MG 24 hr capsule 11/22/2019 at Unknown time  No Yes   Sig: TAKE 1 CAPSULE BY MOUTH EVERY DAY   albuterol (2 5 mg/3 mL) 0 083 % nebulizer solution 11/22/2019 at Unknown time Self No Yes   Sig: Take 1 vial (2 5 mg total) by nebulization every 6 (six) hours as needed for wheezing or shortness of breath   albuterol (PROAIR HFA) 90 mcg/act inhaler 11/22/2019 at Unknown time Self No Yes   Sig: Inhale 2 puffs every 4 (four) hours as needed for wheezing or shortness of breath   citalopram (CeleXA) 20 mg tablet 11/22/2019 at Unknown time Self No Yes   Sig: Take 1 tablet (20 mg total) by mouth daily   ferrous sulfate 325 (65 Fe) mg tablet 11/22/2019 at Unknown time Self Yes Yes   Sig: Take 325 mg by mouth daily   fluticasone-vilanterol (BREO ELLIPTA) 200-25 MCG/INH inhaler 11/22/2019 at Unknown time  No Yes   Sig: Inhale 1 puff daily Rinse mouth after use     folic acid (FOLVITE) 1 mg tablet 11/22/2019 at Unknown time  No Yes   Sig: TAKE 1 TABLET BY MOUTH EVERY DAY   furosemide (LASIX) 20 mg tablet 11/22/2019 at Unknown time Self No Yes   Sig: TAKE 1/2 TABLET BY MOUTH DAILY   melatonin 3 mg Unknown at Unknown time Self No No   Sig: Take 2 tablets by mouth daily at bedtime as needed (sleep)   methylPREDNISolone 4 MG tablet therapy pack 11/22/2019 at Unknown time  No Yes   Sig: Use as directed on package   potassium chloride (K-DUR,KLOR-CON) 20 mEq tablet 11/22/2019 at Unknown time Self No Yes   Sig: TAKE 1 TABLET BY MOUTH EVERY DAY   tiotropium (SPIRIVA RESPIMAT) 2 5 MCG/ACT AERS inhaler 11/22/2019 at Unknown time  No Yes   Sig: Inhale 2 puffs daily      Facility-Administered Medications: None       Past Medical History:   Diagnosis Date    Anxiety     Aortic regurgitation     Atrial flutter (HCC)     Chronic respiratory failure (HCC)     Colon polyp     COPD (chronic obstructive pulmonary disease) (HCC)     Deep venous thrombosis of distal lower extremity (HCC)     Diverticulitis     GI bleed     Hypertension     Iron deficiency anemia     MGUS (monoclonal gammopathy of unknown significance)     Osteoarthritis of hip     PAC (premature atrial contraction)     Paroxysmal atrial fibrillation (HCC)     Patent foramen ovale     Pulmonary artery hypertension [facility])     Renal failure     Sinus tachycardia        Past Surgical History:   Procedure Laterality Date    APPENDECTOMY      COLON SURGERY      HERNIA REPAIR      JOINT REPLACEMENT      KNEE SURGERY         Family History   Problem Relation Age of Onset    Cancer Mother     Heart attack Father     Sudden death Father     Arthritis Family     Diabetes Family      I have reviewed and agree with the history as documented  Social History     Tobacco Use    Smoking status: Former Smoker     Packs/day: 1 50     Years: 42 00     Pack years: 63 00     Types: Cigarettes     Start date:      Last attempt to quit:      Years since quittin 8    Smokeless tobacco: Never Used   Substance Use Topics    Alcohol use: Not Currently    Drug use: No        Review of Systems   Constitutional: Negative for diaphoresis, fever and unexpected weight change  HENT: Positive for congestion and rhinorrhea  Negative for sore throat  Eyes: Negative for pain, discharge and visual disturbance  Respiratory: Positive for shortness of breath  Negative for cough and wheezing  Cardiovascular: Negative for chest pain, palpitations and leg swelling  Gastrointestinal: Negative for abdominal pain, blood in stool, constipation, diarrhea, nausea and vomiting  Genitourinary: Negative for dysuria, flank pain and hematuria  Musculoskeletal: Negative for arthralgias and myalgias  Skin: Negative for rash and wound  Allergic/Immunologic: Negative for environmental allergies and food allergies  Neurological: Negative for dizziness, seizures, weakness and numbness  Hematological: Negative for adenopathy  Psychiatric/Behavioral: Negative for confusion and hallucinations         Physical Exam  ED Triage Vitals [19 0011]   Temperature Pulse Respirations Blood Pressure SpO2   97 9 °F (36 6 °C) 97 22 170/89 95 %      Temp Source Heart Rate Source Patient Position - Orthostatic VS BP Location FiO2 (%)   Oral Monitor Sitting Right arm --      Pain Score       No Pain             Orthostatic Vital Signs  Vitals:    11/22/19 1000 11/22/19 1200 11/22/19 1400 11/22/19 1547   BP: 101/57 (!) 104/45 (!) 113/48 131/62   Pulse: 96 102 94 93   Patient Position - Orthostatic VS:    Sitting       Physical Exam   Constitutional: He is oriented to person, place, and time  He appears well-developed and well-nourished  HENT:   Head: Normocephalic and atraumatic  Right Ear: External ear normal    Left Ear: External ear normal    Nose: Nose normal    Eyes: Pupils are equal, round, and reactive to light  EOM are normal    Neck: Normal range of motion  Neck supple  Cardiovascular: Normal rate, regular rhythm and normal heart sounds  No murmur heard  Pulmonary/Chest: He is in respiratory distress  He has wheezes  He has no rales  Patient has increased work of breathing  Accessory muscle use noted  Patient has reduced air movement bilaterally with inspiratory and expiratory wheezing  No focal abnormalities  Abdominal: Soft  Bowel sounds are normal  He exhibits no distension  There is no tenderness  There is no guarding  Musculoskeletal: Normal range of motion  He exhibits no edema or deformity  Neurological: He is alert and oriented to person, place, and time  No gross motor deficits noted  Cranial nerves II-XII are intact  Speech is fluent without dysarthria or aphasia  Skin: Skin is warm and dry  Capillary refill takes less than 2 seconds  He is not diaphoretic  Psychiatric: He has a normal mood and affect  His behavior is normal    Nursing note and vitals reviewed        ED Medications  Medications   levalbuterol (XOPENEX) inhalation solution 1 25 mg ( Nebulization Dose Auto Held 11/25/19 2000)   albuterol (PROVENTIL HFA,VENTOLIN HFA) inhaler 2 puff (has no administration in time range)   diltiazem (CARDIZEM CD) 24 hr capsule 180 mg (180 mg Oral Given 11/22/19 0803)   citalopram (CeleXA) tablet 20 mg (20 mg Oral Given 11/22/19 0801)   ferrous sulfate tablet 325 mg (325 mg Oral Given 82/17/22 4070)   folic acid (FOLVITE) tablet 1,000 mcg (1,000 mcg Oral Given 11/22/19 0801)   furosemide (LASIX) tablet 10 mg (10 mg Oral Given 11/22/19 0801)   melatonin tablet 3 mg (has no administration in time range)   potassium chloride (K-DUR,KLOR-CON) CR tablet 20 mEq (20 mEq Oral Given 11/22/19 0801)   sodium chloride 0 9 % infusion (75 mL/hr Intravenous New Bag 11/22/19 0354)   docusate sodium (COLACE) capsule 100 mg ( Oral Dose Auto Held 11/25/19 1800)   polyethylene glycol (MIRALAX) packet 17 g ( Oral MAR Hold 11/22/19 1542)   senna (SENOKOT) tablet 8 6 mg ( Oral Dose Auto Held 11/25/19 0900)   guaiFENesin (MUCINEX) 12 hr tablet 600 mg ( Oral Dose Auto Held 11/25/19 2100)   methylPREDNISolone sodium succinate (Solu-MEDROL) injection 40 mg ( Intravenous Dose Auto Held 11/25/19 1845)   enoxaparin (LOVENOX) subcutaneous injection 40 mg ( Subcutaneous Dose Auto Held 11/25/19 0900)   ipratropium (ATROVENT) 0 02 % inhalation solution 0 5 mg ( Nebulization Dose Auto Held 11/25/19 2000)   budesonide (PULMICORT) inhalation solution 0 5 mg ( Nebulization Dose Auto Held 11/25/19 2000)   formoterol (PERFOROMIST) nebulizer solution 20 mcg ( Nebulization Dose Auto Held 11/25/19 2000)   azithromycin (ZITHROMAX) tablet 500 mg ( Oral Dose Auto Held 11/24/19 0515)   insulin lispro (HumaLOG) 100 units/mL subcutaneous injection 1-5 Units ( Subcutaneous Dose Auto Held 11/25/19 1600)   sodium chloride 0 9 % inhalation solution **ADS Override Pull** (  Not Given 11/22/19 1404)   albuterol (FOR EMS ONLY) (2 5 mg/3 mL) 0 083 % inhalation solution 5 mg (0 mg Does not apply Given to EMS 11/22/19 0041)   methylPREDNISolone sodium succinate (FOR EMS ONLY) (Solu-MEDROL) 125 MG injection 125 mg (0 mg Does not apply Given to EMS 11/22/19 0041)   albuterol inhalation solution 10 mg (10 mg Nebulization Given 11/22/19 0116)     And   ipratropium (ATROVENT) 0 02 % inhalation solution 1 mg (1 mg Nebulization Given 11/22/19 0116)     And   sodium chloride 0 9 % inhalation solution 3 mL (3 mL Nebulization Given 11/22/19 0116)       Diagnostic Studies  Results Reviewed     Procedure Component Value Units Date/Time    Procalcitonin AM Draw [[id number]]  (Normal) Resulted:  11/22/19 0556    Lab Status:  Final result Specimen:  Blood Updated:  11/22/19 0556     Procalcitonin 0 13 ng/ml     Influenza A/B and RSV PCR [[id number]]  (Normal) Collected:  11/22/19 0422    Lab Status:  Final result Specimen:  Nose Updated:  11/22/19 0536     INFLUENZA A PCR None Detected     INFLUENZA B PCR None Detected     RSV PCR None Detected    Basic metabolic panel [[id number]]  (Abnormal) Resulted:  11/22/19 0516    Lab Status:  Final result Specimen:  Blood Updated:  11/22/19 0516     Sodium 139 mmol/L      Potassium 4 3 mmol/L      Chloride 99 mmol/L      CO2 39 mmol/L      ANION GAP 1 mmol/L      BUN 24 mg/dL      Creatinine 0 91 mg/dL      Glucose 168 mg/dL      Calcium 9 4 mg/dL      eGFR 84 ml/min/1 73sq m     Narrative:       [place] guidelines for Chronic Kidney Disease (CKD):     Stage 1 with normal or high GFR (GFR > 90 mL/min/1 73 square meters)    Stage 2 Mild CKD (GFR = 60-89 mL/min/1 73 square meters)    Stage 3A Moderate CKD (GFR = 45-59 mL/min/1 73 square meters)    Stage 3B Moderate CKD (GFR = 30-44 mL/min/1 73 square meters)    Stage 4 Severe CKD (GFR = 15-29 mL/min/1 73 square meters)    Stage 5 End Stage CKD (GFR <15 mL/min/1 73 square meters)  Note: GFR calculation is accurate only with a steady state creatinine    CBC (With Platelets) [[id number]]  (Abnormal) Resulted:  11/22/19 0451    Lab Status:  Final result Specimen:  Blood Updated:  11/22/19 0451     WBC 5 58 Thousand/uL      RBC 3 25 Million/uL      Hemoglobin 10 2 g/dL      Hematocrit 33 5 %       fL      MCH 31 4 pg      MCHC 30 4 g/dL      RDW 13 5 %      Platelets 675 Thousands/uL MPV 9 6 fL     Sputum culture and Gram stain [[id number]]     Lab Status:  No result Specimen:  Sputum     Blood gas, venous [[id number]]  (Abnormal) Collected:  11/22/19 0204    Lab Status:  Final result Specimen:  Blood from Arm, Left Updated:  11/22/19 0209     pH, Stewart 7 387     pCO2, Stewart 64 4 mm Hg      pO2, Stewart 135 0 mm Hg      HCO3, Stewart 37 8 mmol/L      Base Excess, Stewart 10 6 mmol/L      O2 Content, Stewart 16 6 ml/dL      O2 HGB, VENOUS 97 1 %     Procalcitonin [[id number]]  (Normal) Collected:  11/22/19 0046    Lab Status:  Final result Specimen:  Blood from Arm, Right Updated:  11/22/19 0141     Procalcitonin 0 07 ng/ml     Troponin I [[id number]]  (Normal) Collected:  11/22/19 0046    Lab Status:  Final result Specimen:  Blood from Arm, Right Updated:  11/22/19 0116     Troponin I 0 02 ng/mL     Comprehensive metabolic panel [[id number]]  (Abnormal) Collected:  11/22/19 0046    Lab Status:  Final result Specimen:  Blood from Arm, Right Updated:  11/22/19 0114     Sodium 140 mmol/L      Potassium 4 4 mmol/L      Chloride 97 mmol/L      CO2 37 mmol/L      ANION GAP 6 mmol/L      BUN 24 mg/dL      Creatinine 1 05 mg/dL      Glucose 221 mg/dL      Calcium 9 6 mg/dL      AST 14 U/L      ALT 15 U/L      Alkaline Phosphatase 88 U/L      Total Protein 7 7 g/dL      Albumin 4 1 g/dL      Total Bilirubin 0 29 mg/dL      eGFR 71 ml/min/1 73sq m     Narrative:       [place] guidelines for Chronic Kidney Disease (CKD):     Stage 1 with normal or high GFR (GFR > 90 mL/min/1 73 square meters)    Stage 2 Mild CKD (GFR = 60-89 mL/min/1 73 square meters)    Stage 3A Moderate CKD (GFR = 45-59 mL/min/1 73 square meters)    Stage 3B Moderate CKD (GFR = 30-44 mL/min/1 73 square meters)    Stage 4 Severe CKD (GFR = 15-29 mL/min/1 73 square meters)    Stage 5 End Stage CKD (GFR <15 mL/min/1 73 square meters)  Note: GFR calculation is accurate only with a steady state creatinine    CBC and differential [[id number]]  (Abnormal) Collected:  11/22/19 0046    Lab Status:  Final result Specimen:  Blood from Arm, Right Updated:  11/22/19 0055     WBC 6 86 Thousand/uL      RBC 3 56 Million/uL      Hemoglobin 11 4 g/dL      Hematocrit 37 4 %       fL      MCH 32 0 pg      MCHC 30 5 g/dL      RDW 13 3 %      MPV 9 6 fL      Platelets 432 Thousands/uL      nRBC 0 /100 WBCs      Neutrophils Relative 82 %      Immat GRANS % 2 %      Lymphocytes Relative 13 %      Monocytes Relative 3 %      Eosinophils Relative 0 %      Basophils Relative 0 %      Neutrophils Absolute 5 57 Thousands/µL      Immature Grans Absolute 0 12 Thousand/uL      Lymphocytes Absolute 0 92 Thousands/µL      Monocytes Absolute 0 20 Thousand/µL      Eosinophils Absolute 0 03 Thousand/µL      Basophils Absolute 0 02 Thousands/µL     Blood gas, venous [[id number]]  (Abnormal) Collected:  11/22/19 0046    Lab Status:  Final result Specimen:  Blood from Arm, Right Updated:  11/22/19 0055     pH, Stewart 7 223     pCO2, Stewart 99 9 mm Hg      pO2, Stewart 35 7 mm Hg      HCO3, Stewart 40 2 mmol/L      Base Excess, Stewart 9 0 mmol/L      O2 Content, Stewart 9 1 ml/dL      O2 HGB, VENOUS 51 8 %                  XR chest 2 views   ED Interpretation by Dru Souza MD (11/22 0103)   No consolidation, effusion, or pneumothorax  Final Result by Antonio Bermudez DO (11/22 3494)      Emphysematous changes are noted  No focal consolidation, pleural effusion, or pneumothorax              Workstation performed: [id number]               Procedures  ECG 12 Lead Documentation Only  Date/Time: 11/22/2019 1:38 AM  Performed by: Dru Souza MD  Authorized by: Dru Souza MD     ECG reviewed by me, the ED Provider: yes    Patient location:  ED  Previous ECG:     Previous ECG:  Compared to current    Comparison ECG info:  February 21, 2019    Similarity:  No change    Comparison to cardiac monitor: Yes    Interpretation:     Interpretation: abnormal    Rate:     ECG rate:  125    ECG rate assessment: tachycardic    Rhythm:     Rhythm: multifocal atrial tachycardia    Ectopy:     Ectopy: none    QRS:     QRS axis:  Left    QRS intervals:  Normal  Conduction:     Conduction: normal    ST segments:     ST segments:  Normal  T waves:     T waves: normal              ED Course  ED Course as of Nov 22 1551   Fri Nov 22, 2019   0100 pH, Stewart(!): 7 223   0100 pCO2, Stewart(!): 99 9   0106 Given respiratory acidosis with elevated pCO2, will place patient on BiPAP  Initial settings 12/5  Informed patient and daughter at bedside  0137 Troponin I: 0 02   0148 Procalcitonin: 0 07   0152 Patient re-evaluated  He was having difficulty tolerating BiPAP  Patient was taken off BiPAP and placed back on nasal cannula  His work of breathing appeared improved  Will check VBG       0224 pH, Stewart: 7 387   0224 pCO2, Stewart(!): 64 4   0224 Repeat VBG markedly improved  Patient admitted to [facility]  MDM  Number of Diagnoses or Management Options  Chronic respiratory failure with hypoxia ([facility] ): established and worsening  COPD with acute exacerbation ([facility] ): new and requires workup  Viral URI with cough: new and does not require workup  Diagnosis management comments:     Patient presented with shortness of breath  He reported a 1 week history of viral URI symptoms including nasal congestion, rhinorrhea, and productive cough  He had been placed on azithromycin and oral corticosteroids by his Pulmonologist   He called EMS due to worsening shortness of breath 1 hour prior to arrival   Patient noted to have baseline chronic hypoxic respiratory failure with oxygen requirement of 4 L via nasal cannula  On examination, the patient had mild tachypnea and increased work of breathing as evidence by accessory muscle use  Auscultation demonstrated reduced air movement bilaterally with inspiratory and expiratory wheezing    Overall, history and physical examination were suggestive of COPD exacerbation secondary to viral URI  Labs were ordered as above  EKG demonstrated tachycardia but patient had a documented history of paroxysmal atrial tachycardia so this was not new  Initial troponin was negative  CBC and CMP did not demonstrate any acute changes  VBG initially demonstrated respiratory acidosis consistent with developing respiratory failure  Patient was placed on BiPAP  He was treated with an hour long nebulizer  On reassessment approximately 1 hour later, the patient was having difficulty tolerating BiPAP so he was taken off and a repeat VBG was checked about 15 minutes later  This revealed resolution of respiratory acidosis  The patient was admitted to Internal Medicine with PRN BiPAP  His procalcitonin was within normal limits and chest x-ray did not demonstrate any consolidation, effusion, or pneumothorax  No antibiotics were given         Amount and/or Complexity of Data Reviewed  Clinical lab tests: ordered and reviewed  Tests in the radiology section of CPT®: ordered and reviewed  Decide to obtain previous medical records or to obtain history from someone other than the patient: yes  Review and summarize past medical records: yes  Discuss the patient with other providers: yes  Independent visualization of images, tracings, or specimens: yes    Risk of Complications, Morbidity, and/or Mortality  Presenting problems: high  Diagnostic procedures: minimal  Management options: minimal    Patient Progress  Patient progress: improved      Disposition  Final diagnoses:   COPD with acute exacerbation ([facility] )   Chronic respiratory failure with hypoxia ([facility] )   Viral URI with cough     Time reflects when diagnosis was documented in both MDM as applicable and the Disposition within this note     Time User Action Codes Description Comment    11/22/2019  2:12 AM Faye Lombard Add [J44 1] COPD with acute exacerbation ([facility] )     11/22/2019  2:13 AM Faye Lombard Add [I67 82,  G93 1] Chronic hypoxic-ischemic brain injury ([facility] )     11/22/2019  2:13 AM Manuelito Clonts [D36 77,  G93 1] Chronic hypoxic-ischemic brain injury ([facility] )     11/22/2019  2:13 AM Eugene Fischer Add [J96 11] Chronic respiratory failure with hypoxia ([facility] )     11/22/2019  2:13 AM Jayrimadison Sakathir Add [J06 9,  B97 89] Viral URI with cough       ED Disposition     ED Disposition Condition Date/Time Comment    Admit Fair Fri Nov 22, 2019  2:12 AM Case was discussed with ALYSSA and the patient's admission status was agreed to be Admission Status: inpatient status to the service of Dr Devora Boone          Follow-up Information     Follow up With Specialties Details Why Contact Info    Noam Funez [facility]  Follow up  [address] [place] [address]  [phone]            Current Discharge Medication List      CONTINUE these medications which have NOT CHANGED    Details   albuterol (2 5 mg/3 mL) 0 083 % nebulizer solution Take 1 vial (2 5 mg total) by nebulization every 6 (six) hours as needed for wheezing or shortness of breath  Qty: 125 vial, Refills: 5    Comments: Dx Code: J44 9  Associated Diagnoses: Chronic obstructive pulmonary disease, unspecified COPD type (HCC)      albuterol (PROAIR HFA) 90 mcg/act inhaler Inhale 2 puffs every 4 (four) hours as needed for wheezing or shortness of breath  Qty: 8 5 Inhaler, Refills: 5    Comments: Generic ok  Associated Diagnoses: Chronic obstructive pulmonary disease, unspecified COPD type (HCC)      CARTIA  MG 24 hr capsule TAKE 1 CAPSULE BY MOUTH EVERY DAY  Qty: 30 capsule, Refills: 5    Associated Diagnoses: Paroxysmal atrial tachycardia (HCC)      citalopram (CeleXA) 20 mg tablet Take 1 tablet (20 mg total) by mouth daily  Qty: 90 tablet, Refills: 3    Associated Diagnoses: Depression, unspecified depression type      ferrous sulfate 325 (65 Fe) mg tablet Take 325 mg by mouth daily      fluticasone-vilanterol (BREO ELLIPTA) 200-25 MCG/INH inhaler Inhale 1 puff daily Rinse mouth after use  Qty: 1 Inhaler, Refills: 5    Associated Diagnoses: COPD (chronic obstructive pulmonary disease) ([facility])      folic acid (FOLVITE) 1 mg tablet TAKE 1 TABLET BY MOUTH EVERY DAY  Qty: 30 tablet, Refills: 5    Associated Diagnoses: Anemia, unspecified type      furosemide (LASIX) 20 mg tablet TAKE 1/2 TABLET BY MOUTH DAILY  Qty: 30 tablet, Refills: 5    Associated Diagnoses: Chronic respiratory failure, unspecified whether with hypoxia or hypercapnia ([facility])      methylPREDNISolone 4 MG tablet therapy pack Use as directed on package  Qty: 21 each, Refills: 0    Associated Diagnoses: Upper respiratory tract infection, unspecified type      potassium chloride (K-DUR,KLOR-CON) 20 mEq tablet TAKE 1 TABLET BY MOUTH EVERY DAY  Qty: 30 tablet, Refills: 5    Associated Diagnoses: Hypokalemia      tiotropium (SPIRIVA RESPIMAT) 2 5 MCG/ACT AERS inhaler Inhale 2 puffs daily  Qty: 1 Inhaler, Refills: 5    Comments: Dx Code: J44 9  Associated Diagnoses: Chronic obstructive pulmonary disease, unspecified COPD type (HCC)      melatonin 3 mg Take 2 tablets by mouth daily at bedtime as needed (sleep)  Qty: 30 tablet, Refills: 0           No discharge procedures on file  ED Provider  Attending physically available and evaluated Jerson Marquez  TRINO managed the patient along with the ED Attending      Electronically Signed by         Asia Grewal MD  11/22/19 6125

## 2019-11-22 NOTE — ASSESSMENT & PLAN NOTE
Patient with COPD chronic respiratory failure 4 L O2 via nasal cannula dependent follows Dr Alpha Shone of pulmonology presented from home for evaluation of acute onset of shortness of breath with tachypnea X 2 hours prior arrival   Due to respiratory distress patient needed to be placed on BiPAP VBG showed respiratory acidosis : pH 7 22, CO2 99 9, bicarb 40 3  He just completed course of steroid a day before and supposed to start erythromycin  After treatment with Solu-Medrol and nebs he felt some improvement and was able to be switched back to nasal cannula  ER asked to admit him to step-down due to possible need of BiPAP overnight  Patient needs to be re-evaluated in a   And downgraded to [facility] if no need of BiPAP  Patient switched back to nasal cannula ==> Will repeat VBG  He follows Dr Alpha Shone who last time evaluated him on November 12, recommended to continue Spiriva Breo Ellipta albuterol, supplemental oxygen 24 hour   patient does not report getting worse sputum or fever, however he supposed to start erythromycin, will initiate azithromycin  due to anaphylactic reaction to penicillin  He had a few days of URI symptoms and most likely virus infection triggered his COPD exacerbation  Will check sputum culture Gram stain  Follow up on procalcitonin level  Start Solu-Medrol IV, continue Breo Ellipta, start duo nebs, hold Spiriva  Further management as per pulmonologist recommendations  Respiratory protocol  Last echo done in August of 2017 reported preserved ejection fraction to be 70% ,no wall motion abnormality, grade 1 diastolic dysfunction    Will update echo  Consult to pulmonologist placed

## 2019-11-22 NOTE — PROGRESS NOTES
Progress Note - Alvarez More 1950, 68 y o  male MRN: [id number]    Unit/Bed#: [facility] 413-01 Encounter: [id number]    Primary Care Provider: Mellisa Horowitz MD   Date and time admitted to hospital: 11/22/2019 12:11 AM        * Acute on chronic respiratory failure with hypercapnia [facility])  Assessment & Plan  Patient with COPD chronic respiratory failure 4 L O2 via nasal cannula dependent follows Dr Micah Serrano of pulmonology presented from home for evaluation of acute onset of shortness of breath with tachypnea X 2 hours prior arrival   Due to respiratory distress patient needed to be placed on BiPAP VBG showed respiratory acidosis : pH 7 22, CO2 99 9, bicarb 40 3  He just completed course of steroid a day before and supposed to start erythromycin  After treatment with Solu-Medrol and nebs he felt some improvement and was able to be switched back to nasal cannula  ER asked to admit him to step-down due to possible need of BiPAP overnight  Patient needs to be re-evaluated in a   And downgraded to [facility] if no need of BiPAP  Patient switched back to nasal cannula ==> Will repeat VBG  He follows Dr Micah Serrano who last time evaluated him on November 12, recommended to continue Spiriva Breo Ellipta albuterol, supplemental oxygen 24 hour   patient does not report getting worse sputum or fever, however he supposed to start erythromycin, will initiate azithromycin  due to anaphylactic reaction to penicillin  He had a few days of URI symptoms and most likely virus infection triggered his COPD exacerbation  Will check sputum culture Gram stain  Follow up on procalcitonin level  Start Solu-Medrol IV, continue Breo Ellipta, start duo nebs, hold Spiriva  Further management as per pulmonologist recommendations  Respiratory protocol  Last echo done in August of 2017 reported preserved ejection fraction to be 70% ,no wall motion abnormality, grade 1 diastolic dysfunction    Will update echo  Consult to pulmonologist placed      At risk for venous thromboembolism (VTE)  Assessment & Plan  VTE risk 8  Will start Lovenox for DVT prophylaxis    Respiratory acidosis  Assessment & Plan  Secondary to COPD exacerbation, with  metabolic alkalosis compensation  Management as per pulmonologist    Iron deficiency anemia due to chronic blood loss  Assessment & Plan  Continue folic acid and ferrous sulfate   hemoglobin stable    Anxiety  Assessment & Plan  Continue Ativan    Essential hypertension  Assessment & Plan  Continue Lasix and Cardizem    COPD with exacerbation ([address] )  Assessment & Plan  Management as above    Pulmonary artery hypertension (HCC)  Assessment & Plan  Update echo      VTE Pharmacologic Prophylaxis:   Pharmacologic: Enoxaparin (Lovenox)  Mechanical VTE Prophylaxis in Place: No    Patient Centered Rounds: I have performed bedside rounds with nursing staff today  Time Spent for Care: 15 minutes  More than 50% of total time spent on counseling and coordination of care as described above  Current Length of Stay: 0 day(s)    Current Patient Status: Inpatient         Code Status: Level 3 - DNAR and DNI      Subjective:   nad    Objective:     Vitals:   Temp (24hrs), Av °F (36 7 °C), Min:97 9 °F (36 6 °C), Max:98 2 °F (36 8 °C)    Temp:  [97 9 °F (36 6 °C)-98 2 °F (36 8 °C)] 98 °F (36 7 °C)  HR:  [] 108  Resp:  [13-52] 18  BP: ()/(43-89) 84/52  SpO2:  [91 %-98 %] 98 %  Body mass index is 22 42 kg/m²  Input and Output Summary (last 24 hours): Intake/Output Summary (Last 24 hours) at 2019 0926  Last data filed at 2019 0800  Gross per 24 hour   Intake 307 5 ml   Output 100 ml   Net 207 5 ml       Physical Exam:     Physical Exam   Constitutional: He is oriented to person, place, and time  He appears well-developed and well-nourished  HENT:   Head: Normocephalic and atraumatic  Eyes: Pupils are equal, round, and reactive to light   EOM are normal    Pulmonary/Chest: Effort normal and breath sounds normal  No stridor  No respiratory distress  Abdominal: Soft  Bowel sounds are normal    Musculoskeletal: Normal range of motion  He exhibits no edema  Neurological: He is alert and oriented to person, place, and time  Additional Data:     Labs:    Results from last 7 days   Lab Units 11/22/19  0451 11/22/19  0046   WBC Thousand/uL 5 58 6 86   HEMOGLOBIN g/dL 10 2* 11 4*   HEMATOCRIT % 33 5* 37 4   PLATELETS Thousands/uL 240 281   NEUTROS PCT %  --  82*   LYMPHS PCT %  --  13*   MONOS PCT %  --  3*   EOS PCT %  --  0     Results from last 7 days   Lab Units 11/22/19  0516 11/22/19  0046   POTASSIUM mmol/L 4 3 4 4   CHLORIDE mmol/L 99* 97*   CO2 mmol/L 39* 37*   BUN mg/dL 24 24   CREATININE mg/dL 0 91 1 05   CALCIUM mg/dL 9 4 9 6   ALK PHOS U/L  --  88   ALT U/L  --  15   AST U/L  --  14           * I Have Reviewed All Lab Data Listed Above  * Additional Pertinent Lab Tests Reviewed:  All Labs Within Last 24 Hours Reviewed        Recent Cultures (last 7 days):           Last 24 Hours Medication List:     Current Facility-Administered Medications:  albuterol 2 puff Inhalation Q4H PRN MD Tushar Martino ON 11/23/2019] azithromycin 500 mg Oral Q24H Nina Chatman MD    budesonide 0 5 mg Nebulization Q12H Nina Chatman MD    citalopram 20 mg Oral Daily hSin Campos MD    diltiazem 180 mg Oral Daily Shin Campos MD    docusate sodium 100 mg Oral BID Shin Campos MD    enoxaparin 40 mg Subcutaneous Daily Shin Campos MD    ferrous sulfate 325 mg Oral Daily Shin Campos MD    folic acid 7,994 mcg Oral Daily Shin Campos MD    formoterol 20 mcg Nebulization Q12H Nina Chatman MD    furosemide 10 mg Oral Daily Shin Campos MD    guaiFENesin 600 mg Oral BID Shin Campos MD    ipratropium 0 5 mg Nebulization Q6H Nina Chatman MD    levalbuterol 1 25 mg Nebulization Q6H Latoya Ryan MD    melatonin 3 mg Oral HS PRN Shin Campos MD    methylPREDNISolone sodium succinate 40 mg Intravenous Q8H Caitie Magallon MD    polyethylene glycol 17 g Oral Daily PRN Caitie Magallon MD    potassium chloride 20 mEq Oral Daily Caitie Magallon MD    senna 1 tablet Oral Daily Caitie Magallon MD    sodium chloride 75 mL/hr Intravenous Continuous Caitie Magallon MD Last Rate: 75 mL/hr (11/22/19 0354)        Today, Patient Was Seen By: Brianna Lopez DO    ** Please Note: Dictation voice to text software may have been used in the creation of this document   **

## 2019-11-22 NOTE — CONSULTS
Consult Note - Pulmonary   Dawood Mar 68 y o  male MRN: [id number]  Unit/Bed#: [facility] 413-01 Encounter: [id number]      Reason for consultation: COPD exacerbation    Requesting physician: Dr Randy Munoz    1  Acute on chronic hypoxic/hypercapnic respiratory failure secondary to acute COPD exacerbation likely due to viral URI  - patient with history of very severe COPD with FEV1 14% predicted  - initially presenting with worsening hypoxia and hypercapnia, which have improved  - he is currently saturating well on 3 L nasal cannula  - would continue with Solu-Medrol 40 mg q 8 hours  - stop Breo while hospitalized - switch to Pulmicort and Perforomist nebulizers b i d   - continue with Atrovent/Xopenex q6h  - continue with azithromycin 500 mg p o  For 3 days for COPD exacerbation  - continue with supplemental nasa cannula, currently saturating well on 3 L  - home regimen includes Breo 200-25 1 puff daily, Spiriva 2 52 puffs daily, p r n  Albuterol  Patient is usually on 4 L nasal cannula 24/7 and follows with Dr Tianna Gaming  - Aggressive incentive reginald  - out of bed to chair  - pulmonary toilet  - low suspicion for bacterial infectious etiology given clear x-ray with 2 negative procalcitonins, without fever or leukocytosis  - flu PCR negative  - will follow up sputum culture    2  Essential hypertension  - continue home Lasix and Cardizem per primary team    3  Iron deficiency anemia  - continue full of gas and ferrous sulfate per primary team, hemoglobin stable    4  History of pulmonary artery hypertension  - repeat echo with EF 65%, concentric remodeling, normal right ventricular size and function, inadequate tricuspid jet for estimation of RV systolic pressure, but no indirect findings for moderate/severe pulmonary hypertension    Case was discussed with Dr Samantha Nunez      History of Present Illness   HPI:  Dawood Mar is a 68 y o  male with past medical history of chronic hypoxic respiratory failure on 4 L nasal cannula 24/7, very severe COPD with most recent FEV1 14% predicted in 2014, former tobacco abuse, pulmonary hypertension, hypertension, iron deficiency anemia, history of DVT off anticoagulation, who presented for evaluation of worsening shortness of breath  Patient states that for the past 1 week, he has been having worsening congestion, cough, shortness of breath, wheezing  He saw his primary care physician about 1 week ago and was given a prednisone taper, which she completed yesterday without much improvement  Due to worsening symptoms of shortness of Breath, he called EMS for respiratory distress  He denies any fever, chills, nausea, vomiting, chest pain at home  He does admit to significant wheezing and shortness of Breath  Upon arrival, patient was given Solu-Medrol 125 mg and VBG showed an acute respiratory acidosis with pH 7 22 and pCO2 99  Patient was subsequently placed on BiPAP with improvement in VBG to 7 38/64  He was then transitioned back to 3 L nasal cannula  Patient was then admitted for further treatment of his COPD and was started on oral azithromycin and continued on Solu-Medrol 40 mg q 8 hours, as well as Breo 1 puff daily, Atrovent/Xopenex t i d ,  His chest x-ray revealed emphysematous changes, but was otherwise clear  His procalcitonin was initially 0 07 and 0 13 repeated  His influenza PCR was negative  He is currently resting comfortably on 3 L nasal cannula  Review of Systems   Constitutional: Negative for chills, fever and unexpected weight change  HENT: Positive for congestion  Negative for rhinorrhea, sneezing and sore throat  Respiratory: Positive for cough, chest tightness, shortness of breath and wheezing  Cardiovascular: Negative for chest pain, palpitations and leg swelling  Gastrointestinal: Negative for abdominal pain, constipation, diarrhea, nausea and vomiting  Endocrine: Negative for cold intolerance and heat intolerance     Genitourinary: Negative for dysuria  Musculoskeletal: Negative for arthralgias  Allergic/Immunologic: Negative for immunocompromised state  Neurological: Negative for dizziness and numbness  Historical Information   Past Medical History:   Diagnosis Date    Anxiety     Aortic regurgitation     Atrial flutter (HCC)     Chronic respiratory failure (HCC)     Colon polyp     COPD (chronic obstructive pulmonary disease) (HCC)     Deep venous thrombosis of distal lower extremity (HCC)     Diverticulitis     GI bleed     Hypertension     Iron deficiency anemia     MGUS (monoclonal gammopathy of unknown significance)     Osteoarthritis of hip     PAC (premature atrial contraction)     Paroxysmal atrial fibrillation (HCC)     Patent foramen ovale     Pulmonary artery hypertension (HCC)     Renal failure     Sinus tachycardia      Past Surgical History:   Procedure Laterality Date    APPENDECTOMY      COLON SURGERY      HERNIA REPAIR      JOINT REPLACEMENT      KNEE SURGERY       Family History   Problem Relation Age of Onset    Cancer Mother     Heart attack Father     Sudden death Father     Arthritis Family     Diabetes Family        Social History:   Former smoker 1 5 packs per day for 45 years, quit in 2012, no alcohol or drug use    Meds/Allergies   Current Facility-Administered Medications   Medication Dose Route Frequency    albuterol (PROVENTIL HFA,VENTOLIN HFA) inhaler 2 puff  2 puff Inhalation Q4H PRN    [START ON 11/23/2019] azithromycin (ZITHROMAX) tablet 500 mg  500 mg Oral Q24H    budesonide (PULMICORT) inhalation solution 0 5 mg  0 5 mg Nebulization Q12H    citalopram (CeleXA) tablet 20 mg  20 mg Oral Daily    diltiazem (CARDIZEM CD) 24 hr capsule 180 mg  180 mg Oral Daily    docusate sodium (COLACE) capsule 100 mg  100 mg Oral BID    enoxaparin (LOVENOX) subcutaneous injection 40 mg  40 mg Subcutaneous Daily    ferrous sulfate tablet 325 mg  325 mg Oral Daily    folic acid (Nakita Josee) tablet 1,000 mcg  1,000 mcg Oral Daily    formoterol (PERFOROMIST) nebulizer solution 20 mcg  20 mcg Nebulization Q12H    furosemide (LASIX) tablet 10 mg  10 mg Oral Daily    guaiFENesin (MUCINEX) 12 hr tablet 600 mg  600 mg Oral BID    insulin lispro (HumaLOG) 100 units/mL subcutaneous injection 1-5 Units  1-5 Units Subcutaneous TID AC    ipratropium (ATROVENT) 0 02 % inhalation solution 0 5 mg  0 5 mg Nebulization Q6H    levalbuterol (XOPENEX) inhalation solution 1 25 mg  1 25 mg Nebulization Q6H    melatonin tablet 3 mg  3 mg Oral HS PRN    methylPREDNISolone sodium succinate (Solu-MEDROL) injection 40 mg  40 mg Intravenous Q8H    polyethylene glycol (MIRALAX) packet 17 g  17 g Oral Daily PRN    potassium chloride (K-DUR,KLOR-CON) CR tablet 20 mEq  20 mEq Oral Daily    senna (SENOKOT) tablet 8 6 mg  1 tablet Oral Daily    sodium chloride 0 9 % infusion  75 mL/hr Intravenous Continuous     Medications Prior to Admission   Medication    albuterol (2 5 mg/3 mL) 0 083 % nebulizer solution    albuterol (PROAIR HFA) 90 mcg/act inhaler    CARTIA  MG 24 hr capsule    citalopram (CeleXA) 20 mg tablet    ferrous sulfate 325 (65 Fe) mg tablet    fluticasone-vilanterol (BREO ELLIPTA) 200-25 MCG/INH inhaler    folic acid (FOLVITE) 1 mg tablet    furosemide (LASIX) 20 mg tablet    methylPREDNISolone 4 MG tablet therapy pack    potassium chloride (K-DUR,KLOR-CON) 20 mEq tablet    tiotropium (SPIRIVA RESPIMAT) 2 5 MCG/ACT AERS inhaler    melatonin 3 mg     Allergies   Allergen Reactions    Penicillins Anaphylaxis       Vitals:    11/22/19 0630 11/22/19 0700 11/22/19 0800 11/22/19 1000   BP: (!) 86/43 114/52 (!) 84/52 101/57   BP Location:       Pulse: (!) 112 (!) 106 (!) 108 96   Resp: (!) 52 (!) 27 18 19   Temp:   98 °F (36 7 °C)    TempSrc:   Oral    SpO2: 94% 95% 98% 95%   Weight:           Physical Exam   Constitutional: He is oriented to person, place, and time  No distress     Within   HENT: Head: Normocephalic and atraumatic  Mouth/Throat: Oropharynx is clear and moist  No oropharyngeal exudate  Eyes: EOM are normal  No scleral icterus  Cardiovascular: Normal rate, regular rhythm and normal heart sounds  No murmur heard  Pulmonary/Chest: Effort normal  No respiratory distress  He has wheezes  Poor air entry bilaterally with scattered expiratory wheezing   Abdominal: Soft  Bowel sounds are normal  He exhibits no distension  There is no tenderness  Musculoskeletal: He exhibits no edema  Neurological: He is alert and oriented to person, place, and time  Skin: He is not diaphoretic  Labs: I have personally reviewed pertinent lab results  Results from last 7 days   Lab Units 19  0451 19  0046   WBC Thousand/uL 5 58 6 86   HEMOGLOBIN g/dL 10 2* 11 4*   HEMATOCRIT % 33 5* 37 4   PLATELETS Thousands/uL 240 281   NEUTROS PCT %  --  82*   MONOS PCT %  --  3*      Results from last 7 days   Lab Units 19  0516 19  0046   POTASSIUM mmol/L 4 3 4 4   CHLORIDE mmol/L 99* 97*   CO2 mmol/L 39* 37*   BUN mg/dL 24 24   CREATININE mg/dL 0 91 1 05   CALCIUM mg/dL 9 4 9 6   ALK PHOS U/L  --  88   ALT U/L  --  15   AST U/L  --  14                      0   Lab Value Date/Time    TROPONINI 0 02 2019 0046    TROPONINI <0 02 2019 0943    TROPONINI <0 02 2017 0652    TROPONINI <0 04 2015 0326    TROPONINI <0 04 2015 1949    TROPONINI <0 04 2015 0600       Imaging and other studies: I have personally reviewed pertinent reports  and I have personally reviewed pertinent films in PACS  Chest x-ray 19  Emphysematous changes without focal consolidation, effusion    Pulmonary function testin spirometry with obstructive airflow limitation    EKG, Pathology, and Other Studies: I have personally reviewed pertinent reports      Echo 2019  EF 65%, concentric remodeling, normal right ventricular size and function, inadequate tricuspid jet for estimation of RV systolic pressure, but no indirect findings for moderate/severe pulmonary hypertension    Code Status: Level 3 - DNAR and DNI      rCistian Cortes MD  Pulmonary & Critical Care Fellow, [facility]

## 2019-11-22 NOTE — ED ATTENDING ATTESTATION
11/22/2019  I, Franko Jason MD, saw and evaluated the patient  I have discussed the patient with the resident/non-physician practitioner and agree with the resident's/non-physician practitioner's findings, Plan of Care, and MDM as documented in the resident's/non-physician practitioner's note, except where noted  All available labs and Radiology studies were reviewed  I was present for key portions of any procedure(s) performed by the resident/non-physician practitioner and I was immediately available to provide assistance  At this point I agree with the current assessment done in the Emergency Department  I have conducted an independent evaluation of this patient a history and physical is as follows:    ED Course         Critical Care Time  Procedures     67 yo male hx of copd, chronic hypoxic respiratory failure on home oxygen 4 liters, hx of afib c/o worsening sob over the last two hour  Pt having uri symptoms for few days  Pt with no relief with rescue inhaler  No fever, no chills, no increased or change in sputum, no leg swelling, no abdominal pain  Pt with sick contacts  Vss, afebrile, increased work of breathing, accessory muscle use, bilateral wheezing on lungs, rrr, abdomen soft nontender, no pedal edema  Copd exacerbation    Cardiac workup, solumedrol given prehospital   Tyrell Fleming neb, procalcitonin, vbg

## 2019-11-22 NOTE — SOCIAL WORK
77yo male admitted with exacerbation COPD  He is alert and oriented, independent ADLs, retired, drives  He resides in [place] with his wife Supa Jones, phone [phone]  She is currently in [place] and will return on 11/25/19  They live in 3 story home with 2 SAUMYA  He has a cane, RW, nebulizer [facility]  Wife will need to bring portable O2 at discharge  He has hx of rehab at [facility]  No hx mental illness or D&A  He is not sure if he has a POA, but states his  is Serina Carrillo who lives next door to him  He also has a daughter Dagmar Munson, phone [phone]  He is currently open to [facility] COPD and wishes to continue, referral sent  He prefers [facility]  [address]  Wife will transport home at discharge  CM reviewed d/c planning process including the following: identifying help at home, patient preference for d/c planning needs, Discharge Lounge, [facility] Meds to Bed program, availability of treatment team to discuss questions or concerns patient and/or family may have regarding understanding medications and recognizing signs and symptoms once discharged  CM also encouraged patient to follow up with all recommended appointments after discharge  Patient advised of importance for patient and family to participate in managing patients medical well being  Patient/caregiver received discharge checklist  Content reviewed  Patient/caregiver encouraged to participate in discharge plan of care prior to discharge home

## 2019-11-22 NOTE — H&P
H&P- Nisha Baez 1950, 68 y o  male MRN: [id number]    Unit/Bed#: [facility] 413-01 Encounter: [id number]    Primary Care Provider: Adeline Zhang MD   Date and time admitted to hospital: 11/22/2019 12:11 AM        * Acute on chronic respiratory failure with hypercapnia [facility])  Assessment & Plan  Patient with COPD chronic respiratory failure 4 L O2 via nasal cannula dependent follows Dr Perez Emanuel of pulmonology presented from home for evaluation of acute onset of shortness of breath with tachypnea X 2 hours prior arrival   Due to respiratory distress patient needed to be placed on BiPAP VBG showed respiratory acidosis : pH 7 22, CO2 99 9, bicarb 40 3  He just completed course of steroid a day before and supposed to start erythromycin  After treatment with Solu-Medrol and nebs he felt some improvement and was able to be switched back to nasal cannula  ER asked to admit him to step-down due to possible need of BiPAP overnight  Patient needs to be re-evaluated in a   And downgraded to [facility] if no need of BiPAP  Patient switched back to nasal cannula ==> Will repeat VBG  He follows Dr Perez Emanuel who last time evaluated him on November 12, recommended to continue Spiriva Breo Ellipta albuterol, supplemental oxygen 24 hour   patient does not report getting worse sputum or fever, however he supposed to start erythromycin, will initiate azithromycin  due to anaphylactic reaction to penicillin  He had a few days of URI symptoms and most likely virus infection triggered his COPD exacerbation  Will check sputum culture Gram stain  Follow up on procalcitonin level  Start Solu-Medrol IV, continue Breo Ellipta, start duo nebs, hold Spiriva  Further management as per pulmonologist recommendations  Respiratory protocol  Last echo done in August of 2017 reported preserved ejection fraction to be 70% ,no wall motion abnormality, grade 1 diastolic dysfunction    Will update echo  Consult to pulmonologist placed      Respiratory acidosis  Assessment & Plan  Secondary to COPD exacerbation, with  metabolic alkalosis compensation  Management as per pulmonologist    COPD with exacerbation [facility])  Assessment & Plan  Management as above    Essential hypertension  Assessment & Plan  Continue Lasix and Cardizem    Iron deficiency anemia due to chronic blood loss  Assessment & Plan  Continue folic acid and ferrous sulfate   hemoglobin stable    Pulmonary artery hypertension (HCC)  Assessment & Plan  Update echo    Anxiety  Assessment & Plan  Continue Ativan    At risk for venous thromboembolism (VTE)  Assessment & Plan  VTE risk 8  Will start Lovenox for DVT prophylaxis        VTE Prophylaxis: Enoxaparin (Lovenox)  / sequential compression device   Code Status: full  POLST: There is no POLST form on file for this patient (pre-hospital)  Discussion with family:  No family members at bedside    Anticipated Length of Stay:  Patient will be admitted on an Inpatient basis with an anticipated length of stay of  > 2 midnights  Justification for Hospital Stay:  IV steroid Pulmonary consult    Total Time for Visit, including Counseling / Coordination of Care: 45 minutes  Greater than 50% of this total time spent on direct patient counseling and coordination of care  Chief Complaint:   Shortness of breath    History of Present Illness:    Viviana Davalos is a 68 y o  male with past medical history of COPD chronic respiratory failure on 4 L via nasal cannula, MGUS, history of DVT off AC who presented from home for evaluation of acute onset of shortness of breath few hours prior arrival   EMS reports Solu-Medrol was given and patient needed to be placed on BiPAP in the ER due to obvious respiratory distress  VBG showed respiratory acidosis, respiratory status markedly improved on BiPAP and patient was switched back to nasal cannula  Due to p r n   BiPAP order he admitted to step-down2  Upon my assessment patient reports feeling better with provided duo nebs and Solu-Medrol he had just completed a course of steroid and supposed to start erythromycin did not take antibiotic yet  He denies fever nausea vomiting diarrhea getting worse sputum quantity or quality, leg swelling orthopnea  Has been having URI symptoms for the past week after contacts with sick family members  Patient is compliant with his meds follows Dr Juaquin Velazquez for COPD management  Last visit was on Nov 12, 2019 recommended to continue current treatment  Patient admitted to the hospitalist service on inpatient basis, I expect him to stay at least 2 midnights due to need of IV steroids and Pulmonary consult  He remains afebrile and hemodynamically stable          Review of Systems:    Review of Systems   Constitutional: Positive for activity change  Respiratory: Positive for shortness of breath and wheezing  Past Medical and Surgical History:     Past Medical History:   Diagnosis Date    Anxiety     Aortic regurgitation     Atrial flutter (HCC)     Chronic respiratory failure (HCC)     Colon polyp     COPD (chronic obstructive pulmonary disease) (HCC)     Deep venous thrombosis of distal lower extremity (HCC)     Diverticulitis     GI bleed     Hypertension     Iron deficiency anemia     MGUS (monoclonal gammopathy of unknown significance)     Osteoarthritis of hip     PAC (premature atrial contraction)     Paroxysmal atrial fibrillation (HCC)     Patent foramen ovale     Pulmonary artery hypertension (HCC)     Renal failure     Sinus tachycardia        Past Surgical History:   Procedure Laterality Date    APPENDECTOMY      COLON SURGERY      HERNIA REPAIR      JOINT REPLACEMENT      KNEE SURGERY         Meds/Allergies:    Prior to Admission medications    Medication Sig Start Date End Date Taking?  Authorizing Provider   albuterol (2 5 mg/3 mL) 0 083 % nebulizer solution Take 1 vial (2 5 mg total) by nebulization every 6 (six) hours as needed for wheezing or shortness of breath 4/15/19  Yes GERA Gordon   albuterol [facility] HFA) 90 mcg/act inhaler Inhale 2 puffs every 4 (four) hours as needed for wheezing or shortness of breath 5/28/19  Yes GERA Aquino   CARTIA  MG 24 hr capsule TAKE 1 CAPSULE BY MOUTH EVERY DAY 10/21/19  Yes Xavier Munson MD   citalopram (CeleXA) 20 mg tablet Take 1 tablet (20 mg total) by mouth daily 7/12/19  Yes Xavier Munson MD   ferrous sulfate 325 (65 Fe) mg tablet Take 325 mg by mouth daily   Yes Darya Davies MD   fluticasone-vilanterol (BREO ELLIPTA) 200-25 MCG/INH inhaler Inhale 1 puff daily Rinse mouth after use  11/12/19  Yes Megan Jacinto MD   folic acid (FOLVITE) 1 mg tablet TAKE 1 TABLET BY MOUTH EVERY DAY 10/21/19  Yes Xavier Munson MD   furosemide (LASIX) 20 mg tablet TAKE 1/2 TABLET BY MOUTH DAILY 12/10/18  Yes Xavier Munson MD   melatonin 3 mg Take 2 tablets by mouth daily at bedtime as needed (sleep) 11/11/17  Yes Mauricio Gonzales MD   methylPREDNISolone 4 MG tablet therapy pack Use as directed on package 11/15/19  Yes Xavier Munson MD   potassium chloride (K-DUR,KLOR-CON) 20 mEq tablet TAKE 1 TABLET BY MOUTH EVERY DAY 9/18/19  Yes GERA Huber   tiotropium (SPIRIVA RESPIMAT) 2 5 MCG/ACT AERS inhaler Inhale 2 puffs daily 11/12/19  Yes Megan Jacinto MD     I have reviewed home medications with a medical source (PCP, Pharmacy, other)  Allergies:    Allergies   Allergen Reactions    Penicillins Anaphylaxis       Social History:     Marital Status: /Civil Union   Occupation: none  Patient Pre-hospital Living Situation: home  Patient Pre-hospital Level of Mobility: reg  Patient Pre-hospital Diet Restrictions: reg  Substance Use History:   Social History     Substance and Sexual Activity   Alcohol Use Not Currently     Social History     Tobacco Use   Smoking Status Former Smoker    Packs/day: 1 50    Years: 42 00    Pack years: 63 00    Types: Cigarettes    Start date: 5    Last attempt to quit: 2012    Years since quittin 8   Smokeless Tobacco Never Used     Social History     Substance and Sexual Activity   Drug Use No       Family History:    non-contributory    Physical Exam:     Vitals:   Blood Pressure: 95/56 (19 0500)  Pulse: (!) 126 (19 0500)  Temperature: 98 2 °F (36 8 °C) (19 0400)  Temp Source: Oral (19 040)  Respirations: (!) 40 (19 050)  Weight - Scale: 64 kg (141 lb) (19 0011)  SpO2: 91 % (19 050)    Physical Exam   Constitutional: No distress  HENT:   Head: Normocephalic  Eyes: Pupils are equal, round, and reactive to light  Neck: Normal range of motion  Cardiovascular: Regular rhythm  Pulmonary/Chest: He has wheezes  Abdominal: Soft  Bowel sounds are normal    Musculoskeletal: He exhibits no edema  Neurological: He is alert  No cranial nerve deficit  Skin: Skin is warm  He is diaphoretic  Psychiatric: He has a normal mood and affect  Additional Data:     Lab Results: I have personally reviewed pertinent reports  Results from last 7 days   Lab Units 19  0451 19  0046   WBC Thousand/uL 5 58 6 86   HEMOGLOBIN g/dL 10 2* 11 4*   HEMATOCRIT % 33 5* 37 4   PLATELETS Thousands/uL 240 281   NEUTROS PCT %  --  82*   LYMPHS PCT %  --  13*   MONOS PCT %  --  3*   EOS PCT %  --  0     Results from last 7 days   Lab Units 19  0046   SODIUM mmol/L 140   POTASSIUM mmol/L 4 4   CHLORIDE mmol/L 97*   CO2 mmol/L 37*   BUN mg/dL 24   CREATININE mg/dL 1 05   ANION GAP mmol/L 6   CALCIUM mg/dL 9 6   ALBUMIN g/dL 4 1   TOTAL BILIRUBIN mg/dL 0 29   ALK PHOS U/L 88   ALT U/L 15   AST U/L 14   GLUCOSE RANDOM mg/dL 221*                 Results from last 7 days   Lab Units 19  0046   PROCALCITONIN ng/ml 0 07       Imaging: I have personally reviewed pertinent reports  XR chest 2 views   ED Interpretation by Devendra Mclean MD ([id number])   No consolidation, effusion, or pneumothorax  Allscripts / [facility] Records Reviewed: Yes     ** Please Note: This note has been constructed using a voice recognition system   **

## 2019-11-22 NOTE — PLAN OF CARE
Problem: Potential for Falls  Goal: Patient will remain free of falls  Description  INTERVENTIONS:  - Assess patient frequently for physical needs  -  Identify cognitive and physical deficits and behaviors that affect risk of falls    -  [place] fall precautions as indicated by assessment   - Educate patient/family on patient safety including physical limitations  - Instruct patient to call for assistance with activity based on assessment  - Modify environment to reduce risk of injury  - Consider OT/PT consult to assist with strengthening/mobility  Outcome: Progressing     Problem: PAIN - ADULT  Goal: Verbalizes/displays adequate comfort level or baseline comfort level  Description  Interventions:  - Encourage patient to monitor pain and request assistance  - Assess pain using appropriate pain scale  - Administer analgesics based on type and severity of pain and evaluate response  - Implement non-pharmacological measures as appropriate and evaluate response  - Consider cultural and social influences on pain and pain management  - Notify physician/advanced practitioner if interventions unsuccessful or patient reports new pain  Outcome: Progressing     Problem: INFECTION - ADULT  Goal: Absence or prevention of progression during hospitalization  Description  INTERVENTIONS:  - Assess and monitor for signs and symptoms of infection  - Monitor lab/diagnostic results  - Monitor all insertion sites, i e  indwelling lines, tubes, and drains  - Monitor endotracheal if appropriate and nasal secretions for changes in amount and color  - [place] appropriate cooling/warming therapies per order  - Administer medications as ordered  - Instruct and encourage patient and family to use good hand hygiene technique  - Identify and instruct in appropriate isolation precautions for identified infection/condition  Outcome: Progressing  Goal: Absence of fever/infection during neutropenic period  Description  INTERVENTIONS:  - Monitor WBC    Outcome: Progressing     Problem: SAFETY ADULT  Goal: Maintain or return to baseline ADL function  Description  INTERVENTIONS:  -  Assess patient's ability to carry out ADLs; assess patient's baseline for ADL function and identify physical deficits which impact ability to perform ADLs (bathing, care of mouth/teeth, toileting, grooming, dressing, etc )  - Assess/evaluate cause of self-care deficits   - Assess range of motion  - Assess patient's mobility; develop plan if impaired  - Assess patient's need for assistive devices and provide as appropriate  - Encourage maximum independence but intervene and supervise when necessary  - Involve family in performance of ADLs  - Assess for home care needs following discharge   - Consider OT consult to assist with ADL evaluation and planning for discharge  - Provide patient education as appropriate  Outcome: Progressing  Goal: Maintain or return mobility status to optimal level  Description  INTERVENTIONS:  - Assess patient's baseline mobility status (ambulation, transfers, stairs, etc )    - Identify cognitive and physical deficits and behaviors that affect mobility  - Identify mobility aids required to assist with transfers and/or ambulation (gait belt, sit-to-stand, lift, walker, cane, etc )  - [place] fall precautions as indicated by assessment  - Record patient progress and toleration of activity level on Mobility SBAR; progress patient to next Phase/Stage  - Instruct patient to call for assistance with activity based on assessment  - Consider rehabilitation consult to assist with strengthening/weightbearing, etc   Outcome: Progressing     Problem: DISCHARGE PLANNING  Goal: Discharge to home or other facility with appropriate resources  Description  INTERVENTIONS:  - Identify barriers to discharge w/patient and caregiver  - Arrange for needed discharge resources and transportation as appropriate  - Identify discharge learning needs (meds, wound care, etc )  - Arrange for interpretive services to assist at discharge as needed  - Refer to Case Management Department for coordinating discharge planning if the patient needs post-hospital services based on physician/advanced practitioner order or complex needs related to functional status, cognitive ability, or social support system  Outcome: Progressing     Problem: Knowledge Deficit  Goal: Patient/family/caregiver demonstrates understanding of disease process, treatment plan, medications, and discharge instructions  Description  Complete learning assessment and assess knowledge base  Interventions:  - Provide teaching at level of understanding  - Provide teaching via preferred learning methods  Outcome: Progressing     Problem: Nutrition/Hydration-ADULT  Goal: Nutrient/Hydration intake appropriate for improving, restoring or maintaining nutritional needs  Description  Monitor and assess patient's nutrition/hydration status for malnutrition  Collaborate with interdisciplinary team and initiate plan and interventions as ordered  Monitor patient's weight and dietary intake as ordered or per policy  Utilize nutrition screening tool and intervene as necessary  Determine patient's food preferences and provide high-protein, high-caloric foods as appropriate       INTERVENTIONS:  - Monitor oral intake, urinary output, labs, and treatment plans  - Assess nutrition and hydration status and recommend course of action  - Evaluate amount of meals eaten  - Assist patient with eating if necessary   - Allow adequate time for meals  - Recommend/ encourage appropriate diets, oral nutritional supplements, and vitamin/mineral supplements  - Order, calculate, and assess calorie counts as needed  - Recommend, monitor, and adjust tube feedings and TPN/PPN based on assessed needs  - Assess need for intravenous fluids  - Provide specific nutrition/hydration education as appropriate  - Include patient/family/caregiver in decisions related to nutrition  Outcome: Progressing     Problem: RESPIRATORY - ADULT  Goal: Achieves optimal ventilation and oxygenation  Description  INTERVENTIONS:  - Assess for changes in respiratory status  - Assess for changes in mentation and behavior  - Position to facilitate oxygenation and minimize respiratory effort  - Oxygen administered by appropriate delivery if ordered  - Initiate smoking cessation education as indicated  - Encourage broncho-pulmonary hygiene including cough, deep breathe, Incentive Spirometry  - Assess the need for suctioning and aspirate as needed  - Assess and instruct to report SOB or any respiratory difficulty  - Respiratory Therapy support as indicated  Outcome: Progressing     Problem: SKIN/TISSUE INTEGRITY - ADULT  Goal: Skin integrity remains intact  Description  INTERVENTIONS  - Identify patients at risk for skin breakdown  - Assess and monitor skin integrity  - Assess and monitor nutrition and hydration status  - Monitor labs (i e  albumin)  - Assess for incontinence   - Turn and reposition patient  - Assist with mobility/ambulation  - Relieve pressure over bony prominences  - Avoid friction and shearing  - Provide appropriate hygiene as needed including keeping skin clean and dry  - Evaluate need for skin moisturizer/barrier cream  - Collaborate with interdisciplinary team (i e  Nutrition, Rehabilitation, etc )   - Patient/family teaching  Outcome: Progressing  Goal: Incision(s), wounds(s) or drain site(s) healing without S/S of infection  Description  INTERVENTIONS  - Assess and document risk factors for skin impairment   - Assess and document dressing, incision, wound bed, drain sites and surrounding tissue  - Consider nutrition services referral as needed  - Oral mucous membranes remain intact  - Provide patient/ family education  Outcome: Progressing  Goal: Oral mucous membranes remain intact  Description  INTERVENTIONS  - Assess oral mucosa and hygiene practices  - Implement preventative oral hygiene regimen  - Implement oral medicated treatments as ordered  - Initiate Nutrition services referral as needed  Outcome: Progressing     Problem: MUSCULOSKELETAL - ADULT  Goal: Maintain or return mobility to safest level of function  Description  INTERVENTIONS:  - Assess patient's ability to carry out ADLs; assess patient's baseline for ADL function and identify physical deficits which impact ability to perform ADLs (bathing, care of mouth/teeth, toileting, grooming, dressing, etc )  - Assess/evaluate cause of self-care deficits   - Assess range of motion  - Assess patient's mobility  - Assess patient's need for assistive devices and provide as appropriate  - Encourage maximum independence but intervene and supervise when necessary  - Involve family in performance of ADLs  - Assess for home care needs following discharge   - Consider OT consult to assist with ADL evaluation and planning for discharge  - Provide patient education as appropriate  Outcome: Progressing  Goal: Maintain proper alignment of affected body part  Description  INTERVENTIONS:  - Support, maintain and protect limb and body alignment  - Provide patient/ family with appropriate education  Outcome: Progressing

## 2019-11-23 LAB
GLUCOSE SERPL-MCNC: 138 MG/DL (ref 65–140)
GLUCOSE SERPL-MCNC: 161 MG/DL (ref 65–140)
GLUCOSE SERPL-MCNC: 163 MG/DL (ref 65–140)
GLUCOSE SERPL-MCNC: 209 MG/DL (ref 65–140)

## 2019-11-23 PROCEDURE — 82948 REAGENT STRIP/BLOOD GLUCOSE: CPT

## 2019-11-23 PROCEDURE — 99232 SBSQ HOSP IP/OBS MODERATE 35: CPT | Performed by: INTERNAL MEDICINE

## 2019-11-23 PROCEDURE — 87205 SMEAR GRAM STAIN: CPT | Performed by: INTERNAL MEDICINE

## 2019-11-23 PROCEDURE — 87106 FUNGI IDENTIFICATION YEAST: CPT | Performed by: INTERNAL MEDICINE

## 2019-11-23 PROCEDURE — 87077 CULTURE AEROBIC IDENTIFY: CPT | Performed by: INTERNAL MEDICINE

## 2019-11-23 PROCEDURE — 94640 AIRWAY INHALATION TREATMENT: CPT

## 2019-11-23 PROCEDURE — 87070 CULTURE OTHR SPECIMN AEROBIC: CPT | Performed by: INTERNAL MEDICINE

## 2019-11-23 PROCEDURE — 87181 SC STD AGAR DILUTION PER AGT: CPT | Performed by: INTERNAL MEDICINE

## 2019-11-23 PROCEDURE — 94760 N-INVAS EAR/PLS OXIMETRY 1: CPT

## 2019-11-23 RX ORDER — ALPRAZOLAM 0.25 MG/1
0.25 TABLET ORAL 2 TIMES DAILY PRN
Status: DISCONTINUED | OUTPATIENT
Start: 2019-11-23 | End: 2019-11-27 | Stop reason: HOSPADM

## 2019-11-23 RX ORDER — METHYLPREDNISOLONE SODIUM SUCCINATE 40 MG/ML
40 INJECTION, POWDER, LYOPHILIZED, FOR SOLUTION INTRAMUSCULAR; INTRAVENOUS EVERY 12 HOURS SCHEDULED
Status: DISCONTINUED | OUTPATIENT
Start: 2019-11-23 | End: 2019-11-25

## 2019-11-23 RX ADMIN — FOLIC ACID 1000 MCG: 1 TABLET ORAL at 08:19

## 2019-11-23 RX ADMIN — DILTIAZEM HYDROCHLORIDE 180 MG: 180 CAPSULE, COATED, EXTENDED RELEASE ORAL at 08:16

## 2019-11-23 RX ADMIN — LEVALBUTEROL HYDROCHLORIDE 1.25 MG: 1.25 SOLUTION, CONCENTRATE RESPIRATORY (INHALATION) at 07:20

## 2019-11-23 RX ADMIN — GUAIFENESIN 600 MG: 600 TABLET, EXTENDED RELEASE ORAL at 08:16

## 2019-11-23 RX ADMIN — INSULIN LISPRO 1 UNITS: 100 INJECTION, SOLUTION INTRAVENOUS; SUBCUTANEOUS at 18:03

## 2019-11-23 RX ADMIN — FERROUS SULFATE TAB 325 MG (65 MG ELEMENTAL FE) 325 MG: 325 (65 FE) TAB at 08:16

## 2019-11-23 RX ADMIN — INSULIN LISPRO 1 UNITS: 100 INJECTION, SOLUTION INTRAVENOUS; SUBCUTANEOUS at 11:03

## 2019-11-23 RX ADMIN — SENNOSIDES 8.6 MG: 8.6 TABLET, FILM COATED ORAL at 08:16

## 2019-11-23 RX ADMIN — FORMOTEROL FUMARATE DIHYDRATE 20 MCG: 20 SOLUTION RESPIRATORY (INHALATION) at 19:25

## 2019-11-23 RX ADMIN — IPRATROPIUM BROMIDE 0.5 MG: 0.5 SOLUTION RESPIRATORY (INHALATION) at 00:38

## 2019-11-23 RX ADMIN — METHYLPREDNISOLONE SODIUM SUCCINATE 40 MG: 40 INJECTION, POWDER, FOR SOLUTION INTRAMUSCULAR; INTRAVENOUS at 03:19

## 2019-11-23 RX ADMIN — BUDESONIDE 0.5 MG: 0.5 INHALANT RESPIRATORY (INHALATION) at 19:25

## 2019-11-23 RX ADMIN — LEVALBUTEROL HYDROCHLORIDE 1.25 MG: 1.25 SOLUTION, CONCENTRATE RESPIRATORY (INHALATION) at 13:34

## 2019-11-23 RX ADMIN — AZITHROMYCIN 500 MG: 250 TABLET, FILM COATED ORAL at 06:39

## 2019-11-23 RX ADMIN — FUROSEMIDE 10 MG: 20 TABLET ORAL at 08:16

## 2019-11-23 RX ADMIN — IPRATROPIUM BROMIDE 0.5 MG: 0.5 SOLUTION RESPIRATORY (INHALATION) at 07:20

## 2019-11-23 RX ADMIN — CITALOPRAM HYDROBROMIDE 20 MG: 20 TABLET ORAL at 08:16

## 2019-11-23 RX ADMIN — MELATONIN 3 MG: 3 TAB ORAL at 22:57

## 2019-11-23 RX ADMIN — IPRATROPIUM BROMIDE 0.5 MG: 0.5 SOLUTION RESPIRATORY (INHALATION) at 19:25

## 2019-11-23 RX ADMIN — POTASSIUM CHLORIDE 20 MEQ: 1500 TABLET, EXTENDED RELEASE ORAL at 08:16

## 2019-11-23 RX ADMIN — LEVALBUTEROL HYDROCHLORIDE 1.25 MG: 1.25 SOLUTION, CONCENTRATE RESPIRATORY (INHALATION) at 00:38

## 2019-11-23 RX ADMIN — DOCUSATE SODIUM 100 MG: 100 CAPSULE, LIQUID FILLED ORAL at 08:16

## 2019-11-23 RX ADMIN — IPRATROPIUM BROMIDE 0.5 MG: 0.5 SOLUTION RESPIRATORY (INHALATION) at 13:34

## 2019-11-23 RX ADMIN — METHYLPREDNISOLONE SODIUM SUCCINATE 40 MG: 40 INJECTION, POWDER, FOR SOLUTION INTRAMUSCULAR; INTRAVENOUS at 18:03

## 2019-11-23 RX ADMIN — ENOXAPARIN SODIUM 40 MG: 40 INJECTION SUBCUTANEOUS at 08:16

## 2019-11-23 RX ADMIN — BUDESONIDE 0.5 MG: 0.5 INHALANT RESPIRATORY (INHALATION) at 07:20

## 2019-11-23 RX ADMIN — LEVALBUTEROL HYDROCHLORIDE 1.25 MG: 1.25 SOLUTION, CONCENTRATE RESPIRATORY (INHALATION) at 19:25

## 2019-11-23 RX ADMIN — ALPRAZOLAM 0.25 MG: 0.25 TABLET ORAL at 18:03

## 2019-11-23 RX ADMIN — DOCUSATE SODIUM 100 MG: 100 CAPSULE, LIQUID FILLED ORAL at 18:03

## 2019-11-23 RX ADMIN — GUAIFENESIN 600 MG: 600 TABLET, EXTENDED RELEASE ORAL at 20:07

## 2019-11-23 RX ADMIN — FORMOTEROL FUMARATE DIHYDRATE 20 MCG: 20 SOLUTION RESPIRATORY (INHALATION) at 07:20

## 2019-11-23 NOTE — PROGRESS NOTES
Progress Note - Rossana Olivares 1950, 68 y o  male MRN: [id number]    Unit/Bed#: [facility] 405-01 Encounter: [id number]    Primary Care Provider: Ez Gustafson MD   Date and time admitted to hospital: 11/22/2019 12:11 AM        * Acute on chronic respiratory failure with hypercapnia [facility])  Assessment & Plan  Patient with COPD chronic respiratory failure 4 L O2 via nasal cannula dependent follows Dr Sapna Curiel of pulmonology presented from home for evaluation of acute onset of shortness of breath with tachypnea X 2 hours prior arrival   Due to respiratory distress patient needed to be placed on BiPAP VBG showed respiratory acidosis : pH 7 22, CO2 99 9, bicarb 40 3  He just completed course of steroid a day before and supposed to start erythromycin  After treatment with Solu-Medrol and nebs he felt some improvement and was able to be switched back to nasal cannula  ER asked to admit him to step-down due to possible need of BiPAP overnight  Patient needs to be re-evaluated in a   And downgraded to [facility] if no need of BiPAP  Patient switched back to nasal cannula ==> Will repeat VBG  He follows Dr Sapna Curiel who last time evaluated him on November 12, recommended to continue Spiriva Breo Ellipta albuterol, supplemental oxygen 24 hour   patient does not report getting worse sputum or fever, however he supposed to start erythromycin, will initiate azithromycin  due to anaphylactic reaction to penicillin  He had a few days of URI symptoms and most likely virus infection triggered his COPD exacerbation  Will check sputum culture Gram stain  Follow up on procalcitonin level  Start Solu-Medrol IV, continue Breo Ellipta, start duo nebs, hold Spiriva  Further management as per pulmonologist recommendations  Respiratory protocol  Last echo done in August of 2017 reported preserved ejection fraction to be 70% ,no wall motion abnormality, grade 1 diastolic dysfunction    Will update echo  Pulmonary following    At risk for venous thromboembolism (VTE)  Assessment & Plan  VTE risk 8  Will start Lovenox for DVT prophylaxis    Respiratory acidosis  Assessment & Plan  Secondary to COPD exacerbation, with  metabolic alkalosis compensation  Management as per pulmonologist    Iron deficiency anemia due to chronic blood loss  Assessment & Plan  Continue folic acid and ferrous sulfate   hemoglobin stable    Anxiety  Assessment & Plan  Continue Ativan    Essential hypertension  Assessment & Plan  Continue Lasix and Cardizem    COPD with exacerbation ([address] )  Assessment & Plan  Management as above    Pulmonary artery hypertension (HCC)  Assessment & Plan  Update echo      VTE Pharmacologic Prophylaxis:   Pharmacologic: Enoxaparin (Lovenox)  Mechanical VTE Prophylaxis in Place: No    Patient Centered Rounds: I have performed bedside rounds with nursing staff today  Time Spent for Care: 15 minutes  More than 50% of total time spent on counseling and coordination of care as described above  Current Length of Stay: 1 day(s)    Current Patient Status: Inpatient       Code Status: Level 3 - DNAR and DNI      Subjective:   nad    Objective:     Vitals:   Temp (24hrs), Av 1 °F (36 7 °C), Min:97 6 °F (36 4 °C), Max:98 4 °F (36 9 °C)    Temp:  [97 6 °F (36 4 °C)-98 4 °F (36 9 °C)] 98 3 °F (36 8 °C)  HR:  [] 95  Resp:  [14-25] 20  BP: ()/(44-91) 134/91  SpO2:  [91 %-98 %] 91 %  Body mass index is 22 42 kg/m²  Input and Output Summary (last 24 hours): Intake/Output Summary (Last 24 hours) at 2019 0851  Last data filed at 2019 6354  Gross per 24 hour   Intake 1415 ml   Output 200 ml   Net 1215 ml       Physical Exam:     Physical Exam   Constitutional: He is oriented to person, place, and time  HENT:   Head: Normocephalic and atraumatic  Pulmonary/Chest: Effort normal and breath sounds normal  No stridor  No respiratory distress  Abdominal: Soft  Bowel sounds are normal    Musculoskeletal: Normal range of motion  He exhibits no edema  Neurological: He is alert and oriented to person, place, and time  Additional Data:     Labs:    Results from last 7 days   Lab Units 11/22/19  0451 11/22/19  0046   WBC Thousand/uL 5 58 6 86   HEMOGLOBIN g/dL 10 2* 11 4*   HEMATOCRIT % 33 5* 37 4   PLATELETS Thousands/uL 240 281   NEUTROS PCT %  --  82*   LYMPHS PCT %  --  13*   MONOS PCT %  --  3*   EOS PCT %  --  0     Results from last 7 days   Lab Units 11/22/19  0516 11/22/19  0046   POTASSIUM mmol/L 4 3 4 4   CHLORIDE mmol/L 99* 97*   CO2 mmol/L 39* 37*   BUN mg/dL 24 24   CREATININE mg/dL 0 91 1 05   CALCIUM mg/dL 9 4 9 6   ALK PHOS U/L  --  88   ALT U/L  --  15   AST U/L  --  14           * I Have Reviewed All Lab Data Listed Above  * Additional Pertinent Lab Tests Reviewed:  All Labs Within Last 24 Hours Reviewed        Recent Cultures (last 7 days):           Last 24 Hours Medication List:     Current Facility-Administered Medications:  albuterol 2 puff Inhalation Q4H PRN Hetul Guevara, DO   azithromycin 500 mg Oral Q24H Hetul Guevara, DO   budesonide 0 5 mg Nebulization Q12H Hetul Guevara, DO   citalopram 20 mg Oral Daily Hetul Guevara, DO   diltiazem 180 mg Oral Daily Hetul Guevara, DO   docusate sodium 100 mg Oral BID Hetul Guevara, DO   enoxaparin 40 mg Subcutaneous Daily Hetul Guevara, DO   ferrous sulfate 325 mg Oral Daily Hetul Guevara, DO   folic acid 4,571 mcg Oral Daily Hetul Guevara, DO   formoterol 20 mcg Nebulization Q12H Hetul Guevara, DO   furosemide 10 mg Oral Daily Hetul Guevara, DO   guaiFENesin 600 mg Oral BID Hetul Guevara, DO   insulin lispro 1-5 Units Subcutaneous TID AC Hetul Guevara, DO   ipratropium 0 5 mg Nebulization Q6H Hetul Guevara, DO   levalbuterol 1 25 mg Nebulization Q6H Hetul Guevara, DO   melatonin 3 mg Oral HS PRN Hetul Guevara, DO   methylPREDNISolone sodium succinate 40 mg Intravenous Q8H Hetul Guevara, DO   polyethylene glycol 17 g Oral Daily PRN Hetul Guevara, DO   potassium chloride 20 mEq Oral Daily Hetul Guevara, DO senna 1 tablet Oral Daily Philip Guevara DO        Today, Patient Was Seen By: Krystyna Silva DO    ** Please Note: Dictation voice to text software may have been used in the creation of this document   **

## 2019-11-23 NOTE — ASSESSMENT & PLAN NOTE
Patient with COPD chronic respiratory failure 4 L O2 via nasal cannula dependent follows Dr Kirstie Hurley of pulmonology presented from home for evaluation of acute onset of shortness of breath with tachypnea X 2 hours prior arrival   Due to respiratory distress patient needed to be placed on BiPAP VBG showed respiratory acidosis : pH 7 22, CO2 99 9, bicarb 40 3  He just completed course of steroid a day before and supposed to start erythromycin  After treatment with Solu-Medrol and nebs he felt some improvement and was able to be switched back to nasal cannula  ER asked to admit him to step-down due to possible need of BiPAP overnight  Patient needs to be re-evaluated in a   And downgraded to [facility] if no need of BiPAP  Patient switched back to nasal cannula ==> Will repeat VBG  He follows Dr Kirstie Hurley who last time evaluated him on November 12, recommended to continue Spiriva Breo Ellipta albuterol, supplemental oxygen 24 hour   patient does not report getting worse sputum or fever, however he supposed to start erythromycin, will initiate azithromycin  due to anaphylactic reaction to penicillin  He had a few days of URI symptoms and most likely virus infection triggered his COPD exacerbation  Will check sputum culture Gram stain  Follow up on procalcitonin level  Start Solu-Medrol IV, continue Breo Ellipta, start duo nebs, hold Spiriva  Further management as per pulmonologist recommendations  Respiratory protocol  Last echo done in August of 2017 reported preserved ejection fraction to be 70% ,no wall motion abnormality, grade 1 diastolic dysfunction    Will update echo  Pulmonary following

## 2019-11-23 NOTE — PROGRESS NOTES
Pulmonary Progress Note  Steven Velasco 68 y o  male MRN: [id number]  Unit/Bed#: [facility] 405-01 Encounter: [id number]      Assesment and Recommendations:    1  Acute/chronic hypoxic respiratory failure secondary to very severe COPD/emphysema with acute exacerbation with probable viral URI  · Complete 3 days of Zithromax for COPD exacerbation  · Continue to wean supplemental oxygen for sats greater than 90% - baseline is 4 L nasal cannula  · Continue Pulmicort, Perforomist b i d  And Xopenex/Atrovent q 6 hours  · Home regimen Breo, Spiriva, albuterol  · Will wean Solu-Medrol to q 12 hours dosing - hopeful to transition to prednisone in the next 24-48 hours      Chief Complaint:  Slightly better    Subjective:  Overall the patient reports that he is slightly better since yesterday  He continues with cough, shortness of breath, wheezing and chest tightness  He uses oxygen at 4 L nasal cannula at home  Vitals: Blood pressure 134/91, pulse 95, temperature 98 3 °F (36 8 °C), temperature source Oral, resp  rate 20, weight 64 kg (141 lb), SpO2 91 %  , 6 L nasal cannula, Body mass index is 22 42 kg/m²  Intake/Output Summary (Last 24 hours) at 11/23/2019 1048  Last data filed at 11/23/2019 0284  Gross per 24 hour   Intake 1265 ml   Output 100 ml   Net 1165 ml       Physical exam:  General:  Patient is awake, alert, chronically ill-appearing but in no acute respiratory distress  Neck: No JVD  CV:  Regular, +S1 and S2, No murmurs, gallops or rubs appreciated  Lungs:  Greatly decreased breath sounds in all lung fields with bilateral expiratory wheeze  Abdomen: Soft, +BS, Non-tender, non-distended  Extremities: No clubbing, cyanosis or edema  Neuro: No focal deficits      Joel Alvarez DO      Portions of the record may have been created with voice recognition software  Occasional wrong word or "sound a like" substitutions may have occurred due to the inherent limitations of voice recognition software   Read the chart carefully and recognize, using context, where substitutions have occurred

## 2019-11-24 LAB
GLUCOSE SERPL-MCNC: 142 MG/DL (ref 65–140)
GLUCOSE SERPL-MCNC: 154 MG/DL (ref 65–140)
GLUCOSE SERPL-MCNC: 202 MG/DL (ref 65–140)
GLUCOSE SERPL-MCNC: 249 MG/DL (ref 65–140)

## 2019-11-24 PROCEDURE — 94760 N-INVAS EAR/PLS OXIMETRY 1: CPT

## 2019-11-24 PROCEDURE — 94640 AIRWAY INHALATION TREATMENT: CPT

## 2019-11-24 PROCEDURE — 99232 SBSQ HOSP IP/OBS MODERATE 35: CPT | Performed by: INTERNAL MEDICINE

## 2019-11-24 PROCEDURE — 82948 REAGENT STRIP/BLOOD GLUCOSE: CPT

## 2019-11-24 RX ADMIN — SENNOSIDES 8.6 MG: 8.6 TABLET, FILM COATED ORAL at 08:12

## 2019-11-24 RX ADMIN — GUAIFENESIN 600 MG: 600 TABLET, EXTENDED RELEASE ORAL at 08:12

## 2019-11-24 RX ADMIN — LEVALBUTEROL HYDROCHLORIDE 1.25 MG: 1.25 SOLUTION, CONCENTRATE RESPIRATORY (INHALATION) at 01:18

## 2019-11-24 RX ADMIN — FORMOTEROL FUMARATE DIHYDRATE 20 MCG: 20 SOLUTION RESPIRATORY (INHALATION) at 19:31

## 2019-11-24 RX ADMIN — DILTIAZEM HYDROCHLORIDE 180 MG: 180 CAPSULE, COATED, EXTENDED RELEASE ORAL at 08:12

## 2019-11-24 RX ADMIN — IPRATROPIUM BROMIDE 0.5 MG: 0.5 SOLUTION RESPIRATORY (INHALATION) at 19:30

## 2019-11-24 RX ADMIN — IPRATROPIUM BROMIDE 0.5 MG: 0.5 SOLUTION RESPIRATORY (INHALATION) at 01:18

## 2019-11-24 RX ADMIN — DOCUSATE SODIUM 100 MG: 100 CAPSULE, LIQUID FILLED ORAL at 08:12

## 2019-11-24 RX ADMIN — METHYLPREDNISOLONE SODIUM SUCCINATE 40 MG: 40 INJECTION, POWDER, FOR SOLUTION INTRAMUSCULAR; INTRAVENOUS at 06:10

## 2019-11-24 RX ADMIN — AZITHROMYCIN 500 MG: 250 TABLET, FILM COATED ORAL at 06:13

## 2019-11-24 RX ADMIN — IPRATROPIUM BROMIDE 0.5 MG: 0.5 SOLUTION RESPIRATORY (INHALATION) at 13:53

## 2019-11-24 RX ADMIN — LEVALBUTEROL HYDROCHLORIDE 1.25 MG: 1.25 SOLUTION, CONCENTRATE RESPIRATORY (INHALATION) at 19:30

## 2019-11-24 RX ADMIN — DOCUSATE SODIUM 100 MG: 100 CAPSULE, LIQUID FILLED ORAL at 17:24

## 2019-11-24 RX ADMIN — LEVALBUTEROL HYDROCHLORIDE 1.25 MG: 1.25 SOLUTION, CONCENTRATE RESPIRATORY (INHALATION) at 13:53

## 2019-11-24 RX ADMIN — IPRATROPIUM BROMIDE 0.5 MG: 0.5 SOLUTION RESPIRATORY (INHALATION) at 07:12

## 2019-11-24 RX ADMIN — FUROSEMIDE 10 MG: 20 TABLET ORAL at 08:12

## 2019-11-24 RX ADMIN — METHYLPREDNISOLONE SODIUM SUCCINATE 40 MG: 40 INJECTION, POWDER, FOR SOLUTION INTRAMUSCULAR; INTRAVENOUS at 17:27

## 2019-11-24 RX ADMIN — BUDESONIDE 0.5 MG: 0.5 INHALANT RESPIRATORY (INHALATION) at 07:12

## 2019-11-24 RX ADMIN — LEVALBUTEROL HYDROCHLORIDE 1.25 MG: 1.25 SOLUTION, CONCENTRATE RESPIRATORY (INHALATION) at 07:12

## 2019-11-24 RX ADMIN — FORMOTEROL FUMARATE DIHYDRATE 20 MCG: 20 SOLUTION RESPIRATORY (INHALATION) at 07:12

## 2019-11-24 RX ADMIN — FOLIC ACID 1000 MCG: 1 TABLET ORAL at 08:12

## 2019-11-24 RX ADMIN — ENOXAPARIN SODIUM 40 MG: 40 INJECTION SUBCUTANEOUS at 08:12

## 2019-11-24 RX ADMIN — INSULIN LISPRO 1 UNITS: 100 INJECTION, SOLUTION INTRAVENOUS; SUBCUTANEOUS at 06:25

## 2019-11-24 RX ADMIN — CITALOPRAM HYDROBROMIDE 20 MG: 20 TABLET ORAL at 08:12

## 2019-11-24 RX ADMIN — INSULIN LISPRO 2 UNITS: 100 INJECTION, SOLUTION INTRAVENOUS; SUBCUTANEOUS at 11:14

## 2019-11-24 RX ADMIN — ALPRAZOLAM 0.25 MG: 0.25 TABLET ORAL at 17:24

## 2019-11-24 RX ADMIN — BUDESONIDE 0.5 MG: 0.5 INHALANT RESPIRATORY (INHALATION) at 19:32

## 2019-11-24 RX ADMIN — FERROUS SULFATE TAB 325 MG (65 MG ELEMENTAL FE) 325 MG: 325 (65 FE) TAB at 08:12

## 2019-11-24 RX ADMIN — POTASSIUM CHLORIDE 20 MEQ: 1500 TABLET, EXTENDED RELEASE ORAL at 08:12

## 2019-11-24 RX ADMIN — GUAIFENESIN 600 MG: 600 TABLET, EXTENDED RELEASE ORAL at 20:07

## 2019-11-24 NOTE — PROGRESS NOTES
Pulmonary Progress Note  Viviana Davalos 68 y o  male MRN: [id number]  Unit/Bed#: [facility] 405-01 Encounter: [id number]      Assesment and Recommendations:    1  Acute/chronic hypoxic respiratory failure secondary to very severe COPD/emphysema with acute exacerbation with probable viral URI  He reports feeling a little bit better  · I would continue IV solumedrol for at least another 24-48 before transitioning to prednisone  · Zithromax completed today  · Continue Pulmicort/perforomist to b i d  As well as Xopenex/Atrovent q 6 hours  · While his home regimen is Cristin Gentleman, Spiriva-we may need to consider placing him on nebulized only regimen as per above even on discharge due to the severity of his lung disease  · Continue to wean supplemental oxygen for sats greater than 88%  · Encouraged him to be up and out of bed ambulating in the hallways today    Chief Complaint:  Doing slightly better    Subjective:  Overall the patient reports that he is feeling slightly better  He has minimal cough at this time  He continues to have some chest tightness and wheezing  He continues to have some mild dyspnea with exertion  He states he also feels like he is still tired  Vitals: Blood pressure 134/63, pulse 84, temperature 97 5 °F (36 4 °C), temperature source Axillary, resp  rate 16, height 5' 6 5" (1 689 m), weight 64 kg (141 lb), SpO2 97 %  , 5 L nasal cannula, Body mass index is 22 42 kg/m²  Intake/Output Summary (Last 24 hours) at 11/24/2019 0853  Last data filed at 11/24/2019 0836  Gross per 24 hour   Intake 471 ml   Output 925 ml   Net -454 ml       Physical exam:  General:  Patient is awake, alert, non-toxic and in no acute respiratory distress  Neck: No JVD  CV:  Regular, +S1 and S2, No murmurs, gallops or rubs appreciated  Lungs:  Greatly decreased breath sounds in all lung fields with end expiratory wheeze bilateral  Abdomen: Soft, +BS, Non-tender, non-distended  Extremities: No clubbing, cyanosis or edema  Neuro:  No focal deficits    Davin Harvey DO      Portions of the record may have been created with voice recognition software  Occasional wrong word or "sound a like" substitutions may have occurred due to the inherent limitations of voice recognition software  Read the chart carefully and recognize, using context, where substitutions have occurred

## 2019-11-24 NOTE — PROGRESS NOTES
Progress Note - Jerson Marquez 1950, 68 y o  male MRN: [id number]    Unit/Bed#: [facility] 405-01 Encounter: [id number]    Primary Care Provider: Chrissie Selby MD   Date and time admitted to hospital: 11/22/2019 12:11 AM        * Acute on chronic respiratory failure with hypercapnia [facility])  Assessment & Plan  Patient with COPD chronic respiratory failure 4 L O2 via nasal cannula dependent follows Dr Memo Dotson of pulmonology presented from home for evaluation of acute onset of shortness of breath with tachypnea X 2 hours prior arrival   Due to respiratory distress patient needed to be placed on BiPAP VBG showed respiratory acidosis : pH 7 22, CO2 99 9, bicarb 40 3  He just completed course of steroid a day before and supposed to start erythromycin  After treatment with Solu-Medrol and nebs he felt some improvement and was able to be switched back to nasal cannula  ER asked to admit him to step-down due to possible need of BiPAP overnight  Patient needs to be re-evaluated in a   And downgraded to [facility] if no need of BiPAP  Patient switched back to nasal cannula ==> Will repeat VBG  He follows Dr Memo Dotson who last time evaluated him on November 12, recommended to continue Spiriva Breo Ellipta albuterol, supplemental oxygen 24 hour   patient does not report getting worse sputum or fever, however he supposed to start erythromycin, will initiate azithromycin  due to anaphylactic reaction to penicillin  He had a few days of URI symptoms and most likely virus infection triggered his COPD exacerbation  Will check sputum culture Gram stain  Follow up on procalcitonin level  Start Solu-Medrol IV, continue Breo Ellipta, start duo nebs, hold Spiriva  Further management as per pulmonologist recommendations  Respiratory protocol  Last echo done in August of 2017 reported preserved ejection fraction to be 70% ,no wall motion abnormality, grade 1 diastolic dysfunction    Will update echo  Pulmonary following    Respiratory acidosis  Assessment & Plan  Secondary to COPD exacerbation, with  metabolic alkalosis compensation  Management as per pulmonologist    Iron deficiency anemia due to chronic blood loss  Assessment & Plan  Continue folic acid and ferrous sulfate   hemoglobin stable    Anxiety  Assessment & Plan  Continue Ativan    Essential hypertension  Assessment & Plan  Continue Lasix and Cardizem    COPD with exacerbation ([facility] [address] )  Assessment & Plan  Management as above    Pulmonary artery hypertension (HCC)  Assessment & Plan  Update echo        VTE Pharmacologic Prophylaxis:   Pharmacologic: Enoxaparin (Lovenox)  Mechanical VTE Prophylaxis in Place: No    Patient Centered Rounds: I have performed bedside rounds with nursing staff today  Time Spent for Care: 15 minutes  More than 50% of total time spent on counseling and coordination of care as described above  Current Length of Stay: 2 day(s)    Current Patient Status: Inpatient       Code Status: Level 3 - DNAR and DNI      Subjective:   nad    Objective:     Vitals:   Temp (24hrs), Av 9 °F (36 6 °C), Min:97 5 °F (36 4 °C), Max:98 2 °F (36 8 °C)    Temp:  [97 5 °F (36 4 °C)-98 2 °F (36 8 °C)] 97 5 °F (36 4 °C)  HR:  [75-95] 84  Resp:  [16-24] 16  BP: ()/(38-63) 134/63  SpO2:  [92 %-98 %] 97 %  Body mass index is 22 42 kg/m²  Input and Output Summary (last 24 hours): Intake/Output Summary (Last 24 hours) at 2019 0925  Last data filed at 2019 0836  Gross per 24 hour   Intake 471 ml   Output 925 ml   Net -454 ml       Physical Exam:     Physical Exam   Constitutional: He is oriented to person, place, and time  HENT:   Head: Normocephalic and atraumatic  Eyes: Pupils are equal, round, and reactive to light  EOM are normal    Pulmonary/Chest: Effort normal and breath sounds normal    Abdominal: Soft  Bowel sounds are normal    Musculoskeletal: Normal range of motion  He exhibits no edema     Neurological: He is alert and oriented to person, place, and time  Additional Data:     Labs:    Results from last 7 days   Lab Units 11/22/19  0451 11/22/19  0046   WBC Thousand/uL 5 58 6 86   HEMOGLOBIN g/dL 10 2* 11 4*   HEMATOCRIT % 33 5* 37 4   PLATELETS Thousands/uL 240 281   NEUTROS PCT %  --  82*   LYMPHS PCT %  --  13*   MONOS PCT %  --  3*   EOS PCT %  --  0     Results from last 7 days   Lab Units 11/22/19  0516 11/22/19  0046   POTASSIUM mmol/L 4 3 4 4   CHLORIDE mmol/L 99* 97*   CO2 mmol/L 39* 37*   BUN mg/dL 24 24   CREATININE mg/dL 0 91 1 05   CALCIUM mg/dL 9 4 9 6   ALK PHOS U/L  --  88   ALT U/L  --  15   AST U/L  --  14           * I Have Reviewed All Lab Data Listed Above  * Additional Pertinent Lab Tests Reviewed:  All Labs Within Last 24 Hours Reviewed        Recent Cultures (last 7 days):           Last 24 Hours Medication List:     Current Facility-Administered Medications:  albuterol 2 puff Inhalation Q4H PRN Hetul Guevara, DO   ALPRAZolam 0 25 mg Oral BID PRN Keating Ros, DO   budesonide 0 5 mg Nebulization Q12H Hetul Guevara, DO   citalopram 20 mg Oral Daily Hetul Guevara, DO   diltiazem 180 mg Oral Daily Hetul Guevara, DO   docusate sodium 100 mg Oral BID Hetul Guevara, DO   enoxaparin 40 mg Subcutaneous Daily Hetul Guevara, DO   ferrous sulfate 325 mg Oral Daily Hetul Guevara, DO   folic acid 0,612 mcg Oral Daily Hetul Guevara, DO   formoterol 20 mcg Nebulization Q12H Hetul Guevara, DO   furosemide 10 mg Oral Daily Hetul Guevara, DO   guaiFENesin 600 mg Oral BID Hetul Guevara, DO   insulin lispro 1-5 Units Subcutaneous TID AC Hetul Guevara, DO   ipratropium 0 5 mg Nebulization Q6H Hetul Guevara, DO   levalbuterol 1 25 mg Nebulization Q6H Hetul Guevara, DO   melatonin 3 mg Oral HS PRN Hetul Guevara, DO   methylPREDNISolone sodium succinate 40 mg Intravenous Q12H [facility] Joann Yeni, DO   polyethylene glycol 17 g Oral Daily PRN Hetul Guevara, DO   potassium chloride 20 mEq Oral Daily Hetul Guevara, DO   senna 1 tablet Oral Daily Hetul Guevara, DO        Today, Patient Was Seen By: Luli Sumner DO    ** Please Note: Dictation voice to text software may have been used in the creation of this document   **

## 2019-11-24 NOTE — ASSESSMENT & PLAN NOTE
Patient with COPD chronic respiratory failure 4 L O2 via nasal cannula dependent follows Dr Juaquin Velazquez of pulmonology presented from home for evaluation of acute onset of shortness of breath with tachypnea X 2 hours prior arrival   Due to respiratory distress patient needed to be placed on BiPAP VBG showed respiratory acidosis : pH 7 22, CO2 99 9, bicarb 40 3  He just completed course of steroid a day before and supposed to start erythromycin  After treatment with Solu-Medrol and nebs he felt some improvement and was able to be switched back to nasal cannula  ER asked to admit him to step-down due to possible need of BiPAP overnight  Patient needs to be re-evaluated in a   And downgraded to [facility] if no need of BiPAP  Patient switched back to nasal cannula ==> Will repeat VBG  He follows Dr Juaquin Velazquez who last time evaluated him on November 12, recommended to continue Spiriva Breo Ellipta albuterol, supplemental oxygen 24 hour   patient does not report getting worse sputum or fever, however he supposed to start erythromycin, will initiate azithromycin  due to anaphylactic reaction to penicillin  He had a few days of URI symptoms and most likely virus infection triggered his COPD exacerbation  Will check sputum culture Gram stain  Follow up on procalcitonin level  Start Solu-Medrol IV, continue Breo Ellipta, start duo nebs, hold Spiriva  Further management as per pulmonologist recommendations  Respiratory protocol  Last echo done in August of 2017 reported preserved ejection fraction to be 70% ,no wall motion abnormality, grade 1 diastolic dysfunction    Will update echo  Pulmonary following

## 2019-11-25 LAB
GLUCOSE SERPL-MCNC: 159 MG/DL (ref 65–140)
GLUCOSE SERPL-MCNC: 180 MG/DL (ref 65–140)
GLUCOSE SERPL-MCNC: 199 MG/DL (ref 65–140)
GLUCOSE SERPL-MCNC: 206 MG/DL (ref 65–140)

## 2019-11-25 PROCEDURE — 99233 SBSQ HOSP IP/OBS HIGH 50: CPT | Performed by: INTERNAL MEDICINE

## 2019-11-25 PROCEDURE — 99232 SBSQ HOSP IP/OBS MODERATE 35: CPT | Performed by: INTERNAL MEDICINE

## 2019-11-25 PROCEDURE — 82948 REAGENT STRIP/BLOOD GLUCOSE: CPT

## 2019-11-25 PROCEDURE — 94760 N-INVAS EAR/PLS OXIMETRY 1: CPT

## 2019-11-25 PROCEDURE — 94640 AIRWAY INHALATION TREATMENT: CPT

## 2019-11-25 RX ORDER — METHYLPREDNISOLONE SODIUM SUCCINATE 40 MG/ML
40 INJECTION, POWDER, LYOPHILIZED, FOR SOLUTION INTRAMUSCULAR; INTRAVENOUS DAILY
Status: COMPLETED | OUTPATIENT
Start: 2019-11-25 | End: 2019-11-25

## 2019-11-25 RX ORDER — LEVALBUTEROL 1.25 MG/.5ML
1.25 SOLUTION, CONCENTRATE RESPIRATORY (INHALATION)
Status: DISCONTINUED | OUTPATIENT
Start: 2019-11-26 | End: 2019-11-27 | Stop reason: HOSPADM

## 2019-11-25 RX ORDER — METHYLPREDNISOLONE SODIUM SUCCINATE 40 MG/ML
40 INJECTION, POWDER, LYOPHILIZED, FOR SOLUTION INTRAMUSCULAR; INTRAVENOUS EVERY 12 HOURS SCHEDULED
Status: DISCONTINUED | OUTPATIENT
Start: 2019-11-25 | End: 2019-11-25

## 2019-11-25 RX ORDER — METHYLPREDNISOLONE SODIUM SUCCINATE 40 MG/ML
40 INJECTION, POWDER, LYOPHILIZED, FOR SOLUTION INTRAMUSCULAR; INTRAVENOUS DAILY
Status: DISCONTINUED | OUTPATIENT
Start: 2019-11-25 | End: 2019-11-25

## 2019-11-25 RX ADMIN — GUAIFENESIN 600 MG: 600 TABLET, EXTENDED RELEASE ORAL at 20:52

## 2019-11-25 RX ADMIN — IPRATROPIUM BROMIDE 0.5 MG: 0.5 SOLUTION RESPIRATORY (INHALATION) at 19:04

## 2019-11-25 RX ADMIN — ALPRAZOLAM 0.25 MG: 0.25 TABLET ORAL at 20:54

## 2019-11-25 RX ADMIN — FUROSEMIDE 10 MG: 20 TABLET ORAL at 08:19

## 2019-11-25 RX ADMIN — IPRATROPIUM BROMIDE 0.5 MG: 0.5 SOLUTION RESPIRATORY (INHALATION) at 07:49

## 2019-11-25 RX ADMIN — LEVALBUTEROL HYDROCHLORIDE 1.25 MG: 1.25 SOLUTION, CONCENTRATE RESPIRATORY (INHALATION) at 00:30

## 2019-11-25 RX ADMIN — SENNOSIDES 8.6 MG: 8.6 TABLET, FILM COATED ORAL at 08:20

## 2019-11-25 RX ADMIN — DOCUSATE SODIUM 100 MG: 100 CAPSULE, LIQUID FILLED ORAL at 17:20

## 2019-11-25 RX ADMIN — ALPRAZOLAM 0.25 MG: 0.25 TABLET ORAL at 08:28

## 2019-11-25 RX ADMIN — IPRATROPIUM BROMIDE 0.5 MG: 0.5 SOLUTION RESPIRATORY (INHALATION) at 00:30

## 2019-11-25 RX ADMIN — BUDESONIDE 0.5 MG: 0.5 INHALANT RESPIRATORY (INHALATION) at 19:04

## 2019-11-25 RX ADMIN — LEVALBUTEROL HYDROCHLORIDE 1.25 MG: 1.25 SOLUTION, CONCENTRATE RESPIRATORY (INHALATION) at 19:04

## 2019-11-25 RX ADMIN — LEVALBUTEROL HYDROCHLORIDE 1.25 MG: 1.25 SOLUTION, CONCENTRATE RESPIRATORY (INHALATION) at 07:49

## 2019-11-25 RX ADMIN — METHYLPREDNISOLONE SODIUM SUCCINATE 40 MG: 40 INJECTION, POWDER, FOR SOLUTION INTRAMUSCULAR; INTRAVENOUS at 06:48

## 2019-11-25 RX ADMIN — INSULIN LISPRO 1 UNITS: 100 INJECTION, SOLUTION INTRAVENOUS; SUBCUTANEOUS at 11:39

## 2019-11-25 RX ADMIN — DOCUSATE SODIUM 100 MG: 100 CAPSULE, LIQUID FILLED ORAL at 08:20

## 2019-11-25 RX ADMIN — POTASSIUM CHLORIDE 20 MEQ: 1500 TABLET, EXTENDED RELEASE ORAL at 08:19

## 2019-11-25 RX ADMIN — INSULIN LISPRO 1 UNITS: 100 INJECTION, SOLUTION INTRAVENOUS; SUBCUTANEOUS at 06:44

## 2019-11-25 RX ADMIN — IPRATROPIUM BROMIDE 0.5 MG: 0.5 SOLUTION RESPIRATORY (INHALATION) at 14:27

## 2019-11-25 RX ADMIN — DILTIAZEM HYDROCHLORIDE 180 MG: 180 CAPSULE, COATED, EXTENDED RELEASE ORAL at 08:20

## 2019-11-25 RX ADMIN — ENOXAPARIN SODIUM 40 MG: 40 INJECTION SUBCUTANEOUS at 08:20

## 2019-11-25 RX ADMIN — LEVALBUTEROL HYDROCHLORIDE 1.25 MG: 1.25 SOLUTION, CONCENTRATE RESPIRATORY (INHALATION) at 14:27

## 2019-11-25 RX ADMIN — FERROUS SULFATE TAB 325 MG (65 MG ELEMENTAL FE) 325 MG: 325 (65 FE) TAB at 08:20

## 2019-11-25 RX ADMIN — FORMOTEROL FUMARATE DIHYDRATE 20 MCG: 20 SOLUTION RESPIRATORY (INHALATION) at 07:49

## 2019-11-25 RX ADMIN — BUDESONIDE 0.5 MG: 0.5 INHALANT RESPIRATORY (INHALATION) at 07:49

## 2019-11-25 RX ADMIN — FORMOTEROL FUMARATE DIHYDRATE 20 MCG: 20 SOLUTION RESPIRATORY (INHALATION) at 19:04

## 2019-11-25 RX ADMIN — CITALOPRAM HYDROBROMIDE 20 MG: 20 TABLET ORAL at 08:19

## 2019-11-25 RX ADMIN — FOLIC ACID 1000 MCG: 1 TABLET ORAL at 08:21

## 2019-11-25 RX ADMIN — INSULIN LISPRO 1 UNITS: 100 INJECTION, SOLUTION INTRAVENOUS; SUBCUTANEOUS at 17:20

## 2019-11-25 RX ADMIN — GUAIFENESIN 600 MG: 600 TABLET, EXTENDED RELEASE ORAL at 08:20

## 2019-11-25 RX ADMIN — METHYLPREDNISOLONE SODIUM SUCCINATE 40 MG: 40 INJECTION, POWDER, FOR SOLUTION INTRAMUSCULAR; INTRAVENOUS at 14:17

## 2019-11-25 NOTE — RESTORATIVE TECHNICIAN NOTE
Restorative Specialist Mobility Note       Activity: Dangle, Bathroom privileges     Assistive Device: Front wheel walker

## 2019-11-25 NOTE — PROGRESS NOTES
Progress Note - Zan Yi 1950, 68 y o  male MRN: [id number]    Unit/Bed#: [facility] 405-01 Encounter: [id number]    Primary Care Provider: Xavier Munson MD   Date and time admitted to hospital: 11/22/2019 12:11 AM        * Acute on chronic respiratory failure with hypercapnia [facility])  Assessment & Plan  Patient with COPD chronic respiratory failure 4 L O2 via nasal cannula dependent follows Dr Stephania Bunn of pulmonology presented from home for evaluation of acute onset of shortness of breath with tachypnea X 2 hours prior arrival   Due to respiratory distress patient needed to be placed on BiPAP VBG showed respiratory acidosis : pH 7 22, CO2 99 9, bicarb 40 3  He just completed course of steroid a day before and supposed to start erythromycin  After treatment with Solu-Medrol and nebs he felt some improvement and was able to be switched back to nasal cannula  ER asked to admit him to step-down due to possible need of BiPAP overnight  Patient needs to be re-evaluated in a   And downgraded to [facility] [employer] if no need of BiPAP  Patient switched back to nasal cannula ==> Will repeat VBG  He follows Dr Stephania Bunn who last time evaluated him on November 12, recommended to continue Spiriva Breo Ellipta albuterol, supplemental oxygen 24 hour   patient does not report getting worse sputum or fever, however he supposed to start erythromycin, will initiate azithromycin  due to anaphylactic reaction to penicillin  He had a few days of URI symptoms and most likely virus infection triggered his COPD exacerbation  Will check sputum culture Gram stain  Follow up on procalcitonin level  Steroids as per Pulmonary  Currently on Solu-Medrol 40 q 12h      At risk for venous thromboembolism (VTE)  Assessment & Plan  VTE risk 8  Lovenox for prophylaxis    Respiratory acidosis  Assessment & Plan  Secondary to COPD exacerbation, with  metabolic alkalosis compensation  Management as per pulmonologist    Iron deficiency anemia due to chronic blood loss  Assessment & Plan  Continue folic acid and ferrous sulfate   hemoglobin stable    Anxiety  Assessment & Plan  Continue Ativan    Essential hypertension  Assessment & Plan  Continue Lasix and Cardizem    COPD with exacerbation ([address] )  Assessment & Plan  Management as above    Pulmonary artery hypertension (HCC)  Assessment & Plan  Update echo        VTE Pharmacologic Prophylaxis:   Pharmacologic: Enoxaparin (Lovenox)  Mechanical VTE Prophylaxis in Place: No    Patient Centered Rounds: I have performed bedside rounds with nursing staff today  Time Spent for Care: 15 minutes  More than 50% of total time spent on counseling and coordination of care as described above  Current Length of Stay: 3 day(s)    Current Patient Status: Inpatient   Certification Statement: The patient will continue to require additional inpatient hospital stay due to Need to monitor respiratory status  Slow wean off steroids  Oxygen saturation slowly improving  Discharge Plan:  Tentative discharge on Wednesday if tolerating steroid wean  Goal O2 requirement of 4 L    Code Status: Level 3 - DNAR and DNI      Subjective:   Patient more comfortable today  Still with significant wheezing  Objective:     Vitals:   Temp (24hrs), Av °F (36 7 °C), Min:97 5 °F (36 4 °C), Max:98 8 °F (37 1 °C)    Temp:  [97 5 °F (36 4 °C)-98 8 °F (37 1 °C)] 97 7 °F (36 5 °C)  HR:  [] 58  Resp:  [18-27] 18  BP: ()/(39-83) 163/83  SpO2:  [90 %-99 %] 94 %  Body mass index is 22 42 kg/m²  Input and Output Summary (last 24 hours): Intake/Output Summary (Last 24 hours) at 2019 0926  Last data filed at 2019 0820  Gross per 24 hour   Intake 630 ml   Output 1400 ml   Net -770 ml       Physical Exam:     Physical Exam   Constitutional: He is oriented to person, place, and time  HENT:   Head: Normocephalic and atraumatic  Cardiovascular: Normal rate and regular rhythm  Pulmonary/Chest: Effort normal  No stridor   No respiratory distress  He has wheezes  Abdominal: Soft  Bowel sounds are normal  He exhibits no distension  There is no tenderness  There is no guarding  Musculoskeletal: Normal range of motion  He exhibits no edema  Neurological: He is alert and oriented to person, place, and time  No cranial nerve deficit  Additional Data:     Labs:    Results from last 7 days   Lab Units 11/22/19  0451 11/22/19  0046   WBC Thousand/uL 5 58 6 86   HEMOGLOBIN g/dL 10 2* 11 4*   HEMATOCRIT % 33 5* 37 4   PLATELETS Thousands/uL 240 281   NEUTROS PCT %  --  82*   LYMPHS PCT %  --  13*   MONOS PCT %  --  3*   EOS PCT %  --  0     Results from last 7 days   Lab Units 11/22/19  0516 11/22/19  0046   POTASSIUM mmol/L 4 3 4 4   CHLORIDE mmol/L 99* 97*   CO2 mmol/L 39* 37*   BUN mg/dL 24 24   CREATININE mg/dL 0 91 1 05   CALCIUM mg/dL 9 4 9 6   ALK PHOS U/L  --  88   ALT U/L  --  15   AST U/L  --  14           * I Have Reviewed All Lab Data Listed Above  * Additional Pertinent Lab Tests Reviewed:  All Labs Within Last 24 Hours Reviewed      Recent Cultures (last 7 days):     Results from last 7 days   Lab Units 11/23/19 2002   GRAM STAIN RESULT  Rare Polys*  Rare Epithelial Cells*  3+ Gram positive cocci in chains and clusters*  Rare Gram positive cocci in clusters*       Last 24 Hours Medication List:     Current Facility-Administered Medications:  albuterol 2 puff Inhalation Q4H PRN Hetul Guevara, DO   ALPRAZolam 0 25 mg Oral BID PRN Janeen Brooke, DO   budesonide 0 5 mg Nebulization Q12H Hetul Guevara, DO   citalopram 20 mg Oral Daily Hetul Guevara, DO   diltiazem 180 mg Oral Daily Hetul Guevara, DO   docusate sodium 100 mg Oral BID Hetul Guevara, DO   enoxaparin 40 mg Subcutaneous Daily Hetul Guevara, DO   ferrous sulfate 325 mg Oral Daily Hetul Guevara, DO   folic acid 2,836 mcg Oral Daily Hetul Guevara, DO   formoterol 20 mcg Nebulization Q12H Hetul Guevara, DO   furosemide 10 mg Oral Daily Hetul Guevara, DO   guaiFENesin 600 mg Oral BID Hetul Guevara, DO   insulin lispro 1-5 Units Subcutaneous TID AC Hetul Guevara, DO   ipratropium 0 5 mg Nebulization Q6H Hetul Guevara, DO   levalbuterol 1 25 mg Nebulization Q6H Hetul Guevara, DO   melatonin 3 mg Oral HS PRN Hetul Guevara, DO   methylPREDNISolone sodium succinate 40 mg Intravenous Q12H [address] Lisabeth Senna, DO   polyethylene glycol 17 g Oral Daily PRN Hetul Guevara, DO   potassium chloride 20 mEq Oral Daily Hetul Guevara, DO   senna 1 tablet Oral Daily Hetul Guevara, DO        Today, Patient Was Seen By: Mary Watkins DO    ** Please Note: Dictation voice to text software may have been used in the creation of this document   **

## 2019-11-25 NOTE — ASSESSMENT & PLAN NOTE
Patient with COPD chronic respiratory failure 4 L O2 via nasal cannula dependent follows Dr Alpha Shone of pulmonology presented from home for evaluation of acute onset of shortness of breath with tachypnea X 2 hours prior arrival   Due to respiratory distress patient needed to be placed on BiPAP VBG showed respiratory acidosis : pH 7 22, CO2 99 9, bicarb 40 3  He just completed course of steroid a day before and supposed to start erythromycin  After treatment with Solu-Medrol and nebs he felt some improvement and was able to be switched back to nasal cannula  ER asked to admit him to step-down due to possible need of BiPAP overnight  Patient needs to be re-evaluated in a   And downgraded to [facility] if no need of BiPAP  Patient switched back to nasal cannula ==> Will repeat VBG  He follows Dr Alpha Shone who last time evaluated him on November 12, recommended to continue Spiriva Breo Ellipta albuterol, supplemental oxygen 24 hour   patient does not report getting worse sputum or fever, however he supposed to start erythromycin, will initiate azithromycin  due to anaphylactic reaction to penicillin  He had a few days of URI symptoms and most likely virus infection triggered his COPD exacerbation  Will check sputum culture Gram stain  Follow up on procalcitonin level  Steroids as per Pulmonary    Currently on Solu-Medrol 40 q 12h

## 2019-11-25 NOTE — RESTORATIVE TECHNICIAN NOTE
Restorative Specialist Mobility Note       Activity: Ambulate in gallardo, Chair     Assistive Device: Front wheel walker

## 2019-11-25 NOTE — PROGRESS NOTES
Progress Note - Pulmonary   Casa Mcgowan 68 y o  male MRN: [id number]  Unit/Bed#: [facility] 405-01 Encounter: [id number]    1  Acute on chronic hypoxic/hypercapnic respiratory failure secondary to acute COPD exacerbation likely due to viral URI  - patient with history of very severe COPD with FEV1 14% predicted  - initially presenting with worsening hypoxia and hypercapnia, which have improved  - he is currently saturating well on 3 L nasal cannula  -  continue Solu-Medrol 40 mg b i d  Today, switch to prednisone 50 mg tomorrow with plan to taper by 10 mg every 3 days until off  -  continue Pulmicort and Perforomist nebulizers b i d   - continue with Atrovent/Xopenex   -  completed azithromycin For 3 days for COPD exacerbation  - continue with supplemental nasal cannula for goal spo2 > 88%, currently saturating well on 3L  - home regimen includes Breo 200-25 1 puff daily, Spiriva 2 52 puffs daily, p r n  Albuterol  Patient is usually on 4 L nasal cannula 24/7 and follows with Dr Tesha Johnson  - Aggressive incentive reginald  - out of bed to chair  - pulmonary toilet  - low suspicion for bacterial infectious etiology given clear x-ray with 2 negative procalcitonins, without fever or leukocytosis  - flu PCR negative  - will follow up sputum culture     2  Essential hypertension  - continue home Lasix and Cardizem per primary team     3  Iron deficiency anemia  - continue full of gas and ferrous sulfate per primary team, hemoglobin stable     4  History of pulmonary artery hypertension  - repeat echo with EF 65%, concentric remodeling, normal right ventricular size and function, inadequate tricuspid jet for estimation of RV systolic pressure, but no indirect findings for moderate/severe pulmonary hypertension     Subjective:   Patient seen and examined at bedside  States that his breathing is improving  Still with cough productive of green/yellow phlegm  Denies fever, chills, nausea, vomiting, chest pain    still complains of wheezing    Review of Systems   Constitutional: Negative for chills, fever and unexpected weight change  HENT: Negative for congestion, rhinorrhea, sneezing and sore throat  Respiratory: Positive for cough, chest tightness, shortness of breath and wheezing  Cardiovascular: Negative for chest pain, palpitations and leg swelling  Gastrointestinal: Negative for abdominal pain, constipation, diarrhea, nausea and vomiting  Endocrine: Negative for cold intolerance and heat intolerance  Genitourinary: Negative for dysuria  Musculoskeletal: Negative for arthralgias  Allergic/Immunologic: Negative for immunocompromised state  Neurological: Negative for dizziness and numbness  Objective:     Vitals:    11/25/19 0031 11/25/19 0325 11/25/19 0722 11/25/19 0749   BP:  (!) 105/47 163/83    BP Location:  Right arm Left arm    Pulse:  81 58    Resp:  18 18    Temp:   97 7 °F (36 5 °C)    TempSrc:   Oral    SpO2: 97% 99% 94% 94%   Weight:       Height:               Intake/Output Summary (Last 24 hours) at 11/25/2019 0933  Last data filed at 11/25/2019 0820  Gross per 24 hour   Intake 630 ml   Output 1400 ml   Net -770 ml         Physical Exam   Constitutional: He is oriented to person, place, and time  He appears well-developed and well-nourished  No distress  thin   HENT:   Head: Normocephalic and atraumatic  Mouth/Throat: Oropharynx is clear and moist  No oropharyngeal exudate  Eyes: EOM are normal  No scleral icterus  Cardiovascular: Normal rate, regular rhythm and normal heart sounds  No murmur heard  Pulmonary/Chest: Effort normal  No respiratory distress  He has decreased breath sounds  He has no wheezes  Poor air entry bilaterally with scattered expiratory wheezing   Abdominal: Soft  Bowel sounds are normal  He exhibits no distension  There is no tenderness  Musculoskeletal: He exhibits no edema  Neurological: He is alert and oriented to person, place, and time     Skin: He is not diaphoretic  Labs: I have personally reviewed pertinent lab results  Results from last 7 days   Lab Units 19  0451 19  0046   WBC Thousand/uL 5 58 6 86   HEMOGLOBIN g/dL 10 2* 11 4*   HEMATOCRIT % 33 5* 37 4   PLATELETS Thousands/uL 240 281   NEUTROS PCT %  --  82*   MONOS PCT %  --  3*      Results from last 7 days   Lab Units 19  0516 19  0046   POTASSIUM mmol/L 4 3 4 4   CHLORIDE mmol/L 99* 97*   CO2 mmol/L 39* 37*   BUN mg/dL 24 24   CREATININE mg/dL 0 91 1 05   CALCIUM mg/dL 9 4 9 6   ALK PHOS U/L  --  88   ALT U/L  --  15   AST U/L  --  14                      0   Lab Value Date/Time    TROPONINI 0 02 2019 0046    TROPONINI <0 02 2019 0943    TROPONINI <0 02 2017 0652    TROPONINI <0 04 2015 0326    TROPONINI <0 04 2015 1949    TROPONINI <0 04 2015 0600     Imaging and other studies: I have personally reviewed pertinent reports  and I have personally reviewed pertinent films in PACS  Chest x-ray 19  Emphysematous changes without focal consolidation, effusion     Pulmonary function testin spirometry with obstructive airflow limitation     EKG, Pathology, and Other Studies: I have personally reviewed pertinent reports      Echo 2019  EF 65%, concentric remodeling, normal right ventricular size and function, inadequate tricuspid jet for estimation of RV systolic pressure, but no indirect findings for moderate/severe pulmonary hypertension      Cal Mendez MD  Pulmonary & Critical Care Fellow, [facility]

## 2019-11-26 LAB
GLUCOSE SERPL-MCNC: 134 MG/DL (ref 65–140)
GLUCOSE SERPL-MCNC: 154 MG/DL (ref 65–140)
GLUCOSE SERPL-MCNC: 194 MG/DL (ref 65–140)
GLUCOSE SERPL-MCNC: 217 MG/DL (ref 65–140)

## 2019-11-26 PROCEDURE — G8978 MOBILITY CURRENT STATUS: HCPCS

## 2019-11-26 PROCEDURE — 94760 N-INVAS EAR/PLS OXIMETRY 1: CPT

## 2019-11-26 PROCEDURE — 99233 SBSQ HOSP IP/OBS HIGH 50: CPT | Performed by: INTERNAL MEDICINE

## 2019-11-26 PROCEDURE — G8979 MOBILITY GOAL STATUS: HCPCS

## 2019-11-26 PROCEDURE — 97163 PT EVAL HIGH COMPLEX 45 MIN: CPT

## 2019-11-26 PROCEDURE — 94640 AIRWAY INHALATION TREATMENT: CPT

## 2019-11-26 PROCEDURE — 99232 SBSQ HOSP IP/OBS MODERATE 35: CPT | Performed by: HOSPITALIST

## 2019-11-26 PROCEDURE — 82948 REAGENT STRIP/BLOOD GLUCOSE: CPT

## 2019-11-26 RX ORDER — PREDNISONE 20 MG/1
40 TABLET ORAL DAILY
Status: DISCONTINUED | OUTPATIENT
Start: 2019-11-26 | End: 2019-11-27 | Stop reason: HOSPADM

## 2019-11-26 RX ADMIN — INSULIN LISPRO 1 UNITS: 100 INJECTION, SOLUTION INTRAVENOUS; SUBCUTANEOUS at 17:43

## 2019-11-26 RX ADMIN — CITALOPRAM HYDROBROMIDE 20 MG: 20 TABLET ORAL at 08:32

## 2019-11-26 RX ADMIN — ALPRAZOLAM 0.25 MG: 0.25 TABLET ORAL at 19:13

## 2019-11-26 RX ADMIN — FOLIC ACID 1000 MCG: 1 TABLET ORAL at 08:33

## 2019-11-26 RX ADMIN — ENOXAPARIN SODIUM 40 MG: 40 INJECTION SUBCUTANEOUS at 08:34

## 2019-11-26 RX ADMIN — BUDESONIDE 0.5 MG: 0.5 INHALANT RESPIRATORY (INHALATION) at 19:30

## 2019-11-26 RX ADMIN — POTASSIUM CHLORIDE 20 MEQ: 1500 TABLET, EXTENDED RELEASE ORAL at 08:32

## 2019-11-26 RX ADMIN — LEVALBUTEROL HYDROCHLORIDE 1.25 MG: 1.25 SOLUTION, CONCENTRATE RESPIRATORY (INHALATION) at 07:21

## 2019-11-26 RX ADMIN — DOCUSATE SODIUM 100 MG: 100 CAPSULE, LIQUID FILLED ORAL at 08:33

## 2019-11-26 RX ADMIN — IPRATROPIUM BROMIDE 0.5 MG: 0.5 SOLUTION RESPIRATORY (INHALATION) at 19:30

## 2019-11-26 RX ADMIN — DILTIAZEM HYDROCHLORIDE 180 MG: 180 CAPSULE, COATED, EXTENDED RELEASE ORAL at 08:32

## 2019-11-26 RX ADMIN — IPRATROPIUM BROMIDE 0.5 MG: 0.5 SOLUTION RESPIRATORY (INHALATION) at 13:12

## 2019-11-26 RX ADMIN — IPRATROPIUM BROMIDE 0.5 MG: 0.5 SOLUTION RESPIRATORY (INHALATION) at 07:21

## 2019-11-26 RX ADMIN — BUDESONIDE 0.5 MG: 0.5 INHALANT RESPIRATORY (INHALATION) at 07:21

## 2019-11-26 RX ADMIN — SENNOSIDES 8.6 MG: 8.6 TABLET, FILM COATED ORAL at 08:32

## 2019-11-26 RX ADMIN — FORMOTEROL FUMARATE DIHYDRATE 20 MCG: 20 SOLUTION RESPIRATORY (INHALATION) at 19:30

## 2019-11-26 RX ADMIN — PREDNISONE 40 MG: 20 TABLET ORAL at 09:55

## 2019-11-26 RX ADMIN — DOCUSATE SODIUM 100 MG: 100 CAPSULE, LIQUID FILLED ORAL at 17:42

## 2019-11-26 RX ADMIN — GUAIFENESIN 600 MG: 600 TABLET, EXTENDED RELEASE ORAL at 21:05

## 2019-11-26 RX ADMIN — FERROUS SULFATE TAB 325 MG (65 MG ELEMENTAL FE) 325 MG: 325 (65 FE) TAB at 08:41

## 2019-11-26 RX ADMIN — INSULIN LISPRO 1 UNITS: 100 INJECTION, SOLUTION INTRAVENOUS; SUBCUTANEOUS at 06:30

## 2019-11-26 RX ADMIN — FORMOTEROL FUMARATE DIHYDRATE 20 MCG: 20 SOLUTION RESPIRATORY (INHALATION) at 07:21

## 2019-11-26 RX ADMIN — FUROSEMIDE 10 MG: 20 TABLET ORAL at 08:33

## 2019-11-26 RX ADMIN — LEVALBUTEROL HYDROCHLORIDE 1.25 MG: 1.25 SOLUTION, CONCENTRATE RESPIRATORY (INHALATION) at 13:12

## 2019-11-26 RX ADMIN — GUAIFENESIN 600 MG: 600 TABLET, EXTENDED RELEASE ORAL at 08:32

## 2019-11-26 RX ADMIN — LEVALBUTEROL HYDROCHLORIDE 1.25 MG: 1.25 SOLUTION, CONCENTRATE RESPIRATORY (INHALATION) at 19:30

## 2019-11-26 NOTE — PHYSICAL THERAPY NOTE
Physical Therapy Evaluation     Patient's Name: Dana Rao    Admitting Diagnosis  Shortness of breath [R06 02]  COPD with acute exacerbation ([facility] ) [J44 1]  Chronic respiratory failure with hypoxia ([facility] ) [J96 11]  Viral URI with cough [J06 9, B97 89]    Problem List  Patient Active Problem List   Diagnosis    Macrocytic anemia    Pulmonary artery hypertension ([facility] )    Aortic regurgitation    COPD with exacerbation (HCC)    Low TSH level    Paroxysmal atrial tachycardia (HCC)    Essential hypertension    Gastroesophageal reflux disease without esophagitis    Anxiety    Iron deficiency anemia due to chronic blood loss    Leukopenia    Medicare annual wellness visit, subsequent    Acute on chronic respiratory failure with hypercapnia ([facility] )    Respiratory acidosis    At risk for venous thromboembolism (VTE)       Past Medical History  Past Medical History:   Diagnosis Date    Anxiety     Aortic regurgitation     Atrial flutter (HCC)     Chronic respiratory failure (HCC)     Colon polyp     COPD (chronic obstructive pulmonary disease) (HCC)     Deep venous thrombosis of distal lower extremity (HCC)     Diverticulitis     GI bleed     Hypertension     Iron deficiency anemia     MGUS (monoclonal gammopathy of unknown significance)     Osteoarthritis of hip     PAC (premature atrial contraction)     Paroxysmal atrial fibrillation (HCC)     Patent foramen ovale     Pulmonary artery hypertension (HCC)     Renal failure     Sinus tachycardia        Past Surgical History  Past Surgical History:   Procedure Laterality Date    APPENDECTOMY      COLON SURGERY      HERNIA REPAIR      JOINT REPLACEMENT      KNEE SURGERY            11/26/19 6179   Note Type   Note type Eval only   Pain Assessment   Pain Assessment No/denies pain   Home Living   Type of Home House   Home Layout Two level; Able to live on main level with bedroom/bathroom  (2 SAUMYA w/ hand rail)   Home Equipment Walker;Cane Prior Function   Level of [place] Independent with ADLs and functional mobility  (amb w/o AD (holds on to the furniture as needed))   Lives With Spouse  (able to (A))   Restrictions/Precautions   Braces or Orthoses   (denies)   Other Precautions O2;Fall Risk   General   Additional Pertinent History cleared for assessment (spoke to nsg)   Cognition   Overall Cognitive Status [facility]   Arousal/Participation Alert   Orientation Level Oriented to person;Oriented to place;Oriented to situation   Memory Decreased short term memory;Decreased recall of precautions   Following Commands Follows one step commands without difficulty   Comments Pt is in the chair; somewhat flat affect; appears to be generally apprehensive; agreeable to try mobilization   RUE Assessment   RUE Assessment WFL  (AROM)   LUE Assessment   LUE Assessment WFL  (AROM)   RLE Assessment   RLE Assessment WFL  (AROM)   Strength RLE   RLE Overall Strength   (fair +/ good - (grossly))   LLE Assessment   LLE Assessment WFL  (AROM)   Strength LLE   LLE Overall Strength   (fair +/ good - (grossly))   Bed Mobility   Additional Comments (B) SCDs placed and activated at the end of session; call bell w/in reach   Transfers   Sit to Stand 5  Supervision  (2 trials)   Additional items Assist x 1;Verbal cues  (reviewed hands placement)   Stand to Sit 5  Supervision   Additional items Assist x 1;Verbal cues  (reviewed the technique)   Additional Comments Reviewed hands placement during transfers   Ambulation/Elevation   Gait pattern Excessively slow; Short stride   Gait Assistance 5  Supervision   Additional items Assist x 1;Verbal cues; Tactile cues  (stand by (A) of 2nd for lines)   Assistive Device Rolling walker   Distance 2 x 25 ft w/ seated rest period in between; limited by SOB; on suppl O2, resting O2 sat = 98 %; post 1st bout of amb, O2 sat = 93 %; post 2nd amb trial, O2 sat = 92 %   Balance   Static Sitting Fair   Static Standing [address] & [address] - Activity Tolerance   Activity Tolerance Patient limited by fatigue; Other (Comment)  (SOB)   Nurse Made Aware spoke to PROSPER Castro   Assessment   Prognosis Fair   Problem List Decreased strength;Decreased endurance; Impaired balance;Decreased mobility   Assessment Pt is 68 y o  male admitted with hx of SOB and Dx of Acute on chronic respiratory failure with hypercapnia, COPD w/ exacerbation, resp acidosis  Pt 's comorbidities affecting POC include: anxiety, a-flutter, PAF, COPD, DVT, HTN and personal factors of: SAUMYA and steps in the house  Pt's clinical presentation is currently unstable/unpredictable which is evident in  need for stand by assist w/ all phases of mobility incl amb w/ rw when usually mobilizing independently and w/o AD, tolerance to only 25 feet of ambulation w/ BURTON noted, and decreased safety awareness during transfers when using rw (education provided)  Pt presents w/ overall weakness, incl decreased LE strength (likely close to baseline), decreased functional endurance and activity tolerance, and min impaired balance w/ associated gait deviations requiring use of rw at this time  Will cont to follow pt in PT for progressive mobilization to address above functional deficits and to max level of (I), endurance, and safety  Otherwise, anticipate pt will return home w/ available family support upon D/C provided he cont improving w/ mobility skills, safety, and endurance (incl on the steps) and when medically cleared; home PT follow up is recommended; 1st floor set-up at least initially upon D/C; rw for amb  Will cont to follow until then  Barriers to Discharge Inaccessible home environment   Goals   Patient Goals to get better before going home   STG Expiration Date 12/06/19   Short Term Goal #1 7-10 days  Pt will amb 2 x 50 ft w/ rw <--> SPC, mod (I) in order to facilitate safe return to premorbid environment and at least household amb status   Pt will negotiate 2 steps --> 14 steps w/ hand rail and SPC PRN, (S)x1 in order to assure safe navigation in and out of the premorbid living environment and between the levels of the house  Pt will achieve (I) level w/ bed mob in order to facilitate safety with OOB and back to bed transitions in own living environment  Pt will perform transfers w/ mod (I) to assure (I) and safety w/ functional mobility/transitions w/ all aspects of mobility/locomotion  Pt will participate in LE therex and balance activities to max progression w/ mobility skills  PT Treatment Day 0   Plan   Treatment/Interventions Functional transfer training;LE strengthening/ROM; Elevations; Therapeutic exercise; Endurance training;Equipment eval/education; Bed mobility;Gait training;Spoke to nursing   PT Frequency Other (Comment)  (3-5x/wk)   Recommendation   Recommendation Home PT; Home with family support  (1st floor set-up)   Equipment Recommended Walker  (at this time)   Modified [place] Scale   Modified [place] Scale 3   Barthel Index   Feeding 10   Bathing 0   Grooming Score 5   Dressing Score 5   Bladder Score 10   Bowels Score 10   Toilet Use Score 5   Transfers (Bed/Chair) Score 10   Mobility (Level Surface) Score 0   Stairs Score 0   Barthel Index Score 55         Jimi Lorenz, PT

## 2019-11-26 NOTE — PLAN OF CARE
Problem: PHYSICAL THERAPY ADULT  Goal: Performs mobility at highest level of function for planned discharge setting  See evaluation for individualized goals  Description  Treatment/Interventions: Functional transfer training, LE strengthening/ROM, Elevations, Therapeutic exercise, Endurance training, Equipment eval/education, Bed mobility, Gait training, Spoke to nursing  Equipment Recommended: Walker(at this time)       See flowsheet documentation for full assessment, interventions and recommendations  Note:   Prognosis: Fair  Problem List: Decreased strength, Decreased endurance, Impaired balance, Decreased mobility  Assessment: Pt is 68 y o  male admitted with hx of SOB and Dx of Acute on chronic respiratory failure with hypercapnia, COPD w/ exacerbation, resp acidosis  Pt 's comorbidities affecting POC include: anxiety, a-flutter, PAF, COPD, DVT, HTN and personal factors of: SAUMYA and steps in the house  Pt's clinical presentation is currently unstable/unpredictable which is evident in  need for stand by assist w/ all phases of mobility incl amb w/ rw when usually mobilizing independently and w/o AD, tolerance to only 25 feet of ambulation w/ BURTON noted, and decreased safety awareness during transfers when using rw (education provided)  Pt presents w/ overall weakness, incl decreased LE strength (likely close to baseline), decreased functional endurance and activity tolerance, and min impaired balance w/ associated gait deviations requiring use of rw at this time  Will cont to follow pt in PT for progressive mobilization to address above functional deficits and to max level of (I), endurance, and safety  Otherwise, anticipate pt will return home w/ available family support upon D/C provided he cont improving w/ mobility skills, safety, and endurance (incl on the steps) and when medically cleared; home PT follow up is recommended; 1st floor set-up at least initially upon D/C; rw for amb   Will cont to follow until then  Barriers to Discharge: Inaccessible home environment     Recommendation: Home PT, Home with family support(1st floor set-up)          See flowsheet documentation for full assessment

## 2019-11-26 NOTE — PROGRESS NOTES
Progress Note - Gunnar Shen 1950, 68 y o  male MRN: [id number]    Unit/Bed#: [facility] 405-01 Encounter: [id number]    Primary Care Provider: Rose Rushing MD   Date and time admitted to hospital: 11/22/2019 12:11 AM        At risk for venous thromboembolism (VTE)  Assessment & Plan  VTE risk 8  Lovenox for prophylaxis    Respiratory acidosis  Assessment & Plan  Secondary to COPD exacerbation, with  metabolic alkalosis compensation  Management as per pulmonologist    Iron deficiency anemia due to chronic blood loss  Assessment & Plan  Continue folic acid and ferrous sulfate   hemoglobin stable    Anxiety  Assessment & Plan  Continue Ativan    Essential hypertension  Assessment & Plan  Continue Lasix and Cardizem    COPD with exacerbation ([address] )  Assessment & Plan  Management as above    Pulmonary artery hypertension ([facility] [address] )  Assessment & Plan  Repeat echo this admission shows LVEF 65% with concentric thickening, moderate to severe pulmonary hypertension    * Acute on chronic respiratory failure with hypercapnia ([address] )  Assessment & Plan  Patient with COPD chronic respiratory failure 4 L O2 via nasal cannula dependent follows Dr Joel Ceballos of pulmonology presented from home for evaluation of acute onset of shortness of breath with tachypnea X 2 hours prior arrival   Due to respiratory distress patient needed to be placed on BiPAP VBG showed respiratory acidosis : pH 7 22, CO2 99 9, bicarb 40 3  He just completed course of steroid a day before and supposed to start erythromycin  After treatment with Solu-Medrol and nebs he felt some improvement and was able to be switched back to nasal cannula    After being admitted to the floors under step-down, was seen by Pulmonary, was treated with IV Solu-Medrol which was scheduled the taper down in switched to oral prednisone 40 mg starting today and plan is to taper by 10 mg every 3 days  Continue Xopenex Atrovent t i d , perforomist  He follows Dr Joel Ceballos who last time evaluated him on , recommended to continue Spiriva Breo Ellipta albuterol, supplemental oxygen 24 hour  Status post 3 days of his some ice in  He had a few days of URI symptoms and most likely virus infection triggered his COPD exacerbation  Sputum cultures in the preliminary results are inconclusive        [facility] Progress Note  Patient: Olinda Lancaster 68 y o  male   MRN: [id number]  PCP: Kristina Modi MD  Unit/Bed#: [facility] 405-01 Encounter: [id number]  Date Of Visit: 19    Assessment:    Principal Problem:    Acute on chronic respiratory failure with hypercapnia ([facility] [address] )  Active Problems:    Pulmonary artery hypertension ([facility] [address] )    COPD with exacerbation ([facility] )    Essential hypertension    Anxiety    Iron deficiency anemia due to chronic blood loss    Respiratory acidosis    At risk for venous thromboembolism (VTE)      Plan:         VTE Pharmacologic Prophylaxis:   Pharmacologic: Enoxaparin (Lovenox)  Mechanical VTE Prophylaxis in Place: Yes    Patient Centered Rounds: I have performed bedside rounds with nursing staff today  Discussions with Specialists or Other Care Team Provider:     Education and Discussions with Family / Patient: patient    Time Spent for Care: 30 minutes  More than 50% of total time spent on counseling and coordination of care as described above      Current Length of Stay: 4 day(s)    Current Patient Status: Inpatient   Certification Statement: The patient will continue to require additional inpatient hospital stay due to PT eval, monitor resp status    Discharge Plan / Estimated Discharge Date:     Code Status: Level 3 - DNAR and DNI      Subjective:   Feels ok, SOB is better but not yet to usual    Objective:     Vitals:   Temp (24hrs), Av 7 °F (36 5 °C), Min:97 6 °F (36 4 °C), Max:98 2 °F (36 8 °C)    Temp:  [97 6 °F (36 4 °C)-98 2 °F (36 8 °C)] 97 6 °F (36 4 °C)  HR:  [69-93] 81  Resp:  [18] 18  BP: (109-135)/(54-84) 135/84  SpO2:  [94 %-99 %] 98 %  Body mass index is 22 42 kg/m²  Input and Output Summary (last 24 hours): Intake/Output Summary (Last 24 hours) at 11/26/2019 1544  Last data filed at 11/26/2019 1453  Gross per 24 hour   Intake 580 ml   Output 1475 ml   Net -895 ml       Physical Exam:     Physical Exam   Constitutional: He is oriented to person, place, and time  He appears well-developed and well-nourished  HENT:   Head: Normocephalic and atraumatic  Mouth/Throat: Oropharynx is clear and moist    Eyes: Conjunctivae are normal    Cardiovascular: Normal rate and regular rhythm  Exam reveals no friction rub  No murmur heard  Pulmonary/Chest: Effort normal and breath sounds normal  No stridor  No respiratory distress  Abdominal: Soft  He exhibits no distension  Neurological: He is alert and oriented to person, place, and time  Vitals reviewed  Additional Data:     Labs:    Results from last 7 days   Lab Units 11/22/19  0451 11/22/19  0046   WBC Thousand/uL 5 58 6 86   HEMOGLOBIN g/dL 10 2* 11 4*   HEMATOCRIT % 33 5* 37 4   PLATELETS Thousands/uL 240 281   NEUTROS PCT %  --  82*   LYMPHS PCT %  --  13*   MONOS PCT %  --  3*   EOS PCT %  --  0     Results from last 7 days   Lab Units 11/22/19  0516 11/22/19  0046   POTASSIUM mmol/L 4 3 4 4   CHLORIDE mmol/L 99* 97*   CO2 mmol/L 39* 37*   BUN mg/dL 24 24   CREATININE mg/dL 0 91 1 05   CALCIUM mg/dL 9 4 9 6   ALK PHOS U/L  --  88   ALT U/L  --  15   AST U/L  --  14           * I Have Reviewed All Lab Data Listed Above    * Additional Pertinent Lab Tests Reviewed: No New Labs Available For Today    Imaging:    Imaging Reports Reviewed Today Include:   Imaging Personally Reviewed by Myself Includes:      Recent Cultures (last 7 days):     Results from last 7 days   Lab Units 11/23/19 2002   GRAM STAIN RESULT  Rare Polys*  Rare Epithelial Cells*  3+ Gram positive cocci in chains and clusters*  Rare Gram positive cocci in clusters*       Last 24 Hours Medication List:     Current Facility-Administered Medications:  albuterol 2 puff Inhalation Q4H PRN Hetul Guevara, DO   ALPRAZolam 0 25 mg Oral BID PRN Jac Crews, DO   budesonide 0 5 mg Nebulization Q12H Hetul Guevara, DO   citalopram 20 mg Oral Daily Hetul Guevara, DO   diltiazem 180 mg Oral Daily Hetul Guevara, DO   docusate sodium 100 mg Oral BID Hetul Guevara, DO   enoxaparin 40 mg Subcutaneous Daily Hetul Guevara, DO   ferrous sulfate 325 mg Oral Daily Hetul Guevara, DO   folic acid 8,159 mcg Oral Daily Hetul Guevara, DO   formoterol 20 mcg Nebulization Q12H Hetul Guevara, DO   furosemide 10 mg Oral Daily Hetul Guevara, DO   guaiFENesin 600 mg Oral BID Hetul Guevara, DO   insulin lispro 1-5 Units Subcutaneous TID AC Hetul Guevara, DO   ipratropium 0 5 mg Nebulization TID Hetul Guevara, DO   levalbuterol 1 25 mg Nebulization TID Hetul Guevara, DO   melatonin 3 mg Oral HS PRN Hetul Guevara, DO   polyethylene glycol 17 g Oral Daily PRN Hetul Guevara, DO   potassium chloride 20 mEq Oral Daily Hetul Guevara, DO   predniSONE 40 mg Oral Daily Emily Perdomo MD   sennaun 1 tablet Oral Daily Hetul Guevara, DO        Today, Patient Was Seen By: Ernie Johnson MD    ** Please Note: This note has been constructed using a voice recognition system   **

## 2019-11-26 NOTE — ASSESSMENT & PLAN NOTE
Repeat echo this admission shows LVEF 65% with concentric thickening, moderate to severe pulmonary hypertension

## 2019-11-26 NOTE — PROGRESS NOTES
Progress Note - Pulmonary   Estefani List 68 y o  male MRN: [id number]  Unit/Bed#: [facility] 405-01 Encounter: [id number]    1  Acute on chronic hypoxic/hypercapnic respiratory failure secondary to acute COPD exacerbation likely due to viral URI  - patient with history of very severe COPD with FEV1 14% predicted  - initially presenting with worsening hypoxia and hypercapnia, which have improved  - he is currently saturating well on 4 L nasal cannula which he uses at home 24/7  -   will switch Solu-Medrol to prednisone 40 mg today with plan to taper by 10 mg every 3 days until off  -  continue Pulmicort and Perforomist nebulizers b i d   - continue with Atrovent/Xopenex   -  completed azithromycin For 3 days for COPD exacerbation  - continue with supplemental nasal cannula for goal spo2 > 88%  - home regimen includes Breo 200-25 1 puff daily, Spiriva 2 puffs daily, p r n  Albuterol   Patient is usually on 4 L nasal cannula 24/7 and follows with Dr Joselo Hitchcock  - Aggressive incentive reginald  - out of bed to chair  - pulmonary toilet     2  Essential hypertension  - continue home Lasix and Cardizem per primary team     3  Iron deficiency anemia  - continue full of gas and ferrous sulfate per primary team, hemoglobin stable     4  History of pulmonary artery hypertension  - repeat echo with EF 65%, concentric remodeling, normal right ventricular size and function, inadequate tricuspid jet for estimation of RV systolic pressure, but no indirect findings for moderate/severe pulmonary hypertension     Subjective:   Patient seen examined at bedside  Still complains of mild shortness of breath and wheezing with productive cough  Denies fever, chills, nausea, vomiting, chest pain  He states that his wife returned from [place] and is home with pink eye so he is hesitant to go home  Remains on 4 L nasal cannula, which is what he uses at home 24/7  Review of Systems   Constitutional: Positive for activity change   Negative for chills, fever and unexpected weight change  HENT: Negative for congestion, rhinorrhea, sneezing and sore throat  Respiratory: Positive for shortness of breath and wheezing  Negative for cough  Cardiovascular: Negative for chest pain, palpitations and leg swelling  Gastrointestinal: Negative for abdominal pain, constipation, diarrhea, nausea and vomiting  Genitourinary: Negative for dysuria  Musculoskeletal: Negative for arthralgias  Neurological: Negative for dizziness and numbness  Objective:     Vitals:    11/25/19 1906 11/25/19 2346 11/26/19 0258 11/26/19 0722   BP: 134/57 109/54 109/58 121/60   BP Location: Left arm Right arm Right arm Right arm   Pulse: 93 86 78 69   Resp: 18 18 18 18   Temp: 98 2 °F (36 8 °C) 97 6 °F (36 4 °C) 97 6 °F (36 4 °C) 97 6 °F (36 4 °C)   TempSrc: Oral Oral Oral Oral   SpO2: 94% 98% 98% 98%   Weight:       Height:               Intake/Output Summary (Last 24 hours) at 11/26/2019 0750  Last data filed at 11/26/2019 0535  Gross per 24 hour   Intake 240 ml   Output 1200 ml   Net -960 ml         Physical Exam   Constitutional: He is oriented to person, place, and time  He appears well-developed and well-nourished  No distress  thin   HENT:   Head: Normocephalic and atraumatic  Mouth/Throat: Oropharynx is clear and moist  No oropharyngeal exudate  Eyes: EOM are normal  No scleral icterus  Cardiovascular: Normal rate, regular rhythm and normal heart sounds  No murmur heard  Pulmonary/Chest: Effort normal  No respiratory distress  He has no wheezes  Diminished breath sounds bilaterally   Abdominal: Soft  Bowel sounds are normal  He exhibits no distension  There is no tenderness  Musculoskeletal: He exhibits no edema  Neurological: He is alert and oriented to person, place, and time  Skin: He is not diaphoretic  Labs: I have personally reviewed pertinent lab results    Results from last 7 days   Lab Units 11/22/19  0451 11/22/19  0046   WBC Thousand/uL 5 58 6 86   HEMOGLOBIN g/dL 10 2* 11 4*   HEMATOCRIT % 33 5* 37 4   PLATELETS Thousands/uL 240 281   NEUTROS PCT %  --  82*   MONOS PCT %  --  3*      Results from last 7 days   Lab Units 19  0516 19  0046   POTASSIUM mmol/L 4 3 4 4   CHLORIDE mmol/L 99* 97*   CO2 mmol/L 39* 37*   BUN mg/dL 24 24   CREATININE mg/dL 0 91 1 05   CALCIUM mg/dL 9 4 9 6   ALK PHOS U/L  --  88   ALT U/L  --  15   AST U/L  --  14                      0   Lab Value Date/Time    TROPONINI 0 02 2019 0046    TROPONINI <0 02 2019 0943    TROPONINI <0 02 2017 0652    TROPONINI <0 04 2015 0326    TROPONINI <0 04 2015 1949    TROPONINI <0 04 2015 0600     Imaging and other studies: I have personally reviewed pertinent reports  Coretta Bhatia I have personally reviewed pertinent films in PACS  Chest x-ray 19  Emphysematous changes without focal consolidation, effusion     Pulmonary function testin spirometry with obstructive airflow limitation     EKG, Pathology, and Other Studies: I have personally reviewed pertinent reports     Echo 2019  EF 65%, concentric remodeling, normal right ventricular size and function, inadequate tricuspid jet for estimation of RV systolic pressure, but no indirect findings for moderate/severe pulmonary hypertension        Cal Mendez MD  Pulmonary & Critical Care Fellow, [facility]

## 2019-11-26 NOTE — ASSESSMENT & PLAN NOTE
Patient with COPD chronic respiratory failure 4 L O2 via nasal cannula dependent follows Dr Carlos Plata of pulmonology presented from home for evaluation of acute onset of shortness of breath with tachypnea X 2 hours prior arrival   Due to respiratory distress patient needed to be placed on BiPAP VBG showed respiratory acidosis : pH 7 22, CO2 99 9, bicarb 40 3  He just completed course of steroid a day before and supposed to start erythromycin  After treatment with Solu-Medrol and nebs he felt some improvement and was able to be switched back to nasal cannula    After being admitted to the floors under step-down, was seen by Pulmonary, was treated with IV Solu-Medrol which was scheduled the taper down in switched to oral prednisone 40 mg starting today and plan is to taper by 10 mg every 3 days  Continue Xopenex Atrovent t i d , perforomist  He follows Dr Carlos Plata who last time evaluated him on November 12, recommended to continue Spiriva Breo Ellipta albuterol, supplemental oxygen 24 hour  Status post 3 days of his some ice in  He had a few days of URI symptoms and most likely virus infection triggered his COPD exacerbation  Sputum cultures in the preliminary results are inconclusive

## 2019-11-27 VITALS
RESPIRATION RATE: 18 BRPM | TEMPERATURE: 98.2 F | DIASTOLIC BLOOD PRESSURE: 56 MMHG | BODY MASS INDEX: 22.13 KG/M2 | SYSTOLIC BLOOD PRESSURE: 129 MMHG | WEIGHT: 141 LBS | HEIGHT: 67 IN | OXYGEN SATURATION: 98 % | HEART RATE: 89 BPM

## 2019-11-27 LAB
GLUCOSE SERPL-MCNC: 122 MG/DL (ref 65–140)
GLUCOSE SERPL-MCNC: 87 MG/DL (ref 65–140)

## 2019-11-27 PROCEDURE — 99233 SBSQ HOSP IP/OBS HIGH 50: CPT | Performed by: INTERNAL MEDICINE

## 2019-11-27 PROCEDURE — 97530 THERAPEUTIC ACTIVITIES: CPT

## 2019-11-27 PROCEDURE — 99239 HOSP IP/OBS DSCHRG MGMT >30: CPT | Performed by: HOSPITALIST

## 2019-11-27 PROCEDURE — 94640 AIRWAY INHALATION TREATMENT: CPT

## 2019-11-27 PROCEDURE — 97116 GAIT TRAINING THERAPY: CPT

## 2019-11-27 PROCEDURE — 82948 REAGENT STRIP/BLOOD GLUCOSE: CPT

## 2019-11-27 PROCEDURE — 94760 N-INVAS EAR/PLS OXIMETRY 1: CPT

## 2019-11-27 RX ORDER — GUAIFENESIN 600 MG
600 TABLET, EXTENDED RELEASE 12 HR ORAL 2 TIMES DAILY
Qty: 14 TABLET | Refills: 0 | Status: SHIPPED | OUTPATIENT
Start: 2019-11-27 | End: 2019-12-04

## 2019-11-27 RX ORDER — PREDNISONE 10 MG/1
TABLET ORAL
Qty: 22 TABLET | Refills: 0 | Status: SHIPPED | OUTPATIENT
Start: 2019-11-27 | End: 2019-12-19

## 2019-11-27 RX ORDER — PREDNISONE 10 MG/1
TABLET ORAL
Qty: 22 TABLET | Refills: 0 | Status: SHIPPED | OUTPATIENT
Start: 2019-11-27 | End: 2019-11-27 | Stop reason: SDUPTHER

## 2019-11-27 RX ADMIN — DILTIAZEM HYDROCHLORIDE 180 MG: 180 CAPSULE, COATED, EXTENDED RELEASE ORAL at 08:14

## 2019-11-27 RX ADMIN — FOLIC ACID 1000 MCG: 1 TABLET ORAL at 08:14

## 2019-11-27 RX ADMIN — FUROSEMIDE 10 MG: 20 TABLET ORAL at 08:14

## 2019-11-27 RX ADMIN — LEVALBUTEROL HYDROCHLORIDE 1.25 MG: 1.25 SOLUTION, CONCENTRATE RESPIRATORY (INHALATION) at 13:42

## 2019-11-27 RX ADMIN — GUAIFENESIN 600 MG: 600 TABLET, EXTENDED RELEASE ORAL at 08:14

## 2019-11-27 RX ADMIN — IPRATROPIUM BROMIDE 0.5 MG: 0.5 SOLUTION RESPIRATORY (INHALATION) at 07:47

## 2019-11-27 RX ADMIN — BUDESONIDE 0.5 MG: 0.5 INHALANT RESPIRATORY (INHALATION) at 07:47

## 2019-11-27 RX ADMIN — FERROUS SULFATE TAB 325 MG (65 MG ELEMENTAL FE) 325 MG: 325 (65 FE) TAB at 08:14

## 2019-11-27 RX ADMIN — DOCUSATE SODIUM 100 MG: 100 CAPSULE, LIQUID FILLED ORAL at 08:14

## 2019-11-27 RX ADMIN — IPRATROPIUM BROMIDE 0.5 MG: 0.5 SOLUTION RESPIRATORY (INHALATION) at 13:42

## 2019-11-27 RX ADMIN — POTASSIUM CHLORIDE 20 MEQ: 1500 TABLET, EXTENDED RELEASE ORAL at 08:14

## 2019-11-27 RX ADMIN — ENOXAPARIN SODIUM 40 MG: 40 INJECTION SUBCUTANEOUS at 08:14

## 2019-11-27 RX ADMIN — PREDNISONE 40 MG: 20 TABLET ORAL at 08:14

## 2019-11-27 RX ADMIN — LEVALBUTEROL HYDROCHLORIDE 1.25 MG: 1.25 SOLUTION, CONCENTRATE RESPIRATORY (INHALATION) at 07:47

## 2019-11-27 RX ADMIN — CITALOPRAM HYDROBROMIDE 20 MG: 20 TABLET ORAL at 08:14

## 2019-11-27 RX ADMIN — FORMOTEROL FUMARATE DIHYDRATE 20 MCG: 20 SOLUTION RESPIRATORY (INHALATION) at 07:54

## 2019-11-27 NOTE — PHYSICAL THERAPY NOTE
Physical Therapy Progress Note     11/27/19 1300   Pain Assessment   Pain Assessment No/denies pain   Pain Score No Pain   Restrictions/Precautions   Other Precautions O2;Fall Risk  (4L O2 )   Subjective   Subjective The pt  states that he is having a hard time breathing, but he is agreeable to walk  Transfers   Sit to Stand 5  Supervision   Stand to Sit 5  Supervision   Ambulation/Elevation   Gait pattern Excessively slow; Short stride; Forward Flexion   Gait Assistance 5  Supervision   Assistive Device Rolling walker   Distance 50 feet x 2  Balance   Static Sitting Good   Static Standing Fair   Ambulatory Fair -   Activity Tolerance   Activity Tolerance Patient tolerated treatment well;Patient limited by fatigue   Nurse Niranjan Carter RN  Assessment   Prognosis Good   Problem List Decreased strength;Decreased endurance; Impaired balance;Decreased mobility   Assessment The pt  was able to ambulate farther today, and he demonstrated appropriate technique throughout  He had no loss of balance, and he took appropriate rests  The pt  noted no dizziness or lightheadedness throughout the session  Will continue to follow  Goals   Patient Goals To get better  STG Expiration Date 12/06/19   PT Treatment Day 1   Plan   Treatment/Interventions Functional transfer training;LE strengthening/ROM; Therapeutic exercise; Endurance training;Elevations; Patient/family training;Bed mobility;Gait training   Progress Progressing toward goals   PT Frequency   (3-5x a week )   Recommendation   Recommendation Home PT; Home with family support   Equipment Recommended Clyde Powers PTA

## 2019-11-27 NOTE — PLAN OF CARE
Problem: Potential for Falls  Goal: Patient will remain free of falls  Description  INTERVENTIONS:  - Assess patient frequently for physical needs  -  Identify cognitive and physical deficits and behaviors that affect risk of falls    -  [place] fall precautions as indicated by assessment   - Educate patient/family on patient safety including physical limitations  - Instruct patient to call for assistance with activity based on assessment  - Modify environment to reduce risk of injury  - Consider OT/PT consult to assist with strengthening/mobility  Outcome: Progressing     Problem: PAIN - ADULT  Goal: Verbalizes/displays adequate comfort level or baseline comfort level  Description  Interventions:  - Encourage patient to monitor pain and request assistance  - Assess pain using appropriate pain scale  - Administer analgesics based on type and severity of pain and evaluate response  - Implement non-pharmacological measures as appropriate and evaluate response  - Consider cultural and social influences on pain and pain management  - Notify physician/advanced practitioner if interventions unsuccessful or patient reports new pain  Outcome: Progressing     Problem: INFECTION - ADULT  Goal: Absence or prevention of progression during hospitalization  Description  INTERVENTIONS:  - Assess and monitor for signs and symptoms of infection  - Monitor lab/diagnostic results  - Monitor all insertion sites, i e  indwelling lines, tubes, and drains  - Monitor endotracheal if appropriate and nasal secretions for changes in amount and color  - [place] appropriate cooling/warming therapies per order  - Administer medications as ordered  - Instruct and encourage patient and family to use good hand hygiene technique  - Identify and instruct in appropriate isolation precautions for identified infection/condition  Outcome: Progressing  Goal: Absence of fever/infection during neutropenic period  Description  INTERVENTIONS:  - Monitor WBC    Outcome: Progressing     Problem: SAFETY ADULT  Goal: Maintain or return to baseline ADL function  Description  INTERVENTIONS:  -  Assess patient's ability to carry out ADLs; assess patient's baseline for ADL function and identify physical deficits which impact ability to perform ADLs (bathing, care of mouth/teeth, toileting, grooming, dressing, etc )  - Assess/evaluate cause of self-care deficits   - Assess range of motion  - Assess patient's mobility; develop plan if impaired  - Assess patient's need for assistive devices and provide as appropriate  - Encourage maximum independence but intervene and supervise when necessary  - Involve family in performance of ADLs  - Assess for home care needs following discharge   - Consider OT consult to assist with ADL evaluation and planning for discharge  - Provide patient education as appropriate  Outcome: Progressing  Goal: Maintain or return mobility status to optimal level  Description  INTERVENTIONS:  - Assess patient's baseline mobility status (ambulation, transfers, stairs, etc )    - Identify cognitive and physical deficits and behaviors that affect mobility  - Identify mobility aids required to assist with transfers and/or ambulation (gait belt, sit-to-stand, lift, walker, cane, etc )  - [place] fall precautions as indicated by assessment  - Record patient progress and toleration of activity level on Mobility SBAR; progress patient to next Phase/Stage  - Instruct patient to call for assistance with activity based on assessment  - Consider rehabilitation consult to assist with strengthening/weightbearing, etc   Outcome: Progressing     Problem: DISCHARGE PLANNING  Goal: Discharge to home or other facility with appropriate resources  Description  INTERVENTIONS:  - Identify barriers to discharge w/patient and caregiver  - Arrange for needed discharge resources and transportation as appropriate  - Identify discharge learning needs (meds, wound care, etc )  - Arrange for interpretive services to assist at discharge as needed  - Refer to Case Management Department for coordinating discharge planning if the patient needs post-hospital services based on physician/advanced practitioner order or complex needs related to functional status, cognitive ability, or social support system  Outcome: Progressing     Problem: Knowledge Deficit  Goal: Patient/family/caregiver demonstrates understanding of disease process, treatment plan, medications, and discharge instructions  Description  Complete learning assessment and assess knowledge base  Interventions:  - Provide teaching at level of understanding  - Provide teaching via preferred learning methods  Outcome: Progressing     Problem: Nutrition/Hydration-ADULT  Goal: Nutrient/Hydration intake appropriate for improving, restoring or maintaining nutritional needs  Description  Monitor and assess patient's nutrition/hydration status for malnutrition  Collaborate with interdisciplinary team and initiate plan and interventions as ordered  Monitor patient's weight and dietary intake as ordered or per policy  Utilize nutrition screening tool and intervene as necessary  Determine patient's food preferences and provide high-protein, high-caloric foods as appropriate       INTERVENTIONS:  - Monitor oral intake, urinary output, labs, and treatment plans  - Assess nutrition and hydration status and recommend course of action  - Evaluate amount of meals eaten  - Assist patient with eating if necessary   - Allow adequate time for meals  - Recommend/ encourage appropriate diets, oral nutritional supplements, and vitamin/mineral supplements  - Order, calculate, and assess calorie counts as needed  - Recommend, monitor, and adjust tube feedings and TPN/PPN based on assessed needs  - Assess need for intravenous fluids  - Provide specific nutrition/hydration education as appropriate  - Include patient/family/caregiver in decisions related to nutrition  Outcome: Progressing     Problem: RESPIRATORY - ADULT  Goal: Achieves optimal ventilation and oxygenation  Description  INTERVENTIONS:  - Assess for changes in respiratory status  - Assess for changes in mentation and behavior  - Position to facilitate oxygenation and minimize respiratory effort  - Oxygen administered by appropriate delivery if ordered  - Initiate smoking cessation education as indicated  - Encourage broncho-pulmonary hygiene including cough, deep breathe, Incentive Spirometry  - Assess the need for suctioning and aspirate as needed  - Assess and instruct to report SOB or any respiratory difficulty  - Respiratory Therapy support as indicated  Outcome: Progressing     Problem: SKIN/TISSUE INTEGRITY - ADULT  Goal: Skin integrity remains intact  Description  INTERVENTIONS  - Identify patients at risk for skin breakdown  - Assess and monitor skin integrity  - Assess and monitor nutrition and hydration status  - Monitor labs (i e  albumin)  - Assess for incontinence   - Turn and reposition patient  - Assist with mobility/ambulation  - Relieve pressure over bony prominences  - Avoid friction and shearing  - Provide appropriate hygiene as needed including keeping skin clean and dry  - Evaluate need for skin moisturizer/barrier cream  - Collaborate with interdisciplinary team (i e  Nutrition, Rehabilitation, etc )   - Patient/family teaching  Outcome: Progressing  Goal: Incision(s), wounds(s) or drain site(s) healing without S/S of infection  Description  INTERVENTIONS  - Assess and document risk factors for skin impairment   - Assess and document dressing, incision, wound bed, drain sites and surrounding tissue  - Consider nutrition services referral as needed  - Oral mucous membranes remain intact  - Provide patient/ family education  Outcome: Progressing  Goal: Oral mucous membranes remain intact  Description  INTERVENTIONS  - Assess oral mucosa and hygiene practices  - Implement preventative oral hygiene regimen  - Implement oral medicated treatments as ordered  - Initiate Nutrition services referral as needed  Outcome: Progressing     Problem: MUSCULOSKELETAL - ADULT  Goal: Maintain or return mobility to safest level of function  Description  INTERVENTIONS:  - Assess patient's ability to carry out ADLs; assess patient's baseline for ADL function and identify physical deficits which impact ability to perform ADLs (bathing, care of mouth/teeth, toileting, grooming, dressing, etc )  - Assess/evaluate cause of self-care deficits   - Assess range of motion  - Assess patient's mobility  - Assess patient's need for assistive devices and provide as appropriate  - Encourage maximum independence but intervene and supervise when necessary  - Involve family in performance of ADLs  - Assess for home care needs following discharge   - Consider OT consult to assist with ADL evaluation and planning for discharge  - Provide patient education as appropriate  Outcome: Progressing  Goal: Maintain proper alignment of affected body part  Description  INTERVENTIONS:  - Support, maintain and protect limb and body alignment  - Provide patient/ family with appropriate education  Outcome: Progressing

## 2019-11-27 NOTE — DISCHARGE SUMMARY
Discharge- Smiley Pattie 1950, 68 y o  male MRN: [id number]    Unit/Bed#: [facility] 405-01 Encounter: [id number]    Primary Care Provider: Dea Demarco MD   Date and time admitted to hospital: 11/22/2019 12:11 AM        At risk for venous thromboembolism (VTE)  Assessment & Plan  VTE risk 8  Lovenox for prophylaxis    Respiratory acidosis  Assessment & Plan  Secondary to COPD exacerbation, with  metabolic alkalosis compensation  Management as per pulmonologist    Iron deficiency anemia due to chronic blood loss  Assessment & Plan  Continue folic acid and ferrous sulfate   hemoglobin stable    Anxiety  Assessment & Plan  Continue Ativan    Essential hypertension  Assessment & Plan  Continue Lasix and Cardizem    COPD with exacerbation ([facility] [address] )  Assessment & Plan  Management as above    Pulmonary artery hypertension ([facility] [address] )  Assessment & Plan  Repeat echo this admission shows LVEF 65% with concentric thickening, moderate to severe pulmonary hypertension    * Acute on chronic respiratory failure with hypercapnia ([address] )  Assessment & Plan  Patient with COPD chronic respiratory failure 4 L O2 via nasal cannula dependent follows Dr Morgan Marquez of pulmonology presented from home for evaluation of acute onset of shortness of breath with tachypnea X 2 hours prior arrival   Due to respiratory distress patient needed to be placed on BiPAP VBG showed respiratory acidosis : pH 7 22, CO2 99 9, bicarb 40 3  He just completed course of steroid a day before and supposed to start erythromycin  After treatment with Solu-Medrol and nebs he felt some improvement and was able to be switched back to nasal cannula    After being admitted to the floors under step-down, was seen by Pulmonary, was treated with IV Solu-Medrol which was scheduled the taper down in switched to oral prednisone 40 mg starting today and plan is to taper by 10 mg every 3 days  Continue Xopenex Atrovent t i d , perforomist  He follows Dr Morgan Marquez who last time evaluated him on November 12, recommended to continue Spiriva Breo Ellipta albuterol, supplemental oxygen 24 hour  Status post 3 days of his some ice in  He had a few days of URI symptoms and most likely virus infection triggered his COPD exacerbation  Sputum cultures in the preliminary results are inconclusive          Discharge Summary - [facility] Internal Medicine    Patient Information: Mike García 68 y o  male MRN: [id number]  [facility] Encounter: [id number]    Discharging Physician / Practitioner: Quinn Jules MD  PCP: Loyd Wilcox MD  Admission Date: 11/22/2019  Discharge Date: 11/27/19    Disposition:     Home    Reason for Admission: dyspnea    Discharge Diagnoses:     Principal Problem:    Acute on chronic respiratory failure with hypercapnia ([address] )  Active Problems:    Pulmonary artery hypertension ([address] )    COPD with exacerbation ([facility] [address] )    Essential hypertension    Anxiety    Iron deficiency anemia due to chronic blood loss    Respiratory acidosis    At risk for venous thromboembolism (VTE)  Resolved Problems:    * No resolved hospital problems  *      Consultations During Hospital Stay:  · pulmonary    Procedures Performed:     ·     Significant Findings / Test Results:     XR chest 2 views   ED Interpretation by Slade Carlson MD (11/22 0101)   No consolidation, effusion, or pneumothorax  Final Result by Dg Lay, DO (11/22 0546)      Emphysematous changes are noted  No focal consolidation, pleural effusion, or pneumothorax  Workstation performed: [id number]               Incidental Findings:   ·      Test Results Pending at Discharge (will require follow up):   ·      Outpatient Tests Requested:  ·     Complications:      Hospital Course:     Mike García is a 68 y o  male patient who originally presented to the hospital on 11/22/2019 due to dyspnea   past medical history of COPD chronic respiratory failure on 4 L via nasal cannula, MGUS, history of DVT off AC who presented from home for evaluation of acute onset of shortness of breath few hours prior arrival   EMS reports Solu-Medrol was given and patient needed to be placed on BiPAP in the ER due to obvious respiratory distress  VBG showed respiratory acidosis, respiratory status markedly improved on BiPAP and patient was switched back to nasal cannula  Due to p r n  BiPAP order he admitted to step-down2  Upon my assessment patient reports feeling better with provided duo nebs and Solu-Medrol he had just completed a course of steroid and supposed to start erythromycin did not take antibiotic yet  He denies fever nausea vomiting diarrhea getting worse sputum quantity or quality, leg swelling orthopnea  Has been having URI symptoms for the past week after contacts with sick family members  Patient is compliant with his meds follows Dr Gagan Garay for COPD management    Condition at Discharge: stable     Discharge Day Visit / Exam:     Subjective:  Feels ok, fatigued, cough, appetite ok  Vitals: Blood Pressure: 119/55(Map 79) (11/27/19 1038)  Pulse: 80 (11/27/19 1038)  Temperature: 97 7 °F (36 5 °C) (11/27/19 1038)  Temp Source: Oral (11/27/19 1038)  Respirations: 18 (11/27/19 1038)  Height: 5' 6 5" (168 9 cm) (11/23/19 2011)  Weight - Scale: 64 kg (141 lb) (11/22/19 0011)  SpO2: 96 %(4L) (11/27/19 1344)  Exam:   Physical Exam   Constitutional: He is oriented to person, place, and time  He appears well-developed and well-nourished  HENT:   Head: Normocephalic and atraumatic  Eyes: Conjunctivae are normal    Cardiovascular: Normal rate and regular rhythm  Exam reveals no friction rub  No murmur heard  Pulmonary/Chest: Effort normal and breath sounds normal  No stridor  No respiratory distress  Abdominal: Soft  He exhibits no distension  There is no tenderness  Musculoskeletal: He exhibits no tenderness  Neurological: He is alert and oriented to person, place, and time  Vitals reviewed        Discussion with Family: wife    Discharge instructions/Information to patient and family:   See after visit summary for information provided to patient and family  Provisions for Follow-Up Care:  See after visit summary for information related to follow-up care and any pertinent home health orders  Planned Readmission: no     Discharge Statement:  I spent 45 minutes discharging the patient  This time was spent on the day of discharge  I had direct contact with the patient on the day of discharge  Greater than 50% of the total time was spent examining patient, answering all patient questions, arranging and discussing plan of care with patient as well as directly providing post-discharge instructions  Additional time then spent on discharge activities  Discharge Medications:  See after visit summary for reconciled discharge medications provided to patient and family        ** Please Note: This note has been constructed using a voice recognition system **

## 2019-11-27 NOTE — PROGRESS NOTES
Progress Note - Pulmonary   Rossana Olivares 68 y o  male MRN: [id number]  Unit/Bed#: [facility] 405-01 Encounter: [id number]      1  Acute on chronic hypoxic/hypercapnic respiratory failure secondary to acute COPD exacerbation likely due to viral URI  - patient with history of very severe COPD with FEV1 14% predicted  - initially presenting with worsening hypoxia and hypercapnia, which have improved  - he is currently saturating well on 4 L nasal cannula which he uses at home 24/7, baseline oxygen requirements  -  continue prednisone 40 mg daily today, can taper by 10 mg every 3 days until off  -  continue Pulmicort and Perforomist nebulizers b i d   - continue with Atrovent/Xopenex   -  completed azithromycin For 3 days for COPD exacerbation  - continue with supplemental nasal cannula for goal spo2 > 88%  - home regimen includes Breo 200-25 1 puff daily, Spiriva 2 puffs daily, p r n  Albuterol   Patient is usually on 4 L nasal cannula 24/7 and follows with Dr Sapna Curiel  - Aggressive incentive reginald  - out of bed to chair  - pulmonary toilet     2  Essential hypertension  - continue home Lasix and Cardizem per primary team     3  Iron deficiency anemia  - continue full of gas and ferrous sulfate per primary team, hemoglobin stable     4  History of pulmonary artery hypertension  - repeat echo with EF 65%, concentric remodeling, normal right ventricular size and function, inadequate tricuspid jet for estimation of RV systolic pressure, but no indirect findings for moderate/severe pulmonary hypertension     * patient is stable for discharge from pulmonary standpoint  Patient will follow up with Dr Sapna Curiel as outpatient  Will sign off at this time, please call with questions  Subjective:   Patient seen and examined at bedside  Complains of mild cough  Denies any fever, chills, nausea, vomiting, congestion, shortness of breath  He is on oral steroids    He states that he does not feel that he is ready for discharge yet, stating that his grandchildren are sick  Yesterday, he he was not ready for discharge because his wife had pinkeye  Review of Systems   Constitutional: Positive for activity change  Negative for chills, fever and unexpected weight change  HENT: Negative for congestion, rhinorrhea, sneezing and sore throat  Respiratory: Positive for shortness of breath and wheezing  Negative for cough  Cardiovascular: Negative for chest pain, palpitations and leg swelling  Gastrointestinal: Negative for abdominal pain, constipation, diarrhea, nausea and vomiting  Genitourinary: Negative for dysuria  Musculoskeletal: Negative for arthralgias  Neurological: Negative for dizziness and numbness  Objective:     Vitals:    11/26/19 2200 11/26/19 2326 11/27/19 0657 11/27/19 0747   BP:  (!) 101/49 136/58    BP Location:  Right arm Right arm    Pulse:  78 86    Resp:  18 18    Temp:  98 1 °F (36 7 °C) 97 8 °F (36 6 °C)    TempSrc:  Oral Oral    SpO2: 95% 95% 98% 98%   Weight:       Height:               Intake/Output Summary (Last 24 hours) at 11/27/2019 1024  Last data filed at 11/27/2019 0745  Gross per 24 hour   Intake 580 ml   Output 1525 ml   Net -945 ml         Physical Exam   Constitutional: He is oriented to person, place, and time  He appears well-developed and well-nourished  No distress  Thin, wearing nasal cannula   HENT:   Head: Normocephalic and atraumatic  Mouth/Throat: Oropharynx is clear and moist  No oropharyngeal exudate  Eyes: EOM are normal  No scleral icterus  Cardiovascular: Normal rate, regular rhythm and normal heart sounds  No murmur heard  Pulmonary/Chest: Effort normal and breath sounds normal  No respiratory distress  He has no wheezes  Abdominal: Soft  Bowel sounds are normal  He exhibits no distension  There is no tenderness  Musculoskeletal: He exhibits no edema  Neurological: He is alert and oriented to person, place, and time  Skin: He is not diaphoretic  Labs: I have personally reviewed pertinent lab results    Results from last 7 days   Lab Units 19  0451 19  0046   WBC Thousand/uL 5 58 6 86   HEMOGLOBIN g/dL 10 2* 11 4*   HEMATOCRIT % 33 5* 37 4   PLATELETS Thousands/uL 240 281   NEUTROS PCT %  --  82*   MONOS PCT %  --  3*      Results from last 7 days   Lab Units 19  0516 19  0046   POTASSIUM mmol/L 4 3 4 4   CHLORIDE mmol/L 99* 97*   CO2 mmol/L 39* 37*   BUN mg/dL 24 24   CREATININE mg/dL 0 91 1 05   CALCIUM mg/dL 9 4 9 6   ALK PHOS U/L  --  88   ALT U/L  --  15   AST U/L  --  14                      0   Lab Value Date/Time    TROPONINI 0 02 2019 0046    TROPONINI <0 02 2019 0943    TROPONINI <0 02 2017 0652    TROPONINI <0 04 2015 0326    TROPONINI <0 04 2015 1949    TROPONINI <0 04 2015 0600     Imaging and other studies: I have personally reviewed pertinent reports  Tory Curtis I have personally reviewed pertinent films in PACS  Chest x-ray 19  Emphysematous changes without focal consolidation, effusion     Pulmonary function testin spirometry with obstructive airflow limitation     EKG, Pathology, and Other Studies: I have personally reviewed pertinent reports     Echo 2019  EF 65%, concentric remodeling, normal right ventricular size and function, inadequate tricuspid jet for estimation of RV systolic pressure, but no indirect findings for moderate/severe pulmonary hypertension        Miranda Ellis MD  Pulmonary & Critical Care Fellow, [facility]

## 2019-11-27 NOTE — PROGRESS NOTES
Patient medically stable on 4L nasal canula with SP02 in the 90s  Physical therapy walked patient in the hallway later this evening  Though, patient still stating, "I'm not ready to go home yet  Maybe by Friday I'll be ready  My grandkid is sick, and my daughter was just sick   So, I don't want to get sick again "

## 2019-11-27 NOTE — PLAN OF CARE
Problem: PHYSICAL THERAPY ADULT  Goal: Performs mobility at highest level of function for planned discharge setting  See evaluation for individualized goals  Description  Treatment/Interventions: Functional transfer training, LE strengthening/ROM, Elevations, Therapeutic exercise, Endurance training, Equipment eval/education, Bed mobility, Gait training, Spoke to nursing  Equipment Recommended: Walker(at this time)       See flowsheet documentation for full assessment, interventions and recommendations  Outcome: Progressing  Note:   Prognosis: Good  Problem List: Decreased strength, Decreased endurance, Impaired balance, Decreased mobility  Assessment: The pt  was able to ambulate farther today, and he demonstrated appropriate technique throughout  He had no loss of balance, and he took appropriate rests  The pt  noted no dizziness or lightheadedness throughout the session  Will continue to follow  Barriers to Discharge: Inaccessible home environment     Recommendation: Home PT, Home with family support          See flowsheet documentation for full assessment

## 2019-11-27 NOTE — ASSESSMENT & PLAN NOTE
Patient with COPD chronic respiratory failure 4 L O2 via nasal cannula dependent follows Dr Atilio Laughlin of pulmonology presented from home for evaluation of acute onset of shortness of breath with tachypnea X 2 hours prior arrival   Due to respiratory distress patient needed to be placed on BiPAP VBG showed respiratory acidosis : pH 7 22, CO2 99 9, bicarb 40 3  He just completed course of steroid a day before and supposed to start erythromycin  After treatment with Solu-Medrol and nebs he felt some improvement and was able to be switched back to nasal cannula    After being admitted to the floors under step-down, was seen by Pulmonary, was treated with IV Solu-Medrol which was scheduled the taper down in switched to oral prednisone 40 mg starting today and plan is to taper by 10 mg every 3 days  Continue Xopenex Atrovent t i d , perforomist  He follows Dr Atilio Laughlin who last time evaluated him on November 12, recommended to continue Spiriva Breo Ellipta albuterol, supplemental oxygen 24 hour  Status post 3 days of his some ice in  He had a few days of URI symptoms and most likely virus infection triggered his COPD exacerbation  Sputum cultures in the preliminary results are inconclusive

## 2019-11-28 LAB
BACTERIA SPT RESP CULT: ABNORMAL
BACTERIA SPT RESP CULT: ABNORMAL
GRAM STN SPEC: ABNORMAL

## 2019-11-28 RX ORDER — LEVOFLOXACIN 500 MG/1
500 TABLET, FILM COATED ORAL EVERY 24 HOURS
Qty: 7 TABLET | Refills: 0 | Status: SHIPPED | OUTPATIENT
Start: 2019-11-28 | End: 2019-12-06

## 2019-11-28 NOTE — QUICK NOTE
Received final result of sputum culture, growing 1 + streptococcus pneumonia  On admission his CXR was negative for pneumonia, procalcitonin twice in a day was 0 06 -> 0 13  Called the patient to ask how he is feeling  Wife picked up phone, states he is ok but still weak, easily gets hypoxic on ambulation to 80s (not like that before), no fever, appetite ok  Informed her about the sputum results   Discussed with pulmonary on call, will treat him for streptococcal bronchitis, he did receive azithro 500 mg x 3 days here but his strep is resistant to that - allergy to penicillin, not tried cephalosporin before, interaction between levaquin & celexa for QT prolongation,hence will give cefdinir 300 BID x 7 days, watch for any allergic reaction

## 2019-11-29 ENCOUNTER — TRANSITIONAL CARE MANAGEMENT (OUTPATIENT)
Dept: INTERNAL MEDICINE CLINIC | Facility: CLINIC | Age: 69
End: 2019-11-29

## 2019-12-02 DIAGNOSIS — J96.10 CHRONIC RESPIRATORY FAILURE, UNSPECIFIED WHETHER WITH HYPOXIA OR HYPERCAPNIA (HCC): ICD-10-CM

## 2019-12-02 RX ORDER — FUROSEMIDE 20 MG/1
TABLET ORAL
Qty: 30 TABLET | Refills: 5 | Status: SHIPPED | OUTPATIENT
Start: 2019-12-02

## 2019-12-03 ENCOUNTER — TELEPHONE (OUTPATIENT)
Dept: INTERNAL MEDICINE CLINIC | Facility: CLINIC | Age: 69
End: 2019-12-03

## 2019-12-03 NOTE — TELEPHONE ENCOUNTER
I spoke with Dr Hermelindo Otero he stated that the pt should not take any of his medication tonight  He should resume his medications tomorrow morning

## 2019-12-03 NOTE — TELEPHONE ENCOUNTER
The patient took 2 doses of the following medications    Medications     albuterol (2 5 mg/3 mL) 0 083 % nebulizer solution     albuterol (PROAIR HFA) 90 mcg/act inhaler     CARTIA  MG 24 hr capsule     citalopram (CeleXA) 20 mg tablet     ferrous sulfate 325 (65 Fe) mg tablet     fluticasone-vilanterol (BREO ELLIPTA) 200-25 MCG/INH inhaler     folic acid (FOLVITE) 1 mg tablet     furosemide (LASIX) 20 mg tablet          levofloxacin (LEVAQUIN) 500 mg tablet     melatonin 3 mg     potassium chloride (K-DUR,KLOR-CON) 20 mEq tablet     predniSONE 10 mg tablet           He had one dose of cefinir  The patient's wife would like to know if she needs to be concerned about this  Edison Quintana is aware

## 2019-12-06 ENCOUNTER — OFFICE VISIT (OUTPATIENT)
Dept: INTERNAL MEDICINE CLINIC | Facility: CLINIC | Age: 69
End: 2019-12-06
Payer: MEDICARE

## 2019-12-06 VITALS
BODY MASS INDEX: 20.5 KG/M2 | TEMPERATURE: 98 F | DIASTOLIC BLOOD PRESSURE: 54 MMHG | RESPIRATION RATE: 15 BRPM | HEART RATE: 96 BPM | OXYGEN SATURATION: 99 % | WEIGHT: 130.6 LBS | SYSTOLIC BLOOD PRESSURE: 127 MMHG | HEIGHT: 67 IN

## 2019-12-06 DIAGNOSIS — I47.1 PAROXYSMAL ATRIAL TACHYCARDIA (HCC): ICD-10-CM

## 2019-12-06 DIAGNOSIS — I10 ESSENTIAL HYPERTENSION: Chronic | ICD-10-CM

## 2019-12-06 DIAGNOSIS — J44.1 COPD WITH EXACERBATION (HCC): Primary | ICD-10-CM

## 2019-12-06 PROCEDURE — 99495 TRANSJ CARE MGMT MOD F2F 14D: CPT | Performed by: INTERNAL MEDICINE

## 2019-12-06 PROCEDURE — 1111F DSCHRG MED/CURRENT MED MERGE: CPT | Performed by: INTERNAL MEDICINE

## 2019-12-06 RX ORDER — CEFDINIR 300 MG/1
CAPSULE ORAL
Refills: 0 | COMMUNITY
Start: 2019-11-28 | End: 2019-12-19

## 2019-12-06 NOTE — ASSESSMENT & PLAN NOTE
Slow improvement, continue oxygen and nebulizers  Patient is completing the course of levofloxacin for the strep pneumonia that grew in his sputum

## 2019-12-06 NOTE — PROGRESS NOTES
Assessment/Plan:    COPD with exacerbation (HCC)  Slow improvement, continue oxygen and nebulizers  Patient is completing the course of levofloxacin for the strep pneumonia that grew in his sputum  Paroxysmal atrial tachycardia (HCC)  Heart rate slightly fast today, but patient was rushing around to get in here    Essential hypertension  Controlled, continue current med       Diagnoses and all orders for this visit:    COPD with exacerbation ([address] )    Paroxysmal atrial tachycardia ([address] )    Essential hypertension    Other orders  -     cefdinir (OMNICEF) 300 mg capsule; TAKE 1 CAPSULE BY MOUTH TWO TIMES DAILY FOR 7 DAYS          Subjective:   TCM Call (since 11/5/2019)     Date and time call was made  11/29/2019  8:20 AM    Hospital care reviewed  Records reviewed    Patient was hospitialized at  [facility]    Date of Admission  11/22/19    Date of discharge  11/27/19    Diagnosis  Respiratory failure    Disposition  Home    Were the patients medications reviewed and updated  No    Current Symptoms  Shortness of breath      TCM Call (since 11/5/2019)     Post hospital issues  Reduced activity    Should patient be enrolled in anticoag monitoring? No    Scheduled for follow up? Yes    Did you obtain your prescribed medications  Yes    Do you need help managing your prescriptions or medications  No    I have advised the patient to call PCP with any new or worsening symptoms  Lillie Cramer, MR    Are you recieving any outpatient services  No    Are you recieving home care services  Yes    Types of home care services  Nurse visit    Are you using any community resources  No    Have you fallen in the last 12 months  Yes    How many times  1    Interperter language line needed  No    Counseling  Family           Patient ID: Og Mckeon is a 68 y o  male      I reviewed patient's hospitalization for COPD exacerbation, was given IV steroids, was on BiPAP while in the hospital, was seen by Pulmonary and was on nebulizers  Since home, patient has felt a little shaky, a little weaker than normal   He has needed to be careful upon standing so that he does not fall  No fevers or chills, no chest pain or chest pressure, no increased ankle swelling  Sputum did grow strep pneumonia, patient has been on levofloxacin      The following portions of the patient's history were reviewed and updated as appropriate: allergies, current medications, past family history, past medical history, past social history, past surgical history and problem list     Review of Systems   Constitutional: Negative for chills, fatigue and fever  HENT: Negative for congestion, nosebleeds, postnasal drip, sore throat and trouble swallowing  Eyes: Negative for pain  Respiratory: Positive for shortness of breath  Negative for cough, chest tightness and wheezing  Cardiovascular: Negative for chest pain, palpitations and leg swelling  Gastrointestinal: Negative for abdominal pain, constipation, diarrhea, nausea and vomiting  Endocrine: Negative for polydipsia and polyuria  Genitourinary: Negative for dysuria, flank pain and hematuria  Musculoskeletal: Negative for arthralgias  Skin: Negative for rash  Neurological: Positive for tremors, weakness and light-headedness  Negative for dizziness and headaches  Hematological: Does not bruise/bleed easily  Psychiatric/Behavioral: Negative for confusion and dysphoric mood  The patient is not nervous/anxious  Objective:      /54   Pulse 96   Temp 98 °F (36 7 °C)   Resp 15   Ht 5' 6 5" (1 689 m)   Wt 59 2 kg (130 lb 9 6 oz)   SpO2 99%   BMI 20 76 kg/m²          Physical Exam   Constitutional: He is oriented to person, place, and time  He appears well-developed and well-nourished  No distress  HENT:   Head: Normocephalic and atraumatic  Right Ear: External ear normal    Left Ear: External ear normal    Eyes: Conjunctivae are normal  No scleral icterus     Neck: Normal range of motion  Neck supple  No tracheal deviation present  No thyromegaly present  Cardiovascular: Normal rate, regular rhythm and normal heart sounds  No murmur heard  Pulmonary/Chest: Effort normal and breath sounds normal  No respiratory distress  He has no wheezes  He has no rales  Abdominal: Soft  Bowel sounds are normal  There is no tenderness  There is no rebound and no guarding  Musculoskeletal: He exhibits no edema  Lymphadenopathy:     He has no cervical adenopathy  Neurological: He is alert and oriented to person, place, and time  Psychiatric: He has a normal mood and affect  His behavior is normal  Judgment and thought content normal    Vitals reviewed

## 2019-12-10 DIAGNOSIS — J44.9 CHRONIC OBSTRUCTIVE PULMONARY DISEASE, UNSPECIFIED COPD TYPE (HCC): Primary | ICD-10-CM

## 2019-12-10 RX ORDER — PREDNISONE 20 MG/1
40 TABLET ORAL DAILY
Qty: 10 TABLET | Refills: 0 | Status: SHIPPED | OUTPATIENT
Start: 2019-12-10 | End: 2019-12-15

## 2019-12-10 RX ORDER — AZITHROMYCIN 250 MG/1
TABLET, FILM COATED ORAL
Qty: 6 TABLET | Refills: 0 | Status: SHIPPED | OUTPATIENT
Start: 2019-12-10 | End: 2019-12-14

## 2019-12-19 ENCOUNTER — OFFICE VISIT (OUTPATIENT)
Dept: PULMONOLOGY | Facility: CLINIC | Age: 69
End: 2019-12-19
Payer: MEDICARE

## 2019-12-19 VITALS
OXYGEN SATURATION: 91 % | WEIGHT: 130 LBS | TEMPERATURE: 97.2 F | SYSTOLIC BLOOD PRESSURE: 118 MMHG | BODY MASS INDEX: 20.4 KG/M2 | HEIGHT: 67 IN | DIASTOLIC BLOOD PRESSURE: 70 MMHG | HEART RATE: 110 BPM | RESPIRATION RATE: 16 BRPM

## 2019-12-19 DIAGNOSIS — J96.11 CHRONIC HYPOXEMIC RESPIRATORY FAILURE (HCC): ICD-10-CM

## 2019-12-19 DIAGNOSIS — J44.9 COPD (CHRONIC OBSTRUCTIVE PULMONARY DISEASE) (HCC): ICD-10-CM

## 2019-12-19 DIAGNOSIS — J44.9 CHRONIC OBSTRUCTIVE PULMONARY DISEASE, UNSPECIFIED COPD TYPE (HCC): ICD-10-CM

## 2019-12-19 DIAGNOSIS — J44.9 COPD (CHRONIC OBSTRUCTIVE PULMONARY DISEASE) (HCC): Primary | ICD-10-CM

## 2019-12-19 PROCEDURE — 94010 BREATHING CAPACITY TEST: CPT | Performed by: INTERNAL MEDICINE

## 2019-12-19 PROCEDURE — 99215 OFFICE O/P EST HI 40 MIN: CPT | Performed by: INTERNAL MEDICINE

## 2019-12-19 RX ORDER — BUDESONIDE 0.5 MG/2ML
0.5 INHALANT ORAL 2 TIMES DAILY
Qty: 60 VIAL | Refills: 0 | Status: SHIPPED | OUTPATIENT
Start: 2019-12-19 | End: 2019-12-19 | Stop reason: SDUPTHER

## 2019-12-19 RX ORDER — FORMOTEROL FUMARATE 20 UG/2ML
20 SOLUTION RESPIRATORY (INHALATION) 2 TIMES DAILY
Qty: 60 VIAL | Refills: 5 | Status: SHIPPED | OUTPATIENT
Start: 2019-12-19 | End: 2019-12-19 | Stop reason: SDUPTHER

## 2019-12-19 RX ORDER — FORMOTEROL FUMARATE 20 UG/2ML
20 SOLUTION RESPIRATORY (INHALATION) 2 TIMES DAILY
Qty: 60 VIAL | Refills: 5 | Status: SHIPPED | OUTPATIENT
Start: 2019-12-19 | End: 2020-01-08

## 2019-12-19 RX ORDER — BUDESONIDE 0.5 MG/2ML
0.5 INHALANT ORAL 2 TIMES DAILY
Qty: 60 VIAL | Refills: 5 | Status: SHIPPED | OUTPATIENT
Start: 2019-12-19 | End: 2020-05-18

## 2019-12-19 NOTE — PROGRESS NOTES
Office Progress Note - Pulmonary    Jerson Marquez 68 y o  male MRN: [id number]    Encounter: [id number]      Assessment:   Very severe chronic obstructive pulmonary disease   Chronic hypoxemic respiratory failure   Severe dyspnea on exertion  Plan:     Budesonide 0 5 mg nebulized twice a day   Performist 20 mcg nebulized twice a day   Ipratropium nebulized 4 times a day   Albuterol nebulizer 4 times a day as needed   Discontinue Spiriva   Discontinue Breo   Spirometry   Oxygen supplement 24 hours a day   Regular walks to improve stamina   Follow-up in 3 months  Discussion:   The patient has very severe COPD  His spirometry today showed FEV1 of 12% Overall his severe dyspnea on exertion is due to the very severe COPD and severe deconditioning     We have reviewed different options of his COPD management moving forward including valve placement which he is not a candidate for  Also we have reviewed the possibility of lung transplant  I have explained to the patient the process that he has to go through to be listed for lung transplant  I have also mentioned to him the benefit of having a lung transplant and the precautions and restrictions that he will have after lung transplant  The patient decided not to proceed with lung transplant  Given his very severe airflow limitation I have stopped the Breo and Spiriva as he will not be able to get benefit from those medications  I have started him performist to and budesonide nebulized twice a day  I have also start him on ipratropium nebulized 4 times a day  He will use the albuterol 4 times a day as needed  He will use the oxygen 24 hours a day  I have recommended that he should start taking regular walks to improve stamina  He already have received the influenza vaccine for this season  I will see him in 3 months in a follow-up visit  Subjective: The patient is here for a follow-up visit    He was admitted to [facility] on November 22nd because of COPD acute exacerbation  He was treated with steroids and bronchodilators  After discharge the patient felt tired and he is having severe dyspnea on exertion  He is sleepy and very sedentary  He denies any significant cough, wheezing or sputum production  He has taken Breo once a day and Spiriva once a day  He is also using the albuterol nebulizer few times a day  He is on the oxygen 24 hours a day  He denies chest pain or palpitations  His appetite has decreased  Review of systems:  A 12 point system review is done and aside from what is stated above the rest of the review of systems is negative  Family history and social history are reviewed  Medications list is reviewed  Vitals: Blood pressure 118/70, pulse (!) 110, temperature (!) 97 2 °F (36 2 °C), resp  rate 16, height 5' 6 5" (1 689 m), weight 59 kg (130 lb), SpO2 91 %  ,     Physical Exam  Gen: Awake, alert, oriented x 3, no acute distress  HEENT: Mucous membranes moist, no oral lesions, no thrush  NECK: No accessory muscle use, JVP not elevated  Cardiac: Regular, single S1, single S2, no murmurs, no rubs, no gallops  Lungs:  Decreased breath sounds  No wheezing or rhonchi  Abdomen: normoactive bowel sounds, soft nontender, nondistended, no rebound or rigidity, no guarding  Extremities: no cyanosis, no clubbing, no edema  Neuro:  Grossly nonfocal   Skin:  No rash  Spirometry done today showed very severe airflow limitation  His FEV1 was 12% (0 32 L)      Lab Results   Component Value Date    WBC 5 58 11/22/2019    HGB 10 2 (L) 11/22/2019    HCT 33 5 (L) 11/22/2019     (H) 11/22/2019     11/22/2019     Lab Results   Component Value Date     06/02/2015    SODIUM 139 11/22/2019    K 4 3 11/22/2019    CL 99 (L) 11/22/2019    CO2 39 (H) 11/22/2019    ANIONGAP -2 (L) 06/02/2015    AGAP 1 (L) 11/22/2019    BUN 24 11/22/2019    CREATININE 0 91 11/22/2019    GLUC 168 (H) 11/22/2019    CALCIUM 9 4 11/22/2019    AST 14 11/22/2019    ALT 15 11/22/2019    ALKPHOS 88 11/22/2019    PROT 5 8 (L) 06/02/2015    PROT 6 0 (L) 06/02/2015    TP 7 7 11/22/2019    BILITOT 0 22 06/02/2015    TBILI 0 29 11/22/2019    EGFR 84 11/22/2019     Chest x-rays reviewed on the [facility] system and it showed hyperinflated lungs  No acute pulmonary disease

## 2019-12-26 ENCOUNTER — HOSPITAL ENCOUNTER (INPATIENT)
Facility: HOSPITAL | Age: 69
LOS: 5 days | Discharge: HOME HEALTH | DRG: 189 | End: 2019-12-31
Attending: EMERGENCY MEDICINE | Admitting: INTERNAL MEDICINE
Payer: MEDICARE

## 2019-12-26 ENCOUNTER — TELEPHONE (OUTPATIENT)
Dept: PULMONOLOGY | Facility: CLINIC | Age: 69
End: 2019-12-26

## 2019-12-26 ENCOUNTER — APPOINTMENT (EMERGENCY)
Dept: RADIOLOGY | Facility: HOSPITAL | Age: 69
DRG: 189 | End: 2019-12-26
Payer: MEDICARE

## 2019-12-26 ENCOUNTER — TELEPHONE (OUTPATIENT)
Dept: OTHER | Facility: HOSPITAL | Age: 69
End: 2019-12-26

## 2019-12-26 DIAGNOSIS — K21.9 GASTROESOPHAGEAL REFLUX DISEASE WITHOUT ESOPHAGITIS: ICD-10-CM

## 2019-12-26 DIAGNOSIS — J44.1 COPD WITH EXACERBATION (HCC): ICD-10-CM

## 2019-12-26 DIAGNOSIS — J44.1 COPD EXACERBATION (HCC): Primary | ICD-10-CM

## 2019-12-26 LAB
ALBUMIN SERPL BCP-MCNC: 3 G/DL (ref 3.5–5)
ALP SERPL-CCNC: 78 U/L (ref 46–116)
ALT SERPL W P-5'-P-CCNC: 15 U/L (ref 12–78)
ANION GAP SERPL CALCULATED.3IONS-SCNC: 0 MMOL/L (ref 4–13)
AST SERPL W P-5'-P-CCNC: 15 U/L (ref 5–45)
ATRIAL RATE: 96 BPM
BASE EX.OXY STD BLDV CALC-SCNC: 92.1 % (ref 60–80)
BASE EXCESS BLDV CALC-SCNC: 12.9 MMOL/L
BASOPHILS # BLD AUTO: 0.01 THOUSANDS/ΜL (ref 0–0.1)
BASOPHILS NFR BLD AUTO: 0 % (ref 0–1)
BILIRUB SERPL-MCNC: 0.16 MG/DL (ref 0.2–1)
BUN SERPL-MCNC: 18 MG/DL (ref 5–25)
CALCIUM SERPL-MCNC: 9.8 MG/DL (ref 8.3–10.1)
CHLORIDE SERPL-SCNC: 97 MMOL/L (ref 100–108)
CO2 SERPL-SCNC: 44 MMOL/L (ref 21–32)
CREAT SERPL-MCNC: 0.6 MG/DL (ref 0.6–1.3)
EOSINOPHIL # BLD AUTO: 0.06 THOUSAND/ΜL (ref 0–0.61)
EOSINOPHIL NFR BLD AUTO: 2 % (ref 0–6)
ERYTHROCYTE [DISTWIDTH] IN BLOOD BY AUTOMATED COUNT: 13.7 % (ref 11.6–15.1)
FLUAV RNA NPH QL NAA+PROBE: NORMAL
FLUBV RNA NPH QL NAA+PROBE: NORMAL
GFR SERPL CREATININE-BSD FRML MDRD: 101 ML/MIN/1.73SQ M
GLUCOSE SERPL-MCNC: 143 MG/DL (ref 65–140)
HCO3 BLDV-SCNC: 40.9 MMOL/L (ref 24–30)
HCT VFR BLD AUTO: 29.9 % (ref 36.5–49.3)
HGB BLD-MCNC: 9 G/DL (ref 12–17)
IMM GRANULOCYTES # BLD AUTO: 0.04 THOUSAND/UL (ref 0–0.2)
IMM GRANULOCYTES NFR BLD AUTO: 1 % (ref 0–2)
LYMPHOCYTES # BLD AUTO: 0.53 THOUSANDS/ΜL (ref 0.6–4.47)
LYMPHOCYTES NFR BLD AUTO: 17 % (ref 14–44)
MCH RBC QN AUTO: 31.4 PG (ref 26.8–34.3)
MCHC RBC AUTO-ENTMCNC: 30.1 G/DL (ref 31.4–37.4)
MCV RBC AUTO: 104 FL (ref 82–98)
MONOCYTES # BLD AUTO: 0.18 THOUSAND/ΜL (ref 0.17–1.22)
MONOCYTES NFR BLD AUTO: 6 % (ref 4–12)
NEUTROPHILS # BLD AUTO: 2.36 THOUSANDS/ΜL (ref 1.85–7.62)
NEUTS SEG NFR BLD AUTO: 74 % (ref 43–75)
NRBC BLD AUTO-RTO: 0 /100 WBCS
NT-PROBNP SERPL-MCNC: 201 PG/ML
O2 CT BLDV-SCNC: 13.3 ML/DL
P AXIS: 88 DEGREES
PCO2 BLDV: 75.7 MM HG (ref 42–50)
PH BLDV: 7.35 [PH] (ref 7.3–7.4)
PLATELET # BLD AUTO: 282 THOUSANDS/UL (ref 149–390)
PMV BLD AUTO: 9 FL (ref 8.9–12.7)
PO2 BLDV: 74.4 MM HG (ref 35–45)
POTASSIUM SERPL-SCNC: 4.7 MMOL/L (ref 3.5–5.3)
PR INTERVAL: 156 MS
PROT SERPL-MCNC: 7 G/DL (ref 6.4–8.2)
QRS AXIS: 76 DEGREES
QRSD INTERVAL: 84 MS
QT INTERVAL: 342 MS
QTC INTERVAL: 432 MS
RBC # BLD AUTO: 2.87 MILLION/UL (ref 3.88–5.62)
RSV RNA NPH QL NAA+PROBE: NORMAL
SODIUM SERPL-SCNC: 141 MMOL/L (ref 136–145)
T WAVE AXIS: 84 DEGREES
TROPONIN I SERPL-MCNC: <0.02 NG/ML
VENTRICULAR RATE: 96 BPM
WBC # BLD AUTO: 3.18 THOUSAND/UL (ref 4.31–10.16)

## 2019-12-26 PROCEDURE — 83880 ASSAY OF NATRIURETIC PEPTIDE: CPT | Performed by: EMERGENCY MEDICINE

## 2019-12-26 PROCEDURE — 84484 ASSAY OF TROPONIN QUANT: CPT | Performed by: EMERGENCY MEDICINE

## 2019-12-26 PROCEDURE — 71045 X-RAY EXAM CHEST 1 VIEW: CPT

## 2019-12-26 PROCEDURE — 94640 AIRWAY INHALATION TREATMENT: CPT

## 2019-12-26 PROCEDURE — 94760 N-INVAS EAR/PLS OXIMETRY 1: CPT

## 2019-12-26 PROCEDURE — 96366 THER/PROPH/DIAG IV INF ADDON: CPT

## 2019-12-26 PROCEDURE — 80053 COMPREHEN METABOLIC PANEL: CPT | Performed by: EMERGENCY MEDICINE

## 2019-12-26 PROCEDURE — 85025 COMPLETE CBC W/AUTO DIFF WBC: CPT | Performed by: EMERGENCY MEDICINE

## 2019-12-26 PROCEDURE — 93005 ELECTROCARDIOGRAM TRACING: CPT

## 2019-12-26 PROCEDURE — 82805 BLOOD GASES W/O2 SATURATION: CPT | Performed by: EMERGENCY MEDICINE

## 2019-12-26 PROCEDURE — 99291 CRITICAL CARE FIRST HOUR: CPT | Performed by: EMERGENCY MEDICINE

## 2019-12-26 PROCEDURE — 96375 TX/PRO/DX INJ NEW DRUG ADDON: CPT

## 2019-12-26 PROCEDURE — 99223 1ST HOSP IP/OBS HIGH 75: CPT | Performed by: INTERNAL MEDICINE

## 2019-12-26 PROCEDURE — 87631 RESP VIRUS 3-5 TARGETS: CPT | Performed by: INTERNAL MEDICINE

## 2019-12-26 PROCEDURE — 99285 EMERGENCY DEPT VISIT HI MDM: CPT

## 2019-12-26 PROCEDURE — 36415 COLL VENOUS BLD VENIPUNCTURE: CPT | Performed by: EMERGENCY MEDICINE

## 2019-12-26 PROCEDURE — 93010 ELECTROCARDIOGRAM REPORT: CPT | Performed by: INTERNAL MEDICINE

## 2019-12-26 PROCEDURE — NC001 PR NO CHARGE: Performed by: INTERNAL MEDICINE

## 2019-12-26 PROCEDURE — 96365 THER/PROPH/DIAG IV INF INIT: CPT

## 2019-12-26 RX ORDER — DILTIAZEM HYDROCHLORIDE 180 MG/1
180 CAPSULE, COATED, EXTENDED RELEASE ORAL DAILY
Status: DISCONTINUED | OUTPATIENT
Start: 2019-12-27 | End: 2019-12-31 | Stop reason: HOSPADM

## 2019-12-26 RX ORDER — LEVALBUTEROL 1.25 MG/.5ML
1.25 SOLUTION, CONCENTRATE RESPIRATORY (INHALATION) EVERY 6 HOURS
Status: DISCONTINUED | OUTPATIENT
Start: 2019-12-26 | End: 2019-12-26

## 2019-12-26 RX ORDER — IPRATROPIUM BROMIDE AND ALBUTEROL SULFATE .5; 3 MG/3ML; MG/3ML
1 SOLUTION RESPIRATORY (INHALATION) ONCE
Status: COMPLETED | OUTPATIENT
Start: 2019-12-26 | End: 2019-12-26

## 2019-12-26 RX ORDER — FERROUS SULFATE 325(65) MG
325 TABLET ORAL DAILY
Status: DISCONTINUED | OUTPATIENT
Start: 2019-12-27 | End: 2019-12-31 | Stop reason: HOSPADM

## 2019-12-26 RX ORDER — AZITHROMYCIN 250 MG/1
500 TABLET, FILM COATED ORAL EVERY 24 HOURS
Status: COMPLETED | OUTPATIENT
Start: 2019-12-27 | End: 2019-12-28

## 2019-12-26 RX ORDER — MAGNESIUM SULFATE HEPTAHYDRATE 40 MG/ML
2 INJECTION, SOLUTION INTRAVENOUS ONCE
Status: COMPLETED | OUTPATIENT
Start: 2019-12-26 | End: 2019-12-26

## 2019-12-26 RX ORDER — PANTOPRAZOLE SODIUM 40 MG/1
40 TABLET, DELAYED RELEASE ORAL
Status: DISCONTINUED | OUTPATIENT
Start: 2019-12-27 | End: 2019-12-31 | Stop reason: HOSPADM

## 2019-12-26 RX ORDER — METHYLPREDNISOLONE SODIUM SUCCINATE 40 MG/ML
40 INJECTION, POWDER, LYOPHILIZED, FOR SOLUTION INTRAMUSCULAR; INTRAVENOUS EVERY 8 HOURS
Status: DISCONTINUED | OUTPATIENT
Start: 2019-12-27 | End: 2019-12-27

## 2019-12-26 RX ORDER — ACETAMINOPHEN 325 MG/1
650 TABLET ORAL EVERY 6 HOURS PRN
Status: DISCONTINUED | OUTPATIENT
Start: 2019-12-26 | End: 2019-12-31 | Stop reason: HOSPADM

## 2019-12-26 RX ORDER — ALBUTEROL SULFATE 2.5 MG/3ML
1 SOLUTION RESPIRATORY (INHALATION) ONCE
Status: DISCONTINUED | OUTPATIENT
Start: 2019-12-26 | End: 2019-12-26

## 2019-12-26 RX ORDER — IPRATROPIUM BROMIDE AND ALBUTEROL SULFATE 2.5; .5 MG/3ML; MG/3ML
3 SOLUTION RESPIRATORY (INHALATION)
Status: DISCONTINUED | OUTPATIENT
Start: 2019-12-26 | End: 2019-12-26

## 2019-12-26 RX ORDER — METHYLPREDNISOLONE SODIUM SUCCINATE 125 MG/2ML
125 INJECTION, POWDER, LYOPHILIZED, FOR SOLUTION INTRAMUSCULAR; INTRAVENOUS ONCE
Status: COMPLETED | OUTPATIENT
Start: 2019-12-26 | End: 2019-12-26

## 2019-12-26 RX ORDER — IPRATROPIUM BROMIDE AND ALBUTEROL SULFATE 2.5; .5 MG/3ML; MG/3ML
SOLUTION RESPIRATORY (INHALATION)
Status: COMPLETED
Start: 2019-12-26 | End: 2019-12-26

## 2019-12-26 RX ORDER — LEVALBUTEROL 1.25 MG/.5ML
SOLUTION, CONCENTRATE RESPIRATORY (INHALATION)
Status: COMPLETED
Start: 2019-12-26 | End: 2019-12-26

## 2019-12-26 RX ORDER — FOLIC ACID 1 MG/1
1000 TABLET ORAL DAILY
Status: DISCONTINUED | OUTPATIENT
Start: 2019-12-27 | End: 2019-12-31 | Stop reason: HOSPADM

## 2019-12-26 RX ORDER — AZITHROMYCIN 250 MG/1
500 TABLET, FILM COATED ORAL EVERY 24 HOURS
Status: DISCONTINUED | OUTPATIENT
Start: 2019-12-26 | End: 2019-12-26

## 2019-12-26 RX ORDER — CALCIUM CARBONATE 200(500)MG
1000 TABLET,CHEWABLE ORAL DAILY PRN
Status: DISCONTINUED | OUTPATIENT
Start: 2019-12-26 | End: 2019-12-31 | Stop reason: HOSPADM

## 2019-12-26 RX ORDER — LANOLIN ALCOHOL/MO/W.PET/CERES
3 CREAM (GRAM) TOPICAL
Status: DISCONTINUED | OUTPATIENT
Start: 2019-12-26 | End: 2019-12-31 | Stop reason: HOSPADM

## 2019-12-26 RX ORDER — POTASSIUM CHLORIDE 20 MEQ/1
20 TABLET, EXTENDED RELEASE ORAL DAILY
Status: DISCONTINUED | OUTPATIENT
Start: 2019-12-27 | End: 2019-12-31 | Stop reason: HOSPADM

## 2019-12-26 RX ORDER — CITALOPRAM 20 MG/1
20 TABLET ORAL DAILY
Status: DISCONTINUED | OUTPATIENT
Start: 2019-12-27 | End: 2019-12-31 | Stop reason: HOSPADM

## 2019-12-26 RX ORDER — BUDESONIDE 0.5 MG/2ML
0.5 INHALANT ORAL
Status: DISCONTINUED | OUTPATIENT
Start: 2019-12-26 | End: 2019-12-31 | Stop reason: HOSPADM

## 2019-12-26 RX ORDER — FUROSEMIDE 20 MG/1
20 TABLET ORAL DAILY
Status: DISCONTINUED | OUTPATIENT
Start: 2019-12-27 | End: 2019-12-31 | Stop reason: HOSPADM

## 2019-12-26 RX ORDER — LEVALBUTEROL 1.25 MG/.5ML
1.25 SOLUTION, CONCENTRATE RESPIRATORY (INHALATION)
Status: DISCONTINUED | OUTPATIENT
Start: 2019-12-26 | End: 2019-12-28

## 2019-12-26 RX ORDER — SODIUM CHLORIDE FOR INHALATION 0.9 %
3 VIAL, NEBULIZER (ML) INHALATION ONCE
Status: COMPLETED | OUTPATIENT
Start: 2019-12-26 | End: 2019-12-26

## 2019-12-26 RX ORDER — TERBUTALINE SULFATE 1 MG/ML
0.25 INJECTION, SOLUTION SUBCUTANEOUS ONCE
Status: COMPLETED | OUTPATIENT
Start: 2019-12-26 | End: 2019-12-26

## 2019-12-26 RX ADMIN — IPRATROPIUM BROMIDE AND ALBUTEROL SULFATE 3 ML: 2.5; .5 SOLUTION RESPIRATORY (INHALATION) at 16:21

## 2019-12-26 RX ADMIN — LEVALBUTEROL HYDROCHLORIDE 1.25 MG: 1.25 SOLUTION, CONCENTRATE RESPIRATORY (INHALATION) at 19:35

## 2019-12-26 RX ADMIN — AZITHROMYCIN 500 MG: 250 TABLET, FILM COATED ORAL at 16:21

## 2019-12-26 RX ADMIN — ISODIUM CHLORIDE 3 ML: 0.03 SOLUTION RESPIRATORY (INHALATION) at 13:41

## 2019-12-26 RX ADMIN — TERBUTALINE SULFATE 0.25 MG: 1 INJECTION SUBCUTANEOUS at 15:33

## 2019-12-26 RX ADMIN — MAGNESIUM SULFATE HEPTAHYDRATE 2 G: 40 INJECTION, SOLUTION INTRAVENOUS at 13:37

## 2019-12-26 RX ADMIN — IPRATROPIUM BROMIDE 0.5 MG: 0.5 SOLUTION RESPIRATORY (INHALATION) at 19:35

## 2019-12-26 RX ADMIN — ALBUTEROL SULFATE 10 MG: 2.5 SOLUTION RESPIRATORY (INHALATION) at 13:41

## 2019-12-26 RX ADMIN — IPRATROPIUM BROMIDE 1 MG: 0.5 SOLUTION RESPIRATORY (INHALATION) at 13:42

## 2019-12-26 RX ADMIN — BUDESONIDE 0.5 MG: 0.5 INHALANT RESPIRATORY (INHALATION) at 19:35

## 2019-12-26 RX ADMIN — METHYLPREDNISOLONE SODIUM SUCCINATE 125 MG: 125 INJECTION, POWDER, FOR SOLUTION INTRAMUSCULAR; INTRAVENOUS at 13:36

## 2019-12-26 NOTE — TELEPHONE ENCOUNTER
Spoke with wife Mark Nicely  She said Jaskaran Nichols is getting no benefit from the nebulizer medications  She said he is still SOB  His pulse ox resting is in the 90's, but the minute he does anything it drops  She said it dropped to the 80's while he was eating  It dropped to the 70's when showering  He is wearing 4L and she will turn it up to 6L if needed  She said she does not think he did any better on the inhalers either  She said last night he started talking to himself and was dizzy  She did say dizziness is a side effect of the Atrovent  She is just concerned if there is anything else that can be done or is this to be expected with his lung disease  She doesn't know if she can leave him to go to work  She says she has to work full time to support both of them  He has a nurse that comes once a week  Mark Nicely says sometime he cannot even come downstairs to eat  She feels lost and started crying on the phone  Please call and talk with wife

## 2019-12-26 NOTE — ED ATTENDING ATTESTATION
12/26/2019  Rosalia Forbes DO, saw and evaluated the patient  I have discussed the patient with the resident/non-physician practitioner and agree with the resident's/non-physician practitioner's findings, Plan of Care, and MDM as documented in the resident's/non-physician practitioner's note, except where noted  All available labs and Radiology studies were reviewed  I was present for key portions of any procedure(s) performed by the resident/non-physician practitioner and I was immediately available to provide assistance  At this point I agree with the current assessment done in the Emergency Department  I have conducted an independent evaluation of this patient a history and physical is as follows:    Patient presents via EMS for complaint of worsening dyspnea  Patient has a longstanding h/o COPD for which she uses supplemental oxygen at home continuously  Per EMS, he was too tachypneic to get off the couch on his own  He generally stays on 1 floor in his home and does not even get up to use the bathroom, using a couch side urinal   He states he has been taking his medications as prescribed and using his supplemental oxygen continuously  He has not increased his rate  His pulse oxygenation quickly plummeted when off of supplemental oxygen  He denies ever being intubated for his COPD exacerbations but has been admitted previously  Denies inhalation drug use  No recent travel or sick contacts  ROS: Denies f/c, abdominal pain, n/v/d  12 system ROS o/w negative  PE: Mild distress, alert, chronically ill-appearing, frail; PERRL, EOMI; MMM, no posterior oropharyngeal exudate, edema or erythema; HRR, no murmur, monitor shows sinus tachycardia at 102 bpm; lungs minimal air movement throughout, increased WOB w/accessory muscle use, 2-3 word conversational dyspnea, POx 95% on 5 lpm (nl); abdomen s/nt/nd, nl BS in all 4 quadrants; (-) LE edema or calf TTP, FROM extremities x4; skin p/w/d       DDx: Dyspnea - acute on chronic COPD exacerbation, bronchitis/bronchospasm, doubt pneumonia, pneumothorax, ACS/MI or PE  A/P: Will check cardiopulmonary workup, treat symptoms with JIANG neb, steroids, mag and terbutaline, admit  ED Course         Critical Care Time  CriticalCare Time  Performed by: Daniella Mercedes DO  Authorized by:  Daniella Mercedes DO     Critical care provider statement:     Critical care time (minutes):  35    Critical care time was exclusive of:  Separately billable procedures and treating other patients and teaching time    Critical care was necessary to treat or prevent imminent or life-threatening deterioration of the following conditions:  Respiratory failure    Critical care was time spent personally by me on the following activities:  Obtaining history from patient or surrogate, development of treatment plan with patient or surrogate, discussions with consultants, evaluation of patient's response to treatment, examination of patient, ordering and performing treatments and interventions, ordering and review of laboratory studies, ordering and review of radiographic studies, review of old charts and re-evaluation of patient's condition    I assumed direction of critical care for this patient from another provider in my specialty: no

## 2019-12-26 NOTE — ASSESSMENT & PLAN NOTE
The patient has developed acute on chronic hypoxic respiratory failure in the setting of a severe COPD exacerbation  He has underlying end-stage COPD and was not amenable to lung transplantation considerations  His last FEV1 was 12%  Last month he was taken off of Breo and Spiriva concurrently is on albuterol as needed, budesonide and formoterol  He has since quit smoking in 2012 however his lung disease remains progressively worsening  Given the frequency of his recurrences and admissions, roflumilast may be of consideration  Will request pulmonology consult for inpatient evaluation of his current status and continued monitoring  In the interim continue high dose of systemic steroids with methylprednisolone 40 mg q 8h for the 1st day which should be gradually tapered down  Azithromycin 500 mg daily x3 days  Dual nebulizer therapy every 4 hours  Rule out influenza and other respiratory viral diseases  No evidence of focal consolidation on x-ray and simply extensive emphysematous and chronic scarring changes  A blood gas was obtained at my request which shows a pH of 7 35 and a pCO2 of 75; his serum bicarb is 44 with an expected calculated pCO2 of 72-76mmHg thereby showing adequate compensation for now  His respiratory status is tenuous however and he will need close monitoring especially with the fact that he is reluctant to use BiPAP if need be and may end up undergoing endotracheal intubation if he does not respond appropriately  With that said, his ability to come off of the ventilator given his current physical and respiratory reserve will be limited

## 2019-12-26 NOTE — ASSESSMENT & PLAN NOTE
Start pantoprazole 40 mg daily especially in the setting of high dose steroid use for ulcer prophylaxis

## 2019-12-26 NOTE — ASSESSMENT & PLAN NOTE
The patient has chronic anemia but this seemed to have a slight worsening from his baseline  No carolyn/overt evidence of blood loss is noted  Will send stool for occult blood testing  In the interim continue ferrous sulfate and folic acid supplementation as his anemic status is likely contributory factor to his generalized fatigue and worsening dyspnea

## 2019-12-26 NOTE — ASSESSMENT & PLAN NOTE
The patient has been leukopenic in the past and seemingly related to his acute episodes of illness, correlating with a SIRS response  Thus far there is no overt evidence of an active infection present warranting the use of broad-spectrum antibiotics  Monitor trend daily

## 2019-12-26 NOTE — H&P
H&P- Desirae Beltrán [id number], 68 y o  male MRN: [id number]    Unit/Bed#: ED 24 Encounter: [id number]    Primary Care Provider: Attila Yen MD   Date and time admitted to hospital: 12/26/2019  1:03 PM        * COPD with exacerbation [facility])  Assessment & Plan  The patient has developed acute on chronic hypoxic respiratory failure in the setting of a severe COPD exacerbation  He has underlying end-stage COPD and was not amenable to lung transplantation considerations  His last FEV1 was 12%  Last month he was taken off of Breo and Spiriva concurrently is on albuterol as needed, budesonide and formoterol  He has since quit smoking in 2012 however his lung disease remains progressively worsening  Given the frequency of his recurrences and admissions, roflumilast may be of consideration  Will request pulmonology consult for inpatient evaluation of his current status and continued monitoring  In the interim continue high dose of systemic steroids with methylprednisolone 40 mg q 8h for the 1st day which should be gradually tapered down  Azithromycin 500 mg daily x3 days  Dual nebulizer therapy every 4 hours  Rule out influenza and other respiratory viral diseases  No evidence of focal consolidation on x-ray and simply extensive emphysematous and chronic scarring changes  A blood gas was obtained at my request which shows a pH of 7 35 and a pCO2 of 75; his serum bicarb is 44 with an expected calculated pCO2 of 72-76mmHg thereby showing adequate compensation for now  His respiratory status is tenuous however and he will need close monitoring especially with the fact that he is reluctant to use BiPAP if need be and may end up undergoing endotracheal intubation if he does not respond appropriately  With that said, his ability to come off of the ventilator given his current physical and respiratory reserve will be limited      Macrocytic anemia  Assessment & Plan  The patient has chronic anemia but this seemed to have a slight worsening from his baseline  No carolyn/overt evidence of blood loss is noted  Will send stool for occult blood testing  In the interim continue ferrous sulfate and folic acid supplementation as his anemic status is likely contributory factor to his generalized fatigue and worsening dyspnea  Paroxysmal atrial tachycardia (HCC)  Assessment & Plan  Seemingly in the setting of chronic lung disease  We will continue diltiazem 180 mg daily  Gastroesophageal reflux disease without esophagitis  Assessment & Plan  Start pantoprazole 40 mg daily especially in the setting of high dose steroid use for ulcer prophylaxis  Leukopenia  Assessment & Plan  The patient has been leukopenic in the past and seemingly related to his acute episodes of illness, correlating with a SIRS response  Thus far there is no overt evidence of an active infection present warranting the use of broad-spectrum antibiotics  Monitor trend daily  At risk for venous thromboembolism (VTE)  Assessment & Plan  DVT prophylaxis with LMWH ordered daily  VTE Prophylaxis: Enoxaparin (Lovenox)  / foot pump applied   Code Status: DNR  POLST: POLST information provided but not completed  Discussion with family: Yes    Anticipated Length of Stay:  Patient will be admitted on an Inpatient basis with an anticipated length of stay of  > 2 midnights  Justification for Hospital Stay: COPD exacerbation    Total Time for Visit, including Counseling / Coordination of Care: 45 minutes  Greater than 50% of this total time spent on direct patient counseling and coordination of care      Chief Complaint:   Shortness of breath    History of Present Illness:    Noemi Barclay is a 68 y o  male who  with chronic hypoxic respiratory failure secondary to end-stage COPD who was last admitted here about a month ago for acute hypercarbic respiratory failure with respiratory acidosis requiring BiPAP and completed a one-week course of antibiotics after growing Streptococcus pneumoniae in his sputum  Since being at home his clinical status has remained tenuous and his wife brings him in today because of his worsening clinical condition  As per the wife, over the last few days he has been increasingly confused, more short of breath and generally fatigued  The patient himself reports diminished exercise tolerance to even mild exertion and activities of daily living  His wife noticed that he is having more twitching, is tachypneic and has a hard time catching his breath  Of recent she has had to increase his oxygen flow rate to 6 L from 4 L due to episodes of hypoxia with physical activity  His appetite has also been significantly depressed  The patient himself denies associated fever, chest pain, productive cough, sore throat, rhinorrhea, myalgias, abdominal pain and headaches  Today however seemed to be his worse today prompting multiple calls by his wife to the pulmonary nurse practitioner and was advised to come to the emergency room for evaluation  He was brought in by EMS who reported that he was too tachypneic to even get off the couch and that there was this urinal by his side because he would become too dyspneic even going to the bathroom  He was found to saturate less than 70% while off of oxygen  On arrival here her a dose of terbutaline was ordered in addition to nebulizer therapy and IV magnesium sulfate and referred for admission  Review of Systems:    Review of Systems   Constitutional: Positive for activity change, appetite change and fatigue  Negative for chills and fever  All other systems reviewed and are negative        Past Medical and Surgical History:     Past Medical History:   Diagnosis Date    Anxiety     Aortic regurgitation     Atrial flutter (HCC)     Chronic respiratory failure (HCC)     Colon polyp     COPD (chronic obstructive pulmonary disease) (HCC)     Deep venous thrombosis of distal lower extremity ([address] )     Diverticulitis     GI bleed     Hypertension     Iron deficiency anemia     MGUS (monoclonal gammopathy of unknown significance)     Osteoarthritis of hip     PAC (premature atrial contraction)     Paroxysmal atrial fibrillation (HCC)     Patent foramen ovale     Pulmonary artery hypertension (HCC)     Renal failure     Sinus tachycardia        Past Surgical History:   Procedure Laterality Date    APPENDECTOMY      COLON SURGERY      HERNIA REPAIR      JOINT REPLACEMENT      KNEE SURGERY         Meds/Allergies:    Prior to Admission medications    Medication Sig Start Date End Date Taking? Authorizing Provider   albuterol (2 5 mg/3 mL) 0 083 % nebulizer solution Take 1 vial (2 5 mg total) by nebulization every 6 (six) hours as needed for wheezing or shortness of breath 4/15/19  Yes GERA Abarca   albuterol [facility] HFA) 90 mcg/act inhaler Inhale 2 puffs every 4 (four) hours as needed for wheezing or shortness of breath 5/28/19  Yes GERA Meneses   budesonide (PULMICORT) 0 5 mg/2 mL nebulizer solution Take 1 vial (0 5 mg total) by nebulization 2 (two) times a day Rinse mouth after use   12/19/19  Yes GERA Abarca   CARTIA  MG 24 hr capsule TAKE 1 CAPSULE BY MOUTH EVERY DAY 10/21/19  Yes Grace Hyatt MD   citalopram (CeleXA) 20 mg tablet Take 1 tablet (20 mg total) by mouth daily 7/12/19  Yes Grace Hyatt MD   ferrous sulfate 325 (65 Fe) mg tablet Take 325 mg by mouth daily   Yes Historical Provider, MD   folic acid (FOLVITE) 1 mg tablet TAKE 1 TABLET BY MOUTH EVERY DAY 10/21/19  Yes Grace Hyatt MD   formoterol (PERFOROMIST) 20 MCG/2ML nebulizer solution Take 1 vial (20 mcg total) by nebulization 2 (two) times a day 12/19/19  Yes GERA Abarca   furosemide (LASIX) 20 mg tablet TAKE 1/2 TABLET BY MOUTH DAILY 12/2/19  Yes Grace Hyatt MD   ipratropium (ATROVENT) 0 02 % nebulizer solution Take 1 vial (0 5 mg total) by nebulization 4 (four) times a day 19  Yes GERA Gordon   melatonin 3 mg Take 2 tablets by mouth daily at bedtime as needed (sleep) 17  Yes Mauricio Gonzales MD   potassium chloride (K-DUR,KLOR-CON) 20 mEq tablet TAKE 1 TABLET BY MOUTH EVERY DAY 19  Yes GERA Huber   fluticasone-vilanterol (BREO ELLIPTA) 200-25 MCG/INH inhaler Inhale 1 puff daily Rinse mouth after use  19  Megan Jacinto MD   tiotropium (SPIRIVA RESPIMAT) 2 5 MCG/ACT AERS inhaler Inhale 2 puffs daily 19  Megan Jacinto MD     I have reviewed home medications with patient personally  Allergies: Allergies   Allergen Reactions    Penicillins Anaphylaxis       Social History:     Marital Status: /Civil Union   Occupation: Retired   Patient Pre-hospital Living Situation: Lives at home with wife  Patient Pre-hospital Level of Mobility: Minimal  Patient Pre-hospital Diet Restrictions: None  Substance Use History:   Social History     Substance and Sexual Activity   Alcohol Use Not Currently     Social History     Tobacco Use   Smoking Status Former Smoker    Packs/day: 1 50    Years: 42 00    Pack years: 63 00    Types: Cigarettes    Start date:     Last attempt to quit:     Years since quittin 9   Smokeless Tobacco Never Used     Social History     Substance and Sexual Activity   Drug Use No       Family History:    non-contributory    Physical Exam:     Vitals:   Blood Pressure: 162/65 (19 1514)  Pulse: 97 (19 1514)  Temperature: 98 9 °F (37 2 °C) (19 1330)  Temp Source: Oral (19 1330)  Respirations: (!) 24 (19 1514)  SpO2: 97 % (19 1514)    Physical Exam  Vitals:  Reviewed  General:  Emaciated elderly white man lying in bed with notable asthenia  Skin:  Warm but pale and dry  HEENT:  Normocephalic and atraumatic  Slightly dry mucous membranes with moderate conjunctiva pallor  Neck:  No lymphadenopathy  No carotid bruits    No palpable thyroid  Chest:  Markedly diminished thoracic expansion with minimal air entry in both lung fields  Heart:  Normal S1 and S2; rhythmic  No rubs or murmurs  Abdomen:  Nondistended, soft and nontender to palpation  No peritoneal reaction  Extremities:  No clubbing, cyanosis or edema  No cough tenderness  Normal distal pulses  Neurological:  Awake, alert and oriented to person, place and time  No focal deficits apparent  Psych:  Affect appropriate  Additional Data:     Lab Results: I have personally reviewed pertinent reports  and I have personally reviewed pertinent films in PACS    Results from last 7 days   Lab Units 12/26/19  1337   WBC Thousand/uL 3 18*   HEMOGLOBIN g/dL 9 0*   HEMATOCRIT % 29 9*   PLATELETS Thousands/uL 282   NEUTROS PCT % 74   LYMPHS PCT % 17   MONOS PCT % 6   EOS PCT % 2     Results from last 7 days   Lab Units 12/26/19  1337   SODIUM mmol/L 141   POTASSIUM mmol/L 4 7   CHLORIDE mmol/L 97*   CO2 mmol/L 44*   BUN mg/dL 18   CREATININE mg/dL 0 60   ANION GAP mmol/L 0*   CALCIUM mg/dL 9 8   ALBUMIN g/dL 3 0*   TOTAL BILIRUBIN mg/dL 0 16*   ALK PHOS U/L 78   ALT U/L 15   AST U/L 15   GLUCOSE RANDOM mg/dL 143*                       Imaging: I have personally reviewed pertinent films in PACS    XR chest 1 view portable   ED Interpretation by Storm Day MD (12/26 4382)   No acute cardiopulmonary disease  Final Result by Ryan Alexandre MD (12/26 9647)      No acute cardiopulmonary disease  COPD with extensive emphysematous changes and bibasilar scarring  Workstation performed: [id number]             EKG, Pathology, and Other Studies Reviewed on Admission:   · EKG:  Sinus rhythm with premature atrial complexes  · Allscripts / Epic Records Reviewed: Yes     ** Please Note: This note has been constructed using a voice recognition system   **

## 2019-12-26 NOTE — TELEPHONE ENCOUNTER
Telephone note- Pulmonary Medicine   Juju Salazar 68 y o  male MRN: [id number]    Reason for call:    Sick call      Pertinent details:    Reports confusion, SOB, hypoxia which is a change for him from baseline  Also reports twitching  I am concerned for acute hypercapnia and acute on chronic hypoxia  Recommend evaluation in the ED, Ancelmo Delong patient's wife will call EMS to present to [facility]       Robert Sarah

## 2019-12-26 NOTE — CONSULTS
Consult Note - Pulmonary   Carly Ferguson 68 y o  male MRN: [id number]  Unit/Bed#: ED 24 Encounter: [id number]      Reason for consultation: COPD exacerbation    Requesting physician: Dr Aide Alfonso    1  Acute on chronic hypoxic/hypercarbic respiratory failure 2/2 acute COPD exacerbation likely 2/2 viral URI? Vs other trigger with hx of very severe end stage COPD  - Patient with history of very severe COPD with FEV1 12% predicted on recent spirometry   - Now presenting with recurrence of intermittent hypoxia, SOB, BURTON, dizziness, tremors  - Initially noted to desaturate to 67% on room air, now mid 90s on 5 L NC  - blood gas 7 35/75/74/41/92% - respiratory acidosis with almost complete metabolic compensation   - Received Atrovent, albuterol, normal saline, DuoNeb nebulizers, as well as azithromycin 500 mg, magnesium, terbutaline, Solu-Medrol 125 mg, and was subsequently admitted for further care  - Patient w/o any signs/symptoms of acute bacterial infection and CXR is clear  - Agree with checking flu/RSV  - Will continue Solu Medrol 40mg q8h for now  - c/w azithromycin 500mg po x 3 days for COPD exacerbation   - C/w Pulmicort BID  - C/w Atrovent/Xopenex q6h  - Wean NC as tolerated back to baseline 4 L  - Pulmonary toilet, OOB to chair, incentive reginald  - PT/OT as able  - home regime includes pulmicort BID, perforomist BID, atrovent QID, albuterol prn and patient wears 4 L NC 24/7, follows with Dr Ziggy Burroughs  - previous discussions held for possible valve replacement, for which patient is not a candidate for  Additionally, previous discussions held for possible lung transplant, but patient has declined  - given recurrent exacerbations and frequent hospitalizations, will consult palliative care for goals of care discussion as well as symptom management  Patient is agreeable to this and interested in further resources  2  Essential HTN  - c/w home lasix and cardizem per primary team     3   Macrocytic anemia  - workup per primary team, c/w home ferrous sulfate, f/u FOBT, monitor H/H    4  GERD  - c/w PPI    Case to be discussed with Dr Karla Hammer  History of Present Illness   HPI:  Dana Rao is a 68 y o  male with past medical history of chronic hypoxic respiratory failure on 4 L nasal cannula 24/7, very severe COPD with most recent FEV1 12% predicted in 12/19/19, former tobacco abuse, pulmonary hypertension, hypertension, iron deficiency anemia, history of DVT off anticoagulation, who presents for evaluation of worsening shortness of breath  Patient recently hospitalized from 11/22-11/27 for acute on chronic hypoxic/hypercarbic respiratory failure secondary to COPD exacerbation  He was initially requiring BiPAP, eventually transitioned to nasal cannula  He was treated with IV Solu-Medrol, eventually switched to prednisone with subsequent taper  Upon discharge, he followed up in the office with Dr June Irwin and was switched to nebulized therapy rather than inhalers  He is now maintained on Pulmicort b i d , Perforomist b i d , Atrovent q i d , albuterol q i d  P r n  He was continued on supplemental oxygen  Different options for COPD management were reviewed  These included valve placement, which patient is not a candidate for  Extensive discussion was held for possible lung transplant, for which patient declined  Today, patient's wife called the office stating that patient was very short of breath with SpO2 dropping to the 70s while bathing  Patient was also having intermittent dizziness associated with shortness of breath  Due to these symptoms, EMS was called and patient was brought to the ER for further evaluation  Pt states he completed recent prednisone taper and was feeling better for about a week  Over past 2-3 days, has been having worsening SOB, BURTON, wheezing, and episodes of hypoxia with minimal exertion  Also has had tremors, and intermittent dizziness   Denies recent fever, chills, nausea, vomiting, chest pain, sick contacts, recent travel  Has had minimal exertion due to severe fatigue  In the ED, patient was noted to be afebrile, tachypneic 24-30, blood pressure 140s-160s/60s  Patient was noted to be hypoxic to 67% on room air and subsequently placed on supplemental nasal cannula with SpO2 improving to 97% on 4-6L  Labs pertinent for blood gas 7 35/75/74/41/92%, WBC 3 18, hemoglobin 9 0, platelets 357, chloride 97, bicarb 44, BUN 18, creatinine 0 60  Troponin negative and proBNP 201  Chest x-ray was clear, but did reveal extensive emphysematous changes and bibasilar scarring  Patient was given Atrovent, albuterol, normal saline, DuoNeb nebulizers, as well as azithromycin 500 mg, magnesium, terbutaline, Solu-Medrol 125 mg, and was subsequently admitted for further care  Pulmonology consultation was requested for further management  Review of Systems   Constitutional: Positive for fatigue  Negative for chills, fever and unexpected weight change  HENT: Negative for congestion, rhinorrhea, sneezing and sore throat  Respiratory: Positive for cough, chest tightness, shortness of breath and wheezing  Cardiovascular: Negative for chest pain, palpitations and leg swelling  Gastrointestinal: Negative for abdominal pain, constipation, diarrhea, nausea and vomiting  Endocrine: Negative for cold intolerance and heat intolerance  Genitourinary: Negative for dysuria  Musculoskeletal: Negative for arthralgias  Allergic/Immunologic: Negative for immunocompromised state  Neurological: Positive for dizziness, tremors and light-headedness  Negative for numbness       Historical Information   Past Medical History:   Diagnosis Date    Anxiety     Aortic regurgitation     Atrial flutter (HCC)     Chronic respiratory failure (HCC)     Colon polyp     COPD (chronic obstructive pulmonary disease) (HCC)     Deep venous thrombosis of distal lower extremity (HCC)     Diverticulitis     GI bleed  Hypertension     Iron deficiency anemia     MGUS (monoclonal gammopathy of unknown significance)     Osteoarthritis of hip     PAC (premature atrial contraction)     Paroxysmal atrial fibrillation (HCC)     Patent foramen ovale     Pulmonary artery hypertension (HCC)     Renal failure     Sinus tachycardia      Past Surgical History:   Procedure Laterality Date    APPENDECTOMY      COLON SURGERY      HERNIA REPAIR      JOINT REPLACEMENT      KNEE SURGERY       Family History   Problem Relation Age of Onset    Cancer Mother     Heart attack Father     Sudden death Father     Arthritis Family     Diabetes Family      Social History: Social History:   Former smoker 1 5 packs per day for 45 years, quit in 2012, no alcohol or drug use    Meds/Allergies   Current Facility-Administered Medications   Medication Dose Route Frequency    acetaminophen (TYLENOL) tablet 650 mg  650 mg Oral Q6H PRN    azithromycin (ZITHROMAX) tablet 500 mg  500 mg Oral Q24H    calcium carbonate (TUMS) chewable tablet 1,000 mg  1,000 mg Oral Daily PRN    [START ON 12/27/2019] citalopram (CeleXA) tablet 20 mg  20 mg Oral Daily    [START ON 12/27/2019] diltiazem (CARDIZEM CD) 24 hr capsule 180 mg  180 mg Oral Daily    [START ON 12/27/2019] enoxaparin (LOVENOX) subcutaneous injection 40 mg  40 mg Subcutaneous Daily    [START ON 12/27/2019] ferrous sulfate tablet 325 mg  325 mg Oral Daily    [START ON 89/71/0768] folic acid (FOLVITE) tablet 1,000 mcg  1,000 mcg Oral Daily    [START ON 12/27/2019] furosemide (LASIX) tablet 20 mg  20 mg Oral Daily    ipratropium-albuterol (DUO-NEB) 0 5-2 5 mg/3 mL inhalation solution 3 mL  3 mL Nebulization Q4H    magnesium hydroxide (MILK OF MAGNESIA) 400 mg/5 mL oral suspension 30 mL  30 mL Oral Daily PRN    melatonin tablet 3 mg  3 mg Oral HS PRN    [START ON 12/27/2019] methylPREDNISolone sodium succinate (Solu-MEDROL) injection 40 mg  40 mg Intravenous Q8H    [START ON 12/27/2019] pantoprazole (PROTONIX) EC tablet 40 mg  40 mg Oral Early Morning    [START ON 12/27/2019] potassium chloride (K-DUR,KLOR-CON) CR tablet 20 mEq  20 mEq Oral Daily       (Not in a hospital admission)  Allergies   Allergen Reactions    Penicillins Anaphylaxis     Vitals:    12/26/19 1330 12/26/19 1335 12/26/19 1342 12/26/19 1514   BP:    162/65   BP Location:  Right arm     Pulse:  92  97   Resp:  (!) 30  (!) 24   Temp: 98 9 °F (37 2 °C)      TempSrc: Oral      SpO2:  96% 97% 97%       Physical Exam   Constitutional: He is oriented to person, place, and time  He appears well-developed and well-nourished  No distress  thin   HENT:   Head: Normocephalic and atraumatic  Mouth/Throat: Oropharynx is clear and moist  No oropharyngeal exudate  Eyes: EOM are normal  No scleral icterus  Cardiovascular: Regular rhythm and normal heart sounds  No murmur heard  Tachycardic   Pulmonary/Chest: Effort normal  No respiratory distress  He has no wheezes  Poor air entry bilaterally with diminished breath sounds with scattered expiratory wheezing   Abdominal: Soft  Bowel sounds are normal  He exhibits no distension  There is no tenderness  Musculoskeletal: He exhibits no edema  Neurological: He is alert and oriented to person, place, and time  Skin: He is not diaphoretic  Labs: I have personally reviewed pertinent lab results    Results from last 7 days   Lab Units 12/26/19  1337   WBC Thousand/uL 3 18*   HEMOGLOBIN g/dL 9 0*   HEMATOCRIT % 29 9*   PLATELETS Thousands/uL 282   NEUTROS PCT % 74   MONOS PCT % 6    Results from last 7 days   Lab Units 12/26/19  1337   POTASSIUM mmol/L 4 7   CHLORIDE mmol/L 97*   CO2 mmol/L 44*   BUN mg/dL 18   CREATININE mg/dL 0 60   CALCIUM mg/dL 9 8   ALK PHOS U/L 78   ALT U/L 15   AST U/L 15                      0   Lab Value Date/Time    TROPONINI <0 02 12/26/2019 1337    TROPONINI 0 02 11/22/2019 0046    TROPONINI <0 02 02/21/2019 0943    TROPONINI <0 02 08/27/2017 0652    TROPONINI <0 04 06/02/2015 0326    TROPONINI <0 04 06/01/2015 1949    TROPONINI <0 04 05/14/2015 0600       Imaging and other studies: I have personally reviewed pertinent reports  and I have personally reviewed pertinent films in PACS  CXR 12/26/19   clear with extensive emphysematous changes and bibasilar scarring  Pulmonary function testing:   Spirometry 12/19/19  very severe airflow limitation  FEV1 12% predicted    EKG, Pathology, and Other Studies: I have personally reviewed pertinent reports      11/22/19 EF 65%, concentric remodeling, normal right ventricular size and function, inadequate tricuspid jet for estimation of RV systolic pressure, but no indirect findings for moderate/severe pulmonary hypertension    Code Status: Level 2 - DNAR: but accepts endotracheal intubation      Zach Gibson MD  Pulmonary & Critical Care Fellow, [facility]

## 2019-12-26 NOTE — TELEPHONE ENCOUNTER
Patients wife Jamari Bridges calling saying she has questions about Baldemar's medication dosage  She also mentioned that he is still short of breath and his sats are in the low 90s   He went in the shower and it dropped to the 70s  [phone]

## 2019-12-27 LAB
ANISOCYTOSIS BLD QL SMEAR: PRESENT
BASOPHILS # BLD MANUAL: 0 THOUSAND/UL (ref 0–0.1)
BASOPHILS NFR MAR MANUAL: 0 % (ref 0–1)
BUN SERPL-MCNC: 19 MG/DL (ref 5–25)
CALCIUM SERPL-MCNC: 9.6 MG/DL (ref 8.3–10.1)
CHLORIDE SERPL-SCNC: 97 MMOL/L (ref 100–108)
CO2 SERPL-SCNC: >45 MMOL/L (ref 21–32)
CREAT SERPL-MCNC: 0.62 MG/DL (ref 0.6–1.3)
EOSINOPHIL # BLD MANUAL: 0 THOUSAND/UL (ref 0–0.4)
EOSINOPHIL NFR BLD MANUAL: 0 % (ref 0–6)
ERYTHROCYTE [DISTWIDTH] IN BLOOD BY AUTOMATED COUNT: 13.5 % (ref 11.6–15.1)
GFR SERPL CREATININE-BSD FRML MDRD: 100 ML/MIN/1.73SQ M
GLUCOSE SERPL-MCNC: 179 MG/DL (ref 65–140)
HCT VFR BLD AUTO: 27.1 % (ref 36.5–49.3)
HGB BLD-MCNC: 8.2 G/DL (ref 12–17)
HYPERCHROMIA BLD QL SMEAR: PRESENT
LYMPHOCYTES # BLD AUTO: 0.15 THOUSAND/UL (ref 0.6–4.47)
LYMPHOCYTES # BLD AUTO: 7 % (ref 14–44)
MACROCYTES BLD QL AUTO: PRESENT
MCH RBC QN AUTO: 30.9 PG (ref 26.8–34.3)
MCHC RBC AUTO-ENTMCNC: 30.3 G/DL (ref 31.4–37.4)
MCV RBC AUTO: 102 FL (ref 82–98)
MONOCYTES # BLD AUTO: 0.04 THOUSAND/UL (ref 0–1.22)
MONOCYTES NFR BLD: 2 % (ref 4–12)
NEUTROPHILS # BLD MANUAL: 1.87 THOUSAND/UL (ref 1.85–7.62)
NEUTS BAND NFR BLD MANUAL: 3 % (ref 0–8)
NEUTS SEG NFR BLD AUTO: 87 % (ref 43–75)
NRBC BLD AUTO-RTO: 0 /100 WBCS
PLATELET # BLD AUTO: 276 THOUSANDS/UL (ref 149–390)
PLATELET BLD QL SMEAR: ADEQUATE
PMV BLD AUTO: 9.5 FL (ref 8.9–12.7)
POIKILOCYTOSIS BLD QL SMEAR: PRESENT
POTASSIUM SERPL-SCNC: 4.6 MMOL/L (ref 3.5–5.3)
RBC # BLD AUTO: 2.65 MILLION/UL (ref 3.88–5.62)
RBC MORPH BLD: PRESENT
SODIUM SERPL-SCNC: 141 MMOL/L (ref 136–145)
VARIANT LYMPHS # BLD AUTO: 1 %
WBC # BLD AUTO: 2.08 THOUSAND/UL (ref 4.31–10.16)

## 2019-12-27 PROCEDURE — 85027 COMPLETE CBC AUTOMATED: CPT | Performed by: INTERNAL MEDICINE

## 2019-12-27 PROCEDURE — 85007 BL SMEAR W/DIFF WBC COUNT: CPT | Performed by: INTERNAL MEDICINE

## 2019-12-27 PROCEDURE — 99232 SBSQ HOSP IP/OBS MODERATE 35: CPT | Performed by: PHYSICIAN ASSISTANT

## 2019-12-27 PROCEDURE — 94640 AIRWAY INHALATION TREATMENT: CPT | Performed by: SOCIAL WORKER

## 2019-12-27 PROCEDURE — 94640 AIRWAY INHALATION TREATMENT: CPT

## 2019-12-27 PROCEDURE — 80048 BASIC METABOLIC PNL TOTAL CA: CPT | Performed by: INTERNAL MEDICINE

## 2019-12-27 PROCEDURE — 99232 SBSQ HOSP IP/OBS MODERATE 35: CPT | Performed by: INTERNAL MEDICINE

## 2019-12-27 PROCEDURE — 94760 N-INVAS EAR/PLS OXIMETRY 1: CPT | Performed by: SOCIAL WORKER

## 2019-12-27 PROCEDURE — 94760 N-INVAS EAR/PLS OXIMETRY 1: CPT

## 2019-12-27 PROCEDURE — 99222 1ST HOSP IP/OBS MODERATE 55: CPT | Performed by: INTERNAL MEDICINE

## 2019-12-27 RX ORDER — METHYLPREDNISOLONE SODIUM SUCCINATE 40 MG/ML
40 INJECTION, POWDER, LYOPHILIZED, FOR SOLUTION INTRAMUSCULAR; INTRAVENOUS EVERY 12 HOURS SCHEDULED
Status: COMPLETED | OUTPATIENT
Start: 2019-12-27 | End: 2019-12-27

## 2019-12-27 RX ORDER — PREDNISONE 20 MG/1
40 TABLET ORAL DAILY
Status: DISCONTINUED | OUTPATIENT
Start: 2019-12-28 | End: 2019-12-31 | Stop reason: HOSPADM

## 2019-12-27 RX ADMIN — BUDESONIDE 0.5 MG: 0.5 INHALANT RESPIRATORY (INHALATION) at 19:15

## 2019-12-27 RX ADMIN — METHYLPREDNISOLONE SODIUM SUCCINATE 40 MG: 40 INJECTION, POWDER, FOR SOLUTION INTRAMUSCULAR; INTRAVENOUS at 09:20

## 2019-12-27 RX ADMIN — METHYLPREDNISOLONE SODIUM SUCCINATE 40 MG: 40 INJECTION, POWDER, FOR SOLUTION INTRAMUSCULAR; INTRAVENOUS at 00:05

## 2019-12-27 RX ADMIN — BUDESONIDE 0.5 MG: 0.5 INHALANT RESPIRATORY (INHALATION) at 08:37

## 2019-12-27 RX ADMIN — LEVALBUTEROL HYDROCHLORIDE 1.25 MG: 1.25 SOLUTION, CONCENTRATE RESPIRATORY (INHALATION) at 08:36

## 2019-12-27 RX ADMIN — FOLIC ACID 1000 MCG: 1 TABLET ORAL at 09:20

## 2019-12-27 RX ADMIN — METHYLPREDNISOLONE SODIUM SUCCINATE 40 MG: 40 INJECTION, POWDER, FOR SOLUTION INTRAMUSCULAR; INTRAVENOUS at 21:07

## 2019-12-27 RX ADMIN — IPRATROPIUM BROMIDE 0.5 MG: 0.5 SOLUTION RESPIRATORY (INHALATION) at 08:36

## 2019-12-27 RX ADMIN — IPRATROPIUM BROMIDE 0.5 MG: 0.5 SOLUTION RESPIRATORY (INHALATION) at 00:23

## 2019-12-27 RX ADMIN — IPRATROPIUM BROMIDE 0.5 MG: 0.5 SOLUTION RESPIRATORY (INHALATION) at 19:15

## 2019-12-27 RX ADMIN — FERROUS SULFATE TAB 325 MG (65 MG ELEMENTAL FE) 325 MG: 325 (65 FE) TAB at 09:20

## 2019-12-27 RX ADMIN — MELATONIN 3 MG: 3 TAB ORAL at 21:07

## 2019-12-27 RX ADMIN — CITALOPRAM HYDROBROMIDE 20 MG: 20 TABLET ORAL at 09:20

## 2019-12-27 RX ADMIN — PANTOPRAZOLE SODIUM 40 MG: 40 TABLET, DELAYED RELEASE ORAL at 05:15

## 2019-12-27 RX ADMIN — LEVALBUTEROL HYDROCHLORIDE 1.25 MG: 1.25 SOLUTION, CONCENTRATE RESPIRATORY (INHALATION) at 19:15

## 2019-12-27 RX ADMIN — LEVALBUTEROL HYDROCHLORIDE 1.25 MG: 1.25 SOLUTION, CONCENTRATE RESPIRATORY (INHALATION) at 13:22

## 2019-12-27 RX ADMIN — LEVALBUTEROL HYDROCHLORIDE 1.25 MG: 1.25 SOLUTION, CONCENTRATE RESPIRATORY (INHALATION) at 00:23

## 2019-12-27 RX ADMIN — FUROSEMIDE 20 MG: 20 TABLET ORAL at 09:20

## 2019-12-27 RX ADMIN — POTASSIUM CHLORIDE 20 MEQ: 1500 TABLET, EXTENDED RELEASE ORAL at 09:20

## 2019-12-27 RX ADMIN — ENOXAPARIN SODIUM 40 MG: 40 INJECTION SUBCUTANEOUS at 09:20

## 2019-12-27 RX ADMIN — DILTIAZEM HYDROCHLORIDE 180 MG: 180 CAPSULE, COATED, EXTENDED RELEASE ORAL at 09:37

## 2019-12-27 RX ADMIN — IPRATROPIUM BROMIDE 0.5 MG: 0.5 SOLUTION RESPIRATORY (INHALATION) at 13:22

## 2019-12-27 RX ADMIN — AZITHROMYCIN 500 MG: 250 TABLET, FILM COATED ORAL at 15:54

## 2019-12-27 NOTE — PLAN OF CARE
Problem: Potential for Falls  Goal: Patient will remain free of falls  Description  INTERVENTIONS:  - Assess patient frequently for physical needs  -  Identify cognitive and physical deficits and behaviors that affect risk of falls  -  [place] fall precautions as indicated by assessment   - Educate patient/family on patient safety including physical limitations  - Instruct patient to call for assistance with activity based on assessment  - Modify environment to reduce risk of injury  - Consider OT/PT consult to assist with strengthening/mobility  Outcome: Progressing     Problem: Prexisting or High Potential for Compromised Skin Integrity  Goal: Skin integrity is maintained or improved  Description  INTERVENTIONS:  - Identify patients at risk for skin breakdown  - Assess and monitor skin integrity  - Assess and monitor nutrition and hydration status  - Monitor labs   - Assess for incontinence   - Turn and reposition patient  - Assist with mobility/ambulation  - Relieve pressure over bony prominences  - Avoid friction and shearing  - Provide appropriate hygiene as needed including keeping skin clean and dry  - Evaluate need for skin moisturizer/barrier cream  - Collaborate with interdisciplinary team   - Patient/family teaching  - Consider wound care consult   Outcome: Progressing     Problem: Nutrition/Hydration-ADULT  Goal: Nutrient/Hydration intake appropriate for improving, restoring or maintaining nutritional needs  Description  Monitor and assess patient's nutrition/hydration status for malnutrition  Collaborate with interdisciplinary team and initiate plan and interventions as ordered  Monitor patient's weight and dietary intake as ordered or per policy  Utilize nutrition screening tool and intervene as necessary  Determine patient's food preferences and provide high-protein, high-caloric foods as appropriate       INTERVENTIONS:  - Monitor oral intake, urinary output, labs, and treatment plans  - Assess nutrition and hydration status and recommend course of action  - Evaluate amount of meals eaten  - Assist patient with eating if necessary   - Allow adequate time for meals  - Recommend/ encourage appropriate diets, oral nutritional supplements, and vitamin/mineral supplements  - Order, calculate, and assess calorie counts as needed  - Recommend, monitor, and adjust tube feedings and TPN/PPN based on assessed needs  - Assess need for intravenous fluids  - Provide specific nutrition/hydration education as appropriate  - Include patient/family/caregiver in decisions related to nutrition  Outcome: Progressing     Problem: RESPIRATORY - ADULT  Goal: Achieves optimal ventilation and oxygenation  Description  INTERVENTIONS:  - Assess for changes in respiratory status  - Assess for changes in mentation and behavior  - Position to facilitate oxygenation and minimize respiratory effort  - Oxygen administered by appropriate delivery if ordered  - Initiate smoking cessation education as indicated  - Encourage broncho-pulmonary hygiene including cough, deep breathe, Incentive Spirometry  - Assess the need for suctioning and aspirate as needed  - Assess and instruct to report SOB or any respiratory difficulty  - Respiratory Therapy support as indicated  Outcome: Progressing     Problem: METABOLIC, FLUID AND ELECTROLYTES - ADULT  Goal: Electrolytes maintained within normal limits  Description  INTERVENTIONS:  - Monitor labs and assess patient for signs and symptoms of electrolyte imbalances  - Administer electrolyte replacement as ordered  - Monitor response to electrolyte replacements, including repeat lab results as appropriate  - Instruct patient on fluid and nutrition as appropriate  Outcome: Progressing     Problem: SKIN/TISSUE INTEGRITY - ADULT  Goal: Skin integrity remains intact  Description  INTERVENTIONS  - Identify patients at risk for skin breakdown  - Assess and monitor skin integrity  - Assess and monitor nutrition and hydration status  - Monitor labs (i e  albumin)  - Assess for incontinence   - Turn and reposition patient  - Assist with mobility/ambulation  - Relieve pressure over bony prominences  - Avoid friction and shearing  - Provide appropriate hygiene as needed including keeping skin clean and dry  - Evaluate need for skin moisturizer/barrier cream  - Collaborate with interdisciplinary team (i e  Nutrition, Rehabilitation, etc )   - Patient/family teaching  Outcome: Progressing     Problem: MUSCULOSKELETAL - ADULT  Goal: Maintain or return mobility to safest level of function  Description  INTERVENTIONS:  - Assess patient's ability to carry out ADLs; assess patient's baseline for ADL function and identify physical deficits which impact ability to perform ADLs (bathing, care of mouth/teeth, toileting, grooming, dressing, etc )  - Assess/evaluate cause of self-care deficits   - Assess range of motion  - Assess patient's mobility  - Assess patient's need for assistive devices and provide as appropriate  - Encourage maximum independence but intervene and supervise when necessary  - Involve family in performance of ADLs  - Assess for home care needs following discharge   - Consider OT consult to assist with ADL evaluation and planning for discharge  - Provide patient education as appropriate  Outcome: Progressing  Goal: Maintain proper alignment of affected body part  Description  INTERVENTIONS:  - Support, maintain and protect limb and body alignment  - Provide patient/ family with appropriate education  Outcome: Progressing

## 2019-12-27 NOTE — CONSULTS
Consultation - Palliative and Supportive Care   Ck Curiel 68 y o  male [id number]      IDENTIFICATION:  Inpatient consult to Palliative Care  Consult performed by: Jose Diaz MD  Consult ordered by: Toro Helton MD        Physician Requesting Consult: Paul Pham MD  Reason for Consult / Principal Problem: assistance with advanced care planning  Hx and PE limited by: none    HISTORY OF PRESENT ILLNESS:       Ck Curiel is a 68 y o  male with PMHx significant for very severe COPD (FEV1 12% - 12/19/19), chronic hypoxic respiratory failure on 4L NC, pulmonary HTN, Hx of DVT (not in any [facility]), who is admitted with acute COPD exacerbation  This is his 3rd hospital stay for the same thing since 1/2019 per chart review  [facility]  PCS consulted for advanced care planning  He was admitted from home on 12/26 after his wife called Dr Trina Becerra office to report that the patient's O2 sats are dropping to the high 60s with exertion  He also appeared confused and dyspneic  They were advised to come to the ED for evaluation where he was found to afebrile and tachypneic  His O2 sat was 67% and was subsequently placed on 5L NC which improved his sats  He was clinically in acute COPD exacerbation and started on steroids and azithromycin  He was continued on his nebulizer treatments and now currently back to his baseline oxygen requirement  I met with Mr Yady Lancaster today at bedside with no family members present in the room  He was asleep but woke up easily  He was AAO x 3 after that  I introduced myself and our service  I explained what Palliative Medicine is  We spoke briefly about how we specialize in goals of care discussion  We spoke about his disease and disease trajectory  He is more than aware and realistic that COPD is a largely incurable disease unless he gets a new pair of lungs which he adamantly said is not for him   He is aware and realistic that the treatments he is getting from Dr Memo Dotson are to keep his COPD at [place] [id number] and prevent him from going into frequent exacerbations  He is aware and realistic that he will eventually get worse and if that happens, then it happens  We also briefly spoke about intubation and how he hated being intubated in the past  He is still not quite sure about intubation and is worried about not getting off of it  But seems agreeable if this is something that will be for short term only  We spoke about his quality of life and what he values most  He said that a good day for him is a day where he wakes up, makes himself some coffee, watch some TV and read the newspaper  He is smart about his energy expenditure and is well aware of his physical limitations  He tells me that he has 2 daughters and 3 grandkids (one is only weeks old)  His family is his source of pride  He spoke highly of his daughters - a dental hygienist and a nurse anesthetist  He describes them as being "smart as a whip"  He names his wife as his primary support and values her presence and opinion  We spoke briefly about long term plans and his expectations in the future  He is requesting to have more support at home in the form of a visiting RN, especially in the afternoon when his wife is at work, to check up on him "from time to time"  He is not ready to remove the hospital from his "basket of support" should he get worse in the future  He will continue to follow up with Dr Memo Dotson closely and I encouraged him to continue seeing us too to resume [address] discussion and advanced care planning  Review of Systems   Cardiovascular: Positive for dyspnea on exertion  Negative for chest pain  Respiratory: Negative for cough and shortness of breath  Gastrointestinal: Negative  Neurological: Positive for tremors  All other systems reviewed and are negative        Past Medical History:   Diagnosis Date    Anxiety     Aortic regurgitation     Atrial flutter (HCC)     Chronic respiratory failure (HCC)     Colon polyp     COPD (chronic obstructive pulmonary disease) (HCC)     Deep venous thrombosis of distal lower extremity (HCC)     Diverticulitis     GI bleed     Hypertension     Iron deficiency anemia     MGUS (monoclonal gammopathy of unknown significance)     Osteoarthritis of hip     PAC (premature atrial contraction)     Paroxysmal atrial fibrillation (HCC)     Patent foramen ovale     Pulmonary artery hypertension (HCC)     Renal failure     Sinus tachycardia      Past Surgical History:   Procedure Laterality Date    APPENDECTOMY      COLON SURGERY      HERNIA REPAIR      JOINT REPLACEMENT      KNEE SURGERY       Social History     Socioeconomic History    Marital status: /Civil Union     Spouse name: Not on file    Number of children: Not on file    Years of education: Not on file    Highest education level: Not on file   Occupational History    Not on file   Social Needs    Financial resource strain: Not on file    Food insecurity:     Worry: Not on file     Inability: Not on file    Transportation needs:     Medical: Not on file     Non-medical: Not on file   Tobacco Use    Smoking status: Former Smoker     Packs/day: 1 50     Years: 42 00     Pack years: 63 00     Types: Cigarettes     Start date:      Last attempt to quit:      Years since quittin 9    Smokeless tobacco: Never Used   Substance and Sexual Activity    Alcohol use: Not Currently    Drug use: Not Currently    Sexual activity: Not Currently   Lifestyle    Physical activity:     Days per week: Not on file     Minutes per session: Not on file    Stress: Not on file   Relationships    Social connections:     Talks on phone: Not on file     Gets together: Not on file     Attends Bahai service: Not on file     Active member of club or organization: Not on file     Attends meetings of clubs or organizations: Not on file     Relationship status: Not on file    Intimate partner violence:     Fear of current or ex partner: Not on file     Emotionally abused: Not on file     Physically abused: Not on file     Forced sexual activity: Not on file   Other Topics Concern    Not on file   Social History Narrative    Daily coffee consumption; 1 serving     Family History   Problem Relation Age of Onset    Cancer Mother     Heart attack Father     Sudden death Father     Arthritis Family     Diabetes Family        MEDICATIONS / ALLERGIES:    all current active meds have been reviewed    Allergies   Allergen Reactions    Penicillins Anaphylaxis       OBJECTIVE:    Physical Exam  Physical Exam   Constitutional: He is oriented to person, place, and time  He appears well-developed and well-nourished  Non-toxic appearance  No distress  He is not intubated  Appears chronically ill, appears ashen   HENT:   Head: Normocephalic and atraumatic  Mouth/Throat: Oropharynx is clear and moist    Eyes: Pupils are equal, round, and reactive to light  EOM are normal    Cardiovascular: Normal rate, regular rhythm and intact distal pulses  Pulmonary/Chest: Effort normal  No accessory muscle usage  No apnea, no tachypnea and no bradypnea  He is not intubated  No respiratory distress  Abdominal: Soft  He exhibits no distension  Musculoskeletal:        Right lower leg: He exhibits no edema  Neurological: He is alert and oriented to person, place, and time  Psychiatric: He has a normal mood and affect  His behavior is normal  His mood appears not anxious  He is not agitated  Lab Results: I have personally reviewed pertinent labs  as noted in the HPI  Imaging Studies: I have personally reviewed pertinent reports  as noted in the HPI  EKG, Pathology, and Other Studies: I have personally reviewed pertinent reports     as noted in the HPI      Assessment:  Very severe COPD, FEV1 12%  Acute COPD exacerbation  Acute on chronic hypoxic respiratory failure  Pulmonary HTN  Essential HTN  GERD  Goals of care    Plan:  1  Symptom management    - given his stated goals of care, he should continue to follow closely with Dr Sheridan Strong and his PCP to ensure his dyspnea/COPD is well-controlled with the hopes of avoiding re-hospitalizations     2  Goals    - Patient demonstrates good insight and judgement into his medical condition  He seems competent on my exam today  - Power of :  presumed to be wife by [place]   - Expressed goal: Continue current cares, continue close follow up with Dr Sheridan Strong, re-hospitalize if needed   - Code Status: Level 2    - Mr Rama Boucher is invited and encouraged to continue follow up with us as an OP to continue to discuss [address] and advanced care planning  At this point in time, he believes that he has a relatively good quality of life despite his advanced disease     - Our office will reach out to him/his wife to set up an appointment with us     3  Psychosocial support   - Admits to needing more support at home in the form of a visiting RN  CM to assist with arranging this  - Names his wife and his 2 daughters as well as 3 grandkids as his main source of support and purpose  We appreciate the invitation to be involved in this patient's care  We will continue to follow peripherally  Please do not hesitate to reach our on call provider through our clinic answering service at  should you have acute symptom control concerns  Counseling / Coordination of Care  Total floor / unit time spent today 60 minutes  Greater than 50% of total time was spent with the patient and / or family counseling and / or coordination of care  A description of the counseling / coordination of care: provided medical updates, discussed palliative care, discussed hospice care, determined capacity/competency, determined goals of care, determined POA, determined social/family support, discussed plans of care, discussed symptom management, provided psychosocial support  Kristen Styles MD  Palliative Medicine & Supportive Care  Internal Medicine  Available via [facility] Text  Office: [phone]  Fax: [phone]

## 2019-12-27 NOTE — ASSESSMENT & PLAN NOTE
· Acute on chronic hypoxic respiratory failure in the setting of a severe COPD exacerbation  · He has underlying end-stage COPD and was not amenable to lung transplantation considerations  His last FEV1 was 12%  · Last month he was taken off of Breo and Spiriva concurrently is on albuterol as needed, budesonide and formoterol  · He has since quit smoking in 2012 however his lung disease remains progressively worsening  · Pulmonary following and appreciate input   · Continue IV solu-medrol with hopeful transition to PO prednisone tomorrow  · Azithromycin 500 mg daily x3 days    · Flu/RSV negative   · Scheduled nebs  · No evidence of focal consolidation on x-ray and simply extensive emphysematous and chronic scarring changes

## 2019-12-27 NOTE — PROGRESS NOTES
Progress Note - Pulmonary   Sivan Mina 68 y o  male MRN: [id number]  Unit/Bed#: [facility] 932-01 Encounter: [id number]      1  Acute on chronic hypoxic/hypercarbic respiratory failure 2/2 acute COPD exacerbation likely 2/2 viral URI? Vs other trigger with hx of very severe end stage COPD  - Patient with history of very severe COPD with FEV1 12% predicted on recent spirometry   - Now presenting with recurrence of intermittent hypoxia, SOB, BURTON, dizziness, tremors  - currently back to baseline O2 requirements on 4 L nasal cannula saturating mid to high 90s  - Patient w/o any signs/symptoms of acute bacterial infection and CXR is clear  - flu/RSV negative, follow-up sputum culture  - patient is without any wheeze on exam this morning and breath sounds have improved  Will continue Solu Medrol 40mg b i d  Today and switch to prednisone 40 mg for 5 days beginning tomorrow   - c/w azithromycin 500mg po x 3 days for COPD exacerbation   - C/w Pulmicort BID  - C/w Atrovent/Xopenex q6h  - Pulmonary toilet, OOB to chair, incentive reginald  - PT/OT as able  - home regime includes pulmicort BID, perforomist BID, atrovent QID, albuterol prn and patient wears 4 L NC 24/7, follows with Dr Marylen Loh  - previous discussions held for possible valve replacement, for which patient is not a candidate for  Additionally, previous discussions held for possible lung transplant, but patient has declined  - given recurrent exacerbations and frequent hospitalizations, palliative care consulted for goals of care discussion as well as symptom management  Patient is agreeable to this and interested in further resources      2  Essential HTN  - c/w home lasix and cardizem per primary team      3  Macrocytic anemia  - workup per primary team, c/w home ferrous sulfate, f/u FOBT, monitor H/H     4  GERD  - c/w PPI    Subjective:   Patient seen and examined at bedside  States that is feeling much better than yesterday, was sleeping comfortably    Currently on 4 L nasal cannula with SpO2 greater than 95%  Does have intermittent cough and mild shortness of breath  Denies fever, chills, nausea, vomiting, chest pain  Review of Systems   Constitutional: Positive for fatigue  Negative for chills, fever and unexpected weight change  HENT: Negative for congestion, rhinorrhea, sneezing and sore throat  Respiratory: Positive for cough, chest tightness and shortness of breath  Negative for wheezing  Cardiovascular: Negative for chest pain, palpitations and leg swelling  Gastrointestinal: Negative for abdominal pain, constipation, diarrhea, nausea and vomiting  Endocrine: Negative for cold intolerance and heat intolerance  Genitourinary: Negative for dysuria  Musculoskeletal: Negative for arthralgias  Allergic/Immunologic: Negative for immunocompromised state  Neurological: Positive for tremors  Negative for dizziness, light-headedness and numbness  Objective:     Vitals:    12/26/19 1935 12/26/19 2212 12/27/19 0024 12/27/19 0730   BP:  114/76  104/55   BP Location:       Pulse:  99  75   Resp:  22  18   Temp:  100 °F (37 8 °C)     TempSrc:       SpO2: 94% 98% 94% 97%   Weight:       Height:               Intake/Output Summary (Last 24 hours) at 12/27/2019 0748  Last data filed at 12/27/2019 0300  Gross per 24 hour   Intake 560 ml   Output 200 ml   Net 360 ml     Physical Exam   Constitutional: He is oriented to person, place, and time  No distress  thin   HENT:   Head: Normocephalic and atraumatic  Mouth/Throat: Oropharynx is clear and moist  No oropharyngeal exudate  Eyes: EOM are normal  No scleral icterus  Cardiovascular: Normal rate, regular rhythm and normal heart sounds  No murmur heard  Pulmonary/Chest: Effort normal  No respiratory distress  He has no wheezes  Distant breath sounds bilaterally, no appreciable wheeze   Abdominal: Soft  Bowel sounds are normal  He exhibits no distension  There is no tenderness     Musculoskeletal: He exhibits no edema  Neurological: He is alert and oriented to person, place, and time  Skin: He is not diaphoretic  Labs: I have personally reviewed pertinent lab results  Results from last 7 days   Lab Units 12/27/19  0501 12/26/19  1337   WBC Thousand/uL 2 08* 3 18*   HEMOGLOBIN g/dL 8 2* 9 0*   HEMATOCRIT % 27 1* 29 9*   PLATELETS Thousands/uL 276 282   NEUTROS PCT %  --  74   MONOS PCT %  --  6      Results from last 7 days   Lab Units 12/27/19  0501 12/26/19  1337   POTASSIUM mmol/L 4 6 4 7   CHLORIDE mmol/L 97* 97*   CO2 mmol/L >45* 44*   BUN mg/dL 19 18   CREATININE mg/dL 0 62 0 60   CALCIUM mg/dL 9 6 9 8   ALK PHOS U/L  --  78   ALT U/L  --  15   AST U/L  --  15                      0   Lab Value Date/Time    TROPONINI <0 02 12/26/2019 1337    TROPONINI 0 02 11/22/2019 0046    TROPONINI <0 02 02/21/2019 0943    TROPONINI <0 02 08/27/2017 0652    TROPONINI <0 04 06/02/2015 0326    TROPONINI <0 04 06/01/2015 1949    TROPONINI <0 04 05/14/2015 0600       Microbiology:  Influenza/RSV negative  Sputum culture pending    Imaging and other studies: I have personally reviewed pertinent reports  and I have personally reviewed pertinent films in PACS  CXR 12/26/19   clear with extensive emphysematous changes and bibasilar scarring      Pulmonary function testing:   Spirometry 12/19/19  very severe airflow limitation  FEV1 12% predicted     EKG, Pathology, and Other Studies: I have personally reviewed pertinent reports      11/22/19 EF 65%, concentric remodeling, normal right ventricular size and function, inadequate tricuspid jet for estimation of RV systolic pressure, but no indirect findings for moderate/severe pulmonary hypertension        Jing Misrty MD  Pulmonary & Critical Care Fellow, [facility]

## 2019-12-27 NOTE — PROGRESS NOTES
[facility]  Progress Note - Marilu Linear 1950, 68 y o  male MRN: [id number]    Unit/Bed#: [facility] 932-01 Encounter: [id number]    Primary Care Provider: Octavia Alejo MD   Date and time admitted to hospital: 12/26/2019  1:03 PM      * COPD with exacerbation ([address] )  Assessment & Plan  · Acute on chronic hypoxic respiratory failure in the setting of a severe COPD exacerbation  · He has underlying end-stage COPD and was not amenable to lung transplantation considerations  His last FEV1 was 12%  · Last month he was taken off of Breo and Spiriva concurrently is on albuterol as needed, budesonide and formoterol  · He has since quit smoking in 2012 however his lung disease remains progressively worsening  · Pulmonary following and appreciate input   · Continue IV solu-medrol with hopeful transition to PO prednisone tomorrow  · Azithromycin 500 mg daily x3 days  · Flu/RSV negative   · Scheduled nebs  · No evidence of focal consolidation on x-ray and simply extensive emphysematous and chronic scarring changes    At risk for venous thromboembolism (VTE)  Assessment & Plan  DVT prophylaxis with LMWH ordered daily  Leukopenia  Assessment & Plan  · The patient has been leukopenic in the past and seemingly related to his acute episodes of illness, correlating with a SIRS response  · Thus far there is no overt evidence of an active infection present warranting the use of broad-spectrum antibiotics  · Monitor trend daily  Gastroesophageal reflux disease without esophagitis  Assessment & Plan  · Start pantoprazole 40 mg daily especially in the setting of high dose steroid use for ulcer prophylaxis  Paroxysmal atrial tachycardia (HCC)  Assessment & Plan  · Seemingly in the setting of chronic lung disease  · We will continue diltiazem 180 mg daily  Macrocytic anemia  Assessment & Plan  · The patient has chronic anemia but this seemed to have a slight worsening from his baseline    · No carolyn/overt evidence of blood loss is noted  · Will send stool for occult blood testing - needs collecting   · In the interim continue ferrous sulfate and folic acid supplementation as his anemic status is likely contributory factor to his generalized fatigue and worsening dyspnea  VTE Pharmacologic Prophylaxis:   Pharmacologic: Enoxaparin (Lovenox)  Mechanical VTE Prophylaxis in Place: Yes    Patient Centered Rounds: I have performed bedside rounds with nursing staff today  Discussions with Specialists or Other Care Team Provider: NORMAN    Education and Discussions with Family / Patient: patient, wife over phone    Time Spent for Care: 30 minutes  More than 50% of total time spent on counseling and coordination of care as described above  Current Length of Stay: 1 day(s)    Current Patient Status: Inpatient   Certification Statement: The patient will continue to require additional inpatient hospital stay due to acute COPD exacerbation requiring IV steroids    Discharge Plan: 24-48 hrs pending improvement of respiratory status     Code Status: Level 2 - DNAR: but accepts endotracheal intubation      Subjective:   Patient reports he is starting to feel better with IV steroid  He is still quite short of breath and very fatigued  He feels he has no energy  Objective:     Vitals:   Temp (24hrs), Av 9 °F (37 2 °C), Min:98 3 °F (36 8 °C), Max:100 °F (37 8 °C)    Temp:  [98 3 °F (36 8 °C)-100 °F (37 8 °C)] 98 9 °F (37 2 °C)  HR:  [] 79  Resp:  [18-22] 22  BP: (104-116)/(55-94) 116/94  SpO2:  [94 %-98 %] 95 %  Body mass index is 20 68 kg/m²  Input and Output Summary (last 24 hours): Intake/Output Summary (Last 24 hours) at 2019 1646  Last data filed at 2019 1558  Gross per 24 hour   Intake 1167 ml   Output 650 ml   Net 517 ml       Physical Exam:     Physical Exam   Constitutional: He is oriented to person, place, and time  No distress     Tremor noted   HENT:   Head: Normocephalic and atraumatic  Eyes: EOM are normal  No scleral icterus  Neck: Normal range of motion  Neck supple  Cardiovascular: Normal rate and regular rhythm  Pulmonary/Chest:   Diminished b/l  Musculoskeletal: Normal range of motion  He exhibits no edema  Neurological: He is alert and oriented to person, place, and time  No cranial nerve deficit  Skin: Skin is warm and dry  He is not diaphoretic  Psychiatric: He has a normal mood and affect  His behavior is normal    Vitals reviewed  Additional Data:     Labs:    Results from last 7 days   Lab Units 12/27/19  0501 12/26/19  1337   WBC Thousand/uL 2 08* 3 18*   HEMOGLOBIN g/dL 8 2* 9 0*   HEMATOCRIT % 27 1* 29 9*   PLATELETS Thousands/uL 276 282   BANDS PCT % 3  --    NEUTROS PCT %  --  74   LYMPHS PCT %  --  17   LYMPHO PCT % 7*  --    MONOS PCT %  --  6   MONO PCT % 2*  --    EOS PCT % 0 2     Results from last 7 days   Lab Units 12/27/19  0501 12/26/19  1337   SODIUM mmol/L 141 141   POTASSIUM mmol/L 4 6 4 7   CHLORIDE mmol/L 97* 97*   CO2 mmol/L >45* 44*   BUN mg/dL 19 18   CREATININE mg/dL 0 62 0 60   ANION GAP mmol/L  --  0*   CALCIUM mg/dL 9 6 9 8   ALBUMIN g/dL  --  3 0*   TOTAL BILIRUBIN mg/dL  --  0 16*   ALK PHOS U/L  --  78   ALT U/L  --  15   AST U/L  --  15   GLUCOSE RANDOM mg/dL 179* 143*                           * I Have Reviewed All Lab Data Listed Above  * Additional Pertinent Lab Tests Reviewed:  All Labs Within Last 24 Hours Reviewed    Imaging:    Imaging Reports Reviewed Today Include: none  Imaging Personally Reviewed by Myself Includes:  none    Recent Cultures (last 7 days):           Last 24 Hours Medication List:     Current Facility-Administered Medications:  acetaminophen 650 mg Oral Q6H PRN Ari Carcamo MD   azithromycin 500 mg Oral Q24H Ari Carcamo MD   budesonide 0 5 mg Nebulization Q12H Ari Carcamo MD   calcium carbonate 1,000 mg Oral Daily PRN Ari Carcamo MD   citalopram 20 mg Oral Daily Kristina Bai MD   diltiazem 180 mg Oral Daily Kristina Bai MD   enoxaparin 40 mg Subcutaneous Daily Kristina Bai MD   ferrous sulfate 325 mg Oral Daily Kristina Bai MD   folic acid 2,706 mcg Oral Daily Kristina Bai MD   furosemide 20 mg Oral Daily Kristina Bai MD   ipratropium 0 5 mg Nebulization Q6H Kristina Bai MD   levalbuterol 1 25 mg Nebulization Q6H Kristina Bai MD   magnesium hydroxide 30 mL Oral Daily PRN Kristina Bai MD   melatonin 3 mg Oral HS PRN Kristina Bai MD   methylPREDNISolone sodium succinate 40 mg Intravenous Q12H Abe Alvares MD   pantoprazole 40 mg Oral Early Morning Kristina Bai MD   potassium chloride 20 mEq Oral Daily MD Shayy Garibay ON 12/28/2019] predniSONE 40 mg Oral Daily Adriano Otero MD        Today, Patient Was Seen By: Darius Schwarz PA-C    ** Please Note: Dictation voice to text software may have been used in the creation of this document   **

## 2019-12-27 NOTE — RESPIRATORY THERAPY NOTE
RT Protocol Note  Rossana Olivares 68 y o  male MRN: [id number]  Unit/Bed#: [facility] 932-01 Encounter: [id number]    Assessment    Principal Problem:    COPD with exacerbation ([facility] [address] )  Active Problems:    Macrocytic anemia    Paroxysmal atrial tachycardia (HCC)    Gastroesophageal reflux disease without esophagitis    Leukopenia    At risk for venous thromboembolism (VTE)      Home Pulmonary Medications:  2 5mg albuterol Q6prn  0 5mg atrovent QID  0 5mg pulmicort BID         Past Medical History:   Diagnosis Date    Anxiety     Aortic regurgitation     Atrial flutter (HCC)     Chronic respiratory failure (HCC)     Colon polyp     COPD (chronic obstructive pulmonary disease) (HCC)     Deep venous thrombosis of distal lower extremity (HCC)     Diverticulitis     GI bleed     Hypertension     Iron deficiency anemia     MGUS (monoclonal gammopathy of unknown significance)     Osteoarthritis of hip     PAC (premature atrial contraction)     Paroxysmal atrial fibrillation (HCC)     Patent foramen ovale     Pulmonary artery hypertension (HCC)     Renal failure     Sinus tachycardia      Social History     Socioeconomic History    Marital status: /Civil Union     Spouse name: None    Number of children: None    Years of education: None    Highest education level: None   Occupational History    None   Social Needs    Financial resource strain: None    Food insecurity:     Worry: None     Inability: None    Transportation needs:     Medical: None     Non-medical: None   Tobacco Use    Smoking status: Former Smoker     Packs/day: 1 50     Years: 42 00     Pack years: 63 00     Types: Cigarettes     Start date:      Last attempt to quit:      Years since quittin 9    Smokeless tobacco: Never Used   Substance and Sexual Activity    Alcohol use: Not Currently    Drug use: Not Currently    Sexual activity: Not Currently   Lifestyle    Physical activity:     Days per week: None     Minutes per session: None    Stress: None   Relationships    Social connections:     Talks on phone: None     Gets together: None     Attends Hinduism service: None     Active member of club or organization: None     Attends meetings of clubs or organizations: None     Relationship status: None    Intimate partner violence:     Fear of current or ex partner: None     Emotionally abused: None     Physically abused: None     Forced sexual activity: None   Other Topics Concern    None   Social History Narrative    Daily coffee consumption; 1 serving       Subjective         Objective    Physical Exam:   Assessment Type: (P) Pre-treatment  General Appearance: (P) Alert, Awake  Respiratory Pattern: (P) Normal  Chest Assessment: (P) Chest expansion symmetrical  Bilateral Breath Sounds: (P) Diminished    Vitals:  Blood pressure 116/75, pulse 103, temperature 98 3 °F (36 8 °C), resp  rate 22, SpO2 94 %  Imaging and other studies: I have personally reviewed pertinent reports  Plan    Respiratory Plan: Moderate/Severe Distress pathway        Resp Comments: (P) Pt admitted with an exacerbation of COPD  He takes albuterol prn, atrovent QID and pulmicort BID  He had a heart neb in the ER which relieved mush of his sob  He states it is still "pretty bad"  Pt cont  on UDN therapy every 6 hrs as ordered by the physician, and pulmicort as at home  Pt to cont  on respiratory protocal as his pulmonary physician has not seen him yet

## 2019-12-27 NOTE — ASSESSMENT & PLAN NOTE
· The patient has been leukopenic in the past and seemingly related to his acute episodes of illness, correlating with a SIRS response  · Thus far there is no overt evidence of an active infection present warranting the use of broad-spectrum antibiotics  · Monitor trend daily

## 2019-12-27 NOTE — ASSESSMENT & PLAN NOTE
· Start pantoprazole 40 mg daily especially in the setting of high dose steroid use for ulcer prophylaxis

## 2019-12-27 NOTE — SOCIAL WORK
Met with pt and explained CM program/CM role  Resides with wife in a 3 story home, 2 [address], main floor set up  Independent for ADL's, ambulates with a cane or walker  PCP Dr Karishma Alcaraz  Has prescription plan, uses [facility], [address]  Does not drive  DME: walker, cane, nebulizer, O2  HHC- [facility]  IP Rehab- [facility]  Denies mental health illness, IP or OP psyche care, drug and/or alcohol abuse  States wife is POA and he thinks he has a living will, no documents on chart  Message left with wife to confirm as well as whether interested in Meds to Bed, per patient request    TCF wife Rach, she would like meds to beds  She Freedomida Mccarthy has LW and POA, she will check paperwork and bring in    Family will provide transport home    Explained he will receive call from PCP office within 3 business days of discharge to schedule appt    Provided [facility] now and [facility] to Care pamphlets and explained  OP CM notification completed    CM reviewed d/c planning process including the following: identifying help at home, patient preference for d/c planning needs, Discharge Lounge, [facility] Meds to Bed program, availability of treatment team to discuss questions or concerns patient and/or family may have regarding understanding medications and recognizing signs and symptoms once discharged  CM also encouraged patient to follow up with all recommended appointments after discharge  Patient advised of importance for patient and family to participate in managing patients medical well being  Patient/caregiver received discharge checklist  Content reviewed  Patient/caregiver encouraged to participate in discharge plan of care prior to discharge home

## 2019-12-27 NOTE — ASSESSMENT & PLAN NOTE
· The patient has chronic anemia but this seemed to have a slight worsening from his baseline  · No carolyn/overt evidence of blood loss is noted  · Will send stool for occult blood testing - needs collecting   · In the interim continue ferrous sulfate and folic acid supplementation as his anemic status is likely contributory factor to his generalized fatigue and worsening dyspnea

## 2019-12-27 NOTE — ED PROVIDER NOTES
History  Chief Complaint   Patient presents with    Shortness of Breath     Pt c/o SOB for about a week, worsening  26-year-old male with history of poorly controlled COPD has baseline oxygen requirement of 4 L presenting for evaluation of worsening shortness of breath and respiratory distress  Patient was just discharged from the hospital for COPD exacerbation states that his symptoms have been worsening over the past 3 days but today his wife thought they were worsen that he needs come in to be evaluated  Prior to arrival patient received 1 albuterol DuoNeb no other treatments or medications were given  Patient after off oxygen for a brief moment saturation dropped to 67%  Patient was placed on nasal cannula at 6 L and oxygen saturation improved  Patient denies any chest pain shortness of breath no PE risk factors but does endorse immobility on a daily basis  No history of PEs DVTs no recent surgeries no exogenous estrogens or steroids  History provided by:  Patient   used: No    Shortness of Breath   Severity:  Severe  Onset quality:  Gradual  Timing:  Constant  Progression:  Worsening  Chronicity:  Recurrent  Context: weather changes    Relieved by:  Nothing  Worsened by:  Nothing  Ineffective treatments:  None tried  Associated symptoms: no abdominal pain, no chest pain, no fever, no headaches, no rash, no sore throat and no vomiting    Risk factors: prolonged immobilization    Risk factors: no hx of PE/DVT        Prior to Admission Medications   Prescriptions Last Dose Informant Patient Reported? Taking?    CARTIA  MG 24 hr capsule 12/26/2019 at Unknown time  No Yes   Sig: TAKE 1 CAPSULE BY MOUTH EVERY DAY   albuterol (2 5 mg/3 mL) 0 083 % nebulizer solution 12/26/2019 at Unknown time Self No Yes   Sig: Take 1 vial (2 5 mg total) by nebulization every 6 (six) hours as needed for wheezing or shortness of breath   albuterol (PROAIR HFA) 90 mcg/act inhaler 12/26/2019 at Unknown time Self No Yes   Sig: Inhale 2 puffs every 4 (four) hours as needed for wheezing or shortness of breath   budesonide (PULMICORT) 0 5 mg/2 mL nebulizer solution 12/26/2019 at Unknown time  No Yes   Sig: Take 1 vial (0 5 mg total) by nebulization 2 (two) times a day Rinse mouth after use     citalopram (CeleXA) 20 mg tablet 12/26/2019 at Unknown time Self No Yes   Sig: Take 1 tablet (20 mg total) by mouth daily   ferrous sulfate 325 (65 Fe) mg tablet 12/26/2019 at Unknown time Self Yes Yes   Sig: Take 325 mg by mouth daily   folic acid (FOLVITE) 1 mg tablet 12/26/2019 at Unknown time  No Yes   Sig: TAKE 1 TABLET BY MOUTH EVERY DAY   formoterol (PERFOROMIST) 20 MCG/2ML nebulizer solution 12/26/2019 at Unknown time  No Yes   Sig: Take 1 vial (20 mcg total) by nebulization 2 (two) times a day   furosemide (LASIX) 20 mg tablet   No Yes   Sig: TAKE 1/2 TABLET BY MOUTH DAILY   ipratropium (ATROVENT) 0 02 % nebulizer solution 12/26/2019 at Unknown time  No Yes   Sig: Take 1 vial (0 5 mg total) by nebulization 4 (four) times a day   melatonin 3 mg 12/25/2019 at Unknown time Self No Yes   Sig: Take 2 tablets by mouth daily at bedtime as needed (sleep)   potassium chloride (K-DUR,KLOR-CON) 20 mEq tablet 12/26/2019 at Unknown time Self No Yes   Sig: TAKE 1 TABLET BY MOUTH EVERY DAY      Facility-Administered Medications: None       Past Medical History:   Diagnosis Date    Anxiety     Aortic regurgitation     Atrial flutter (HCC)     Chronic respiratory failure (HCC)     Colon polyp     COPD (chronic obstructive pulmonary disease) (HCC)     Deep venous thrombosis of distal lower extremity (HCC)     Diverticulitis     GI bleed     Hypertension     Iron deficiency anemia     MGUS (monoclonal gammopathy of unknown significance)     Osteoarthritis of hip     PAC (premature atrial contraction)     Paroxysmal atrial fibrillation (HCC)     Patent foramen ovale     Pulmonary artery hypertension (HCC)     Renal failure     Sinus tachycardia        Past Surgical History:   Procedure Laterality Date    APPENDECTOMY      COLON SURGERY      HERNIA REPAIR      JOINT REPLACEMENT      KNEE SURGERY         Family History   Problem Relation Age of Onset    Cancer Mother     Heart attack Father     Sudden death Father     Arthritis Family     Diabetes Family      I have reviewed and agree with the history as documented  Social History     Tobacco Use    Smoking status: Former Smoker     Packs/day: 1 50     Years: 42 00     Pack years: 63 00     Types: Cigarettes     Start date:      Last attempt to quit:      Years since quittin 9    Smokeless tobacco: Never Used   Substance Use Topics    Alcohol use: Not Currently    Drug use: Not Currently        Review of Systems   Constitutional: Negative for chills, fatigue and fever  HENT: Negative for sore throat  Eyes: Negative for visual disturbance  Respiratory: Positive for chest tightness and shortness of breath  Cardiovascular: Negative for chest pain  Gastrointestinal: Negative for abdominal pain, constipation, diarrhea, nausea and vomiting  Genitourinary: Negative for difficulty urinating, dysuria and hematuria  Musculoskeletal: Negative for arthralgias  Skin: Negative for rash  Neurological: Negative for syncope, weakness and headaches  Hematological: Negative for adenopathy  Psychiatric/Behavioral: Negative for agitation and behavioral problems  All other systems reviewed and are negative        Physical Exam  ED Triage Vitals   Temperature Pulse Respirations Blood Pressure SpO2   19 1330 19 1305 19 1305 19 1305 19 1305   98 9 °F (37 2 °C) 91 (!) 30 142/63 (!) 67 %      Temp Source Heart Rate Source Patient Position - Orthostatic VS BP Location FiO2 (%)   19 1330 19 1305 19 1335 19 1335 --   Oral Monitor Sitting Right arm       Pain Score       19 1305       No Pain Orthostatic Vital Signs  Vitals:    12/26/19 1335 12/26/19 1514 12/26/19 1715 12/26/19 2212   BP:  162/65 116/75 114/76   Pulse: 92 97 103 99   Patient Position - Orthostatic VS: Sitting          Physical Exam   Constitutional: He is oriented to person, place, and time  He appears well-developed and well-nourished  HENT:   Head: Normocephalic and atraumatic  Eyes: Conjunctivae and EOM are normal  No scleral icterus  Neck: Normal range of motion  Neck supple  Cardiovascular: Normal rate and regular rhythm  No murmur heard  Pulmonary/Chest: Effort normal  He has decreased breath sounds in the right upper field, the right middle field, the right lower field, the left upper field, the left middle field and the left lower field  Abdominal: Soft  Bowel sounds are normal  There is no tenderness  Musculoskeletal: Normal range of motion  Neurological: He is alert and oriented to person, place, and time  Skin: Skin is warm and dry  Psychiatric: He has a normal mood and affect  His behavior is normal    Nursing note and vitals reviewed        ED Medications  Medications   ferrous sulfate tablet 325 mg (has no administration in time range)   citalopram (CeleXA) tablet 20 mg (has no administration in time range)   diltiazem (CARDIZEM CD) 24 hr capsule 180 mg (has no administration in time range)   folic acid (FOLVITE) tablet 1,000 mcg (has no administration in time range)   furosemide (LASIX) tablet 20 mg (has no administration in time range)   melatonin tablet 3 mg (has no administration in time range)   potassium chloride (K-DUR,KLOR-CON) CR tablet 20 mEq (has no administration in time range)   methylPREDNISolone sodium succinate (Solu-MEDROL) injection 40 mg (has no administration in time range)   enoxaparin (LOVENOX) subcutaneous injection 40 mg (has no administration in time range)   calcium carbonate (TUMS) chewable tablet 1,000 mg (has no administration in time range)   magnesium hydroxide (MILK OF MAGNESIA) 400 mg/5 mL oral suspension 30 mL (has no administration in time range)   acetaminophen (TYLENOL) tablet 650 mg (has no administration in time range)   pantoprazole (PROTONIX) EC tablet 40 mg (has no administration in time range)   azithromycin (ZITHROMAX) tablet 500 mg (has no administration in time range)   budesonide (PULMICORT) inhalation solution 0 5 mg (0 5 mg Nebulization Given 12/26/19 1935)   ipratropium (ATROVENT) 0 02 % inhalation solution 0 5 mg (0 5 mg Nebulization Given 12/26/19 1935)   levalbuterol (XOPENEX) inhalation solution 1 25 mg (1 25 mg Nebulization Given 12/26/19 1935)   albuterol inhalation solution 10 mg (10 mg Nebulization Given 12/26/19 1341)     And   ipratropium (ATROVENT) 0 02 % inhalation solution 1 mg (1 mg Nebulization Given 12/26/19 1342)     And   sodium chloride 0 9 % inhalation solution 3 mL (3 mL Nebulization Given 12/26/19 1341)   methylPREDNISolone sodium succinate (Solu-MEDROL) injection 125 mg (125 mg Intravenous Given 12/26/19 1336)   magnesium sulfate 2 g/50 mL IVPB (premix) 2 g (0 g Intravenous Stopped 12/26/19 1530)   terbutaline (BRETHINE) injection 0 25 mg (0 25 mg Subcutaneous Given 12/26/19 1533)   ipratropium-albuterol (FOR EMS ONLY) (DUO-NEB) 0 5-2 5 mg/3 mL inhalation solution 3 mL (0 each Does not apply Given to EMS 12/26/19 1323)       Diagnostic Studies  Results Reviewed     Procedure Component Value Units Date/Time    Influenza A/B and RSV PCR [[id number]]  (Normal) Collected:  12/26/19 1621    Lab Status:  Final result Specimen:  Nasopharyngeal Swab Updated:  12/26/19 1754     INFLUENZA A PCR None Detected     INFLUENZA B PCR None Detected     RSV PCR None Detected    Occult blood 1-3, stool [[id number]]     Lab Status:  No result Specimen:  Stool     Blood gas, venous [[id number]]  (Abnormal) Collected:  12/26/19 1532    Lab Status:  Final result Specimen:  Blood from Arm, Left Updated:  12/26/19 1538     pH, Stewart 7 351     pCO2, Stewart 75 7 mm Hg      pO2, Stewart 74 4 mm Hg      HCO3, Stewart 40 9 mmol/L      Base Excess, Stewart 12 9 mmol/L      O2 Content, Stewart 13 3 ml/dL      O2 HGB, VENOUS 92 1 %     Comprehensive metabolic panel [[id number]]  (Abnormal) Collected:  12/26/19 1337    Lab Status:  Final result Specimen:  Blood from Arm, Right Updated:  12/26/19 1423     Sodium 141 mmol/L      Potassium 4 7 mmol/L      Chloride 97 mmol/L      CO2 44 mmol/L      ANION GAP 0 mmol/L      BUN 18 mg/dL      Creatinine 0 60 mg/dL      Glucose 143 mg/dL      Calcium 9 8 mg/dL      AST 15 U/L      ALT 15 U/L      Alkaline Phosphatase 78 U/L      Total Protein 7 0 g/dL      Albumin 3 0 g/dL      Total Bilirubin 0 16 mg/dL      eGFR 101 ml/min/1 73sq m     Narrative:       [facility] guidelines for Chronic Kidney Disease (CKD):     Stage 1 with normal or high GFR (GFR > 90 mL/min/1 73 square meters)    Stage 2 Mild CKD (GFR = 60-89 mL/min/1 73 square meters)    Stage 3A Moderate CKD (GFR = 45-59 mL/min/1 73 square meters)    Stage 3B Moderate CKD (GFR = 30-44 mL/min/1 73 square meters)    Stage 4 Severe CKD (GFR = 15-29 mL/min/1 73 square meters)    Stage 5 End Stage CKD (GFR <15 mL/min/1 73 square meters)  Note: GFR calculation is accurate only with a steady state creatinine    NT-BNP PRO [[id number]]  (Abnormal) Collected:  12/26/19 1337    Lab Status:  Final result Specimen:  Blood from Arm, Right Updated:  12/26/19 1423     NT-proBNP 201 pg/mL     Troponin I [[id number]]  (Normal) Collected:  12/26/19 1337    Lab Status:  Final result Specimen:  Blood from Arm, Right Updated:  12/26/19 1414     Troponin I <0 02 ng/mL     CBC and differential [[id number]]  (Abnormal) Collected:  12/26/19 1337    Lab Status:  Final result Specimen:  Blood from Arm, Right Updated:  12/26/19 1349     WBC 3 18 Thousand/uL      RBC 2 87 Million/uL      Hemoglobin 9 0 g/dL      Hematocrit 29 9 %       fL      MCH 31 4 pg      MCHC 30 1 g/dL      RDW 13 7 % MPV 9 0 fL      Platelets 737 Thousands/uL      nRBC 0 /100 WBCs      Neutrophils Relative 74 %      Immat GRANS % 1 %      Lymphocytes Relative 17 %      Monocytes Relative 6 %      Eosinophils Relative 2 %      Basophils Relative 0 %      Neutrophils Absolute 2 36 Thousands/µL      Immature Grans Absolute 0 04 Thousand/uL      Lymphocytes Absolute 0 53 Thousands/µL      Monocytes Absolute 0 18 Thousand/µL      Eosinophils Absolute 0 06 Thousand/µL      Basophils Absolute 0 01 Thousands/µL                  XR chest 1 view portable   ED Interpretation by Joni Seth MD (12/26 1406)   No acute cardiopulmonary disease  Final Result by Shruthi Dickson MD (12/26 1528)      No acute cardiopulmonary disease  COPD with extensive emphysematous changes and bibasilar scarring  Workstation performed: [id number]               Procedures  Procedures      ED Course           Identification of Seniors at Risk      Most Recent Value   (ISAR) Identification of Seniors at Risk   Before the illness or injury that brought you to the Emergency, did you need someone to help you on a regular basis? 0 Filed at: 12/26/2019 1338   In the last 24 hours, have you needed more help than usual?  1 Filed at: 12/26/2019 1338   Have you been hospitalized for one or more nights during the past 6 months? 0 Filed at: 12/26/2019 1338   In general, do you see well?  0 Filed at: 12/26/2019 1338   In general, do you have serious problems with your memory? 0 Filed at: 12/26/2019 1338   Do you take more than three different medications every day?   1 Filed at: 12/26/2019 1338   ISAR Score  2 Filed at: 12/26/2019 1338                          MDM  Number of Diagnoses or Management Options  COPD exacerbation [facility]): new and requires workup  Diagnosis management comments: 68-year-old male presenting for evaluation of COPD exacerbation, decreased breath sounds throughout all lung fields on initial evaluation with no wheezing noted, patient's vital signs are otherwise unremarkable, initiated patient on heart neb therapy as well as magnesium Solu-Medrol and terbutaline  Patient states his symptoms had improved however he still was not moving much air on my repeat examination  Discussed with medicine will be readmitted to their service  Amount and/or Complexity of Data Reviewed  Clinical lab tests: reviewed and ordered  Tests in the radiology section of CPT®: ordered and reviewed  Tests in the medicine section of CPT®: ordered and reviewed  Decide to obtain previous medical records or to obtain history from someone other than the patient: yes  Review and summarize past medical records: yes  Discuss the patient with other providers: yes  Independent visualization of images, tracings, or specimens: yes          Disposition  Final diagnoses:   COPD exacerbation ([place] [address] )     Time reflects when diagnosis was documented in both MDM as applicable and the Disposition within this note     Time User Action Codes Description Comment    12/26/2019  3:08 PM Misty Story Add [J44 1] COPD exacerbation ([place] [address] )     12/26/2019  4:44 PM Judi Carlson Add [J44 1] COPD with exacerbation [facility])       ED Disposition     ED Disposition Condition Date/Time Comment    Admit Stable Thu Dec 26, 2019  3:08 PM Case was discussed with ALYSSA and the patient's admission status was agreed to be Admission Status: inpatient status to the service of Dr Jacquie Noe           Follow-up Information    None         Current Discharge Medication List      CONTINUE these medications which have NOT CHANGED    Details   albuterol (2 5 mg/3 mL) 0 083 % nebulizer solution Take 1 vial (2 5 mg total) by nebulization every 6 (six) hours as needed for wheezing or shortness of breath  Qty: 125 vial, Refills: 5    Comments: Dx Code: J44 9  Associated Diagnoses: Chronic obstructive pulmonary disease, unspecified COPD type ([place] [address] )      albuterol (PROAIR HFA) 90 mcg/act inhaler Inhale 2 puffs every 4 (four) hours as needed for wheezing or shortness of breath  Qty: 8 5 Inhaler, Refills: 5    Comments: Generic ok  Associated Diagnoses: Chronic obstructive pulmonary disease, unspecified COPD type ([facility])      budesonide (PULMICORT) 0 5 mg/2 mL nebulizer solution Take 1 vial (0 5 mg total) by nebulization 2 (two) times a day Rinse mouth after use    Qty: 60 vial, Refills: 5    Associated Diagnoses: COPD (chronic obstructive pulmonary disease) ([facility])      CARTIA  MG 24 hr capsule TAKE 1 CAPSULE BY MOUTH EVERY DAY  Qty: 30 capsule, Refills: 5    Associated Diagnoses: Paroxysmal atrial tachycardia ([facility])      citalopram (CeleXA) 20 mg tablet Take 1 tablet (20 mg total) by mouth daily  Qty: 90 tablet, Refills: 3    Associated Diagnoses: Depression, unspecified depression type      ferrous sulfate 325 (65 Fe) mg tablet Take 325 mg by mouth daily      folic acid (FOLVITE) 1 mg tablet TAKE 1 TABLET BY MOUTH EVERY DAY  Qty: 30 tablet, Refills: 5    Associated Diagnoses: Anemia, unspecified type      formoterol (PERFOROMIST) 20 MCG/2ML nebulizer solution Take 1 vial (20 mcg total) by nebulization 2 (two) times a day  Qty: 60 vial, Refills: 5    Associated Diagnoses: COPD (chronic obstructive pulmonary disease) ([facility])      furosemide (LASIX) 20 mg tablet TAKE 1/2 TABLET BY MOUTH DAILY  Qty: 30 tablet, Refills: 5    Associated Diagnoses: Chronic respiratory failure, unspecified whether with hypoxia or hypercapnia ([facility])      ipratropium (ATROVENT) 0 02 % nebulizer solution Take 1 vial (0 5 mg total) by nebulization 4 (four) times a day  Qty: 120 vial, Refills: 5    Associated Diagnoses: COPD (chronic obstructive pulmonary disease) ([facility])      melatonin 3 mg Take 2 tablets by mouth daily at bedtime as needed (sleep)  Qty: 30 tablet, Refills: 0      potassium chloride (K-DUR,KLOR-CON) 20 mEq tablet TAKE 1 TABLET BY MOUTH EVERY DAY  Qty: 30 tablet, Refills: 5    Associated Diagnoses: Hypokalemia           No discharge procedures on file     ED Provider  Attending physically available and evaluated Phyllisklaudia Velasco  TRINO managed the patient along with the ED Attending      Electronically Signed by         Sedrick Minor MD  12/26/19 2760

## 2019-12-28 LAB
ABO GROUP BLD: NORMAL
BLD GP AB SCN SERPL QL: NEGATIVE
BUN SERPL-MCNC: 23 MG/DL (ref 5–25)
CALCIUM SERPL-MCNC: 9.5 MG/DL (ref 8.3–10.1)
CHLORIDE SERPL-SCNC: 98 MMOL/L (ref 100–108)
CO2 SERPL-SCNC: >45 MMOL/L (ref 21–32)
CREAT SERPL-MCNC: 0.68 MG/DL (ref 0.6–1.3)
ERYTHROCYTE [DISTWIDTH] IN BLOOD BY AUTOMATED COUNT: 13.7 % (ref 11.6–15.1)
FERRITIN SERPL-MCNC: 109 NG/ML (ref 8–388)
GFR SERPL CREATININE-BSD FRML MDRD: 96 ML/MIN/1.73SQ M
GLUCOSE SERPL-MCNC: 192 MG/DL (ref 65–140)
HCT VFR BLD AUTO: 25.8 % (ref 36.5–49.3)
HGB BLD-MCNC: 7.8 G/DL (ref 12–17)
IRON SATN MFR SERPL: 32 %
IRON SERPL-MCNC: 83 UG/DL (ref 65–175)
MCH RBC QN AUTO: 31.2 PG (ref 26.8–34.3)
MCHC RBC AUTO-ENTMCNC: 30.2 G/DL (ref 31.4–37.4)
MCV RBC AUTO: 103 FL (ref 82–98)
PLATELET # BLD AUTO: 300 THOUSANDS/UL (ref 149–390)
PMV BLD AUTO: 9.5 FL (ref 8.9–12.7)
POTASSIUM SERPL-SCNC: 4.9 MMOL/L (ref 3.5–5.3)
RBC # BLD AUTO: 2.5 MILLION/UL (ref 3.88–5.62)
RH BLD: POSITIVE
SODIUM SERPL-SCNC: 142 MMOL/L (ref 136–145)
SPECIMEN EXPIRATION DATE: NORMAL
TIBC SERPL-MCNC: 258 UG/DL (ref 250–450)
WBC # BLD AUTO: 4.7 THOUSAND/UL (ref 4.31–10.16)

## 2019-12-28 PROCEDURE — 83540 ASSAY OF IRON: CPT | Performed by: PHYSICIAN ASSISTANT

## 2019-12-28 PROCEDURE — 86901 BLOOD TYPING SEROLOGIC RH(D): CPT | Performed by: PHYSICIAN ASSISTANT

## 2019-12-28 PROCEDURE — 86900 BLOOD TYPING SEROLOGIC ABO: CPT | Performed by: PHYSICIAN ASSISTANT

## 2019-12-28 PROCEDURE — 85027 COMPLETE CBC AUTOMATED: CPT | Performed by: PHYSICIAN ASSISTANT

## 2019-12-28 PROCEDURE — 86850 RBC ANTIBODY SCREEN: CPT | Performed by: PHYSICIAN ASSISTANT

## 2019-12-28 PROCEDURE — 83550 IRON BINDING TEST: CPT | Performed by: PHYSICIAN ASSISTANT

## 2019-12-28 PROCEDURE — 94760 N-INVAS EAR/PLS OXIMETRY 1: CPT

## 2019-12-28 PROCEDURE — 99232 SBSQ HOSP IP/OBS MODERATE 35: CPT | Performed by: PHYSICIAN ASSISTANT

## 2019-12-28 PROCEDURE — 94640 AIRWAY INHALATION TREATMENT: CPT

## 2019-12-28 PROCEDURE — 80048 BASIC METABOLIC PNL TOTAL CA: CPT | Performed by: PHYSICIAN ASSISTANT

## 2019-12-28 PROCEDURE — P9016 RBC LEUKOCYTES REDUCED: HCPCS

## 2019-12-28 PROCEDURE — 30233N1 TRANSFUSION OF NONAUTOLOGOUS RED BLOOD CELLS INTO PERIPHERAL VEIN, PERCUTANEOUS APPROACH: ICD-10-PCS | Performed by: INTERNAL MEDICINE

## 2019-12-28 PROCEDURE — 86923 COMPATIBILITY TEST ELECTRIC: CPT

## 2019-12-28 PROCEDURE — 82728 ASSAY OF FERRITIN: CPT | Performed by: PHYSICIAN ASSISTANT

## 2019-12-28 PROCEDURE — 99232 SBSQ HOSP IP/OBS MODERATE 35: CPT | Performed by: INTERNAL MEDICINE

## 2019-12-28 RX ORDER — SENNOSIDES 8.6 MG
1 TABLET ORAL
Status: DISCONTINUED | OUTPATIENT
Start: 2019-12-28 | End: 2019-12-31 | Stop reason: HOSPADM

## 2019-12-28 RX ORDER — DOCUSATE SODIUM 100 MG/1
100 CAPSULE, LIQUID FILLED ORAL 2 TIMES DAILY
Status: DISCONTINUED | OUTPATIENT
Start: 2019-12-28 | End: 2019-12-31 | Stop reason: HOSPADM

## 2019-12-28 RX ORDER — LEVALBUTEROL 1.25 MG/.5ML
1.25 SOLUTION, CONCENTRATE RESPIRATORY (INHALATION)
Status: DISCONTINUED | OUTPATIENT
Start: 2019-12-29 | End: 2019-12-31 | Stop reason: HOSPADM

## 2019-12-28 RX ORDER — ALBUTEROL SULFATE 2.5 MG/3ML
2.5 SOLUTION RESPIRATORY (INHALATION) EVERY 4 HOURS PRN
Status: DISCONTINUED | OUTPATIENT
Start: 2019-12-28 | End: 2019-12-31 | Stop reason: HOSPADM

## 2019-12-28 RX ORDER — POLYETHYLENE GLYCOL 3350 17 G/17G
17 POWDER, FOR SOLUTION ORAL DAILY PRN
Status: DISCONTINUED | OUTPATIENT
Start: 2019-12-28 | End: 2019-12-31 | Stop reason: HOSPADM

## 2019-12-28 RX ADMIN — LEVALBUTEROL HYDROCHLORIDE 1.25 MG: 1.25 SOLUTION, CONCENTRATE RESPIRATORY (INHALATION) at 19:15

## 2019-12-28 RX ADMIN — PANTOPRAZOLE SODIUM 40 MG: 40 TABLET, DELAYED RELEASE ORAL at 06:15

## 2019-12-28 RX ADMIN — SENNOSIDES 8.6 MG: 8.6 TABLET, FILM COATED ORAL at 21:11

## 2019-12-28 RX ADMIN — FUROSEMIDE 20 MG: 20 TABLET ORAL at 08:23

## 2019-12-28 RX ADMIN — DILTIAZEM HYDROCHLORIDE 180 MG: 180 CAPSULE, COATED, EXTENDED RELEASE ORAL at 08:23

## 2019-12-28 RX ADMIN — LEVALBUTEROL HYDROCHLORIDE 1.25 MG: 1.25 SOLUTION, CONCENTRATE RESPIRATORY (INHALATION) at 13:07

## 2019-12-28 RX ADMIN — BUDESONIDE 0.5 MG: 0.5 INHALANT RESPIRATORY (INHALATION) at 19:15

## 2019-12-28 RX ADMIN — PREDNISONE 40 MG: 20 TABLET ORAL at 08:23

## 2019-12-28 RX ADMIN — BUDESONIDE 0.5 MG: 0.5 INHALANT RESPIRATORY (INHALATION) at 07:22

## 2019-12-28 RX ADMIN — CITALOPRAM HYDROBROMIDE 20 MG: 20 TABLET ORAL at 08:23

## 2019-12-28 RX ADMIN — IPRATROPIUM BROMIDE 0.5 MG: 0.5 SOLUTION RESPIRATORY (INHALATION) at 19:15

## 2019-12-28 RX ADMIN — FERROUS SULFATE TAB 325 MG (65 MG ELEMENTAL FE) 325 MG: 325 (65 FE) TAB at 08:23

## 2019-12-28 RX ADMIN — ENOXAPARIN SODIUM 40 MG: 40 INJECTION SUBCUTANEOUS at 08:23

## 2019-12-28 RX ADMIN — DOCUSATE SODIUM 100 MG: 100 CAPSULE, LIQUID FILLED ORAL at 17:15

## 2019-12-28 RX ADMIN — IPRATROPIUM BROMIDE 0.5 MG: 0.5 SOLUTION RESPIRATORY (INHALATION) at 13:07

## 2019-12-28 RX ADMIN — LEVALBUTEROL HYDROCHLORIDE 1.25 MG: 1.25 SOLUTION, CONCENTRATE RESPIRATORY (INHALATION) at 07:22

## 2019-12-28 RX ADMIN — AZITHROMYCIN 500 MG: 250 TABLET, FILM COATED ORAL at 17:15

## 2019-12-28 RX ADMIN — POTASSIUM CHLORIDE 20 MEQ: 1500 TABLET, EXTENDED RELEASE ORAL at 08:23

## 2019-12-28 RX ADMIN — IPRATROPIUM BROMIDE 0.5 MG: 0.5 SOLUTION RESPIRATORY (INHALATION) at 07:22

## 2019-12-28 RX ADMIN — FOLIC ACID 1000 MCG: 1 TABLET ORAL at 08:23

## 2019-12-28 NOTE — PLAN OF CARE
Problem: Potential for Falls  Goal: Patient will remain free of falls  Description  INTERVENTIONS:  - Assess patient frequently for physical needs  -  Identify cognitive and physical deficits and behaviors that affect risk of falls  -  [place] fall precautions as indicated by assessment   - Educate patient/family on patient safety including physical limitations  - Instruct patient to call for assistance with activity based on assessment  - Modify environment to reduce risk of injury  - Consider OT/PT consult to assist with strengthening/mobility  Outcome: Completed     Problem: Prexisting or High Potential for Compromised Skin Integrity  Goal: Skin integrity is maintained or improved  Description  INTERVENTIONS:  - Identify patients at risk for skin breakdown  - Assess and monitor skin integrity  - Assess and monitor nutrition and hydration status  - Monitor labs   - Assess for incontinence   - Turn and reposition patient  - Assist with mobility/ambulation  - Relieve pressure over bony prominences  - Avoid friction and shearing  - Provide appropriate hygiene as needed including keeping skin clean and dry  - Evaluate need for skin moisturizer/barrier cream  - Collaborate with interdisciplinary team   - Patient/family teaching  - Consider wound care consult   Outcome: Completed     Problem: Nutrition/Hydration-ADULT  Goal: Nutrient/Hydration intake appropriate for improving, restoring or maintaining nutritional needs  Description  Monitor and assess patient's nutrition/hydration status for malnutrition  Collaborate with interdisciplinary team and initiate plan and interventions as ordered  Monitor patient's weight and dietary intake as ordered or per policy  Utilize nutrition screening tool and intervene as necessary  Determine patient's food preferences and provide high-protein, high-caloric foods as appropriate       INTERVENTIONS:  - Monitor oral intake, urinary output, labs, and treatment plans  - Assess nutrition and hydration status and recommend course of action  - Evaluate amount of meals eaten  - Assist patient with eating if necessary   - Allow adequate time for meals  - Recommend/ encourage appropriate diets, oral nutritional supplements, and vitamin/mineral supplements  - Order, calculate, and assess calorie counts as needed  - Recommend, monitor, and adjust tube feedings and TPN/PPN based on assessed needs  - Assess need for intravenous fluids  - Provide specific nutrition/hydration education as appropriate  - Include patient/family/caregiver in decisions related to nutrition  Outcome: Completed     Problem: RESPIRATORY - ADULT  Goal: Achieves optimal ventilation and oxygenation  Description  INTERVENTIONS:  - Assess for changes in respiratory status  - Assess for changes in mentation and behavior  - Position to facilitate oxygenation and minimize respiratory effort  - Oxygen administered by appropriate delivery if ordered  - Initiate smoking cessation education as indicated  - Encourage broncho-pulmonary hygiene including cough, deep breathe, Incentive Spirometry  - Assess the need for suctioning and aspirate as needed  - Assess and instruct to report SOB or any respiratory difficulty  - Respiratory Therapy support as indicated  Outcome: Completed     Problem: METABOLIC, FLUID AND ELECTROLYTES - ADULT  Goal: Electrolytes maintained within normal limits  Description  INTERVENTIONS:  - Monitor labs and assess patient for signs and symptoms of electrolyte imbalances  - Administer electrolyte replacement as ordered  - Monitor response to electrolyte replacements, including repeat lab results as appropriate  - Instruct patient on fluid and nutrition as appropriate  Outcome: Completed     Problem: SKIN/TISSUE INTEGRITY - ADULT  Goal: Skin integrity remains intact  Description  INTERVENTIONS  - Identify patients at risk for skin breakdown  - Assess and monitor skin integrity  - Assess and monitor nutrition and hydration status  - Monitor labs (i e  albumin)  - Assess for incontinence   - Turn and reposition patient  - Assist with mobility/ambulation  - Relieve pressure over bony prominences  - Avoid friction and shearing  - Provide appropriate hygiene as needed including keeping skin clean and dry  - Evaluate need for skin moisturizer/barrier cream  - Collaborate with interdisciplinary team (i e  Nutrition, Rehabilitation, etc )   - Patient/family teaching  Outcome: Completed     Problem: MUSCULOSKELETAL - ADULT  Goal: Maintain or return mobility to safest level of function  Description  INTERVENTIONS:  - Assess patient's ability to carry out ADLs; assess patient's baseline for ADL function and identify physical deficits which impact ability to perform ADLs (bathing, care of mouth/teeth, toileting, grooming, dressing, etc )  - Assess/evaluate cause of self-care deficits   - Assess range of motion  - Assess patient's mobility  - Assess patient's need for assistive devices and provide as appropriate  - Encourage maximum independence but intervene and supervise when necessary  - Involve family in performance of ADLs  - Assess for home care needs following discharge   - Consider OT consult to assist with ADL evaluation and planning for discharge  - Provide patient education as appropriate  Outcome: Completed  Goal: Maintain proper alignment of affected body part  Description  INTERVENTIONS:  - Support, maintain and protect limb and body alignment  - Provide patient/ family with appropriate education  Outcome: Completed

## 2019-12-28 NOTE — ASSESSMENT & PLAN NOTE
· Acute on chronic hypoxic respiratory failure in the setting of a severe COPD exacerbation  · He has underlying end-stage COPD and was not amenable to lung transplantation considerations  His last FEV1 was 12%  · Last month he was taken off of Breo and Spiriva concurrently is on albuterol as needed, budesonide and formoterol  · He has since quit smoking in 2012 however his lung disease remains progressively worsening     · Pulmonary following and appreciate input   · Transitioned to PO prednisone from IV solumedrol, day 1/5   · Azithromycin 500 mg daily x3 days, day 3/3   · Flu/RSV negative   · Scheduled nebs  · No evidence of focal consolidation on x-ray and simply extensive emphysematous and chronic scarring changes

## 2019-12-28 NOTE — PLAN OF CARE
Problem: Potential for Falls  Goal: Patient will remain free of falls  Description  INTERVENTIONS:  - Assess patient frequently for physical needs  -  Identify cognitive and physical deficits and behaviors that affect risk of falls  -  [place] fall precautions as indicated by assessment   - Educate patient/family on patient safety including physical limitations  - Instruct patient to call for assistance with activity based on assessment  - Modify environment to reduce risk of injury  - Consider OT/PT consult to assist with strengthening/mobility  Outcome: Progressing     Problem: Prexisting or High Potential for Compromised Skin Integrity  Goal: Skin integrity is maintained or improved  Description  INTERVENTIONS:  - Identify patients at risk for skin breakdown  - Assess and monitor skin integrity  - Assess and monitor nutrition and hydration status  - Monitor labs   - Assess for incontinence   - Turn and reposition patient  - Assist with mobility/ambulation  - Relieve pressure over bony prominences  - Avoid friction and shearing  - Provide appropriate hygiene as needed including keeping skin clean and dry  - Evaluate need for skin moisturizer/barrier cream  - Collaborate with interdisciplinary team   - Patient/family teaching  - Consider wound care consult   Outcome: Progressing     Problem: Nutrition/Hydration-ADULT  Goal: Nutrient/Hydration intake appropriate for improving, restoring or maintaining nutritional needs  Description  Monitor and assess patient's nutrition/hydration status for malnutrition  Collaborate with interdisciplinary team and initiate plan and interventions as ordered  Monitor patient's weight and dietary intake as ordered or per policy  Utilize nutrition screening tool and intervene as necessary  Determine patient's food preferences and provide high-protein, high-caloric foods as appropriate       INTERVENTIONS:  - Monitor oral intake, urinary output, labs, and treatment plans  - Assess nutrition and hydration status and recommend course of action  - Evaluate amount of meals eaten  - Assist patient with eating if necessary   - Allow adequate time for meals  - Recommend/ encourage appropriate diets, oral nutritional supplements, and vitamin/mineral supplements  - Order, calculate, and assess calorie counts as needed  - Recommend, monitor, and adjust tube feedings and TPN/PPN based on assessed needs  - Assess need for intravenous fluids  - Provide specific nutrition/hydration education as appropriate  - Include patient/family/caregiver in decisions related to nutrition  Outcome: Progressing     Problem: RESPIRATORY - ADULT  Goal: Achieves optimal ventilation and oxygenation  Description  INTERVENTIONS:  - Assess for changes in respiratory status  - Assess for changes in mentation and behavior  - Position to facilitate oxygenation and minimize respiratory effort  - Oxygen administered by appropriate delivery if ordered  - Initiate smoking cessation education as indicated  - Encourage broncho-pulmonary hygiene including cough, deep breathe, Incentive Spirometry  - Assess the need for suctioning and aspirate as needed  - Assess and instruct to report SOB or any respiratory difficulty  - Respiratory Therapy support as indicated  Outcome: Progressing     Problem: METABOLIC, FLUID AND ELECTROLYTES - ADULT  Goal: Electrolytes maintained within normal limits  Description  INTERVENTIONS:  - Monitor labs and assess patient for signs and symptoms of electrolyte imbalances  - Administer electrolyte replacement as ordered  - Monitor response to electrolyte replacements, including repeat lab results as appropriate  - Instruct patient on fluid and nutrition as appropriate  Outcome: Progressing     Problem: SKIN/TISSUE INTEGRITY - ADULT  Goal: Skin integrity remains intact  Description  INTERVENTIONS  - Identify patients at risk for skin breakdown  - Assess and monitor skin integrity  - Assess and monitor nutrition and hydration status  - Monitor labs (i e  albumin)  - Assess for incontinence   - Turn and reposition patient  - Assist with mobility/ambulation  - Relieve pressure over bony prominences  - Avoid friction and shearing  - Provide appropriate hygiene as needed including keeping skin clean and dry  - Evaluate need for skin moisturizer/barrier cream  - Collaborate with interdisciplinary team (i e  Nutrition, Rehabilitation, etc )   - Patient/family teaching  Outcome: Progressing     Problem: MUSCULOSKELETAL - ADULT  Goal: Maintain or return mobility to safest level of function  Description  INTERVENTIONS:  - Assess patient's ability to carry out ADLs; assess patient's baseline for ADL function and identify physical deficits which impact ability to perform ADLs (bathing, care of mouth/teeth, toileting, grooming, dressing, etc )  - Assess/evaluate cause of self-care deficits   - Assess range of motion  - Assess patient's mobility  - Assess patient's need for assistive devices and provide as appropriate  - Encourage maximum independence but intervene and supervise when necessary  - Involve family in performance of ADLs  - Assess for home care needs following discharge   - Consider OT consult to assist with ADL evaluation and planning for discharge  - Provide patient education as appropriate  Outcome: Progressing

## 2019-12-28 NOTE — PROGRESS NOTES
Progress Note - Pulmonary   Domingo Graff 68 y o  male MRN: [id number]  Unit/Bed#: [facility] 932-01 Encounter: [id number]      1  Acute on chronic hypoxic/hypercarbic respiratory failure 2/2 acute COPD exacerbation likely 2/2 viral URI? Vs other trigger with hx of very severe end stage COPD  - Patient with history of very severe COPD with FEV1 12% predicted on recent spirometry   - presented with recurrence of intermittent hypoxia, SOB, BURTON, dizziness, tremors  - currently back to baseline O2 requirements on 3- 4 L nasal cannula saturating mid to high 90s  - Patient w/o any signs/symptoms of acute bacterial infection and CXR is clear  - flu/RSV negative, follow-up sputum culture  - switch to prednisone 40 mg for 5 days beginning today then stop   - c/w azithromycin 500mg po x 3 days for COPD exacerbation (today day 3/3)  - C/w Pulmicort BID  - C/w Atrovent/Xopenex q6h  - Pulmonary toilet, OOB to chair, incentive reginald  - PT/OT as able  - home regime includes pulmicort BID, perforomist BID, atrovent QID, albuterol prn and patient wears 4 L NC 24/7, follows with Dr Baldomero Murry discussions held for possible valve replacement, for which patient is not a candidate for   Additionally, previous discussions held for possible lung transplant, but patient has declined  - Seen by palliative care given very severe end stage copd, remains full code, will f/u outpatient     2  Essential HTN  - c/w home lasix and cardizem per primary team      3  Macrocytic anemia  - workup per primary team, c/w home ferrous sulfate, f/u FOBT, monitor H/H     4  GERD  - c/w PPI    Subjective:   Pt seen and examined at bedside  Sitting up, feeling better  Still mild non productive cough and mild SOB, but improving  On baseline O2 requirements, usually uses 4 L NC, but now saturating high 90s on 3 L at rest  No fever, chills, nausea, vomiting, chest pain  Review of Systems   Constitutional: Positive for fatigue   Negative for chills, fever and unexpected weight change  HENT: Negative for congestion, rhinorrhea, sneezing and sore throat  Respiratory: Positive for cough, chest tightness and shortness of breath  Negative for wheezing  Cardiovascular: Negative for chest pain, palpitations and leg swelling  Gastrointestinal: Negative for abdominal pain, constipation, diarrhea, nausea and vomiting  Endocrine: Negative for cold intolerance and heat intolerance  Genitourinary: Negative for dysuria  Musculoskeletal: Negative for arthralgias  Allergic/Immunologic: Negative for immunocompromised state  Neurological: Negative for dizziness, tremors, light-headedness and numbness  Objective:     Vitals:    12/27/19 1917 12/27/19 2210 12/28/19 0724 12/28/19 0747   BP:  100/72  110/64   Pulse:  94  74   Resp:  22  18   Temp:  98 1 °F (36 7 °C)  97 7 °F (36 5 °C)   TempSrc:       SpO2: 94% 93% 100% 99%   Weight:       Height:               Intake/Output Summary (Last 24 hours) at 12/28/2019 0846  Last data filed at 12/28/2019 0601  Gross per 24 hour   Intake 1017 ml   Output 750 ml   Net 267 ml         Physical Exam   Constitutional: He is oriented to person, place, and time  He appears well-developed and well-nourished  No distress  Thin, chronically ill appearing    HENT:   Head: Normocephalic and atraumatic  Mouth/Throat: Oropharynx is clear and moist  No oropharyngeal exudate  Eyes: EOM are normal  No scleral icterus  Cardiovascular: Normal rate, regular rhythm and normal heart sounds  No murmur heard  Pulmonary/Chest: Effort normal  No respiratory distress  He has no wheezes  Diminished breath sounds b/l    Abdominal: Soft  Bowel sounds are normal  He exhibits no distension  There is no tenderness  Musculoskeletal: He exhibits no edema  Neurological: He is alert and oriented to person, place, and time  Skin: He is not diaphoretic  Labs: I have personally reviewed pertinent lab results    Results from last 7 days Lab Units 12/28/19  0618 12/27/19  0501 12/26/19  1337   WBC Thousand/uL 4 70 2 08* 3 18*   HEMOGLOBIN g/dL 7 8* 8 2* 9 0*   HEMATOCRIT % 25 8* 27 1* 29 9*   PLATELETS Thousands/uL 300 276 282   NEUTROS PCT %  --   --  74   MONOS PCT %  --   --  6   MONO PCT %  --  2*  --       Results from last 7 days   Lab Units 12/28/19  0618 12/27/19  0501 12/26/19  1337   POTASSIUM mmol/L 4 9 4 6 4 7   CHLORIDE mmol/L 98* 97* 97*   CO2 mmol/L >45* >45* 44*   BUN mg/dL 23 19 18   CREATININE mg/dL 0 68 0 62 0 60   CALCIUM mg/dL 9 5 9 6 9 8   ALK PHOS U/L  --   --  78   ALT U/L  --   --  15   AST U/L  --   --  15                      0   Lab Value Date/Time    TROPONINI <0 02 12/26/2019 1337    TROPONINI 0 02 11/22/2019 0046    TROPONINI <0 02 02/21/2019 0943    TROPONINI <0 02 08/27/2017 0652    TROPONINI <0 04 06/02/2015 0326    TROPONINI <0 04 06/01/2015 1949    TROPONINI <0 04 05/14/2015 0600       Microbiology:  Influenza/RSV negative  Sputum culture pending     Imaging and other studies: I have personally reviewed pertinent reports    and I have personally reviewed pertinent films in PACS  CXR 12/26/19   clear with extensive emphysematous changes and bibasilar scarring      Pulmonary function testing:   Spirometry 12/19/19  very severe airflow limitation   FEV1 12% predicted     EKG, Pathology, and Other Studies: I have personally reviewed pertinent reports     11/22/19 EF 65%, concentric remodeling, normal right ventricular size and function, inadequate tricuspid jet for estimation of RV systolic pressure, but no indirect findings for moderate/severe pulmonary hypertension        Jesu Garay MD  Pulmonary & Critical Care Fellow, [facility]

## 2019-12-28 NOTE — PROGRESS NOTES
[facility]  Progress Note - Estefani List 1950, 68 y o  male MRN: [id number]    Unit/Bed#: [place] 932-01 Encounter: [id number]    Primary Care Provider: Sally Hawley MD   Date and time admitted to hospital: 12/26/2019  1:03 PM      * COPD with exacerbation ([facility] [address] )  Assessment & Plan  · Acute on chronic hypoxic respiratory failure in the setting of a severe COPD exacerbation  · He has underlying end-stage COPD and was not amenable to lung transplantation considerations  His last FEV1 was 12%  · Last month he was taken off of Breo and Spiriva concurrently is on albuterol as needed, budesonide and formoterol  · He has since quit smoking in 2012 however his lung disease remains progressively worsening  · Pulmonary following and appreciate input   · Transitioned to PO prednisone from IV solumedrol, day 1/5   · Azithromycin 500 mg daily x3 days, day 3/3   · Flu/RSV negative   · Scheduled nebs  · No evidence of focal consolidation on x-ray and simply extensive emphysematous and chronic scarring changes    Macrocytic anemia  Assessment & Plan  · The patient has chronic anemia, Hgb around 10-11 at baseline  · No carolyn/overt evidence of blood loss is noted  Pt denies hematuria, hematemesis, no known bloody stool however he has not had BM since admission and he is on iron supplement chronically   · Will send stool for occult blood testing - needs collecting   · In the interim continue ferrous sulfate and folic acid supplementation as his anemic status is likely contributory factor to his generalized fatigue and worsening dyspnea  · Iron panel reviewed and wnl  · Hgb 7 8 today and pt with symptomatic anemia - fatigue, malaise, sob  Will plan to transfuse 1 unit PRBC today and repeat CBC in AM   Continue to trend  Continue Protonix for GI prophylaxis    If continues to trend down, would consider GI evaluation given recurrent steroid use/anemia     At risk for venous thromboembolism (VTE)  Assessment & Plan  DVT prophylaxis with LMWH ordered daily  Leukopenia  Assessment & Plan  · The patient has been leukopenic in the past and seemingly related to his acute episodes of illness, correlating with a SIRS response  · Thus far there is no overt evidence of an active infection present warranting the use of broad-spectrum antibiotics  · Monitor trend daily  Gastroesophageal reflux disease without esophagitis  Assessment & Plan  · Start pantoprazole 40 mg daily especially in the setting of high dose steroid use for ulcer prophylaxis  Paroxysmal atrial tachycardia (HCC)  Assessment & Plan  · Seemingly in the setting of chronic lung disease  · We will continue diltiazem 180 mg daily  VTE Pharmacologic Prophylaxis:   Pharmacologic: Enoxaparin (Lovenox)  Mechanical VTE Prophylaxis in Place: Yes    Patient Centered Rounds: I have performed bedside rounds with nursing staff today  Discussions with Specialists or Other Care Team Provider: RN, attending physician     Education and Discussions with Family / Patient: patient, wife over phone  Time Spent for Care: 30 minutes  More than 50% of total time spent on counseling and coordination of care as described above  Current Length of Stay: 2 day(s)    Current Patient Status: Inpatient   Certification Statement: The patient will continue to require additional inpatient hospital stay due to monitor respiratory status, acute on chronic anemia requiring transfusion and monitoring Hgb, needs PT eval for safe discharge    Discharge Plan: pending stable respiratory status with transition to PO prednisone, pending stable Hgb, PT eval for safe discharge     Code Status: Level 2 - DNAR: but accepts endotracheal intubation      Subjective:   Patient reports overall he continues to feel quite fatigued and deconditioned  His respiratory status is improved but admittedly he has not been ambulating so he is not sure how he would feel with activity  He denies any overt bleeding denies hematemesis, hematuria, hematochezia, melena  Objective:     Vitals:   Temp (24hrs), Av 2 °F (36 8 °C), Min:97 7 °F (36 5 °C), Max:98 9 °F (37 2 °C)    Temp:  [97 7 °F (36 5 °C)-98 9 °F (37 2 °C)] 97 7 °F (36 5 °C)  HR:  [74-94] 74  Resp:  [18-22] 18  BP: (100-116)/(64-94) 110/64  SpO2:  [93 %-100 %] 96 %  Body mass index is 20 68 kg/m²  Input and Output Summary (last 24 hours): Intake/Output Summary (Last 24 hours) at 2019 1458  Last data filed at 2019 0601  Gross per 24 hour   Intake 360 ml   Output 450 ml   Net -90 ml       Physical Exam:     Physical Exam   Constitutional: He is oriented to person, place, and time  No distress  HENT:   Head: Normocephalic and atraumatic  Eyes: EOM are normal  No scleral icterus  Neck: Normal range of motion  Neck supple  Cardiovascular: Normal rate and regular rhythm  Pulmonary/Chest: Effort normal    Diminished throughout  Musculoskeletal: Normal range of motion  He exhibits no edema  Neurological: He is alert and oriented to person, place, and time  No cranial nerve deficit  Skin: Skin is warm and dry  He is not diaphoretic  Psychiatric: He has a normal mood and affect  His behavior is normal    Vitals reviewed        Additional Data:     Labs:    Results from last 7 days   Lab Units 19  0618 19  0501 19  1337   WBC Thousand/uL 4 70 2 08* 3 18*   HEMOGLOBIN g/dL 7 8* 8 2* 9 0*   HEMATOCRIT % 25 8* 27 1* 29 9*   PLATELETS Thousands/uL 300 276 282   BANDS PCT %  --  3  --    NEUTROS PCT %  --   --  74   LYMPHS PCT %  --   --  17   LYMPHO PCT %  --  7*  --    MONOS PCT %  --   --  6   MONO PCT %  --  2*  --    EOS PCT %  --  0 2     Results from last 7 days   Lab Units 19  0618  19  1337   SODIUM mmol/L 142   < > 141   POTASSIUM mmol/L 4 9   < > 4 7   CHLORIDE mmol/L 98*   < > 97*   CO2 mmol/L >45*   < > 44*   BUN mg/dL 23   < > 18   CREATININE mg/dL 0 68   < > 0 60 ANION GAP mmol/L  --   --  0*   CALCIUM mg/dL 9 5   < > 9 8   ALBUMIN g/dL  --   --  3 0*   TOTAL BILIRUBIN mg/dL  --   --  0 16*   ALK PHOS U/L  --   --  78   ALT U/L  --   --  15   AST U/L  --   --  15   GLUCOSE RANDOM mg/dL 192*   < > 143*    < > = values in this interval not displayed  * I Have Reviewed All Lab Data Listed Above  * Additional Pertinent Lab Tests Reviewed:  All Labs Within Last 24 Hours Reviewed    Imaging:    Imaging Reports Reviewed Today Include: none  Imaging Personally Reviewed by Myself Includes:  none    Recent Cultures (last 7 days):           Last 24 Hours Medication List:     Current Facility-Administered Medications:  acetaminophen 650 mg Oral Q6H PRN Zeinab Lang MD   azithromycin 500 mg Oral Q24H Zeinab Lang MD   budesonide 0 5 mg Nebulization Q12H Zeinab Lang MD   calcium carbonate 1,000 mg Oral Daily PRN Zeinab Lang MD   citalopram 20 mg Oral Daily Zeinab Lang MD   diltiazem 180 mg Oral Daily Zeinab Lang MD   docusate sodium 100 mg Oral BID Bita Ramos PA-C   enoxaparin 40 mg Subcutaneous Daily Zeinab Lang MD   ferrous sulfate 325 mg Oral Daily Zeinab Lang MD   folic acid 5,335 mcg Oral Daily Zeinab Lang MD   furosemide 20 mg Oral Daily Zeinab Lang MD   ipratropium 0 5 mg Nebulization Q6H Zeinab Lang MD   levalbuterol 1 25 mg Nebulization Q6H Zeinab Lang MD   magnesium hydroxide 30 mL Oral Daily PRN Zeinab Lang MD   melatonin 3 mg Oral HS PRN Zeinab Lang MD   pantoprazole 40 mg Oral Early Morning Zeinab Lang MD   polyethylene glycol 17 g Oral Daily PRN Bita Ramos PA-C   potassium chloride 20 mEq Oral Daily Zeinab Lang MD   predniSONE 40 mg Oral Daily Shona Peres MD   senna 1 tablet Oral HS Bita Ramos PA-C        Today, Patient Was Seen By: Mendy Reno PA-C    ** Please Note: Dictation voice to text software may have been used in the creation of this document   **

## 2019-12-28 NOTE — ASSESSMENT & PLAN NOTE
· The patient has chronic anemia, Hgb around 10-11 at baseline  · No carolyn/overt evidence of blood loss is noted  Pt denies hematuria, hematemesis, no known bloody stool however he has not had BM since admission and he is on iron supplement chronically   · Will send stool for occult blood testing - needs collecting   · In the interim continue ferrous sulfate and folic acid supplementation as his anemic status is likely contributory factor to his generalized fatigue and worsening dyspnea  · Iron panel reviewed and wnl  · Hgb 7 8 today and pt with symptomatic anemia - fatigue, malaise, sob  Will plan to transfuse 1 unit PRBC today and repeat CBC in AM   Continue to trend  Continue Protonix for GI prophylaxis    If continues to trend down, would consider GI evaluation given recurrent steroid use/anemia

## 2019-12-29 LAB
ABO GROUP BLD BPU: NORMAL
BASOPHILS # BLD AUTO: 0.01 THOUSANDS/ΜL (ref 0–0.1)
BASOPHILS NFR BLD AUTO: 0 % (ref 0–1)
BPU ID: NORMAL
CROSSMATCH: NORMAL
EOSINOPHIL # BLD AUTO: 0 THOUSAND/ΜL (ref 0–0.61)
EOSINOPHIL NFR BLD AUTO: 0 % (ref 0–6)
ERYTHROCYTE [DISTWIDTH] IN BLOOD BY AUTOMATED COUNT: 15.9 % (ref 11.6–15.1)
FOLATE SERPL-MCNC: >20 NG/ML (ref 3.1–17.5)
HCT VFR BLD AUTO: 30.8 % (ref 36.5–49.3)
HGB BLD-MCNC: 9.1 G/DL (ref 12–17)
IMM GRANULOCYTES # BLD AUTO: 0.05 THOUSAND/UL (ref 0–0.2)
IMM GRANULOCYTES NFR BLD AUTO: 1 % (ref 0–2)
LYMPHOCYTES # BLD AUTO: 0.48 THOUSANDS/ΜL (ref 0.6–4.47)
LYMPHOCYTES NFR BLD AUTO: 12 % (ref 14–44)
MCH RBC QN AUTO: 29.6 PG (ref 26.8–34.3)
MCHC RBC AUTO-ENTMCNC: 29.5 G/DL (ref 31.4–37.4)
MCV RBC AUTO: 100 FL (ref 82–98)
MONOCYTES # BLD AUTO: 0.23 THOUSAND/ΜL (ref 0.17–1.22)
MONOCYTES NFR BLD AUTO: 6 % (ref 4–12)
NEUTROPHILS # BLD AUTO: 3.26 THOUSANDS/ΜL (ref 1.85–7.62)
NEUTS SEG NFR BLD AUTO: 81 % (ref 43–75)
NRBC BLD AUTO-RTO: 0 /100 WBCS
PLATELET # BLD AUTO: 268 THOUSANDS/UL (ref 149–390)
PMV BLD AUTO: 9.3 FL (ref 8.9–12.7)
RBC # BLD AUTO: 3.07 MILLION/UL (ref 3.88–5.62)
UNIT DISPENSE STATUS: NORMAL
UNIT PRODUCT CODE: NORMAL
UNIT RH: NORMAL
VIT B12 SERPL-MCNC: 491 PG/ML (ref 100–900)
WBC # BLD AUTO: 4.03 THOUSAND/UL (ref 4.31–10.16)

## 2019-12-29 PROCEDURE — 82746 ASSAY OF FOLIC ACID SERUM: CPT | Performed by: PHYSICIAN ASSISTANT

## 2019-12-29 PROCEDURE — 85025 COMPLETE CBC W/AUTO DIFF WBC: CPT | Performed by: PHYSICIAN ASSISTANT

## 2019-12-29 PROCEDURE — 97163 PT EVAL HIGH COMPLEX 45 MIN: CPT

## 2019-12-29 PROCEDURE — G8978 MOBILITY CURRENT STATUS: HCPCS

## 2019-12-29 PROCEDURE — G8979 MOBILITY GOAL STATUS: HCPCS

## 2019-12-29 PROCEDURE — 94640 AIRWAY INHALATION TREATMENT: CPT

## 2019-12-29 PROCEDURE — 99232 SBSQ HOSP IP/OBS MODERATE 35: CPT | Performed by: PHYSICIAN ASSISTANT

## 2019-12-29 PROCEDURE — 94760 N-INVAS EAR/PLS OXIMETRY 1: CPT

## 2019-12-29 PROCEDURE — 82607 VITAMIN B-12: CPT | Performed by: PHYSICIAN ASSISTANT

## 2019-12-29 PROCEDURE — 99232 SBSQ HOSP IP/OBS MODERATE 35: CPT | Performed by: INTERNAL MEDICINE

## 2019-12-29 RX ADMIN — LEVALBUTEROL HYDROCHLORIDE 1.25 MG: 1.25 SOLUTION, CONCENTRATE RESPIRATORY (INHALATION) at 20:54

## 2019-12-29 RX ADMIN — IPRATROPIUM BROMIDE 0.5 MG: 0.5 SOLUTION RESPIRATORY (INHALATION) at 07:49

## 2019-12-29 RX ADMIN — IPRATROPIUM BROMIDE 0.5 MG: 0.5 SOLUTION RESPIRATORY (INHALATION) at 13:31

## 2019-12-29 RX ADMIN — DOCUSATE SODIUM 100 MG: 100 CAPSULE, LIQUID FILLED ORAL at 08:47

## 2019-12-29 RX ADMIN — DOCUSATE SODIUM 100 MG: 100 CAPSULE, LIQUID FILLED ORAL at 17:29

## 2019-12-29 RX ADMIN — LEVALBUTEROL HYDROCHLORIDE 1.25 MG: 1.25 SOLUTION, CONCENTRATE RESPIRATORY (INHALATION) at 13:31

## 2019-12-29 RX ADMIN — BUDESONIDE 0.5 MG: 0.5 INHALANT RESPIRATORY (INHALATION) at 07:49

## 2019-12-29 RX ADMIN — PREDNISONE 40 MG: 20 TABLET ORAL at 08:47

## 2019-12-29 RX ADMIN — DILTIAZEM HYDROCHLORIDE 180 MG: 180 CAPSULE, COATED, EXTENDED RELEASE ORAL at 08:47

## 2019-12-29 RX ADMIN — FOLIC ACID 1000 MCG: 1 TABLET ORAL at 08:48

## 2019-12-29 RX ADMIN — ENOXAPARIN SODIUM 40 MG: 40 INJECTION SUBCUTANEOUS at 08:49

## 2019-12-29 RX ADMIN — SENNOSIDES 8.6 MG: 8.6 TABLET, FILM COATED ORAL at 21:27

## 2019-12-29 RX ADMIN — FUROSEMIDE 20 MG: 20 TABLET ORAL at 08:47

## 2019-12-29 RX ADMIN — PANTOPRAZOLE SODIUM 40 MG: 40 TABLET, DELAYED RELEASE ORAL at 05:11

## 2019-12-29 RX ADMIN — FERROUS SULFATE TAB 325 MG (65 MG ELEMENTAL FE) 325 MG: 325 (65 FE) TAB at 08:47

## 2019-12-29 RX ADMIN — POTASSIUM CHLORIDE 20 MEQ: 1500 TABLET, EXTENDED RELEASE ORAL at 08:47

## 2019-12-29 RX ADMIN — CITALOPRAM HYDROBROMIDE 20 MG: 20 TABLET ORAL at 08:47

## 2019-12-29 RX ADMIN — IPRATROPIUM BROMIDE 0.5 MG: 0.5 SOLUTION RESPIRATORY (INHALATION) at 20:54

## 2019-12-29 RX ADMIN — LEVALBUTEROL HYDROCHLORIDE 1.25 MG: 1.25 SOLUTION, CONCENTRATE RESPIRATORY (INHALATION) at 07:49

## 2019-12-29 RX ADMIN — BUDESONIDE 0.5 MG: 0.5 INHALANT RESPIRATORY (INHALATION) at 20:54

## 2019-12-29 NOTE — ASSESSMENT & PLAN NOTE
· Acute on chronic hypoxic respiratory failure in the setting of a severe COPD exacerbation  · He has underlying end-stage COPD and was not amenable to lung transplantation considerations  His last FEV1 was 12%  · Last month he was taken off of Breo and Spiriva concurrently is on albuterol as needed, budesonide and formoterol  · He has since quit smoking in 2012 however his lung disease remains progressively worsening     · Pulmonary following and appreciate input   · Transitioned to PO prednisone from IV solumedrol, day 2/5   · Azithromycin 500 mg daily x3 days - completed   · Flu/RSV negative   · Scheduled nebs  · No evidence of focal consolidation on x-ray and simply extensive emphysematous and chronic scarring changes

## 2019-12-29 NOTE — PHYSICAL THERAPY NOTE
Physical Therapy Evaluation     Patient's Name: Jaqueline Ards    Admitting Diagnosis  SOB (shortness of breath) [R06 02]  COPD exacerbation ([place] [address] ) [J44 1]  COPD with exacerbation ([place] [address] ) [J44 1]    Problem List  Patient Active Problem List   Diagnosis    Macrocytic anemia    Pulmonary artery hypertension ([place] [address] )    Aortic regurgitation    COPD with exacerbation (HCC)    Low TSH level    Paroxysmal atrial tachycardia ([place] [address] )    Essential hypertension    Gastroesophageal reflux disease without esophagitis    Anxiety    Iron deficiency anemia due to chronic blood loss    Leukopenia    Medicare annual wellness visit, subsequent    Acute on chronic respiratory failure with hypercapnia ([place] [address] )    Respiratory acidosis    At risk for venous thromboembolism (VTE)       Past Medical History  Past Medical History:   Diagnosis Date    Anxiety     Aortic regurgitation     Atrial flutter (HCC)     Chronic respiratory failure (HCC)     Colon polyp     COPD (chronic obstructive pulmonary disease) ([place] [address] )     Deep venous thrombosis of distal lower extremity (HCC)     Diverticulitis     GI bleed     Hypertension     Iron deficiency anemia     MGUS (monoclonal gammopathy of unknown significance)     Osteoarthritis of hip     PAC (premature atrial contraction)     Paroxysmal atrial fibrillation ([place] [address] )     Patent foramen ovale     Pulmonary artery hypertension (HCC)     Renal failure     Sinus tachycardia        Past Surgical History  Past Surgical History:   Procedure Laterality Date    APPENDECTOMY      COLON SURGERY      HERNIA REPAIR      JOINT REPLACEMENT      KNEE SURGERY              12/29/19 1330   Note Type   Note type Eval only   Pain Assessment   Pain Assessment No/denies pain   Home Living   Type of Home House   Home Layout Two level; Able to live on main level with bedroom/bathroom  (2 SAUMYA w/ hand rail)   Home Equipment Walker;Cane   Additional Comments uses 4 L/min of O2 at home   Prior Function   Level of [place] Independent with ADLs and functional mobility  (amb w/o AD; also uses furniture for support as needed)   Lives With Spouse  (works)   Restrictions/Precautions   Braces or Orthoses   (denies (incl does not wear a shoe lift))   Other Precautions Telemetry;O2;Fall Risk   General   Additional Pertinent History cleared for assessment by mindy   Cognition   Overall Cognitive Status WFL   Arousal/Participation Cooperative   Orientation Level Oriented to person;Oriented to place;Oriented to situation   Memory Within functional limits   Following Commands Follows one step commands without difficulty   Comments Pt is observed in bed; reports he was about to rest; agreeable to assessment and then to return back to bed;   RUE Assessment   RUE Assessment WFL  (AROM)   LUE Assessment   LUE Assessment WFL  (AROM)   RLE Assessment   RLE Assessment WFL  (AROM)   Strength RLE   RLE Overall Strength   (fair +/ good - (grossly))   LLE Assessment   LLE Assessment WFL  (AROM)   Strength LLE   LLE Overall Strength   (fair +/ good - (grossly))   Bed Mobility   Supine to Sit 6  Modified independent   Sit to Supine 6  Modified independent   Additional Comments RT arrived at the end of session to provide resp Tx to the pt   Transfers   Sit to Stand 5  Supervision   Additional items Assist x 1;Verbal cues   Stand to Sit 5  Supervision   Additional items Assist x 1;Verbal cues   Ambulation/Elevation   Gait pattern Excessively slow; Short stride; Inconsistent avila  (partial (L) knee flex during stance;reports leg length issue)   Gait Assistance 4  Minimal assist  (stand by (A) of 2nd for lines)   Additional items Assist x 1;Verbal cues; Tactile cues   Assistive Device None   Distance 16 ft total distance; O2 sat at 95 % pre and post mobilization   Balance   Static Sitting Fair +   Static Standing Fair -   Ambulatory Poor +   Activity Tolerance   Activity Tolerance Patient limited by fatigue   Nurse Made Aware SPT spoke to Rosario, [address] [facility]   Assessment   Prognosis Fair   Problem List Decreased strength;Decreased endurance; Impaired balance;Decreased mobility   Assessment Pt is 68 y o  male admitted with hx of SOB and Dx of COPD with exacerbation  Pt 's comorbidities affecting POC include: anxiety, a-flutter, PAF, DVT, HTN and pt's reported hx of LORIE w/ resulting leg length discrepancy (reports he does not wear a shoe lift anymore) and personal factors of: SAUMYA and steps in the house  Pt's clinical presentation is currently unstable/unpredictable which is evident in abn lab values, need for min assist w/ amb when usually mobilizing independently and tolerance to 16 feet of ambulation at this time w/ min overall fatigue noted  Pt presents w/ generalized weakness, incl decreased LE strength (likely close to baseline), decreased functional endurance (likely in light of clinical course and relative immobility), and min impaired amb balance w/ associated gait deviations (appears to be close to premorbid level --> r/o SPC during follow up visits)  Will cont to follow pt in PT for graded mobilization to max level of (I), endurance, and safety  Otherwise, anticipate pt will return home w/ available family support upon D/C provided he cont improving w/ mobility skills, safety, and endurance (incl on the steps) and when medically cleared; 1st floor set-up is recommended at least initially upon D/C; home PT follow up is also recommended; will follow  Barriers to Discharge Inaccessible home environment   Goals   Patient Goals to rest   STG Expiration Date 01/08/20   Short Term Goal #1 7-10 days  Pt will amb 150 ft w/ no AD <--> SPC, mod (I) in order to facilitate safe return to premorbid environment and to initiate return to community amb status   Pt will negotiate 2 steps w/ hand rail and SPC PRN, (S)x1 and 14 steps w/ hand rail and SPC as needed, mod (I) in order to assure safe navigation in and out of the premorbid living environment and between the levels of the house  Pt will achieve (I) level w/ bed mob in order to facilitate safety with OOB and back to bed transitions in own living environment  Pt will perform transfers w/ mod (I) to assure (I) and safety w/ functional mobility/transitions w/ all aspects of mobility/locomotion  Pt will participate in LE therex and balance activities to max progression w/ mobility skills  PT Treatment Day 0   Plan   Treatment/Interventions Functional transfer training;LE strengthening/ROM; Elevations; Therapeutic exercise; Endurance training;Bed mobility;Gait training;Equipment eval/education   PT Frequency Other (Comment)  (3-5x/wk)   Recommendation   Recommendation Home PT; Home with family support  (1st floor set-up at least initially upon D/C)   Equipment Recommended   (r/o SPC)   Modified González Scale   Modified González Scale 4   Barthel Index   Feeding 10   Bathing 0   Grooming Score 5   Dressing Score 10   Bladder Score 10   Bowels Score 10   Toilet Use Score 5   Transfers (Bed/Chair) Score 10   Mobility (Level Surface) Score 0   Stairs Score 0   Barthel Index Score 60       Jimi Lorenz, PT

## 2019-12-29 NOTE — RESPIRATORY THERAPY NOTE
RT Protocol Note  Blas Rendon 68 y o  male MRN: [id number]  [facility] Encounter: [id number]    Assessment    Principal Problem:    COPD with exacerbation ([facility] [address] )  Active Problems:    Macrocytic anemia    Paroxysmal atrial tachycardia (HCC)    Gastroesophageal reflux disease without esophagitis    Leukopenia    At risk for venous thromboembolism (VTE)      Home Pulmonary Medications:    Home Devices/Therapy: (P) Home O2    Past Medical History:   Diagnosis Date    Anxiety     Aortic regurgitation     Atrial flutter (HCC)     Chronic respiratory failure (HCC)     Colon polyp     COPD (chronic obstructive pulmonary disease) (HCC)     Deep venous thrombosis of distal lower extremity (HCC)     Diverticulitis     GI bleed     Hypertension     Iron deficiency anemia     MGUS (monoclonal gammopathy of unknown significance)     Osteoarthritis of hip     PAC (premature atrial contraction)     Paroxysmal atrial fibrillation (HCC)     Patent foramen ovale     Pulmonary artery hypertension (HCC)     Renal failure     Sinus tachycardia      Social History     Socioeconomic History    Marital status: /Civil Union     Spouse name: None    Number of children: None    Years of education: None    Highest education level: None   Occupational History    None   Social Needs    Financial resource strain: None    Food insecurity:     Worry: None     Inability: None    Transportation needs:     Medical: None     Non-medical: None   Tobacco Use    Smoking status: Former Smoker     Packs/day: 1 50     Years: 42 00     Pack years: 63 00     Types: Cigarettes     Start date:      Last attempt to quit:      Years since quittin 9    Smokeless tobacco: Never Used   Substance and Sexual Activity    Alcohol use: Not Currently    Drug use: Not Currently    Sexual activity: Not Currently   Lifestyle    Physical activity:     Days per week: None     Minutes per session: None    Stress: None   Relationships    Social connections:     Talks on phone: None     Gets together: None     Attends Spiritism service: None     Active member of club or organization: None     Attends meetings of clubs or organizations: None     Relationship status: None    Intimate partner violence:     Fear of current or ex partner: None     Emotionally abused: None     Physically abused: None     Forced sexual activity: None   Other Topics Concern    None   Social History Narrative    Daily coffee consumption; 1 serving       Subjective         Objective    Physical Exam:   Assessment Type: (P) Pre-treatment  General Appearance: (P) Alert, Awake  Respiratory Pattern: (P) Normal  Chest Assessment: (P) Chest expansion symmetrical  Bilateral Breath Sounds: (P) Diminished    Vitals:  Blood pressure 111/55, pulse 74, temperature (!) 97 4 °F (36 3 °C), resp  rate 18, height 5' 6 5" (1 689 m), weight 59 kg (130 lb 1 1 oz), SpO2 98 %  Imaging and other studies: I have personally reviewed pertinent reports  Plan    Respiratory Plan: (P) No distress/Pulmonary history        Resp Comments: (P) Pt is no longer in acute distress  He expressed the need for a restful nights sleep  I agreed we would change his UDN therapy to TID and prn HS

## 2019-12-29 NOTE — ASSESSMENT & PLAN NOTE
· The patient has chronic anemia, Hgb around 10-11 at baseline  · No carolyn/overt evidence of blood loss is noted  Pt denies hematuria, hematemesis, no known bloody stool - reports he had BM last night this was not collected   · Will send stool for occult blood testing - needs collecting   · In the interim continue ferrous sulfate and folic acid supplementation as his anemic status is likely contributory factor to his generalized fatigue and worsening dyspnea  · Iron panel reviewed and wnl  · Hgb 7 8 12/28 and pt with symptomatic anemia - fatigue, malaise, sob  Given 1 unit PRBC and Hgb this AM 9 1  · Continue Protonix for GI prophylaxis    If continues to trend down, would consider GI evaluation given recurrent steroid use/anemia   · CBC in AM

## 2019-12-29 NOTE — PROGRESS NOTES
Progress Note - Pulmonary   Renata Trujillo 68 y o  male MRN: [id number]  Unit/Bed#: [facility] 932-01 Encounter: [id number]      1  Acute on chronic hypoxic/hypercarbic respiratory failure 2/2 acute COPD exacerbation likely 2/2 viral URI? Vs other trigger with hx of very severe end stage COPD  - Patient with history of very severe COPD with FEV1 12% predicted on recent spirometry   - presented with recurrence of intermittent hypoxia, SOB, BURTON, dizziness, tremors  - currently back to baseline O2 requirements on 3- 4 L nasal cannula saturating mid to high 90s feeling improved   - Patient w/o any signs/symptoms of acute bacterial infection and CXR is clear, flu/RSV negative  - c/w prednisone 40 mg for 5 days then stop (2/5)  - completed 3 days azithro for COPD exacerbation   - C/w Pulmicort BID  - C/w Atrovent/Xopenex q6h  - Pulmonary toilet, OOB to chair, incentive reginald  - PT/OT as able  - home regime includes pulmicort BID, perforomist BID, atrovent QID, albuterol prn and patient wears 4 L NC 24/7, follows with Dr Radha Reardon  -Jessica Tijerina discussions held for possible valve replacement, for which patient is not a candidate for   Additionally, previous discussions held for possible lung transplant, but patient has declined  - Seen by palliative care given very severe end stage copd, remains full code, will f/u outpatient     2  Essential HTN  - c/w home lasix and cardizem per primary team      3  Macrocytic anemia  - workup per primary team, c/w home ferrous sulfate, f/u FOBT, monitor H/H  - received 1u prbc yesterday for hgb 7 8, f/u repeat, would transfuse only for HGB < 7     4  GERD  - c/w PPI    Pt stable for d/c from pulmonary standpoint, will f/u outpatient in 2-4 weeks  Will sign off, please call with questions  Subjective:   Pt seen and examined at bedside  Feeling much better from breathing standpoint  Denies significant shortness of breath, wheeze, fever, chills, nausea, vomiting  Mild non productive cough   Did receive 1u prbc yesterday for HGB 7 8, repeat hgb pending  Review of Systems   Constitutional: Positive for fatigue  Negative for chills, fever and unexpected weight change  HENT: Negative for congestion, rhinorrhea, sneezing and sore throat  Respiratory: Positive for cough  Negative for chest tightness, shortness of breath and wheezing  Cardiovascular: Negative for chest pain, palpitations and leg swelling  Gastrointestinal: Negative for abdominal pain, constipation, diarrhea, nausea and vomiting  Endocrine: Negative for cold intolerance and heat intolerance  Genitourinary: Negative for dysuria  Musculoskeletal: Negative for arthralgias  Allergic/Immunologic: Negative for immunocompromised state  Neurological: Negative for dizziness, tremors, light-headedness and numbness  Objective:     Vitals:    12/28/19 2111 12/28/19 2111 12/28/19 2226 12/29/19 0737   BP: 112/54 112/54 109/73 116/83   Pulse: 83 83 80 76   Resp: 19 19 20   Temp: 97 9 °F (36 6 °C) 97 9 °F (36 6 °C) 98 2 °F (36 8 °C) 98 6 °F (37 °C)   TempSrc:       SpO2:  97% 100% 100%   Weight:       Height:               Intake/Output Summary (Last 24 hours) at 12/29/2019 0742  Last data filed at 12/29/2019 0447  Gross per 24 hour   Intake 2014 61 ml   Output 1275 ml   Net 739 61 ml         Physical Exam   Constitutional: He is oriented to person, place, and time  He appears well-developed and well-nourished  No distress  Thin , chronically ill appearing    HENT:   Head: Normocephalic and atraumatic  Mouth/Throat: Oropharynx is clear and moist  No oropharyngeal exudate  Eyes: EOM are normal  No scleral icterus  Cardiovascular: Normal rate, regular rhythm and normal heart sounds  No murmur heard  Pulmonary/Chest: Effort normal and breath sounds normal  No respiratory distress  He has no wheezes  Dec breath sounds but clear b/l    Abdominal: Soft  Bowel sounds are normal  He exhibits no distension   There is no tenderness  Musculoskeletal: He exhibits no edema  Neurological: He is alert and oriented to person, place, and time  Skin: He is not diaphoretic  Labs: I have personally reviewed pertinent lab results  Results from last 7 days   Lab Units 12/28/19  0618 12/27/19  0501 12/26/19  1337   WBC Thousand/uL 4 70 2 08* 3 18*   HEMOGLOBIN g/dL 7 8* 8 2* 9 0*   HEMATOCRIT % 25 8* 27 1* 29 9*   PLATELETS Thousands/uL 300 276 282   NEUTROS PCT %  --   --  74   MONOS PCT %  --   --  6   MONO PCT %  --  2*  --       Results from last 7 days   Lab Units 12/28/19  0618 12/27/19  0501 12/26/19  1337   POTASSIUM mmol/L 4 9 4 6 4 7   CHLORIDE mmol/L 98* 97* 97*   CO2 mmol/L >45* >45* 44*   BUN mg/dL 23 19 18   CREATININE mg/dL 0 68 0 62 0 60   CALCIUM mg/dL 9 5 9 6 9 8   ALK PHOS U/L  --   --  78   ALT U/L  --   --  15   AST U/L  --   --  15                      0   Lab Value Date/Time    TROPONINI <0 02 12/26/2019 1337    TROPONINI 0 02 11/22/2019 0046    TROPONINI <0 02 02/21/2019 0943    TROPONINI <0 02 08/27/2017 0652    TROPONINI <0 04 06/02/2015 0326    TROPONINI <0 04 06/01/2015 1949    TROPONINI <0 04 05/14/2015 0600          Microbiology:  Influenza/RSV negative  Sputum culture pending     Imaging and other studies: I have personally reviewed pertinent reports    and I have personally reviewed pertinent films in PACS  CXR 12/26/19   clear with extensive emphysematous changes and bibasilar scarring      Pulmonary function testing:   Spirometry 12/19/19  very severe airflow limitation   FEV1 12% predicted     EKG, Pathology, and Other Studies: I have personally reviewed pertinent reports     11/22/19 EF 65%, concentric remodeling, normal right ventricular size and function, inadequate tricuspid jet for estimation of RV systolic pressure, but no indirect findings for moderate/severe pulmonary hypertension        Gladys Murphy MD  Pulmonary & Critical Care Fellow, PGY4  [facility]

## 2019-12-29 NOTE — PLAN OF CARE
Problem: PHYSICAL THERAPY ADULT  Goal: Performs mobility at highest level of function for planned discharge setting  See evaluation for individualized goals  Description  Treatment/Interventions: Functional transfer training, LE strengthening/ROM, Elevations, Therapeutic exercise, Endurance training, Bed mobility, Gait training, Equipment eval/education  Equipment Recommended: (r/o SPC)       See flowsheet documentation for full assessment, interventions and recommendations  Note:   Prognosis: Fair  Problem List: Decreased strength, Decreased endurance, Impaired balance, Decreased mobility  Assessment: Pt is 68 y o  male admitted with hx of SOB and Dx of COPD with exacerbation  Pt 's comorbidities affecting POC include: anxiety, a-flutter, PAF, DVT, HTN and pt's reported hx of LORIE w/ resulting leg length discrepancy (reports he does not wear a shoe lift anymore) and personal factors of: SAUMYA and steps in the house  Pt's clinical presentation is currently unstable/unpredictable which is evident in abn lab values, need for min assist w/ amb when usually mobilizing independently and tolerance to 16 feet of ambulation at this time w/ min overall fatigue noted  Pt presents w/ generalized weakness, incl decreased LE strength (likely close to baseline), decreased functional endurance (likely in light of clinical course and relative immobility), and min impaired amb balance w/ associated gait deviations (appears to be close to premorbid level --> r/o SPC during follow up visits)  Will cont to follow pt in PT for graded mobilization to max level of (I), endurance, and safety  Otherwise, anticipate pt will return home w/ available family support upon D/C provided he cont improving w/ mobility skills, safety, and endurance (incl on the steps) and when medically cleared; 1st floor set-up is recommended at least initially upon D/C; home PT follow up is also recommended; will follow    Barriers to Discharge: Inaccessible home environment     Recommendation: Home PT, Home with family support(1st floor set-up at least initially upon D/C)          See flowsheet documentation for full assessment

## 2019-12-30 DIAGNOSIS — Z71.89 COMPLEX CARE COORDINATION: Primary | ICD-10-CM

## 2019-12-30 PROBLEM — J96.11 CHRONIC RESPIRATORY FAILURE WITH HYPOXIA (HCC): Status: ACTIVE | Noted: 2019-12-30

## 2019-12-30 PROCEDURE — 94640 AIRWAY INHALATION TREATMENT: CPT

## 2019-12-30 PROCEDURE — 97167 OT EVAL HIGH COMPLEX 60 MIN: CPT

## 2019-12-30 PROCEDURE — 99232 SBSQ HOSP IP/OBS MODERATE 35: CPT | Performed by: INTERNAL MEDICINE

## 2019-12-30 PROCEDURE — 94760 N-INVAS EAR/PLS OXIMETRY 1: CPT

## 2019-12-30 PROCEDURE — G8987 SELF CARE CURRENT STATUS: HCPCS

## 2019-12-30 PROCEDURE — G8988 SELF CARE GOAL STATUS: HCPCS

## 2019-12-30 RX ADMIN — BUDESONIDE 0.5 MG: 0.5 INHALANT RESPIRATORY (INHALATION) at 18:55

## 2019-12-30 RX ADMIN — IPRATROPIUM BROMIDE 0.5 MG: 0.5 SOLUTION RESPIRATORY (INHALATION) at 13:35

## 2019-12-30 RX ADMIN — LEVALBUTEROL HYDROCHLORIDE 1.25 MG: 1.25 SOLUTION, CONCENTRATE RESPIRATORY (INHALATION) at 13:35

## 2019-12-30 RX ADMIN — SENNOSIDES 8.6 MG: 8.6 TABLET, FILM COATED ORAL at 21:01

## 2019-12-30 RX ADMIN — PREDNISONE 40 MG: 20 TABLET ORAL at 08:22

## 2019-12-30 RX ADMIN — IPRATROPIUM BROMIDE 0.5 MG: 0.5 SOLUTION RESPIRATORY (INHALATION) at 07:22

## 2019-12-30 RX ADMIN — MELATONIN 3 MG: 3 TAB ORAL at 21:01

## 2019-12-30 RX ADMIN — FOLIC ACID 1000 MCG: 1 TABLET ORAL at 08:22

## 2019-12-30 RX ADMIN — BUDESONIDE 0.5 MG: 0.5 INHALANT RESPIRATORY (INHALATION) at 07:22

## 2019-12-30 RX ADMIN — ENOXAPARIN SODIUM 40 MG: 40 INJECTION SUBCUTANEOUS at 08:22

## 2019-12-30 RX ADMIN — PANTOPRAZOLE SODIUM 40 MG: 40 TABLET, DELAYED RELEASE ORAL at 05:03

## 2019-12-30 RX ADMIN — POTASSIUM CHLORIDE 20 MEQ: 1500 TABLET, EXTENDED RELEASE ORAL at 08:22

## 2019-12-30 RX ADMIN — FERROUS SULFATE TAB 325 MG (65 MG ELEMENTAL FE) 325 MG: 325 (65 FE) TAB at 08:22

## 2019-12-30 RX ADMIN — LEVALBUTEROL HYDROCHLORIDE 1.25 MG: 1.25 SOLUTION, CONCENTRATE RESPIRATORY (INHALATION) at 18:55

## 2019-12-30 RX ADMIN — FUROSEMIDE 20 MG: 20 TABLET ORAL at 08:22

## 2019-12-30 RX ADMIN — IPRATROPIUM BROMIDE 0.5 MG: 0.5 SOLUTION RESPIRATORY (INHALATION) at 18:55

## 2019-12-30 RX ADMIN — DILTIAZEM HYDROCHLORIDE 180 MG: 180 CAPSULE, COATED, EXTENDED RELEASE ORAL at 08:22

## 2019-12-30 RX ADMIN — CITALOPRAM HYDROBROMIDE 20 MG: 20 TABLET ORAL at 08:22

## 2019-12-30 RX ADMIN — LEVALBUTEROL HYDROCHLORIDE 1.25 MG: 1.25 SOLUTION, CONCENTRATE RESPIRATORY (INHALATION) at 07:22

## 2019-12-30 NOTE — PLAN OF CARE
Problem: OCCUPATIONAL THERAPY ADULT  Goal: Performs self-care activities at highest level of function for planned discharge setting  See evaluation for individualized goals  Description  Treatment Interventions: ADL retraining, Functional transfer training, Endurance training, Cognitive reorientation, Patient/family training, Equipment evaluation/education, Compensatory technique education, Energy conservation, Activityengagement  Equipment Recommended: Bedside commode(pt owns SC - recommend continued use)       See flowsheet documentation for full assessment, interventions and recommendations  Note:   Limitation: Decreased ADL status, Decreased cognition, Decreased endurance, Decreased self-care trans, Decreased high-level ADLs  Prognosis: Good  Assessment: Pt is a 68 y o  male who was admitted to [facility] on 12/26/2019 with acute on chronic hypoxic respiratory failure in setting of severe COPD exacerbation  Pt's problem list also includes PMH of end-stage COPD, macrocytic anemia, paroxysmal atrial tachycardia, GERD, leukopenia, anxiety  At baseline pt was completing all ADLs IND, IADLs with assist, (-) driving (wife and pt's friend assist w/ driving), ambulating IND w/o AD (use of furniture for support prn)  Pt uses 4 L NC supplemental O2 at baseline  Pt lives with his wife in a 2  with option of 1st floor set up  Pt reports his wife works - is able to assist when home as needed  Currently pt requires SUP-MIN A for overall ADLS and for functional mobility/transfers w/o AD  Pt's SpO2 91-94% on 4 L NC throughout  Pt currently presents with impairments in the following categories -steps to enter environment, limited home support and difficulty performing ADLS activity tolerance, endurance, problem solving, and standing balance/tolerance   These impairments, as well as pt's fatigue, SOB, decreased caregiver support, risk for falls and home environment  limit pt's ability to safely engage in all baseline areas of occupation, includinggrooming, bathing, dressing, toileting, functional mobility/transfers, community mobility and leisure activities   From OT standpoint, recommend [address] upon D/C  Recommend pt have SUP for bathing at this time  Pt reports he feels more SOB than at baseline  Discussed energy conservation strategies including continued use of SC, delegating tasks, planning ahead  Pt reports his wife can assist him with his morning & evening self-care routine  Pt reports his only concern for home is having meals prepared during daytime when his wife is working - discussed option of preparing meals ahead of time that can be microwaved etc  Pt in agreement with plan  OT will continue to follow to address the below stated goals       OT Discharge Recommendation: Home OT(& home with family support)

## 2019-12-30 NOTE — ASSESSMENT & PLAN NOTE
- history of underlying end-stage COPD not amenable to lung transplantation noted  - quit tobacco smoking in 2012 - continue to encourage abstinence  - steroid regimen transition to oral Prednisone to further complete tapering course  - continue Pulmicort - continue Xopenex/Atrovent nebulization  - maintain oxygenation encourage incentive spirometry  - Influenza/RSV screen negative  - anticipate discharge home tomorrow with further symptomatic improvement as patient still feels generally weak with increased shortness of breath on exertion

## 2019-12-30 NOTE — PHYSICAL THERAPY NOTE
Physical Therapy Cancellation Note  PT chart reviewed  Per nursing patient requested no visits at this time   Will continue to follow  Anna Machado, Pt, DPT

## 2019-12-30 NOTE — OCCUPATIONAL THERAPY NOTE
[address]  Evaluation     Patient Name: Rossana BENNETT Date: 12/30/2019  Problem List  Principal Problem:    COPD exacerbation  Active Problems:    Anemia of chronic disease    Paroxysmal atrial tachycardia    GERD    Leukopenia    At risk for venous thromboembolism (VTE)    Chronic respiratory failure with hypoxia    Past Medical History  Past Medical History:   Diagnosis Date    Anxiety     Aortic regurgitation     Atrial flutter (HCC)     Chronic respiratory failure (HCC)     Colon polyp     COPD (chronic obstructive pulmonary disease) (HCC)     Deep venous thrombosis of distal lower extremity (HCC)     Diverticulitis     GI bleed     Hypertension     Iron deficiency anemia     MGUS (monoclonal gammopathy of unknown significance)     Osteoarthritis of hip     PAC (premature atrial contraction)     Paroxysmal atrial fibrillation (HCC)     Patent foramen ovale     Pulmonary artery hypertension (HCC)     Renal failure     Sinus tachycardia      Past Surgical History  Past Surgical History:   Procedure Laterality Date    APPENDECTOMY      COLON SURGERY      HERNIA REPAIR      JOINT REPLACEMENT      KNEE SURGERY             12/30/19 2475   Note Type   Note type Eval only   Restrictions/Precautions   Weight Bearing Precautions Per Order No   Other Precautions O2;Fall Risk;Bed Alarm  (4 L NC)   Pain Assessment   Pain Assessment No/denies pain   Pain Score No Pain   Home Living   Type of Home House   Home Layout Two level; Able to live on main level with bedroom/bathroom  (2 SAUMYA)   Bathroom Shower/Tub Tub/shower unit   Bathroom Toilet Standard   Bathroom Equipment Shower chair  (reports use PTA)   Home Equipment Walker;Cane   Additional Comments Pt uses 4 L NC supplemental O2 at baseline     Prior Function   Level of [place] Independent with ADLs and functional mobility   Lives With Spouse   Receives Help From Family   ADL Assistance Independent   IADLs Needs assistance [place] in the last 6 months 0   Comments  Pt reports his wife works - is able to assist when home as needed  Lifestyle   Autonomy At baseline pt was completing all ADLs IND, IADLs with assist, (-) driving, ambulating IND w/o AD (use of furniture for support prn)  Reciprocal Relationships supportive spouse   Service to Others retired   [address] enjoys reading   [address]am [address] 19 (WDL) WDL   Subjective   Subjective "My breathing feels better but not back to normal"   ADL   Eating Assistance 7  Independent   Grooming Assistance 5  Supervision/Setup   UB Bathing Assistance 5  Supervision/Setup   LB Bathing Assistance 4  Minimal Assistance   [address] 5  Supervision/Setup    [place] [address]  5  Supervision/Setup   Bed Mobility   Supine to Sit 6  Modified independent   Additional items HOB elevated   Sit to Supine 7  Independent   Transfers   Sit to Stand 5  Supervision   Additional items Verbal cues   Stand to Sit 5  Supervision   Additional Comments pt reports baseline leg length discrepancy    Functional Mobility   Functional Mobility 4  Minimal assistance   Additional Comments w/in room and bathroom   Additional items   (no AD)   Balance   Static Sitting Fair +   Dynamic Sitting Fair +   Static Standing Fair -   Dynamic Standing Fair -   Activity Tolerance   Activity Tolerance Patient limited by fatigue   Nurse Made Aware Per RN pt appropriate to be seen   RUE Assessment   RUE Assessment WFL   LUE Assessment   LUE Assessment WFL   Hand Function   Gross Motor Coordination Functional   Fine Motor Coordination Functional   Cognition   Overall Cognitive Status WFL   Arousal/Participation Alert; Cooperative   Attention Within functional limits   Orientation Level Oriented X4   Memory Within functional limits   Following Commands Follows one step commands without difficulty   Comments pt with occasional MIN VCs needed for problem solving during functional tasks  Assessment   Limitation Decreased ADL status; Decreased cognition;Decreased endurance;Decreased self-care trans;Decreased high-level ADLs   Prognosis Good   Assessment Pt is a 68 y o  male who was admitted to [facility] on 12/26/2019 with acute on chronic hypoxic respiratory failure in setting of severe COPD exacerbation  Pt's problem list also includes PMH of end-stage COPD, macrocytic anemia, paroxysmal atrial tachycardia, GERD, leukopenia, anxiety  At baseline pt was completing all ADLs IND, IADLs with assist, (-) driving (wife and pt's friend assist w/ driving), ambulating IND w/o AD (use of furniture for support prn)  Pt uses 4 L NC supplemental O2 at baseline  Pt lives with his wife in a 2 SH with option of 1st floor set up  Pt reports his wife works - is able to assist when home as needed  Currently pt requires SUP-MIN A for overall ADLS and for functional mobility/transfers w/o AD  Pt's SpO2 91-94% on 4 L NC throughout  Pt currently presents with impairments in the following categories -steps to enter environment, limited home support and difficulty performing ADLS activity tolerance, endurance, problem solving, and standing balance/tolerance  These impairments, as well as pt's fatigue, SOB, decreased caregiver support, risk for falls and home environment  limit pt's ability to safely engage in all baseline areas of occupation, includinggrooming, bathing, dressing, toileting, functional mobility/transfers, community mobility and leisure activities   From OT standpoint, recommend [address] upon D/C  Recommend pt have SUP for bathing at this time  Pt reports he feels more SOB than at baseline  Discussed energy conservation strategies including continued use of SC, delegating tasks, planning ahead  Pt reports his wife can assist him with his morning & evening self-care routine   Pt reports his only concern for home is having meals prepared during daytime when his wife is working - discussed option of preparing meals ahead of time that can be microwaved etc  Pt in agreement with plan  OT will continue to follow to address the below stated goals  Goals   Patient Goals to sleep   LTG Time Frame 10-14   Long Term Goal #1 see goals below   Plan   Treatment Interventions ADL retraining;Functional transfer training; Endurance training;Cognitive reorientation;Patient/family training;Equipment evaluation/education; Compensatory technique education; Energy conservation; Activityengagement   Goal Expiration Date 01/13/20   OT Frequency 3-5x/wk   Recommendation   OT Discharge Recommendation Home OT  (& home with family support)   Equipment Recommended Bedside commode  (pt owns SC - recommend continued use)   Barthel Index   Feeding 10   Bathing 0   Grooming Score 5   Dressing Score 5   Bladder Score 10   Bowels Score 10   Toilet Use Score 5   Transfers (Bed/Chair) Score 10   Mobility (Level Surface) Score 0   Stairs Score 0   Barthel Index Score 55   Modified [place] Scale   Modified [place] Scale 4         GOALS     Pt will improve activity tolerance to G for min 30 min txment sessions     Pt will complete UB ADLs with MOD IND and use of adaptive device and DME as needed     Pt will complete LB ADLs with MOD IND w/ use of AE and DME as needed     Pt will complete toileting w/ MOD IND w/ G hygiene/thoroughness using DME as needed     Pt will improve functional transfers to Mod I on/off all surfaces using DME as needed w/ G balance/safety      Pt will improve functional mobility during ADL/IADL/leisure tasks to Mod I using DME as needed w/ G balance/safety      Pt will demonstrate G carryover of pt/caregiver education and training as appropriate w/ mod I w/o cues w/ good tolerance     Pt to participate in ongoing functional cognitive assessment with Good attention/participation to assist with safe D/C planning      Improve standing balance to Good for 8-10 minutes of participation in functional tasks  Pt to demo % carryover of energy conservation strategies during functional tasks        Mango Mcgraw, OT

## 2019-12-30 NOTE — ASSESSMENT & PLAN NOTE
- typical baseline between 10-11 - currently @ 9 1  - required a PRBC transfusion earlier hospital course decreased hemoglobin contributory to symptomatology  - continue folic acid and ferrous sulfate supplementation

## 2019-12-30 NOTE — PLAN OF CARE
Problem: PAIN - ADULT  Goal: Verbalizes/displays adequate comfort level or baseline comfort level  Description  Interventions:  - Encourage patient to monitor pain and request assistance  - Assess pain using appropriate pain scale  - Administer analgesics based on type and severity of pain and evaluate response  - Implement non-pharmacological measures as appropriate and evaluate response  - Consider cultural and social influences on pain and pain management  - Notify physician/advanced practitioner if interventions unsuccessful or patient reports new pain  Outcome: Progressing     Problem: INFECTION - ADULT  Goal: Absence or prevention of progression during hospitalization  Description  INTERVENTIONS:  - Assess and monitor for signs and symptoms of infection  - Monitor lab/diagnostic results  - Monitor all insertion sites, i e  indwelling lines, tubes, and drains  - Monitor endotracheal if appropriate and nasal secretions for changes in amount and color  - [place] appropriate cooling/warming therapies per order  - Administer medications as ordered  - Instruct and encourage patient and family to use good hand hygiene technique  - Identify and instruct in appropriate isolation precautions for identified infection/condition  Outcome: Progressing  Goal: Absence of fever/infection during neutropenic period  Description  INTERVENTIONS:  - Monitor WBC    Outcome: Progressing     Problem: SAFETY ADULT  Goal: Patient will remain free of falls  Description  INTERVENTIONS:  - Assess patient frequently for physical needs  -  Identify cognitive and physical deficits and behaviors that affect risk of falls    -  [place] fall precautions as indicated by assessment   - Educate patient/family on patient safety including physical limitations  - Instruct patient to call for assistance with activity based on assessment  - Modify environment to reduce risk of injury  - Consider OT/PT consult to assist with strengthening/mobility  Outcome: Progressing  Goal: Maintain or return to baseline ADL function  Description  INTERVENTIONS:  -  Assess patient's ability to carry out ADLs; assess patient's baseline for ADL function and identify physical deficits which impact ability to perform ADLs (bathing, care of mouth/teeth, toileting, grooming, dressing, etc )  - Assess/evaluate cause of self-care deficits   - Assess range of motion  - Assess patient's mobility; develop plan if impaired  - Assess patient's need for assistive devices and provide as appropriate  - Encourage maximum independence but intervene and supervise when necessary  - Involve family in performance of ADLs  - Assess for home care needs following discharge   - Consider OT consult to assist with ADL evaluation and planning for discharge  - Provide patient education as appropriate  Outcome: Progressing  Goal: Maintain or return mobility status to optimal level  Description  INTERVENTIONS:  - Assess patient's baseline mobility status (ambulation, transfers, stairs, etc )    - Identify cognitive and physical deficits and behaviors that affect mobility  - Identify mobility aids required to assist with transfers and/or ambulation (gait belt, sit-to-stand, lift, walker, cane, etc )  - [place] fall precautions as indicated by assessment  - Record patient progress and toleration of activity level on Mobility SBAR; progress patient to next Phase/Stage  - Instruct patient to call for assistance with activity based on assessment  - Consider rehabilitation consult to assist with strengthening/weightbearing, etc   Outcome: Progressing     Problem: DISCHARGE PLANNING  Goal: Discharge to home or other facility with appropriate resources  Description  INTERVENTIONS:  - Identify barriers to discharge w/patient and caregiver  - Arrange for needed discharge resources and transportation as appropriate  - Identify discharge learning needs (meds, wound care, etc )  - Arrange for interpretive services to assist at discharge as needed  - Refer to Case Management Department for coordinating discharge planning if the patient needs post-hospital services based on physician/advanced practitioner order or complex needs related to functional status, cognitive ability, or social support system  Outcome: Progressing

## 2019-12-30 NOTE — PROGRESS NOTES
[facility] - Progress Note  Patient: Vidya Barba 68 y o  male MRN: [id number]  Unit/Bed#: [facility] 932-01 Encounter: [id number]  Primary Care Provider: Joo Dale MD  Date Of Visit: 12/30/19        Assessment & Plan:    * COPD exacerbation  Assessment & Plan  - history of underlying end-stage COPD not amenable to lung transplantation noted  - quit tobacco smoking in 2012 - continue to encourage abstinence  - steroid regimen transition to oral Prednisone to further complete tapering course  - continue Pulmicort - continue Xopenex/Atrovent nebulization  - maintain oxygenation encourage incentive spirometry  - Influenza/RSV screen negative  - anticipate discharge home tomorrow with further symptomatic improvement as patient still feels generally weak with increased shortness of breath on exertion    Chronic respiratory failure with hypoxia  Assessment & Plan  - baseline oxygen requirement of 4 L via nasal cannula around the clock (currently at baseline)    GERD  Assessment & Plan  - continue PPI regimen  - PRN Tums on board    Paroxysmal atrial tachycardia  Assessment & Plan  - continue Cardizem    Anemia of chronic disease  Assessment & Plan  - typical baseline between 10-11 - currently @ 9 1  - required a PRBC transfusion earlier hospital course decreased hemoglobin contributory to symptomatology  - continue folic acid and ferrous sulfate supplementation    Leukopenia  Assessment & Plan  - monitor CBC - remains afebrile      DVT Prophylaxis:  Lovenox      Patient Centered Rounds:  I have performed bedside rounds and discussed plan of care with nursing today  Discussions with Specialists or Other Care Team Provider:  see above assessments if applicable    Education and Discussions with Family / Patient:  Patient at bedside    Time Spent for Care:  32 minutes  More than 50% of total time spent on counseling and coordination of care as described above      Current Length of Stay: 4 day(s)    Current Patient Status: Inpatient   Certification Statement:  Patient will continue to require additional hospital stay due to assessments as noted above  Code Status: Level 2 - DNAR: but accepts endotracheal intubation      Subjective:   Seen/examined earlier in the day during nursing rounds  Still reports weakness/fatigue but wants to make attempts to ambulate further today  Remains on his baseline 4 L via nasal cannula  Denies any worsening coughing although he is intermittently short of breath  Objective:     Vitals:   Temp (24hrs), Av 2 °F (36 8 °C), Min:97 9 °F (36 6 °C), Max:98 6 °F (37 °C)    Temp:  [97 9 °F (36 6 °C)-98 6 °F (37 °C)] 97 9 °F (36 6 °C)  HR:  [67-89] 67  Resp:  [18-20] 18  BP: (112-124)/(57-78) 124/78  SpO2:  [94 %-100 %] 99 %  Body mass index is 20 68 kg/m²  Input and Output Summary (last 24 hours):        Intake/Output Summary (Last 24 hours) at 2019 1218  Last data filed at 2019 0820  Gross per 24 hour   Intake 1140 ml   Output 1550 ml   Net -410 ml       Physical Exam:     GENERAL:  Weak/fatigued - improved conversational dyspnea  HEAD:  Normocephalic - atraumatic  EYES: PERRL - EOMI   MOUTH:  Mucosa moist  NECK:  Supple - full range of motion  CARDIAC:  Rate-controlled - S1/S2 positive  PULMONARY:  Resolving expiratory wheezes  ABDOMEN:  Soft - nontender/nondistended - active bowel sounds  MUSCULOSKELETAL:  Motor strength/range of motion intact  NEUROLOGIC:  Alert/oriented at baseline  SKIN:  Chronic wrinkles/blemishes   PSYCHIATRIC:  Mood/affect age-appropriate      Additional Data:     Labs & Recent Cultures:    Results from last 7 days   Lab Units 19  0447  19  0501   WBC Thousand/uL 4 03*   < > 2 08*   HEMOGLOBIN g/dL 9 1*   < > 8 2*   HEMATOCRIT % 30 8*   < > 27 1*   PLATELETS Thousands/uL 268   < > 276   BANDS PCT %  --   --  3   NEUTROS PCT % 81*  --   --    LYMPHS PCT % 12*  --   --    LYMPHO PCT %  --   --  7*   MONOS PCT % 6 --   --    MONO PCT %  --   --  2*   EOS PCT % 0  --  0    < > = values in this interval not displayed  Results from last 7 days   Lab Units 12/28/19  0618  12/26/19  1337   POTASSIUM mmol/L 4 9   < > 4 7   CHLORIDE mmol/L 98*   < > 97*   CO2 mmol/L >45*   < > 44*   BUN mg/dL 23   < > 18   CREATININE mg/dL 0 68   < > 0 60   CALCIUM mg/dL 9 5   < > 9 8   ALK PHOS U/L  --   --  78   ALT U/L  --   --  15   AST U/L  --   --  15    < > = values in this interval not displayed  Last 24 Hours Medication List:     Current Facility-Administered Medications:  acetaminophen 650 mg Oral Q6H PRN Mar Forward, MD   albuterol 2 5 mg Nebulization Q4H PRN Tye Spaulding, MD   budesonide 0 5 mg Nebulization Q12H Mar Forward, MD   calcium carbonate 1,000 mg Oral Daily PRN Mar Forward, MD   citalopram 20 mg Oral Daily Mar Forward, MD   diltiazem 180 mg Oral Daily Mar Forward, MD   docusate sodium 100 mg Oral BID Bita Ramos PA-C   enoxaparin 40 mg Subcutaneous Daily Mar Forward, MD   ferrous sulfate 325 mg Oral Daily Mar Forward, MD   folic acid 0,088 mcg Oral Daily Mar Forward, MD   furosemide 20 mg Oral Daily Mar Forward, MD   ipratropium 0 5 mg Nebulization TID Tye Spaulding MD   levalbuterol 1 25 mg Nebulization TID Tye Spaulding MD   magnesium hydroxide 30 mL Oral Daily PRN Mar Forward, MD   melatonin 3 mg Oral HS PRN Mar Forward, MD   pantoprazole 40 mg Oral Early Morning Mar Forward, MD   polyethylene glycol 17 g Oral Daily PRN Bita Ramos PA-C   potassium chloride 20 mEq Oral Daily Mar Forward, MD   predniSONE 40 mg Oral Daily De Wu MD   senna 1 tablet Oral HS Bita Ramos PA-C                ** Please Note: This note is constructed using a voice recognition dictation system    An occasional wrong word/phrase or sound-a-like substitution may have been picked up by dictation device due to the inherent limitations of voice recognition software  Read the chart carefully and recognize, using reasonable context, where substitutions may have occurred  **

## 2019-12-31 VITALS
WEIGHT: 130.07 LBS | TEMPERATURE: 98.4 F | OXYGEN SATURATION: 98 % | SYSTOLIC BLOOD PRESSURE: 97 MMHG | DIASTOLIC BLOOD PRESSURE: 57 MMHG | HEIGHT: 67 IN | BODY MASS INDEX: 20.42 KG/M2 | HEART RATE: 97 BPM | RESPIRATION RATE: 20 BRPM

## 2019-12-31 PROBLEM — E43 SEVERE PROTEIN-CALORIE MALNUTRITION (HCC): Status: ACTIVE | Noted: 2019-12-31

## 2019-12-31 LAB
BASOPHILS # BLD MANUAL: 0 THOUSAND/UL (ref 0–0.1)
BASOPHILS NFR MAR MANUAL: 0 % (ref 0–1)
EOSINOPHIL # BLD MANUAL: 0 THOUSAND/UL (ref 0–0.4)
EOSINOPHIL NFR BLD MANUAL: 0 % (ref 0–6)
ERYTHROCYTE [DISTWIDTH] IN BLOOD BY AUTOMATED COUNT: 14.4 % (ref 11.6–15.1)
HCT VFR BLD AUTO: 32.6 % (ref 36.5–49.3)
HGB BLD-MCNC: 10 G/DL (ref 12–17)
HYPERCHROMIA BLD QL SMEAR: PRESENT
LYMPHOCYTES # BLD AUTO: 0.33 THOUSAND/UL (ref 0.6–4.47)
LYMPHOCYTES # BLD AUTO: 10 % (ref 14–44)
MACROCYTES BLD QL AUTO: PRESENT
MCH RBC QN AUTO: 31 PG (ref 26.8–34.3)
MCHC RBC AUTO-ENTMCNC: 30.7 G/DL (ref 31.4–37.4)
MCV RBC AUTO: 101 FL (ref 82–98)
MONOCYTES # BLD AUTO: 0.13 THOUSAND/UL (ref 0–1.22)
MONOCYTES NFR BLD: 4 % (ref 4–12)
NEUTROPHILS # BLD MANUAL: 2.83 THOUSAND/UL (ref 1.85–7.62)
NEUTS BAND NFR BLD MANUAL: 2 % (ref 0–8)
NEUTS SEG NFR BLD AUTO: 84 % (ref 43–75)
NRBC BLD AUTO-RTO: 0 /100 WBCS
PLATELET # BLD AUTO: 241 THOUSANDS/UL (ref 149–390)
PLATELET BLD QL SMEAR: ADEQUATE
PMV BLD AUTO: 8.9 FL (ref 8.9–12.7)
RBC # BLD AUTO: 3.23 MILLION/UL (ref 3.88–5.62)
RBC MORPH BLD: PRESENT
WBC # BLD AUTO: 3.29 THOUSAND/UL (ref 4.31–10.16)

## 2019-12-31 PROCEDURE — 94760 N-INVAS EAR/PLS OXIMETRY 1: CPT

## 2019-12-31 PROCEDURE — 85027 COMPLETE CBC AUTOMATED: CPT | Performed by: INTERNAL MEDICINE

## 2019-12-31 PROCEDURE — 94640 AIRWAY INHALATION TREATMENT: CPT

## 2019-12-31 PROCEDURE — 97110 THERAPEUTIC EXERCISES: CPT

## 2019-12-31 PROCEDURE — 99239 HOSP IP/OBS DSCHRG MGMT >30: CPT | Performed by: INTERNAL MEDICINE

## 2019-12-31 PROCEDURE — 97116 GAIT TRAINING THERAPY: CPT

## 2019-12-31 PROCEDURE — 85007 BL SMEAR W/DIFF WBC COUNT: CPT | Performed by: INTERNAL MEDICINE

## 2019-12-31 RX ORDER — PANTOPRAZOLE SODIUM 40 MG/1
40 TABLET, DELAYED RELEASE ORAL
Qty: 30 TABLET | Refills: 0 | Status: SHIPPED | OUTPATIENT
Start: 2020-01-01 | End: 2020-04-08 | Stop reason: ALTCHOICE

## 2019-12-31 RX ORDER — PREDNISONE 20 MG/1
40 TABLET ORAL DAILY
Qty: 2 TABLET | Refills: 0 | Status: SHIPPED | OUTPATIENT
Start: 2020-01-01 | End: 2020-01-02

## 2019-12-31 RX ADMIN — BUDESONIDE 0.5 MG: 0.5 INHALANT RESPIRATORY (INHALATION) at 07:26

## 2019-12-31 RX ADMIN — DILTIAZEM HYDROCHLORIDE 180 MG: 180 CAPSULE, COATED, EXTENDED RELEASE ORAL at 09:52

## 2019-12-31 RX ADMIN — ENOXAPARIN SODIUM 40 MG: 40 INJECTION SUBCUTANEOUS at 09:52

## 2019-12-31 RX ADMIN — DOCUSATE SODIUM 100 MG: 100 CAPSULE, LIQUID FILLED ORAL at 09:51

## 2019-12-31 RX ADMIN — FERROUS SULFATE TAB 325 MG (65 MG ELEMENTAL FE) 325 MG: 325 (65 FE) TAB at 09:51

## 2019-12-31 RX ADMIN — FUROSEMIDE 20 MG: 20 TABLET ORAL at 09:51

## 2019-12-31 RX ADMIN — FOLIC ACID 1000 MCG: 1 TABLET ORAL at 09:51

## 2019-12-31 RX ADMIN — IPRATROPIUM BROMIDE 0.5 MG: 0.5 SOLUTION RESPIRATORY (INHALATION) at 13:32

## 2019-12-31 RX ADMIN — PANTOPRAZOLE SODIUM 40 MG: 40 TABLET, DELAYED RELEASE ORAL at 05:41

## 2019-12-31 RX ADMIN — IPRATROPIUM BROMIDE 0.5 MG: 0.5 SOLUTION RESPIRATORY (INHALATION) at 07:26

## 2019-12-31 RX ADMIN — LEVALBUTEROL HYDROCHLORIDE 1.25 MG: 1.25 SOLUTION, CONCENTRATE RESPIRATORY (INHALATION) at 07:26

## 2019-12-31 RX ADMIN — PREDNISONE 40 MG: 20 TABLET ORAL at 09:51

## 2019-12-31 RX ADMIN — LEVALBUTEROL HYDROCHLORIDE 1.25 MG: 1.25 SOLUTION, CONCENTRATE RESPIRATORY (INHALATION) at 13:32

## 2019-12-31 RX ADMIN — POTASSIUM CHLORIDE 20 MEQ: 1500 TABLET, EXTENDED RELEASE ORAL at 09:51

## 2019-12-31 RX ADMIN — CITALOPRAM HYDROBROMIDE 20 MG: 20 TABLET ORAL at 09:51

## 2019-12-31 NOTE — ASSESSMENT & PLAN NOTE
- history of underlying end-stage COPD not amenable to lung transplantation noted  - quit tobacco smoking in 2012 - continue to encourage abstinence  - steroid regimen transitioned to oral Prednisone to further complete tapering course  - continue Pulmicort - continue Xopenex/Atrovent nebulization  - maintain oxygenation encourage incentive spirometry  - Influenza/RSV screen negative  - plan discharge home today

## 2019-12-31 NOTE — DISCHARGE SUMMARY
Discharge Summary - [facility]  Patient: Alvarez More 68 y o  male   MRN: [id number]  PCP: Mellisa Horowitz MD  Unit/Bed#: [facility] 932-01 Encounter: [id number]        Admission Date:   Admission Orders (From admission, onward)     Ordered        12/26/19 1514  Inpatient Admission  Once                 Discharge Date: 12/31/19        Hospital Course/Assessments:    * COPD exacerbation  Assessment & Plan  - history of underlying end-stage COPD not amenable to lung transplantation noted  - quit tobacco smoking in 2012 - continue to encourage abstinence  - steroid regimen transitioned to oral Prednisone to further complete tapering course  - continue Pulmicort - continue Xopenex/Atrovent nebulization  - maintain oxygenation encourage incentive spirometry  - Influenza/RSV screen negative  - plan discharge home today    Chronic respiratory failure with hypoxia  Assessment & Plan  - baseline oxygen requirement of 4 L via nasal cannula around the clock (remains at baseline)    GERD  Assessment & Plan  - continue PPI regimen  - PRN Tums on board    Paroxysmal atrial tachycardia  Assessment & Plan  - continue Cardizem    Anemia of chronic disease  Assessment & Plan  - returned to baseline at time of discharge  - required a PRBC transfusion earlier hospital course decreased hemoglobin contributory to symptomatology  - continue folic acid and ferrous sulfate supplementation    Leukopenia  Assessment & Plan  - monitor CBC - remained afebrile at time of discharge    Severe protein-calorie malnutrition   Assessment & Plan  Malnutrition Findings:   Malnutrition type: Chronic illness(in the context of severe COPD with exacerbation)  Degree of Malnutrition: Other severe protein calorie malnutrition    BMI Findings: Body mass index is 20 68 kg/m²           Reason for Admission:  Shortness of breath      Discharge Diagnoses:     Principal Problem:    COPD exacerbation  Active Problems:    Chronic respiratory failure with hypoxia    GERD    Paroxysmal atrial tachycardia    Anemia of chronic disease    Leukopenia    Severe protein-calorie malnutrition       Consultations During Hospital Stay:  · Pulmonology  · Palliative care      Condition at Discharge: fair       Discharge Day Visit / Exam:     Vitals: Blood Pressure: 97/57 (12/31/19 1453)  Pulse: 97 (12/31/19 1453)  Temperature: 98 4 °F (36 9 °C) (12/31/19 1453)  Temp Source: Oral (12/29/19 1517)  Respirations: 20 (12/31/19 1453)  Height: 5' 6 5" (168 9 cm) (12/26/19 1715)  Weight - Scale: 59 kg (130 lb 1 1 oz) (12/26/19 1715)  SpO2: 98 % (12/31/19 1453)      Physical exam - I had a face-to-face encounter with the patient on day of discharge  Discussion with Patient and/or Family:  The patient has been advised to return to the ER immediately if any symptoms recur or worsen  Discharge instructions/Information to Patient and/or Family:  See after visit summary for information provided to patient and/or family  Provisions for Follow-Up Care:  See after visit summary for information related to follow-up care and any pertinent home health orders  Disposition:   Home      Discharge Medications:  See after visit summary for reconciled discharge medications provided to patient and/or family  Discharge Statement:  I spent 38 minutes discharging the patient  This time was spent on the day of discharge  I had direct contact with the patient on the day of discharge  Greater than 50% of the total time was spent examining patient, answering all patient questions, arranging and discussing plan of care with patient as well as directly providing post-discharge instructions  Additional time then spent on discharge activities  ** Please Note: This note is constructed using a voice recognition dictation system    An occasional wrong word/phrase or sound-a-like substitution may have been picked up by dictation device due to the inherent limitations of voice recognition software  Read the chart carefully and recognize, using reasonable context, where substitutions may have occurred  **

## 2019-12-31 NOTE — PHYSICAL THERAPY NOTE
Physical Therapy Treatment Note     Patient Name: Jerson Marquez    IMXGS'N Date: 12/31/2019     Problem List  Principal Problem:    COPD exacerbation  Active Problems:    Anemia of chronic disease    Paroxysmal atrial tachycardia    GERD    Leukopenia    At risk for venous thromboembolism (VTE)    Chronic respiratory failure with hypoxia       Past Medical History  Past Medical History:   Diagnosis Date    Anxiety     Aortic regurgitation     Atrial flutter (HCC)     Chronic respiratory failure (HCC)     Colon polyp     COPD (chronic obstructive pulmonary disease) (HCC)     Deep venous thrombosis of distal lower extremity (HCC)     Diverticulitis     GI bleed     Hypertension     Iron deficiency anemia     MGUS (monoclonal gammopathy of unknown significance)     Osteoarthritis of hip     PAC (premature atrial contraction)     Paroxysmal atrial fibrillation (HCC)     Patent foramen ovale     Pulmonary artery hypertension (HCC)     Renal failure     Sinus tachycardia         Past Surgical History  Past Surgical History:   Procedure Laterality Date    APPENDECTOMY      COLON SURGERY      HERNIA REPAIR      JOINT REPLACEMENT      KNEE SURGERY             12/31/19 1054   Pain Assessment   Pain Assessment No/denies pain   Pain Score No Pain   Restrictions/Precautions   Weight Bearing Precautions Per Order No   Other Precautions O2;Fall Risk   General   Chart Reviewed Yes   Family/Caregiver Present No   Cognition   Overall Cognitive Status WFL   Arousal/Participation Alert   Attention Within functional limits   Orientation Level Oriented X4   Memory Within functional limits   Following Commands Follows all commands and directions without difficulty   Bed Mobility   Supine to Sit 7  Independent   Sit to Supine 7  Independent   Transfers   Sit to Stand 5  Supervision   Stand to Sit 5  Supervision   Ambulation/Elevation   Gait pattern Excessively slow;Shuffling; Foward flexed  (SOB, BURTON)   Gait Assistance 5  Supervision  (CGA)   Additional items Assist x 1   Assistive Device [facility]   Distance [id number]   Stair Management Assistance 4  Minimal assist   Additional items Assist x 1   Stair Management Technique One rail L   Number of Stairs 2   Balance   Static Sitting Fair +   Dynamic Sitting Fair +   Static Standing Fair   Dynamic Standing Fair   Ambulatory Fair -   Endurance Deficit   Endurance Deficit Yes   Activity Tolerance   Activity Tolerance Patient limited by fatigue   Nurse Made Aware Nurse approved therapy session   Exercises   Hip Flexion Sitting;Bilateral  (12 resp x 2 sets )   Knee AROM Long Arc Quad Sitting;Bilateral  (12 reps x 2 sets )   Ankle Pumps Sitting;Bilateral  (30 reps x 2  sets )   Assessment   Prognosis Fair   Problem List Decreased strength;Decreased endurance; Impaired balance;Decreased mobility   Assessment Pt presents to therapy today with reduced mobility, limited endurance, high risk of falling, limited activity tolerance  These impairments limit the patient by requiring rest breaks post activity, and places him at an increased risk for faling  Pt would benefit from continued skilled therapy while in the hospital to improve overall mobility and work towards a safe d c  Recommend home with home PT with the use of a SPC  At end of session patient was left supine with call bell within reach  Pt was able to ambulate further today with CGA and trial steps  Pt was limited with endurance and required a seated rest break and time to recover and limited by SOB  SpO2 97% on 4L post activity  Goals   STG Expiration Date 01/08/19   Short Term Goal #1 continue to progress towardsa goals    PT Treatment Day 1   Plan   Treatment/Interventions Functional transfer training;LE strengthening/ROM; Therapeutic exercise;Elevations; Endurance training;Bed mobility;Gait training;Equipment eval/education   Progress Progressing toward goals   PT Frequency Other (Comment)  (3-5xwk)   Recommendation   Recommendation Home PT; Home with family support   Equipment Recommended Cane   PT - OK to Discharge Yes   Additional Comments when medically cleared   Ciro Hunter, Pt, DPT

## 2019-12-31 NOTE — ASSESSMENT & PLAN NOTE
- returned to baseline at time of discharge  - required a PRBC transfusion earlier hospital course decreased hemoglobin contributory to symptomatology  - continue folic acid and ferrous sulfate supplementation

## 2019-12-31 NOTE — PLAN OF CARE
Problem: PHYSICAL THERAPY ADULT  Goal: Performs mobility at highest level of function for planned discharge setting  See evaluation for individualized goals  Description  Treatment/Interventions: Functional transfer training, LE strengthening/ROM, Elevations, Therapeutic exercise, Endurance training, Bed mobility, Gait training, Equipment eval/education  Equipment Recommended: (r/o SPC)       See flowsheet documentation for full assessment, interventions and recommendations  Outcome: Progressing  Note:   Prognosis: Fair  Problem List: Decreased strength, Decreased endurance, Impaired balance, Decreased mobility  Assessment: Pt presents to therapy today with reduced mobility, limited endurance, high risk of falling, limited activity tolerance  These impairments limit the patient by requiring rest breaks post activity, and places him at an increased risk for faling  Pt would benefit from continued skilled therapy while in the hospital to improve overall mobility and work towards a safe d c  Recommend home with home PT with the use of a SPC  At end of session patient was left supine with call bell within reach  Pt was able to ambulate further today with CGA and trial steps  Pt was limited with endurance and required a seated rest break and time to recover  SpO2 97% on 4L post activity  Barriers to Discharge: Inaccessible home environment     Recommendation: Home PT, Home with family support     PT - OK to Discharge: Yes    See flowsheet documentation for full assessment

## 2020-01-02 ENCOUNTER — TRANSITIONAL CARE MANAGEMENT (OUTPATIENT)
Dept: INTERNAL MEDICINE CLINIC | Facility: CLINIC | Age: 70
End: 2020-01-02

## 2020-01-03 DIAGNOSIS — J44.9 CHRONIC OBSTRUCTIVE PULMONARY DISEASE, UNSPECIFIED COPD TYPE (HCC): Primary | ICD-10-CM

## 2020-01-06 ENCOUNTER — PATIENT OUTREACH (OUTPATIENT)
Dept: INTERNAL MEDICINE CLINIC | Facility: CLINIC | Age: 70
End: 2020-01-06

## 2020-01-06 ENCOUNTER — PATIENT OUTREACH (OUTPATIENT)
Dept: CASE MANAGEMENT | Facility: HOSPITAL | Age: 70
End: 2020-01-06

## 2020-01-06 NOTE — PROGRESS NOTES
Respiratory Therapist Home Visit    Today's date: 2020   Patient name: Marisol Aponet      :        MRN: [id number]  PCP: Clayton Gonzalez MD  Pulmonologist: Kirstie Hurley  Dx: COPD  Date of onset:   Other significant health conditions: anemia, Leukopenia, GERD, malnutrition    Cultural needs: none    Height:   66 5 inches  Weight:    130 lbs    Date of Visit: 2020  Time in:1215        Time out: 1310  Date of last hospitalization: 19    Respiratory and Personal Assessment:    Breath sounds:  diminished   O2 saturation:   96% at rest on O2 at 4 lpm   Heart rate:   89   Respiratory rate and breathing pattern:   Normal at rest   Sputum:  none   Physical appearance:   Well groomed   Patient activity level:   Very little   Sleep schedule:  Nighttime and naps   Pain level (none to very severe): No complaints at this time   Nutritional status:   Poor, malnourished, no appetite   Cognitive status:  Slightly forgetful    Family Living Situation and Support: wife is a good support  She works full time to support household  Home Assessment and Indoor Triggers: none noted  Clean, organized home    Respiratory and Mobility Equipment in Home: concentrator with Home Fill station     Respiratory Medication: Duoneb, Budesonide    Outside Environment / Triggers: none    Teaching Provided:   Oxygen Equipment checked / reviewed   Filters cleaned / reviewed  Oxygen/ nebulizer tubing checked for kinks, water, holes, dirt  Respiratory medication reviewed, compliance discussed  Breathing techniques demonstrated (pursed lips, diaphragm)  Energy conservation  Home exercises reviewed    Comments Patient was seated in kitchen during visit  All equipment reviewed, checked to assure good working order  Patient uses E cylinder instead of HomeFill tanks when going out due to higher liter flow There are 4 full cylinders in the closet and one nearly empty cylinder in a cart       Hernán Morning states that he is still able to drive short distances and his wife drives the longer routes  He has 2 children and at least 3 grandchildren who he enjoys spending time with  He discussed covering his face with a mask when the grandchildren have sniffles  His wife works in [employer] and children live in [place]  One daughter works as a nurse anesthetist at [employer] and is nearby if he needs something  Generally, Baldemar's wife helps him with meds and meals before work and he has a portable phone (land line) to call her if needed  Patient Goals: To decrease SOB, feel stronger  We discussed incorporating light exercises in his daily routine to help increase strength  Physician Contacted (Y/N): No    DME Provider:  [facility]                 DME provider contacted (Y/N): Florentino Faria RRT

## 2020-01-06 NOTE — PROGRESS NOTES
Outreach Tc to patient's Cg, Spouse, Camille  Spouse is listed as first contact  Educated spouse on role of CM, contact information for CM and complex care management program  Spouse was agreeable and gave permission to this CM to contact patient at home  Spouse provided home phone number to CM  Patient will be seen on 1/8/2020 for PCP appointment so spouse and this CM agreed to meet in the office for an assessment  Will reschedule assessment for 1/8/2020

## 2020-01-08 ENCOUNTER — APPOINTMENT (OUTPATIENT)
Dept: LAB | Facility: HOSPITAL | Age: 70
End: 2020-01-08
Attending: INTERNAL MEDICINE
Payer: MEDICARE

## 2020-01-08 ENCOUNTER — OFFICE VISIT (OUTPATIENT)
Dept: INTERNAL MEDICINE CLINIC | Facility: CLINIC | Age: 70
End: 2020-01-08
Payer: MEDICARE

## 2020-01-08 ENCOUNTER — PATIENT OUTREACH (OUTPATIENT)
Dept: INTERNAL MEDICINE CLINIC | Facility: CLINIC | Age: 70
End: 2020-01-08

## 2020-01-08 VITALS
WEIGHT: 126.25 LBS | OXYGEN SATURATION: 97 % | HEART RATE: 110 BPM | RESPIRATION RATE: 16 BRPM | DIASTOLIC BLOOD PRESSURE: 60 MMHG | TEMPERATURE: 97.9 F | HEIGHT: 67 IN | BODY MASS INDEX: 19.82 KG/M2 | SYSTOLIC BLOOD PRESSURE: 110 MMHG

## 2020-01-08 DIAGNOSIS — J44.1 COPD WITH EXACERBATION (HCC): Primary | ICD-10-CM

## 2020-01-08 DIAGNOSIS — I10 ESSENTIAL HYPERTENSION: Chronic | ICD-10-CM

## 2020-01-08 DIAGNOSIS — I47.1 PAROXYSMAL ATRIAL TACHYCARDIA (HCC): ICD-10-CM

## 2020-01-08 DIAGNOSIS — D50.0 IRON DEFICIENCY ANEMIA DUE TO CHRONIC BLOOD LOSS: ICD-10-CM

## 2020-01-08 LAB — HEMOCCULT STL QL IA: NEGATIVE

## 2020-01-08 PROCEDURE — 99495 TRANSJ CARE MGMT MOD F2F 14D: CPT | Performed by: INTERNAL MEDICINE

## 2020-01-08 PROCEDURE — G0328 FECAL BLOOD SCRN IMMUNOASSAY: HCPCS

## 2020-01-08 PROCEDURE — 1111F DSCHRG MED/CURRENT MED MERGE: CPT | Performed by: INTERNAL MEDICINE

## 2020-01-08 NOTE — PATIENT INSTRUCTIONS
Problem List Items Addressed This Visit        Respiratory    COPD exacerbation - Primary     Continue inhalers, oxygen, and follow up Pulmonary            Cardiovascular and Mediastinum    Essential hypertension (Chronic)     Controlled, continue cartia         Paroxysmal atrial tachycardia     Heart rate good, continue diltiazem

## 2020-01-08 NOTE — PROGRESS NOTES
Assessment/Plan:    Paroxysmal atrial tachycardia  Heart rate good, continue diltiazem    Essential hypertension  Controlled, continue cartia    COPD exacerbation  Continue inhalers, oxygen, and follow up Pulmonary    Anemia of chronic disease  Patient was transfused in the hospital, continue monitoring, this could also contribute to his shortness of breath       Diagnoses and all orders for this visit:    COPD exacerbation    Paroxysmal atrial tachycardia    Essential hypertension          Subjective:   TCM Call (since 12/8/2019)     Date and time call was made  1/2/2020 10:17 AM    Hospital care reviewed  Records not available    Patient was hospitialized at  [facility]    Date of Admission  12/26/19    Date of discharge  12/31/19    Diagnosis  COPD exacerbation    Disposition  Home    Current Symptoms  None      TCM Call (since 12/8/2019)     Scheduled for follow up? Yes    Did you obtain your prescribed medications  Yes    Do you need help managing your prescriptions or medications  No    Is transportation to your appointment needed  No    I have advised the patient to call PCP with any new or worsening symptoms  Michelle Finley -     Living Arrangements  Family members    Are you recieving any outpatient services  No    Are you recieving home care services  Yes    Types of home care services  Nurse visit    Have you fallen in the last 12 months  Yes    How many times  1    Counseling  Patient           Patient ID: Jimbo Shah is a 68 y o  male  I reviewed patient's hospitalization for COPD exacerbation  Since home, patient's pulse ox is have been good in the 90s, but he still feels more short of breath than baseline  No fevers chills or sweats, no discolored mucus, no pleuritic pain, no chest pressure, no increased ankle swelling, no heart racing or heart palpitations         The following portions of the patient's history were reviewed and updated as appropriate: allergies, current medications, past family history, past medical history, past social history, past surgical history and problem list     Review of Systems   Constitutional: Positive for fatigue  Negative for chills and fever  HENT: Negative for congestion, nosebleeds, postnasal drip, sore throat and trouble swallowing  Eyes: Negative for pain  Respiratory: Positive for shortness of breath  Negative for cough, chest tightness and wheezing  Cardiovascular: Negative for chest pain, palpitations and leg swelling  Gastrointestinal: Negative for abdominal pain, constipation, diarrhea, nausea and vomiting  Endocrine: Negative for polydipsia and polyuria  Genitourinary: Negative for dysuria, flank pain and hematuria  Musculoskeletal: Negative for arthralgias  Skin: Negative for rash  Neurological: Positive for weakness  Negative for dizziness, tremors and headaches  Hematological: Does not bruise/bleed easily  Psychiatric/Behavioral: Negative for confusion and dysphoric mood  The patient is not nervous/anxious  Objective:      /60 (BP Location: Left arm, Patient Position: Sitting, Cuff Size: Adult)   Pulse (!) 110   Temp 97 9 °F (36 6 °C) (Oral)   Resp 16   Ht 5' 6 5" (1 689 m)   Wt 57 3 kg (126 lb 4 oz)   SpO2 97%   BMI 20 07 kg/m²          Physical Exam   Constitutional: He is oriented to person, place, and time  He appears well-developed and well-nourished  No distress  HENT:   Head: Normocephalic and atraumatic  Right Ear: External ear normal    Left Ear: External ear normal    Eyes: Conjunctivae are normal  No scleral icterus  Neck: Normal range of motion  Neck supple  No tracheal deviation present  No thyromegaly present  Cardiovascular: Normal rate, regular rhythm and normal heart sounds  No murmur heard  Pulmonary/Chest: Effort normal and breath sounds normal  No respiratory distress  He has no wheezes  He has no rales  Abdominal: Soft   Bowel sounds are normal  There is no tenderness  There is no rebound and no guarding  Musculoskeletal: He exhibits no edema  Lymphadenopathy:     He has no cervical adenopathy  Neurological: He is alert and oriented to person, place, and time  Psychiatric: He has a normal mood and affect  His behavior is normal  Judgment and thought content normal    Vitals reviewed

## 2020-01-08 NOTE — ASSESSMENT & PLAN NOTE
Patient was transfused in the hospital, continue monitoring, this could also contribute to his shortness of breath

## 2020-01-08 NOTE — PROGRESS NOTES
F2F visit with patient and CG in PCP office  Patient reports that he still feels weaker than his baseline  Patient is SOB with minimal activity and so is currently using a wheelchair when he needs to walk longer distances  Patient is independent with his ADL's with extra time for rest periods and oxygen  Patient is able to reheat meals or make a sandwich independently  Patient's spouse works full-time so patient is home alone  Patient is able to use oxygen, nebulizer, phone independently  CG assists with medication preparation and does all the shopping, cooking, housekeeping and laundry  Patient is AAO x all spheres  Patient and CG deny any needs at this time  Patient has [facility] for SN/PT services  Reviewed home medications, conservation of energy, oxygen use and safety, fall precautions, s/sx to report, future appointments and how/when to report symptoms to provider vs 911  Patient and CG verbalize understanding  Agree to additional calls

## 2020-01-10 ENCOUNTER — TELEPHONE (OUTPATIENT)
Dept: OTHER | Facility: HOSPITAL | Age: 70
End: 2020-01-10

## 2020-01-10 NOTE — TELEPHONE ENCOUNTER
Wife Natalia Palacios calling regarding same issue as this previous call on 12/26  She says Armando Pulido is still very SOB with his new regimen of nebulizer medication  He has no energy and his activity level is low  She says his pulse ox is in the 90's  If it does drop, it will drop on ambulation and come right back up when resting  She says he spends most of his time in bed  She wants to know if there is anything else she can give him or if this is part of his disease  She says she does not understand his disease enough to know  She would like a call back

## 2020-01-10 NOTE — TELEPHONE ENCOUNTER
Telephone note- Pulmonary Medicine   Rossana Olivares 68 y o  male MRN: [id number]    Reason for call:    Questions regarding current disease process      Pertinent details:    Questions on current lung function and regimen    Reports Angie Bro continues to be fatigued with significant SOB  No additional changes in symptoms-at what appears to be baseline  Reports Angie Bro does not appear to motivated to participate in rehab and remains very sedentary    His wife Kenny Jerome spoke with me today to ensure she was doing everything needed for her     I ensured Vladimir Whitfield that the current medication and therapy regimen was the best for Angie Bro    Encouraged continued work with PT  Continue current regimen    Call with any hypoxia, change in cough/sputum, chest tightness or wheezing above baseline    Follow up with palliative care and pulmonary    verbalized understanding and denied further questions      GERA Morejon

## 2020-01-13 DIAGNOSIS — K21.9 GASTROESOPHAGEAL REFLUX DISEASE WITHOUT ESOPHAGITIS: ICD-10-CM

## 2020-01-13 RX ORDER — PANTOPRAZOLE SODIUM 40 MG/1
TABLET, DELAYED RELEASE ORAL
Qty: 30 TABLET | Refills: 0 | OUTPATIENT
Start: 2020-01-13

## 2020-01-14 DIAGNOSIS — F32.A DEPRESSION, UNSPECIFIED DEPRESSION TYPE: ICD-10-CM

## 2020-01-14 RX ORDER — CITALOPRAM 20 MG/1
20 TABLET ORAL DAILY
Qty: 90 TABLET | Refills: 3 | Status: SHIPPED | OUTPATIENT
Start: 2020-01-14 | End: 2020-07-12

## 2020-01-16 PROBLEM — R06.02 SHORTNESS OF BREATH: Status: ACTIVE | Noted: 2020-01-16

## 2020-01-16 PROBLEM — Z00.00 MEDICARE ANNUAL WELLNESS VISIT, SUBSEQUENT: Status: RESOLVED | Noted: 2019-10-11 | Resolved: 2020-01-16

## 2020-01-16 PROBLEM — J96.22 ACUTE ON CHRONIC RESPIRATORY FAILURE WITH HYPERCAPNIA (HCC): Status: RESOLVED | Noted: 2019-11-22 | Resolved: 2020-01-16

## 2020-01-16 PROBLEM — Z99.81 SUPPLEMENTAL OXYGEN DEPENDENT: Status: ACTIVE | Noted: 2020-01-16

## 2020-01-16 NOTE — PROGRESS NOTES
Palliative and Supportive Care   Jerson Marquez 68 y o  male [id number]    Assessment/Plan:  1  Pulmonary emphysema, unspecified emphysema type ([address] )   · Follows with Dr Memo Dotson of Pulmonology and has PALS at home established for cares and support at home  · Recognizes that he has been less functional in the last month since hospitalization, though does not have significant desire to over exert himself with PT/OT for further improvement in symptoms of SOB and BURTON  2  Chronic respiratory failure with hypoxia    3  Patient has living will   · Instructed patient and wife to bring living will to next office visit for review and POLST form provided to patient and wife for review and discussed use of POLST   4  Goals of care, counseling/discussion   · Discussed goals of care in relation to his severe COPD requiring supplemental oxygen at baseline, as well as requiring nebulizer treatments as inhaler medications are not well tolerated  · States that his goal is to continue to live and reports that he hopes for each day to be better than the previous, though also reports limited functionality and limited desire for further PT/OT given his limitations associated with BURTON and SOB  · Patient reported that he is comfortable in his current situation with support from wife/children/friends as caregivers and does not recognize a significant need for further activity for improvement  · Discussed the limitations associated with severe COPD and goal for maintain an appropriate functional baseline so that further infections would not lead to death  · Patient wishes to continue further discussions of goals of care and limitations in care going forwards  · Social support provided  5  Palliative care patient          Requested Prescriptions      No prescriptions requested or ordered in this encounter     There are no discontinued medications      Return in about 10 weeks (around 3/27/2020) for Recheck, discussion of goals of care, with Dr Alejandrina Dickson  Subjective:   Chief Complaint  New consultation for:  goal of care assessment and decisional support, advance care planning, emotional support in the setting of serious illness  HPI     Og Mckeon (Betsey)is a 68 y o  male with PMH significant for severe COPD (emphysema type, FEV1 12% on home PALS program), chronic hypoxic respiratory failure on 4LNC, pulmonary HTN, HTN, PAF  He follows with Dr Carlos Plata of Pulmonology for management of severe COPD  His home regimen includes pulmicort BID, perforomist BID and atrovent QID with albuterol PRN  He was last hospitalized in December 2019 for acute COPD exacerbation, his fourth hospitalization for the year of 2019  [facility] was consulted for discussion of goals of care regarding his severe COPD  He is establishing care with [facility] for further discussion of goals of care  Leon Fajardo is at his visit today with his wife  We reviewed his current medical conditions including his severe COPD and what this teams to him as it defines his cares and life/functionality  He recognizes the limitations in his physical activity as related to his COPD  He is aware that his functional status has significantly decreased over the last month since he was hospitalized in December 2019  Both him and his wife have noticed that he is less active in general  He states that he has been less active secondary to his SOB and BURTON  He does report that he is comfortable with his current clinical status as he has significant support from his wife/family/friends in relation to medication regimen and cooking/preparing meals  He reports that he has been encouraged by family to increase his physical activity, but he has not had a significant desire to do so  Patient stated that his goals at this time are to continue to improve and that he "takes everything one day at a a time" and gets some anxiety associated with discussions of future plans   He does have a living will for which he will bring to his next visit with [facility]  The following portions of the medical history were reviewed: past medical history, problem list, medication list, and social history  Current Outpatient Medications:     albuterol (2 5 mg/3 mL) 0 083 % nebulizer solution, Take 1 vial (2 5 mg total) by nebulization every 6 (six) hours as needed for wheezing or shortness of breath, Disp: 125 vial, Rfl: 5    budesonide (PULMICORT) 0 5 mg/2 mL nebulizer solution, Take 1 vial (0 5 mg total) by nebulization 2 (two) times a day Rinse mouth after use , Disp: 60 vial, Rfl: 5    CARTIA  MG 24 hr capsule, TAKE 1 CAPSULE BY MOUTH EVERY DAY, Disp: 30 capsule, Rfl: 5    citalopram (CeleXA) 20 mg tablet, Take 1 tablet (20 mg total) by mouth daily, Disp: 90 tablet, Rfl: 3    ferrous sulfate 325 (65 Fe) mg tablet, Take 325 mg by mouth daily, Disp: , Rfl:     folic acid (FOLVITE) 1 mg tablet, TAKE 1 TABLET BY MOUTH EVERY DAY, Disp: 30 tablet, Rfl: 5    furosemide (LASIX) 20 mg tablet, TAKE 1/2 TABLET BY MOUTH DAILY, Disp: 30 tablet, Rfl: 5    ipratropium (ATROVENT) 0 02 % nebulizer solution, Take 1 vial (0 5 mg total) by nebulization 4 (four) times a day, Disp: 120 vial, Rfl: 5    melatonin 3 mg, Take 2 tablets by mouth daily at bedtime as needed (sleep), Disp: 30 tablet, Rfl: 0    pantoprazole (PROTONIX) 40 mg tablet, Take 1 tablet (40 mg total) by mouth daily in the early morning, Disp: 30 tablet, Rfl: 0    potassium chloride (K-DUR,KLOR-CON) 20 mEq tablet, TAKE 1 TABLET BY MOUTH EVERY DAY, Disp: 30 tablet, Rfl: 5  Review of Systems    All other systems negative    Objective:  Vital Signs  /60 (BP Location: Right arm, Cuff Size: Standard)   Pulse (!) 110   Temp (!) 96 8 °F (36 °C) (Tympanic)   Wt 56 8 kg (125 lb 3 2 oz)   SpO2 96%   BMI 19 91 kg/m²    Physical Exam    Constitutional: Sitting comfortably in wheelchair on 4LNC  Breathing lith pursed lips  Appears cachectic and well-nourished   In no acute physical or emotional distress  Head: Normocephalic and atraumatic  Eyes: EOM are normal  No ocular discharge  No scleral icterus  Neck: No visible adenopathy or masses  Respiratory: Effort normal  No stridor  No respiratory distress  Gastrointestinal: No abdominal distension  Musculoskeletal: No edema  Neurological: Alert, oriented and appropriately conversant  Skin: No diaphoresis, no rashes seen on exposed areas of skin  Psychiatric: Displays a normal mood and affect  Behavior, judgement and thought content appear normal      Lauri Lorenzo MD  [address] and Supportive Care

## 2020-01-17 ENCOUNTER — OFFICE VISIT (OUTPATIENT)
Dept: PALLIATIVE MEDICINE | Facility: CLINIC | Age: 70
End: 2020-01-17
Payer: MEDICARE

## 2020-01-17 ENCOUNTER — SOCIAL WORK (OUTPATIENT)
Dept: PALLIATIVE MEDICINE | Facility: CLINIC | Age: 70
End: 2020-01-17
Payer: MEDICARE

## 2020-01-17 VITALS
OXYGEN SATURATION: 96 % | WEIGHT: 125.2 LBS | SYSTOLIC BLOOD PRESSURE: 130 MMHG | DIASTOLIC BLOOD PRESSURE: 60 MMHG | TEMPERATURE: 96.8 F | HEART RATE: 110 BPM | BODY MASS INDEX: 19.91 KG/M2

## 2020-01-17 DIAGNOSIS — Z51.5 PALLIATIVE CARE PATIENT: ICD-10-CM

## 2020-01-17 DIAGNOSIS — Z71.89 COUNSELING AND COORDINATION OF CARE: Primary | ICD-10-CM

## 2020-01-17 DIAGNOSIS — J43.9 PULMONARY EMPHYSEMA, UNSPECIFIED EMPHYSEMA TYPE (HCC): Primary | ICD-10-CM

## 2020-01-17 DIAGNOSIS — Z78.9: ICD-10-CM

## 2020-01-17 DIAGNOSIS — Z71.89 GOALS OF CARE, COUNSELING/DISCUSSION: ICD-10-CM

## 2020-01-17 DIAGNOSIS — J96.11 CHRONIC RESPIRATORY FAILURE WITH HYPOXIA (HCC): ICD-10-CM

## 2020-01-17 PROCEDURE — 99214 OFFICE O/P EST MOD 30 MIN: CPT | Performed by: FAMILY MEDICINE

## 2020-01-17 PROCEDURE — NC001 PR NO CHARGE

## 2020-01-17 NOTE — PROGRESS NOTES
Palliative LSW met with patient, wife Moshe Lanza), and Dr Alejandrina Dickson this morning  LSW introduced self and role to patient  Patient reported he was doing ok  Patient discussed he was not ready to make a future plan because he was happy with the way things were going  He tends to get anxious when thinking about the future  Patient has a living will and will look over the POLST form and try to fill it out  Patient expressed he feels he lets people down because he is content with the way things are right now  His wife addressed that she is only trying to follow what other services (PT, OT, Respiratory therapy) are suggesting for patient to do  She knows he needs to be up and moving more, so she tries to get him to do this  Patient states he is comfortable with the way things are going, but is willing to try and move around more  LSW offered resources that are available and patient and wife were interested in transportation  LSW provided written information about [facility] services  Patient discussed he used to work for [employer] and loves watching sports  Patient's wife is concerned about not being there all day because she has to work  They are unable to pay privately for care in the home  They currently have two friends who come in during the week to check in on patient  They are very happy with the support they are given by friends and family  Patient expressed he would probably be dead without the support from his wife  LSW provided emotional support and will follow when able

## 2020-01-21 ENCOUNTER — PATIENT OUTREACH (OUTPATIENT)
Dept: CASE MANAGEMENT | Facility: HOSPITAL | Age: 70
End: 2020-01-21

## 2020-01-21 NOTE — PROGRESS NOTES
Patient was contacted as a follow-up to the RT visit on 1/6/2020 with PALS RN Mary Ellen Mcdermott's bedroom was moved to the 1st floor which he states is better for his breathing  He is currently lying down and feels fine with no increased dyspnea on exertion or general SOB  He states that he is trying to incorporate more exercise in his daily routine and was encouraged to continue to do so to help gain strength, endurance  No complaints, problems or questions at this time

## 2020-01-28 ENCOUNTER — OFFICE VISIT (OUTPATIENT)
Dept: PULMONOLOGY | Facility: CLINIC | Age: 70
End: 2020-01-28
Payer: MEDICARE

## 2020-01-28 ENCOUNTER — DOCUMENTATION (OUTPATIENT)
Dept: PULMONOLOGY | Facility: CLINIC | Age: 70
End: 2020-01-28

## 2020-01-28 VITALS
HEIGHT: 67 IN | BODY MASS INDEX: 19.62 KG/M2 | TEMPERATURE: 97.1 F | SYSTOLIC BLOOD PRESSURE: 120 MMHG | DIASTOLIC BLOOD PRESSURE: 68 MMHG | OXYGEN SATURATION: 100 % | WEIGHT: 125 LBS | HEART RATE: 94 BPM

## 2020-01-28 DIAGNOSIS — J96.12 CHRONIC RESPIRATORY FAILURE WITH HYPOXIA AND HYPERCAPNIA (HCC): ICD-10-CM

## 2020-01-28 DIAGNOSIS — J96.11 CHRONIC RESPIRATORY FAILURE WITH HYPOXIA AND HYPERCAPNIA (HCC): ICD-10-CM

## 2020-01-28 DIAGNOSIS — J44.9 CHRONIC OBSTRUCTIVE PULMONARY DISEASE, UNSPECIFIED COPD TYPE (HCC): Primary | ICD-10-CM

## 2020-01-28 DIAGNOSIS — R06.00 DOE (DYSPNEA ON EXERTION): ICD-10-CM

## 2020-01-28 DIAGNOSIS — I10 ESSENTIAL HYPERTENSION: ICD-10-CM

## 2020-01-28 PROCEDURE — 99215 OFFICE O/P EST HI 40 MIN: CPT | Performed by: INTERNAL MEDICINE

## 2020-01-28 NOTE — PROGRESS NOTES
Script for BiPAP with supplies, office notes, PFT report, and ABG has been faxed to [address]  at [phone]

## 2020-01-28 NOTE — PROGRESS NOTES
Office Progress Note - Pulmonary    Gunnar Shen 68 y o  male MRN: [id number]    Encounter: [id number]      Assessment:   Chronic hypoxemic and hypercapnic respiratory failure with a recent acute exacerbation   Very severe chronic obstructive pulmonary disease   Dyspnea on exertion  Plan:     Budesonide 0 5 milligram nebulized twice a day   Ipratropium/albuterol nebulized 4 times a day   Oxygen supplement 24 hours a day   Regular walks to improve stamina   Nocturnal BiPAP and during the day as needed   Follow-up in 3 months  Discussion:   The patient's COPD acute exacerbation has resolved  He is back to his baseline  He has chronic hypercapnia as evident by the arterial blood gases with a pCO2 of 76  His FEV1 was 30 percent predicted during the last visit  I have ordered BiPAP 15/8 to improve his hypercapnia and also minimized need to go to the hospital   I have maintained him on the budesonide 0 5 milligram nebulized twice a day and ipratropium/albuterol nebulized 4 times a day  He will continue using the oxygen 24 hours a day  I will see him in 3 months in a follow-up visit  Subjective: The patient is here for a follow-up visit  He was admitted she says about 3 weeks ago with acute on chronic hypercapnic and hypoxic respiratory failure  He was treated with BiPAP, steroids and bronchodilators  Denies chest pain  No cough or sputum production  Review of systems:  A 12 point system review is done and aside from what is stated above the rest of the review of systems is negative  Family history and social history are reviewed  Medications list is reviewed  Vitals: Blood pressure 120/68, pulse 94, temperature (!) 97 1 °F (36 2 °C), temperature source Tympanic, height 5' 6 5" (1 689 m), weight 56 7 kg (125 lb), SpO2 100 %  ,     Physical Exam  Gen: Awake, alert, oriented x 3, no acute distress  HEENT: Mucous membranes moist, no oral lesions, no thrush  NECK: No accessory muscle use, JVP not elevated  Cardiac: Regular, single S1, single S2, no murmurs, no rubs, no gallops  Lungs:  Decreased breath sounds  No wheezing or rhonchi  Abdomen: normoactive bowel sounds, soft nontender, nondistended, no rebound or rigidity, no guarding  Extremities: no cyanosis, no clubbing, no edema  Neuro:  Grossly nonfocal   Skin:  No rash  Chest x-rays reviewed on the [facility] system and it showed hyperinflated lungs  No acute pulmonary findings      Lab Results   Component Value Date    WBC 3 29 (L) 12/31/2019    HGB 10 0 (L) 12/31/2019    HCT 32 6 (L) 12/31/2019     (H) 12/31/2019     12/31/2019     Lab Results   Component Value Date    SODIUM 142 12/28/2019    K 4 9 12/28/2019    CL 98 (L) 12/28/2019    CO2 >45 (HH) 12/28/2019    BUN 23 12/28/2019    CREATININE 0 68 12/28/2019    GLUC 192 (H) 12/28/2019    CALCIUM 9 5 12/28/2019 12/26/2019  3:38 PM     Component Value Flag Ref Range Units Status   pH, Stewart 7 351   7 300 - 7 400  Final   pCO2, Stewart 75 7  High   42 0 - 50 0 mm Hg Final   pO2, Stewart 74 4  High   35 0 - 45 0 mm Hg Final   HCO3, Stewart 40 9  High   24 - 30 mmol/L Final   Base Excess, Stewart 12 9    mmol/L Final   O2 Content, Stewart 13 3    ml/dL Final   O2 HGB, VENOUS 92 1  High

## 2020-02-11 ENCOUNTER — TELEPHONE (OUTPATIENT)
Dept: PULMONOLOGY | Facility: CLINIC | Age: 70
End: 2020-02-11

## 2020-02-11 NOTE — TELEPHONE ENCOUNTER
Called [facility] to fined out why they are requesting the ABG and Pulse Ox when [address] sent everything and [facility] stated that they received the stuff on the 29th which would be more then 30 from the testing when the patient got it done 12/26/2019  Patient would have to get the testing redone with at least 2lpm of o2 and then send the results to them  Joaquina Quinones to let her know

## 2020-02-25 ENCOUNTER — HOSPITAL ENCOUNTER (OUTPATIENT)
Dept: NON INVASIVE DIAGNOSTICS | Facility: HOSPITAL | Age: 70
Discharge: HOME | End: 2020-02-25
Payer: MEDICARE

## 2020-02-25 DIAGNOSIS — J44.9 CHRONIC OBSTRUCTIVE LUNG DISEASE, TYPE A (HCC): ICD-10-CM

## 2020-02-25 DIAGNOSIS — Z86.79 HISTORY OF ATRIAL FLUTTER: ICD-10-CM

## 2020-02-25 PROCEDURE — 93226 XTRNL ECG REC<48 HR SCAN A/R: CPT

## 2020-02-25 PROCEDURE — 93225 XTRNL ECG REC<48 HRS REC: CPT

## 2020-03-03 ENCOUNTER — TELEPHONE (OUTPATIENT)
Dept: PULMONOLOGY | Facility: CLINIC | Age: 70
End: 2020-03-03

## 2020-03-03 DIAGNOSIS — J96.12 CHRONIC RESPIRATORY FAILURE WITH HYPOXIA AND HYPERCAPNIA (HCC): ICD-10-CM

## 2020-03-03 DIAGNOSIS — J96.11 CHRONIC RESPIRATORY FAILURE WITH HYPOXIA AND HYPERCAPNIA (HCC): ICD-10-CM

## 2020-03-03 DIAGNOSIS — J44.9 CHRONIC OBSTRUCTIVE PULMONARY DISEASE, UNSPECIFIED COPD TYPE (HCC): Primary | ICD-10-CM

## 2020-03-03 PROCEDURE — 93227 XTRNL ECG REC<48 HR R&I: CPT | Performed by: INTERNAL MEDICINE

## 2020-03-27 ENCOUNTER — TELEMEDICINE (OUTPATIENT)
Dept: PALLIATIVE MEDICINE | Facility: CLINIC | Age: 70
End: 2020-03-27
Payer: MEDICARE

## 2020-03-27 DIAGNOSIS — J96.11 CHRONIC RESPIRATORY FAILURE WITH HYPOXIA (HCC): ICD-10-CM

## 2020-03-27 DIAGNOSIS — Z71.89 COMMUNITY HEALTH EDUCATION: ICD-10-CM

## 2020-03-27 DIAGNOSIS — J43.9 PULMONARY EMPHYSEMA, UNSPECIFIED EMPHYSEMA TYPE (HCC): Primary | ICD-10-CM

## 2020-03-27 DIAGNOSIS — Z78.9: ICD-10-CM

## 2020-03-27 DIAGNOSIS — Z51.5 PALLIATIVE CARE PATIENT: ICD-10-CM

## 2020-03-27 DIAGNOSIS — E43 SEVERE PROTEIN-CALORIE MALNUTRITION (HCC): ICD-10-CM

## 2020-03-27 DIAGNOSIS — Z71.89 GOALS OF CARE, COUNSELING/DISCUSSION: ICD-10-CM

## 2020-03-27 PROCEDURE — 99443 PR PHYS/QHP TELEPHONE EVALUATION 21-30 MIN: CPT | Performed by: FAMILY MEDICINE

## 2020-03-27 NOTE — PROGRESS NOTES
Outpatient Virtual Visit - Palliative and Supportive Care  Dawood Mar 68 y o  male [id number]    Intake and Identification:  Reason for visit is follow-up of symptom management as well as goals of care discussion in setting of severe COPD    Encounter provider Alba Ricketts MD, located at:  [address] [facility] [address]  [facility] [address] [address]  [phone]    Recent Visits  No visits were found meeting these conditions  Showing recent visits within past 7 days and meeting all other requirements     Today's Visits  Date Type Provider Dept   03/27/20 Telemedicine Alba Ricketts, [address] today's visits and meeting all other requirements     Future Appointments  No visits were found meeting these conditions  Showing future appointments within next 150 days and meeting all other requirements             Patient agrees to participate in a virtual check in via telephone or video visit instead of presenting to the office to address urgent/immediate medical needs, or to respect quarantine and public health guidelines  Patient was informed this is a billable service  After connecting through telephone, the patient was identified by name and date of birth  Dawood Mar was informed that this was a telemedicine visit and that the visit is being conducted through [employer] and patient was informed that this is not a secure, HIPAA-complaint platform  he agrees to proceed  My office door was closed  The following individuals were in the room with me and the patient informed Izabella Salazar MD - attending physician  He acknowledged consent and understanding of privacy and security of the virtual check-in visit  I informed the patient that I have reviewed his record in Epic and presented the opportunity for him to ask any questions regarding the visit today   The patient initiated communication and agreed to participate  Assessment and Plan  Baldemar telephone assessment is Patient denied any complaints of shortness of breath, cough, fever, chills, nausea, vomiting, chest pain abdominal pain, diarrhea     Diagnoses for this encounter are:    1  Pulmonary emphysema, unspecified emphysema type ([address] )  Continue to follow with pulmonology as outpatient in regard to his chronic and severe COPD  Continue to follow with PALS at home to maximize patient's independence in functionality  He may continue to follow with palliative care for further discussion of goals of care through the course of his he severe COPD    2  Chronic respiratory failure with hypoxia    3  Severe protein-calorie malnutrition     4  Patient has living will   -family is to submit electronic version of patient's living will so that we may add to patient's electronic chart for further referencing and review in the future   5  Goals of care, counseling/discussion   As above   6  Palliative care patient    7  Community health education   Provided further agitation and discuss precautions necessary in the setting of the Coronavirus pandemic  Discuss the need to frequently wash hands as well as to avoid facial contact with mucous membranes  Additionally we discussed social distancing as well as Physical distance in to include limiting outside interactions to only essential activities such as grocery shopping, refilling vehicles and he cannot medications  Additionally we spoke about the need to clean frequently touched surfaces such as counter tops, doorknobs, sinks, faucets  Medications adjusted this encounter:  Requested Prescriptions      No prescriptions requested or ordered in this encounter     There are no discontinued medications  Tristavaibhav Ac was assessed today for symptoms and planning cares related to above illnesses    I have reviewed the patient's controlled substance dispensing history in the Prescription Drug Monitoring Program in compliance with the [facility] regulations before prescribing any controlled substances  He is invited to continue to follow with us  If there are questions or concerns, please contact us through our clinic/answering service 24 hours a day, seven days a week  Disposition:    Patient's wife is to send over his electronic version of living will for a blow to his medical records  Patient to continue to follow with PALS team at home to help to improve his functionality as well as independence  He may follow-up with Dr Crystal Asher in 4-6 months for further monitoring of symptoms and goals of care  Subjective  Olinda Lancaster (Betsey)is a 68 y o  male with PMH significant for severe COPD (emphysema type, FEV1 12% on home PALS program), chronic hypoxic respiratory failure on 3LNC, pulmonary HTN, HTN, PAF  He follows with Dr Karla Powers of Pulmonology for management of severe COPD  His home regimen includes pulmicort BID, perforomist BID and atrovent QID with albuterol PRN  Olinda Lancaster and his wife were available for tele comfort thing this morning for follow-up visit  Patient states that he has improved his functional status over the last few months since his visit in the palliative care office is following physical therapy advice and recommendations  He reports that he is able to a improve his ambulation as well as physical activity while standing  He he denies significant shortness of breath with ambulation and is proud of the fact that he has been able to decrease his oxygen requirements to 3 L nasal cannula  He follows with his pulmonologist and reports that a trial of BiPAP was attempted but he was unable to tolerate the device and since does not look for initiation of the device for any other prolonged period of time  We discussed goals of care including the previously documented living will that we do not currently have a on our electronic records    Patient's wife will be forwarding this to myself for up loaded into his medical records  We discussed the current setting of corona virus pandemic as well as the limitation of visiting into patient's house giving is severe respiratory disease at this point  Patient will still continue to follow with the PALS team as an outpatient but on a monthly basis and primarily at this point through telephone visits at his request as well as wife's to minimize social interaction given the respiratory nature of this virus  Additionally we spoke about other personal protective techniques which are to include social distancing as well as physical distances as well as frequently cleaning frequently use services such as counter tops, sinks, faucets, doorknobs  Additionally we discussed the need to frequently wash hands as well as to prevent touching mucous membranes with dirty hands        Past Medical History:   Diagnosis Date    Anxiety     Aortic regurgitation     Atrial flutter (HCC)     Chronic respiratory failure (HCC)     Colon polyp     COPD (chronic obstructive pulmonary disease) (HCC)     Deep venous thrombosis of distal lower extremity (HCC)     Diverticulitis     GI bleed     Hypertension     Iron deficiency anemia     MGUS (monoclonal gammopathy of unknown significance)     Osteoarthritis of hip     PAC (premature atrial contraction)     Paroxysmal atrial fibrillation (HCC)     Patent foramen ovale     Pulmonary artery hypertension (HCC)     Renal failure     Sinus tachycardia      Past Surgical History:   Procedure Laterality Date    APPENDECTOMY      COLON SURGERY      HERNIA REPAIR      JOINT REPLACEMENT      KNEE SURGERY       Current Outpatient Medications   Medication Sig Dispense Refill    albuterol (2 5 mg/3 mL) 0 083 % nebulizer solution Take 1 vial (2 5 mg total) by nebulization every 6 (six) hours as needed for wheezing or shortness of breath 125 vial 5    budesonide (PULMICORT) 0 5 mg/2 mL nebulizer solution Take 1 vial (0 5 mg total) by nebulization 2 (two) times a day Rinse mouth after use  60 vial 5    CARTIA  MG 24 hr capsule TAKE 1 CAPSULE BY MOUTH EVERY DAY 30 capsule 5    citalopram (CeleXA) 20 mg tablet Take 1 tablet (20 mg total) by mouth daily 90 tablet 3    ferrous sulfate 325 (65 Fe) mg tablet Take 325 mg by mouth daily      folic acid (FOLVITE) 1 mg tablet TAKE 1 TABLET BY MOUTH EVERY DAY 30 tablet 5    furosemide (LASIX) 20 mg tablet TAKE 1/2 TABLET BY MOUTH DAILY 30 tablet 5    ipratropium (ATROVENT) 0 02 % nebulizer solution Take 1 vial (0 5 mg total) by nebulization 4 (four) times a day 120 vial 5    melatonin 3 mg Take 2 tablets by mouth daily at bedtime as needed (sleep) 30 tablet 0    pantoprazole (PROTONIX) 40 mg tablet Take 1 tablet (40 mg total) by mouth daily in the early morning 30 tablet 0    potassium chloride (K-DUR,KLOR-CON) 20 mEq tablet TAKE 1 TABLET BY MOUTH EVERY DAY 30 tablet 5     No current facility-administered medications for this visit  Allergies   Allergen Reactions    Penicillins Anaphylaxis       ROS  Patient denied any complaints of shortness of breath, chest pain, abdominal pain, diarrhea, nausea, vomiting, headache    Physical Exam  Unable to perform secondary to telephone conversation  I spent 20+ minutes with the patient during this PHONE visit  Cares and counseling included the following: etiology of diagnosis, pathogenesis of diagnosis, prognosis of diagnosis, treatment instructions, follow up requirements and patient and family counseling/involvement in care  All of the patient's questions were answered during this discussion  [address] and Supportive Care

## 2020-04-01 ENCOUNTER — TELEPHONE (OUTPATIENT)
Dept: PULMONOLOGY | Facility: CLINIC | Age: 70
End: 2020-04-01

## 2020-04-07 ENCOUNTER — TELEMEDICINE (OUTPATIENT)
Dept: PULMONOLOGY | Facility: CLINIC | Age: 70
End: 2020-04-07
Payer: MEDICARE

## 2020-04-07 VITALS — HEART RATE: 90 BPM | OXYGEN SATURATION: 96 %

## 2020-04-07 DIAGNOSIS — K21.9 GASTROESOPHAGEAL REFLUX DISEASE WITHOUT ESOPHAGITIS: ICD-10-CM

## 2020-04-07 DIAGNOSIS — J96.11 CHRONIC RESPIRATORY FAILURE WITH HYPOXIA (HCC): Primary | ICD-10-CM

## 2020-04-07 DIAGNOSIS — R06.02 SHORTNESS OF BREATH: ICD-10-CM

## 2020-04-07 DIAGNOSIS — J43.9 PULMONARY EMPHYSEMA, UNSPECIFIED EMPHYSEMA TYPE (HCC): ICD-10-CM

## 2020-04-07 PROCEDURE — 99214 OFFICE O/P EST MOD 30 MIN: CPT | Performed by: NURSE PRACTITIONER

## 2020-04-08 ENCOUNTER — TELEMEDICINE (OUTPATIENT)
Dept: INTERNAL MEDICINE CLINIC | Facility: CLINIC | Age: 70
End: 2020-04-08
Payer: MEDICARE

## 2020-04-08 VITALS — OXYGEN SATURATION: 95 % | HEART RATE: 86 BPM

## 2020-04-08 DIAGNOSIS — F41.9 ANXIETY: Chronic | ICD-10-CM

## 2020-04-08 DIAGNOSIS — J96.11 CHRONIC RESPIRATORY FAILURE WITH HYPOXIA (HCC): Primary | ICD-10-CM

## 2020-04-08 DIAGNOSIS — I10 ESSENTIAL HYPERTENSION: Chronic | ICD-10-CM

## 2020-04-08 DIAGNOSIS — D50.0 IRON DEFICIENCY ANEMIA DUE TO CHRONIC BLOOD LOSS: Chronic | ICD-10-CM

## 2020-04-08 DIAGNOSIS — K21.9 GASTROESOPHAGEAL REFLUX DISEASE WITHOUT ESOPHAGITIS: ICD-10-CM

## 2020-04-08 PROCEDURE — 99214 OFFICE O/P EST MOD 30 MIN: CPT | Performed by: INTERNAL MEDICINE

## 2020-04-12 DIAGNOSIS — E87.6 HYPOKALEMIA: ICD-10-CM

## 2020-04-13 RX ORDER — POTASSIUM CHLORIDE 20 MEQ/1
TABLET, EXTENDED RELEASE ORAL
Qty: 30 TABLET | Refills: 5 | Status: SHIPPED | OUTPATIENT
Start: 2020-04-13

## 2020-04-16 ENCOUNTER — PATIENT OUTREACH (OUTPATIENT)
Dept: INTERNAL MEDICINE CLINIC | Facility: CLINIC | Age: 70
End: 2020-04-16

## 2020-04-22 ENCOUNTER — TELEMEDICINE (OUTPATIENT)
Dept: INTERNAL MEDICINE CLINIC | Facility: CLINIC | Age: 70
End: 2020-04-22
Payer: MEDICARE

## 2020-04-22 ENCOUNTER — TELEPHONE (OUTPATIENT)
Dept: INTERNAL MEDICINE CLINIC | Facility: CLINIC | Age: 70
End: 2020-04-22

## 2020-04-22 VITALS — TEMPERATURE: 97.4 F | OXYGEN SATURATION: 98 % | HEART RATE: 91 BPM

## 2020-04-22 DIAGNOSIS — J96.11 CHRONIC RESPIRATORY FAILURE WITH HYPOXIA (HCC): Primary | ICD-10-CM

## 2020-04-22 DIAGNOSIS — Z20.828 EXPOSURE TO SARS-ASSOCIATED CORONAVIRUS: Primary | ICD-10-CM

## 2020-04-22 DIAGNOSIS — R06.02 SHORTNESS OF BREATH: ICD-10-CM

## 2020-04-22 DIAGNOSIS — Z20.828 EXPOSURE TO SARS-ASSOCIATED CORONAVIRUS: ICD-10-CM

## 2020-04-22 PROCEDURE — 99214 OFFICE O/P EST MOD 30 MIN: CPT | Performed by: INTERNAL MEDICINE

## 2020-04-22 PROCEDURE — 87635 SARS-COV-2 COVID-19 AMP PRB: CPT

## 2020-04-22 RX ORDER — METHYLPREDNISOLONE 4 MG/1
TABLET ORAL
Qty: 21 EACH | Refills: 0 | Status: SHIPPED | OUTPATIENT
Start: 2020-04-22 | End: 2020-07-08 | Stop reason: HOSPADM

## 2020-04-22 RX ORDER — AZITHROMYCIN 250 MG/1
250 TABLET, FILM COATED ORAL EVERY 24 HOURS
Qty: 6 TABLET | Refills: 0 | Status: SHIPPED | OUTPATIENT
Start: 2020-04-22 | End: 2020-04-27

## 2020-04-23 LAB — SARS-COV-2 RNA SPEC QL NAA+PROBE: NOT DETECTED

## 2020-05-10 DIAGNOSIS — J44.9 CHRONIC OBSTRUCTIVE PULMONARY DISEASE, UNSPECIFIED COPD TYPE (HCC): ICD-10-CM

## 2020-05-10 RX ORDER — ALBUTEROL SULFATE 2.5 MG/3ML
SOLUTION RESPIRATORY (INHALATION)
Qty: 375 ML | Refills: 5 | Status: SHIPPED | OUTPATIENT
Start: 2020-05-10 | End: 2020-05-11

## 2020-05-11 DIAGNOSIS — J44.9 CHRONIC OBSTRUCTIVE PULMONARY DISEASE, UNSPECIFIED COPD TYPE (HCC): ICD-10-CM

## 2020-05-11 RX ORDER — ALBUTEROL SULFATE 2.5 MG/3ML
SOLUTION RESPIRATORY (INHALATION)
Qty: 375 ML | Refills: 5 | Status: SHIPPED | OUTPATIENT
Start: 2020-05-11

## 2020-05-17 DIAGNOSIS — J44.9 COPD (CHRONIC OBSTRUCTIVE PULMONARY DISEASE) (HCC): ICD-10-CM

## 2020-05-18 RX ORDER — BUDESONIDE 0.5 MG/2ML
0.5 INHALANT ORAL 2 TIMES DAILY
Qty: 120 ML | Refills: 5 | Status: SHIPPED | OUTPATIENT
Start: 2020-05-18

## 2020-05-22 DIAGNOSIS — I47.1 PAROXYSMAL ATRIAL TACHYCARDIA (HCC): ICD-10-CM

## 2020-05-22 DIAGNOSIS — D64.9 ANEMIA, UNSPECIFIED TYPE: ICD-10-CM

## 2020-05-22 RX ORDER — FOLIC ACID 1 MG/1
TABLET ORAL
Qty: 30 TABLET | Refills: 5 | Status: SHIPPED | OUTPATIENT
Start: 2020-05-22

## 2020-05-22 RX ORDER — DILTIAZEM HYDROCHLORIDE 180 MG/1
CAPSULE, COATED, EXTENDED RELEASE ORAL
Qty: 30 CAPSULE | Refills: 5 | Status: SHIPPED | OUTPATIENT
Start: 2020-05-22

## 2020-06-15 ENCOUNTER — DOCUMENTATION (OUTPATIENT)
Dept: PULMONOLOGY | Facility: CLINIC | Age: 70
End: 2020-06-15

## 2020-06-29 DIAGNOSIS — J44.9 CHRONIC OBSTRUCTIVE PULMONARY DISEASE, UNSPECIFIED COPD TYPE (HCC): Primary | ICD-10-CM

## 2020-06-29 RX ORDER — ALBUTEROL SULFATE 90 UG/1
2 AEROSOL, METERED RESPIRATORY (INHALATION) EVERY 6 HOURS PRN
Qty: 8.5 G | Refills: 5 | Status: SHIPPED | OUTPATIENT
Start: 2020-06-29

## 2020-07-01 ENCOUNTER — HOSPITAL ENCOUNTER (INPATIENT)
Facility: HOSPITAL | Age: 70
LOS: 7 days | Discharge: HOME HEALTH | DRG: 189 | End: 2020-07-08
Attending: EMERGENCY MEDICINE | Admitting: GENERAL PRACTICE
Payer: MEDICARE

## 2020-07-01 ENCOUNTER — APPOINTMENT (EMERGENCY)
Dept: RADIOLOGY | Facility: HOSPITAL | Age: 70
DRG: 189 | End: 2020-07-01
Payer: MEDICARE

## 2020-07-01 ENCOUNTER — PATIENT OUTREACH (OUTPATIENT)
Dept: INTERNAL MEDICINE CLINIC | Facility: CLINIC | Age: 70
End: 2020-07-01

## 2020-07-01 DIAGNOSIS — D64.9 ANEMIA: ICD-10-CM

## 2020-07-01 DIAGNOSIS — J44.1 COPD WITH ACUTE EXACERBATION (HCC): Primary | ICD-10-CM

## 2020-07-01 DIAGNOSIS — J96.22 ACUTE ON CHRONIC RESPIRATORY FAILURE WITH HYPERCAPNIA (HCC): ICD-10-CM
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
## 2020-07-01 LAB
ATRIAL RATE: 95 BPM
BASE EX.OXY STD BLDV CALC-SCNC: 86.7 % (ref 60–80)
BASE EXCESS BLDA CALC-SCNC: 23 MMOL/L (ref -2–3)
BASE EXCESS BLDV CALC-SCNC: 15.5 MMOL/L
BASOPHILS # BLD AUTO: 0 THOUSANDS/ΜL (ref 0–0.1)
BASOPHILS NFR BLD AUTO: 0 % (ref 0–1)
BUN SERPL-MCNC: 13 MG/DL (ref 5–25)
CA-I BLD-SCNC: 1.11 MMOL/L (ref 1.12–1.32)
CALCIUM SERPL-MCNC: 9.7 MG/DL (ref 8.3–10.1)
CHLORIDE SERPL-SCNC: 96 MMOL/L (ref 100–108)
CO2 SERPL-SCNC: >45 MMOL/L (ref 21–32)
CREAT SERPL-MCNC: 0.57 MG/DL (ref 0.6–1.3)
EOSINOPHIL # BLD AUTO: 0.05 THOUSAND/ΜL (ref 0–0.61)
EOSINOPHIL NFR BLD AUTO: 2 % (ref 0–6)
ERYTHROCYTE [DISTWIDTH] IN BLOOD BY AUTOMATED COUNT: 14.2 % (ref 11.6–15.1)
GFR SERPL CREATININE-BSD FRML MDRD: 103 ML/MIN/1.73SQ M
GLUCOSE SERPL-MCNC: 129 MG/DL (ref 65–140)
GLUCOSE SERPL-MCNC: 249 MG/DL (ref 65–140)
HCO3 BLDA-SCNC: 50.8 MMOL/L (ref 24–30)
HCO3 BLDV-SCNC: 46.1 MMOL/L (ref 24–30)
HCT VFR BLD AUTO: 31.4 % (ref 36.5–49.3)
HCT VFR BLD CALC: 31 % (ref 36.5–49.3)
HGB BLD-MCNC: 9.2 G/DL (ref 12–17)
HGB BLDA-MCNC: 10.5 G/DL (ref 12–17)
IMM GRANULOCYTES # BLD AUTO: 0.02 THOUSAND/UL (ref 0–0.2)
IMM GRANULOCYTES NFR BLD AUTO: 1 % (ref 0–2)
LYMPHOCYTES # BLD AUTO: 1.43 THOUSANDS/ΜL (ref 0.6–4.47)
LYMPHOCYTES NFR BLD AUTO: 52 % (ref 14–44)
MCH RBC QN AUTO: 32.2 PG (ref 26.8–34.3)
MCHC RBC AUTO-ENTMCNC: 29.3 G/DL (ref 31.4–37.4)
MCV RBC AUTO: 110 FL (ref 82–98)
MONOCYTES # BLD AUTO: 0.12 THOUSAND/ΜL (ref 0.17–1.22)
MONOCYTES NFR BLD AUTO: 5 % (ref 4–12)
NEUTROPHILS # BLD AUTO: 1.07 THOUSANDS/ΜL (ref 1.85–7.62)
NEUTS SEG NFR BLD AUTO: 40 % (ref 43–75)
NRBC BLD AUTO-RTO: 0 /100 WBCS
O2 CT BLDV-SCNC: 12 ML/DL
P AXIS: 84 DEGREES
PCO2 BLD: 77 MM HG (ref 42–50)
PCO2 BLD: >45 MMOL/L (ref 21–32)
PCO2 BLDV: 109.4 MM HG (ref 42–50)
PH BLD: 7.43 [PH] (ref 7.3–7.4)
PH BLDV: 7.24 [PH] (ref 7.3–7.4)
PLATELET # BLD AUTO: 127 THOUSANDS/UL (ref 149–390)
PLATELET # BLD AUTO: 128 THOUSANDS/UL (ref 149–390)
PMV BLD AUTO: 9 FL (ref 8.9–12.7)
PMV BLD AUTO: 9.1 FL (ref 8.9–12.7)
PO2 BLD: 168 MM HG (ref 35–45)
PO2 BLDV: 62.4 MM HG (ref 35–45)
POTASSIUM BLD-SCNC: 5.3 MMOL/L (ref 3.5–5.3)
POTASSIUM SERPL-SCNC: 4.1 MMOL/L (ref 3.5–5.3)
PR INTERVAL: 156 MS
PROCALCITONIN SERPL-MCNC: <0.05 NG/ML
QRS AXIS: 78 DEGREES
QRSD INTERVAL: 88 MS
QT INTERVAL: 352 MS
QTC INTERVAL: 442 MS
RBC # BLD AUTO: 2.86 MILLION/UL (ref 3.88–5.62)
SAO2 % BLD FROM PO2: 99 % (ref 60–85)
SODIUM BLD-SCNC: 138 MMOL/L (ref 136–145)
SODIUM SERPL-SCNC: 140 MMOL/L (ref 136–145)
SPECIMEN SOURCE: ABNORMAL
T WAVE AXIS: 77 DEGREES
TROPONIN I SERPL-MCNC: <0.02 NG/ML
VENTRICULAR RATE: 95 BPM
WBC # BLD AUTO: 2.69 THOUSAND/UL (ref 4.31–10.16)

## 2020-07-01 PROCEDURE — 94002 VENT MGMT INPAT INIT DAY: CPT

## 2020-07-01 PROCEDURE — 82805 BLOOD GASES W/O2 SATURATION: CPT | Performed by: EMERGENCY MEDICINE

## 2020-07-01 PROCEDURE — 96365 THER/PROPH/DIAG IV INF INIT: CPT

## 2020-07-01 PROCEDURE — 99233 SBSQ HOSP IP/OBS HIGH 50: CPT | Performed by: NURSE PRACTITIONER

## 2020-07-01 PROCEDURE — 94760 N-INVAS EAR/PLS OXIMETRY 1: CPT

## 2020-07-01 PROCEDURE — 82947 ASSAY GLUCOSE BLOOD QUANT: CPT

## 2020-07-01 PROCEDURE — 99223 1ST HOSP IP/OBS HIGH 75: CPT | Performed by: GENERAL PRACTICE

## 2020-07-01 PROCEDURE — 93005 ELECTROCARDIOGRAM TRACING: CPT

## 2020-07-01 PROCEDURE — 99285 EMERGENCY DEPT VISIT HI MDM: CPT

## 2020-07-01 PROCEDURE — 94640 AIRWAY INHALATION TREATMENT: CPT

## 2020-07-01 PROCEDURE — 36415 COLL VENOUS BLD VENIPUNCTURE: CPT | Performed by: EMERGENCY MEDICINE

## 2020-07-01 PROCEDURE — 1123F ACP DISCUSS/DSCN MKR DOCD: CPT | Performed by: INTERNAL MEDICINE

## 2020-07-01 PROCEDURE — 84145 PROCALCITONIN (PCT): CPT | Performed by: FAMILY MEDICINE

## 2020-07-01 PROCEDURE — 84132 ASSAY OF SERUM POTASSIUM: CPT

## 2020-07-01 PROCEDURE — 82803 BLOOD GASES ANY COMBINATION: CPT

## 2020-07-01 PROCEDURE — 84484 ASSAY OF TROPONIN QUANT: CPT | Performed by: EMERGENCY MEDICINE

## 2020-07-01 PROCEDURE — 96366 THER/PROPH/DIAG IV INF ADDON: CPT

## 2020-07-01 PROCEDURE — 99291 CRITICAL CARE FIRST HOUR: CPT | Performed by: EMERGENCY MEDICINE

## 2020-07-01 PROCEDURE — 85025 COMPLETE CBC W/AUTO DIFF WBC: CPT | Performed by: EMERGENCY MEDICINE

## 2020-07-01 PROCEDURE — 85014 HEMATOCRIT: CPT

## 2020-07-01 PROCEDURE — 82330 ASSAY OF CALCIUM: CPT

## 2020-07-01 PROCEDURE — 80048 BASIC METABOLIC PNL TOTAL CA: CPT | Performed by: EMERGENCY MEDICINE

## 2020-07-01 PROCEDURE — 85049 AUTOMATED PLATELET COUNT: CPT | Performed by: FAMILY MEDICINE

## 2020-07-01 PROCEDURE — 84295 ASSAY OF SERUM SODIUM: CPT

## 2020-07-01 PROCEDURE — 71045 X-RAY EXAM CHEST 1 VIEW: CPT

## 2020-07-01 PROCEDURE — 93010 ELECTROCARDIOGRAM REPORT: CPT | Performed by: INTERNAL MEDICINE

## 2020-07-01 RX ORDER — POTASSIUM CHLORIDE 20 MEQ/1
20 TABLET, EXTENDED RELEASE ORAL DAILY
Status: DISCONTINUED | OUTPATIENT
Start: 2020-07-02 | End: 2020-07-08 | Stop reason: HOSPADM

## 2020-07-01 RX ORDER — PREDNISONE 20 MG/1
40 TABLET ORAL ONCE
Status: COMPLETED | OUTPATIENT
Start: 2020-07-01 | End: 2020-07-01

## 2020-07-01 RX ORDER — SODIUM CHLORIDE FOR INHALATION 0.9 %
3 VIAL, NEBULIZER (ML) INHALATION ONCE
Status: COMPLETED | OUTPATIENT
Start: 2020-07-01 | End: 2020-07-01

## 2020-07-01 RX ORDER — PANTOPRAZOLE SODIUM 40 MG/1
40 TABLET, DELAYED RELEASE ORAL
Status: DISCONTINUED | OUTPATIENT
Start: 2020-07-02 | End: 2020-07-02

## 2020-07-01 RX ORDER — PREDNISONE 20 MG/1
40 TABLET ORAL DAILY
Status: CANCELLED | OUTPATIENT
Start: 2020-07-01

## 2020-07-01 RX ORDER — TERBUTALINE SULFATE 1 MG/ML
1 INJECTION, SOLUTION SUBCUTANEOUS ONCE
Status: COMPLETED | OUTPATIENT
Start: 2020-07-01 | End: 2020-07-01

## 2020-07-01 RX ORDER — SODIUM CHLORIDE 9 MG/ML
3 INJECTION INTRAVENOUS
Status: DISCONTINUED | OUTPATIENT
Start: 2020-07-01 | End: 2020-07-08 | Stop reason: HOSPADM

## 2020-07-01 RX ORDER — METHYLPREDNISOLONE SOD SUCC 125 MG
1 VIAL (EA) INJECTION ONCE
Status: COMPLETED | OUTPATIENT
Start: 2020-07-01 | End: 2020-07-01

## 2020-07-01 RX ORDER — FERROUS SULFATE 325(65) MG
325 TABLET ORAL DAILY
Status: DISCONTINUED | OUTPATIENT
Start: 2020-07-02 | End: 2020-07-08 | Stop reason: HOSPADM

## 2020-07-01 RX ORDER — CITALOPRAM 20 MG/1
20 TABLET ORAL DAILY
Status: DISCONTINUED | OUTPATIENT
Start: 2020-07-02 | End: 2020-07-08 | Stop reason: HOSPADM

## 2020-07-01 RX ORDER — LANOLIN ALCOHOL/MO/W.PET/CERES
6 CREAM (GRAM) TOPICAL
Status: DISCONTINUED | OUTPATIENT
Start: 2020-07-01 | End: 2020-07-08 | Stop reason: HOSPADM

## 2020-07-01 RX ORDER — FUROSEMIDE 20 MG/1
10 TABLET ORAL DAILY
Status: DISCONTINUED | OUTPATIENT
Start: 2020-07-02 | End: 2020-07-08 | Stop reason: HOSPADM

## 2020-07-01 RX ORDER — FOLIC ACID 1 MG/1
1000 TABLET ORAL DAILY
Status: DISCONTINUED | OUTPATIENT
Start: 2020-07-02 | End: 2020-07-08 | Stop reason: HOSPADM

## 2020-07-01 RX ORDER — MAGNESIUM SULFATE HEPTAHYDRATE 40 MG/ML
2 INJECTION, SOLUTION INTRAVENOUS ONCE
Status: COMPLETED | OUTPATIENT
Start: 2020-07-01 | End: 2020-07-01

## 2020-07-01 RX ORDER — METHYLPREDNISOLONE SODIUM SUCCINATE 40 MG/ML
40 INJECTION, POWDER, LYOPHILIZED, FOR SOLUTION INTRAMUSCULAR; INTRAVENOUS EVERY 12 HOURS SCHEDULED
Status: DISCONTINUED | OUTPATIENT
Start: 2020-07-02 | End: 2020-07-05

## 2020-07-01 RX ORDER — DILTIAZEM HYDROCHLORIDE 180 MG/1
180 CAPSULE, COATED, EXTENDED RELEASE ORAL DAILY
Status: DISCONTINUED | OUTPATIENT
Start: 2020-07-02 | End: 2020-07-08 | Stop reason: HOSPADM

## 2020-07-01 RX ORDER — LEVALBUTEROL 1.25 MG/.5ML
1.25 SOLUTION, CONCENTRATE RESPIRATORY (INHALATION)
Status: DISCONTINUED | OUTPATIENT
Start: 2020-07-01 | End: 2020-07-08 | Stop reason: HOSPADM

## 2020-07-01 RX ADMIN — SODIUM CHLORIDE 500 ML: 0.9 INJECTION, SOLUTION INTRAVENOUS at 19:24

## 2020-07-01 RX ADMIN — ALBUTEROL SULFATE 10 MG: 2.5 SOLUTION RESPIRATORY (INHALATION) at 10:47

## 2020-07-01 RX ADMIN — LEVALBUTEROL HYDROCHLORIDE 1.25 MG: 1.25 SOLUTION, CONCENTRATE RESPIRATORY (INHALATION) at 22:03

## 2020-07-01 RX ADMIN — SODIUM CHLORIDE 500 ML: 0.9 INJECTION, SOLUTION INTRAVENOUS at 10:41

## 2020-07-01 RX ADMIN — IPRATROPIUM BROMIDE 0.5 MG: 0.5 SOLUTION RESPIRATORY (INHALATION) at 22:03

## 2020-07-01 RX ADMIN — ISODIUM CHLORIDE 3 ML: 0.03 SOLUTION RESPIRATORY (INHALATION) at 10:47

## 2020-07-01 RX ADMIN — IPRATROPIUM BROMIDE 1 MG: 0.5 SOLUTION RESPIRATORY (INHALATION) at 10:47

## 2020-07-01 RX ADMIN — MAGNESIUM SULFATE HEPTAHYDRATE 2 G: 40 INJECTION, SOLUTION INTRAVENOUS at 10:42

## 2020-07-01 RX ADMIN — PREDNISONE 40 MG: 20 TABLET ORAL at 10:43

## 2020-07-01 NOTE — ASSESSMENT & PLAN NOTE
pt with end stage COPD, FEV1/FVC 30%  initially  on BiPAP - now back to his baseline O2 of 4 lt nc  Pt feeling better but still not back to baseline  Continue Solu-Medrol 40 mg q 12h  Xopenex, Atrovent, Pulmicort

## 2020-07-01 NOTE — ASSESSMENT & PLAN NOTE
- has leukopenia @ baseline, worsening  - WBC < 4 @ 2 69, HR: 96, RR: 34, meets SIRS  - CXR w/o acute abnormality; no source of infection  Continue monitor    If worsens will consult Heme-Onc

## 2020-07-01 NOTE — H&P
H&P- Klaus Herrera 1950, 65 y o  male MRN: [id number]    Unit/Bed#: ED 06 Encounter: [id number]    Primary Care Provider: Dawna Rosenberg MD   Date and time admitted to hospital: 7/1/2020 10:17 AM        * Acute exacerbation of chronic obstructive pulmonary disease (COPD) ([facility] [address] )  Assessment & Plan  - setting end stage COPD, FEV1/FVC 30% in 2019  - s/p 125 mg methylprednisolone, 40 mg prednisone x1, terbutaline and Mg+ via EMS & ED  - initially  on BiPAP 14/8, weaned to 12/5   - attempted to trail off BiPAP in ED; transitioned to [facility] which is pt's baseline home O2 requirements  - order resp protocol  - continue prednisone 40 mg QD, next dose tomorrow  - pulm consult  - COVID neg  - keep spO2>88%    Fall at home  Assessment & Plan  - no LOC  - no injury reported; fell onto bed  - mechanical fall  - PT/OT consult     Paroxysmal atrial fibrillation (HCC)  Assessment & Plan  Continue PTA diltiazem 180 mg QD    Respiratory acidosis  Assessment & Plan  In setting of acute on chronic COPD    Acute on chronic respiratory failure with hypercapnia (HCC)  Assessment & Plan  In setting of COPD  Leukopenia  Assessment & Plan  - has leukopenia @ baseline, worsening  - WBC < 4 @ 2 69, HR: 96, RR: 34, meets SIRS  - CXR w/o acute abnormality; no source of infection    Anxiety  Assessment & Plan  Continue PTA Celexa 20 mg QD    GERD  Assessment & Plan  Start Protonix in setting of daily steroid use            VTE Prophylaxis: Enoxaparin (Lovenox)  / sequential compression device   Code Status: level 2    Discussion with family: no family at bedside, discussed with pt     Anticipated Length of Stay:  Patient will be admitted on an Inpatient basis with an anticipated length of stay of  > 2 midnights  Justification for Hospital Stay: Acute COPD laceration    Total Time for Visit, including Counseling / Coordination of Care: 30 minutes    Greater than 50% of this total time spent on direct patient counseling and coordination of care     Chief Complaint:   SOB    History of Present Illness:    Dayna Vasquez is a 65 y o  male with past medical history significant for severe COPD, anxiety, GERD, PAF, who presents with shortness of breath for the past 3 days  Patient reports that he has been using his PTA nebs and inhalers w/o improvement  Patient says this is similar to his prior exacerbations, he reports no relief over the last 3 days with his PTA regimen  Reports that today was unbearable  See ROS as below  Review of Systems:    Review of Systems   Constitutional: Negative for chills and fever  HENT: Negative for congestion and sore throat  Eyes: Negative for visual disturbance  Respiratory: Positive for shortness of breath and wheezing  Negative for cough and chest tightness  Gastrointestinal: Negative for constipation, diarrhea and vomiting  Skin: Negative for rash  Neurological: Negative for dizziness, weakness, light-headedness and headaches  Psychiatric/Behavioral: Negative for dysphoric mood  Past Medical and Surgical History:     Past Medical History:   Diagnosis Date    Anxiety     Aortic regurgitation     Atrial flutter (HCC)     Chronic respiratory failure (HCC)     Colon polyp     COPD (chronic obstructive pulmonary disease) (HCC)     Deep venous thrombosis of distal lower extremity (HCC)     Diverticulitis     GI bleed     Hypertension     Iron deficiency anemia     MGUS (monoclonal gammopathy of unknown significance)     Osteoarthritis of hip     PAC (premature atrial contraction)     Paroxysmal atrial fibrillation (HCC)     Patent foramen ovale     Pulmonary artery hypertension (HCC)     Renal failure     Sinus tachycardia        Past Surgical History:   Procedure Laterality Date    APPENDECTOMY      COLON SURGERY      HERNIA REPAIR      JOINT REPLACEMENT      KNEE SURGERY         Meds/Allergies:    Prior to Admission medications    Medication Sig Start Date End Date Taking? Authorizing Provider   albuterol (2 5 mg/3 mL) 0 083 % nebulizer solution INHALE THE CONTENTS OF 1 VIAL VIA NEBULIZER EVERY 6 HOURS AS NEEDED FOR WHEEZING OR SHORTNESS OF BREATH 5/11/20  Yes GERA Cohn   albuterol (ProAir HFA) 90 mcg/act inhaler Inhale 2 puffs every 6 (six) hours as needed for wheezing 6/29/20  Yes GERA Cohn   budesonide (PULMICORT) 0 5 mg/2 mL nebulizer solution TAKE 1 VIAL (0 5 MG TOTAL) BY NEBULIZATION 2 (TWO) TIMES A DAY RINSE MOUTH AFTER USE  5/18/20  Yes GERA Cohn   citalopram (CeleXA) 20 mg tablet Take 1 tablet (20 mg total) by mouth daily 1/14/20  Yes Pepe Cisneros MD   diltiazem (CARDIZEM CD) 180 mg 24 hr capsule TAKE 1 CAPSULE BY MOUTH EVERY DAY 5/22/20  Yes Pepe Cisneros MD   ferrous sulfate 325 (65 Fe) mg tablet Take 325 mg by mouth daily   Yes Darya Davies MD   folic acid (FOLVITE) 1 mg tablet TAKE 1 TABLET BY MOUTH EVERY DAY 5/22/20  Yes Pepe Cisneros MD   furosemide (LASIX) 20 mg tablet TAKE 1/2 TABLET BY MOUTH DAILY 12/2/19  Yes Pepe Cisneros MD   ipratropium (ATROVENT) 0 02 % nebulizer solution TAKE 1 VIAL (0 5 MG TOTAL) BY NEBULIZATION 4 (FOUR) TIMES A DAY 5/18/20  Yes GERA Cohn   melatonin 3 mg Take 2 tablets by mouth daily at bedtime as needed (sleep) 11/11/17  Yes Gypsy Ortega MD   potassium chloride (K-DUR,KLOR-CON) 20 mEq tablet TAKE 1 TABLET BY MOUTH EVERY DAY 4/13/20  Yes Pepe Cisneros MD   methylPREDNISolone 4 MG tablet therapy pack Use as directed on package  Patient not taking: Reported on 7/1/2020 4/22/20   Pepe Cisneros MD     I have reviewed home medications with patient personally  Allergies:    Allergies   Allergen Reactions    Penicillins Anaphylaxis       Social History:     Marital Status: /Civil Union     Social History     Substance and Sexual Activity   Alcohol Use Not Currently     Social History     Tobacco Use   Smoking Status Former Smoker    Packs/day: 1 50    Years: 42 00  Pack years: 63 00    Types: Cigarettes    Start date: 5    Last attempt to quit: 2012    Years since quittin 5   Smokeless Tobacco Never Used     Social History     Substance and Sexual Activity   Drug Use Not Currently       Family History:    non-contributory    Physical Exam:     Vitals:   Blood Pressure: 116/66 (20 1515)  Pulse: 96 (20 1515)  Temperature: 98 2 °F (36 8 °C) (20 1233)  Temp Source: Tympanic (20 1233)  Respirations: (!) 34 (20 1515)  SpO2: 97 % (20 1515)    Physical Exam   Constitutional: He is oriented to person, place, and time  He appears well-developed  He appears distressed  HENT:   Head: Normocephalic and atraumatic  Mouth/Throat: Oropharynx is clear and moist  No oropharyngeal exudate  Eyes: Conjunctivae are normal    Neck: Normal range of motion  Cardiovascular: Normal rate, regular rhythm and normal heart sounds  Pulmonary/Chest: He is in respiratory distress (mild respiratory distress)  Poor air entry     Abdominal: Soft  There is no tenderness  Neurological: He is alert and oriented to person, place, and time  Skin: Skin is warm and dry  He is not diaphoretic  Psychiatric: He has a normal mood and affect  Vitals reviewed  Additional Data:     Lab Results: I have personally reviewed pertinent reports  Results from last 7 days   Lab Units 20  1035   WBC Thousand/uL 2 69*   HEMOGLOBIN g/dL 9 2*   HEMATOCRIT % 31 4*   PLATELETS Thousands/uL 127*   NEUTROS PCT % 40*   LYMPHS PCT % 52*   MONOS PCT % 5   EOS PCT % 2     Results from last 7 days   Lab Units 20  1035   SODIUM mmol/L 140   POTASSIUM mmol/L 4 1   CHLORIDE mmol/L 96*   CO2 mmol/L >45*   BUN mg/dL 13   CREATININE mg/dL 0 57*   CALCIUM mg/dL 9 7   GLUCOSE RANDOM mg/dL 129                       Imaging: I have personally reviewed pertinent reports        X-ray chest 1 view portable   ED Interpretation by Chris Stuart DO ([id number])   No acute cardiopulmonary processes       Final Result by Constantino Silva MD (07/01 7733)      Emphysema  No evidence of acute abnormality in the chest             Workstation performed: [id number]             EKG, Pathology, and Other Studies Reviewed on Admission:   · EKG: NSR    Allscripts / Epic Records Reviewed: Yes     ** Please Note: This note has been constructed using a voice recognition system   **

## 2020-07-01 NOTE — ED NOTES
Water and menu provided to patient  Patient aware that he is on a carb control diet        Constance Boswell RN  07/01/20 4680

## 2020-07-01 NOTE — ASSESSMENT & PLAN NOTE
- has leukopenia @ baseline, worsening  - WBC < 4 @ 2 69, HR: 96, RR: 34, meets SIRS  - CXR w/o acute abnormality; no source of infection

## 2020-07-01 NOTE — ED ATTENDING ATTESTATION
7/1/2020  IMian MD, saw and evaluated the patient  I have discussed the patient with the resident/non-physician practitioner and agree with the resident's/non-physician practitioner's findings, Plan of Care, and MDM as documented in the resident's/non-physician practitioner's note, except where noted  All available labs and Radiology studies were reviewed  I was present for key portions of any procedure(s) performed by the resident/non-physician practitioner and I was immediately available to provide assistance  At this point I agree with the current assessment done in the Emergency Department  I have conducted an independent evaluation of this patient a history and physical is as follows:    ED Course     64-year-old male, history of end-stage COPD, presenting to the emergency department for evaluation of increased shortness of breath that started this morning  Patient did not try any of his home meds  Patient denies history of recent cough  Patient denies history of known COVID exposure  Patient denies any chest pain, abdominal pain, back pain  Ten systems reviewed negative except as noted in the history of present illness  On examination the patient is moderately emaciated consistent with malnutrition  Head is normocephalic and atraumatic  Eyelids lashes are normal   Mucous membranes are dry  Neck is supple  Lung sounds are diminished throughout in all lung fields  Heart is regular rate rhythm with no murmurs rubs or gallops  Abdomen is nondistended, soft nontender  Extremities are cachectic  Skin is warm and dry  Patient is awake, alert, and moves bilateral upper lower extremities  64-year-old male presenting to the emergency department for evaluation of exacerbation of end-stage COPD  Plan is Heart neb, magnesium, steroids, and reassessment  If the patient requires admission, COVID testing will be performed, however the patient has no exposure to COVID      Labs Reviewed   CBC AND DIFFERENTIAL - Abnormal       Result Value Ref Range Status    WBC 2 69 (*) 4 31 - 10 16 Thousand/uL Final    RBC 2 86 (*) 3 88 - 5 62 Million/uL Final    Hemoglobin 9 2 (*) 12 0 - 17 0 g/dL Final    Hematocrit 31 4 (*) 36 5 - 49 3 % Final     (*) 82 - 98 fL Final    MCH 32 2  26 8 - 34 3 pg Final    MCHC 29 3 (*) 31 4 - 37 4 g/dL Final    RDW 14 2  11 6 - 15 1 % Final    MPV 9 0  8 9 - 12 7 fL Final    Platelets 023 (*) 657 - 390 Thousands/uL Final    nRBC 0  /100 WBCs Final    Neutrophils Relative 40 (*) 43 - 75 % Final    Immat GRANS % 1  0 - 2 % Final    Lymphocytes Relative 52 (*) 14 - 44 % Final    Monocytes Relative 5  4 - 12 % Final    Eosinophils Relative 2  0 - 6 % Final    Basophils Relative 0  0 - 1 % Final    Neutrophils Absolute 1 07 (*) 1 85 - 7 62 Thousands/µL Final    Immature Grans Absolute 0 02  0 00 - 0 20 Thousand/uL Final    Lymphocytes Absolute 1 43  0 60 - 4 47 Thousands/µL Final    Monocytes Absolute 0 12 (*) 0 17 - 1 22 Thousand/µL Final    Eosinophils Absolute 0 05  0 00 - 0 61 Thousand/µL Final    Basophils Absolute 0 00  0 00 - 0 10 Thousands/µL Final   BASIC METABOLIC PANEL - Abnormal    Sodium 140  136 - 145 mmol/L Final    Potassium 4 1  3 5 - 5 3 mmol/L Final    Chloride 96 (*) 100 - 108 mmol/L Final    CO2 >45 (*) 21 - 32 mmol/L Final    Comment: [id number]    ANION GAP     Final    Comment: Unable to calculate    BUN 13  5 - 25 mg/dL Final    Creatinine 0 57 (*) 0 60 - 1 30 mg/dL Final    Comment: Standardized to IDMS reference method    Glucose 129  65 - 140 mg/dL Final    Comment:   If the patient is fasting, the ADA then defines impaired fasting glucose as > 100 mg/dL and diabetes as > or equal to 123 mg/dL  Specimen collection should occur prior to Sulfasalazine administration due to the potential for falsely depressed results  Specimen collection should occur prior to Sulfapyridine administration due to the potential for falsely elevated results  Calcium 9 7  8 3 - 10 1 mg/dL Final    eGFR 103  ml/min/1 73sq m Final    Narrative:     [place] guidelines for Chronic Kidney Disease (CKD):     Stage 1 with normal or high GFR (GFR > 90 mL/min/1 73 square meters)    Stage 2 Mild CKD (GFR = 60-89 mL/min/1 73 square meters)    Stage 3A Moderate CKD (GFR = 45-59 mL/min/1 73 square meters)    Stage 3B Moderate CKD (GFR = 30-44 mL/min/1 73 square meters)    Stage 4 Severe CKD (GFR = 15-29 mL/min/1 73 square meters)    Stage 5 End Stage CKD (GFR <15 mL/min/1 73 square meters)  Note: GFR calculation is accurate only with a steady state creatinine   BLOOD GAS, VENOUS - Abnormal    pH, Stewart 7 243 (*) 7 300 - 7 400 Final    pCO2, Stewart 109 4 (*) 42 0 - 50 0 mm Hg Final    pO2, Stewart 62 4 (*) 35 0 - 45 0 mm Hg Final    HCO3, Stewart 46 1 (*) 24 - 30 mmol/L Final    Base Excess, Stewart 15 5  mmol/L Final    O2 Content, Stewart 12 0  ml/dL Final    O2 HGB, VENOUS 86 7 (*) 60 0 - 80 0 % Final   POCT BLOOD GAS (CG8+) - Abnormal    ph, Stewart ISTAT 7 427 (*) 7 300 - 7 400 Final    pCO2, Stewart i-STAT 77 0 (*) 42 0 - 50 0 mm HG Final    pO2, Stewart i-STAT 168 0 (*) 35 0 - 45 0 mm HG Final    BE, i-STAT 23 (*) -2 - 3 mmol/L Final    HCO3, Stewart i-STAT 50 8 (*) 24 0 - 30 0 mmol/L Final    CO2, i-STAT >45 (*) 21 - 32 mmol/L Final    O2 Sat, i-STAT 99 (*) 60 - 85 % Final    SODIUM, I-STAT 138  136 - 145 mmol/l Final    Potassium, i-STAT 5 3  3 5 - 5 3 mmol/L Final    Calcium, Ionized i-STAT 1 11 (*) 1 12 - 1 32 mmol/L Final    Hct, i-STAT 31 (*) 36 5 - 49 3 % Final    Hgb, i-STAT 10 5 (*) 12 0 - 17 0 g/dl Final    Glucose, i-STAT 249 (*) 65 - 140 mg/dl Final    Specimen Type VENOUS   Final   TROPONIN I - Normal    Troponin I <0 02  <=0 04 ng/mL Final    Comment:   [employer] Chemistry analyzer 99% cutoff is > 0 04 ng/mL in network labs     o cTnI 99% cutoff is useful only when applied to patients in the clinical setting of myocardial ischemia   o cTnI 99% cutoff should be interpreted in the context of clinical history, ECG findings and possibly cardiac imaging to establish correct diagnosis  o cTnI 99% cutoff may be suggestive but clearly not indicative of a coronary event without the clinical setting of myocardial ischemia  X-ray chest 1 view portable   ED Interpretation   No acute cardiopulmonary processes       Final Result      Emphysema  No evidence of acute abnormality in the chest             Workstation performed: [id number]                 Critical Care Time  CriticalCare Time  Performed by: Rebecca Caruso MD  Authorized by: Rebecca Caruso MD     Critical care provider statement:     Critical care time (minutes):  32    Critical care was necessary to treat or prevent imminent or life-threatening deterioration of the following conditions:  Respiratory failure    Critical care was time spent personally by me on the following activities:  Obtaining history from patient or surrogate, discussions with consultants, discussions with primary provider, ordering and performing treatments and interventions, ordering and review of laboratory studies, ordering and review of radiographic studies, examination of patient, re-evaluation of patient's condition and review of old charts  Comments:      COPD exacerbation with respiratory acidosis, treated with heart neb and BiPAP  Multiple reassessments in the emergency department

## 2020-07-01 NOTE — PROGRESS NOTES
Chart review reveals that patient is currently in the ED at [facility] for exacerbation of COPD  Will follow chart electronically for progress toward discharge or LIVIA

## 2020-07-01 NOTE — RESPIRATORY THERAPY NOTE
RT Protocol Note  Ranjith Ledesma 65 y o  male MRN: [id number]  Unit/Bed#: ED 06 Encounter: [id number]    Assessment    Principal Problem:    Acute exacerbation of chronic obstructive pulmonary disease (COPD) ([facility] )  Active Problems:    GERD    Anxiety    Leukopenia    Acute on chronic respiratory failure with hypercapnia (HCC)    Respiratory acidosis    Paroxysmal atrial fibrillation ([facility] )    Fall at home      Home Pulmonary Medications:  Prn albuterol UDN and MDI; pulmicort BID, atrovent QID  Home Devices/Therapy: (P) Home O2    Past Medical History:   Diagnosis Date    Anxiety     Aortic regurgitation     Atrial flutter (HCC)     Chronic respiratory failure (HCC)     Colon polyp     COPD (chronic obstructive pulmonary disease) (HCC)     Deep venous thrombosis of distal lower extremity (HCC)     Diverticulitis     GI bleed     Hypertension     Iron deficiency anemia     MGUS (monoclonal gammopathy of unknown significance)     Osteoarthritis of hip     PAC (premature atrial contraction)     Paroxysmal atrial fibrillation (HCC)     Patent foramen ovale     Pulmonary artery hypertension (HCC)     Renal failure     Sinus tachycardia      Social History     Socioeconomic History    Marital status: /Civil Union     Spouse name: None    Number of children: None    Years of education: None    Highest education level: None   Occupational History    None   Social Needs    Financial resource strain: None    Food insecurity:     Worry: None     Inability: None    Transportation needs:     Medical: None     Non-medical: None   Tobacco Use    Smoking status: Former Smoker     Packs/day: 1 50     Years: 42 00     Pack years: 63 00     Types: Cigarettes     Start date:      Last attempt to quit:      Years since quittin 5    Smokeless tobacco: Never Used   Substance and Sexual Activity    Alcohol use: Not Currently    Drug use: Not Currently    Sexual activity: Not Currently Lifestyle    Physical activity:     Days per week: None     Minutes per session: None    Stress: None   Relationships    Social connections:     Talks on phone: None     Gets together: None     Attends [employer] service: None     Active member of club or organization: None     Attends meetings of clubs or organizations: None     Relationship status: None    Intimate partner violence:     Fear of current or ex partner: None     Emotionally abused: None     Physically abused: None     Forced sexual activity: None   Other Topics Concern    None   Social History Narrative    Daily coffee consumption; 1 serving       Subjective         Objective    Physical Exam:   Assessment Type: (P) Assess only  General Appearance: (P) Awake, Drowsy(acting little confused)  Respiratory Pattern: (P) Labored, Tachypneic  Chest Assessment: (P) Chest expansion symmetrical  Bilateral Breath Sounds: (P) Diminished  Cough: (P) None    Vitals:  Blood pressure 133/56, pulse 102, temperature 98 2 °F (36 8 °C), temperature source Tympanic, resp  rate (!) 36, SpO2 92 %  Imaging and other studies: I have personally reviewed pertinent reports              Plan    Respiratory Plan: (P) Moderate/Severe Distress pathway, Home Bronchodilator Patient pathway        Resp Comments: (P) placed back on BIPAP 2/2 increased WOB, sats dropping  reportedly to 20's; admitted with exacerbation COPD; uses prn albuterol (UDN and MDi), atrovent 4 x a day, pulmicort 2 x a day; will order UDN q6

## 2020-07-01 NOTE — ED NOTES
Daughter Abel Castillo called for update, cell phone listed on emergency contact form     Juarez Damian RN  07/01/20 0128

## 2020-07-01 NOTE — ASSESSMENT & PLAN NOTE
- setting end stage COPD, FEV1/FVC 30% in 2019  - s/p 125 mg methylprednisolone, 40 mg prednisone x1, terbutaline and Mg+ via EMS & ED  - initially  on BiPAP 14/8, weaned to 12/5   - attempted to trail off BiPAP in ED; transitioned to [facility] which is pt's baseline home O2 requirements  - order resp protocol  - continue prednisone 40 mg QD, next dose tomorrow  - pulm consult  - COVID neg  - keep spO2>88%

## 2020-07-01 NOTE — ED NOTES
Patient O2 sat at 25%, doc called to room and patient placed back on bipap   O2 at 95% back on bipap at 12/5 and 35% O2     Narciso Irwin RN  07/01/20 [address]a [address] Milan Carias RN  07/01/20 8753

## 2020-07-02 PROBLEM — E44.0 MODERATE PROTEIN-CALORIE MALNUTRITION (HCC): Status: ACTIVE | Noted: 2020-07-02

## 2020-07-02 LAB
ANION GAP SERPL CALCULATED.3IONS-SCNC: -1 MMOL/L (ref 4–13)
BASOPHILS # BLD AUTO: 0 THOUSANDS/ΜL (ref 0–0.1)
BASOPHILS NFR BLD AUTO: 0 % (ref 0–1)
BUN SERPL-MCNC: 17 MG/DL (ref 5–25)
CALCIUM SERPL-MCNC: 9.6 MG/DL (ref 8.3–10.1)
CHLORIDE SERPL-SCNC: 98 MMOL/L (ref 100–108)
CO2 SERPL-SCNC: 43 MMOL/L (ref 21–32)
CREAT SERPL-MCNC: 0.48 MG/DL (ref 0.6–1.3)
EOSINOPHIL # BLD AUTO: 0.01 THOUSAND/ΜL (ref 0–0.61)
EOSINOPHIL NFR BLD AUTO: 1 % (ref 0–6)
ERYTHROCYTE [DISTWIDTH] IN BLOOD BY AUTOMATED COUNT: 14.1 % (ref 11.6–15.1)
FOLATE SERPL-MCNC: >20 NG/ML (ref 3.1–17.5)
GFR SERPL CREATININE-BSD FRML MDRD: 110 ML/MIN/1.73SQ M
GLUCOSE SERPL-MCNC: 104 MG/DL (ref 65–140)
HCT VFR BLD AUTO: 27.2 % (ref 36.5–49.3)
HGB BLD-MCNC: 8.3 G/DL (ref 12–17)
IMM GRANULOCYTES # BLD AUTO: 0 THOUSAND/UL (ref 0–0.2)
IMM GRANULOCYTES NFR BLD AUTO: 0 % (ref 0–2)
LYMPHOCYTES # BLD AUTO: 0.57 THOUSANDS/ΜL (ref 0.6–4.47)
LYMPHOCYTES NFR BLD AUTO: 32 % (ref 14–44)
MCH RBC QN AUTO: 33.5 PG (ref 26.8–34.3)
MCHC RBC AUTO-ENTMCNC: 30.5 G/DL (ref 31.4–37.4)
MCV RBC AUTO: 110 FL (ref 82–98)
MONOCYTES # BLD AUTO: 0.09 THOUSAND/ΜL (ref 0.17–1.22)
MONOCYTES NFR BLD AUTO: 5 % (ref 4–12)
NEUTROPHILS # BLD AUTO: 1.12 THOUSANDS/ΜL (ref 1.85–7.62)
NEUTS SEG NFR BLD AUTO: 62 % (ref 43–75)
NRBC BLD AUTO-RTO: 0 /100 WBCS
PLATELET # BLD AUTO: 160 THOUSANDS/UL (ref 149–390)
PMV BLD AUTO: 9.4 FL (ref 8.9–12.7)
POTASSIUM SERPL-SCNC: 4.5 MMOL/L (ref 3.5–5.3)
PROCALCITONIN SERPL-MCNC: <0.05 NG/ML
RBC # BLD AUTO: 2.48 MILLION/UL (ref 3.88–5.62)
SODIUM SERPL-SCNC: 140 MMOL/L (ref 136–145)
VIT B12 SERPL-MCNC: 251 PG/ML (ref 100–900)
WBC # BLD AUTO: 1.79 THOUSAND/UL (ref 4.31–10.16)

## 2020-07-02 PROCEDURE — 84145 PROCALCITONIN (PCT): CPT | Performed by: FAMILY MEDICINE

## 2020-07-02 PROCEDURE — 99223 1ST HOSP IP/OBS HIGH 75: CPT | Performed by: INTERNAL MEDICINE

## 2020-07-02 PROCEDURE — 82746 ASSAY OF FOLIC ACID SERUM: CPT | Performed by: FAMILY MEDICINE

## 2020-07-02 PROCEDURE — 94640 AIRWAY INHALATION TREATMENT: CPT

## 2020-07-02 PROCEDURE — 94760 N-INVAS EAR/PLS OXIMETRY 1: CPT

## 2020-07-02 PROCEDURE — 82607 VITAMIN B-12: CPT | Performed by: FAMILY MEDICINE

## 2020-07-02 PROCEDURE — 94003 VENT MGMT INPAT SUBQ DAY: CPT

## 2020-07-02 PROCEDURE — 97167 OT EVAL HIGH COMPLEX 60 MIN: CPT

## 2020-07-02 PROCEDURE — 94640 AIRWAY INHALATION TREATMENT: CPT | Performed by: SOCIAL WORKER

## 2020-07-02 PROCEDURE — 85025 COMPLETE CBC W/AUTO DIFF WBC: CPT | Performed by: FAMILY MEDICINE

## 2020-07-02 PROCEDURE — 99232 SBSQ HOSP IP/OBS MODERATE 35: CPT | Performed by: INTERNAL MEDICINE

## 2020-07-02 PROCEDURE — C9113 INJ PANTOPRAZOLE SODIUM, VIA: HCPCS | Performed by: PHYSICIAN ASSISTANT

## 2020-07-02 PROCEDURE — 97163 PT EVAL HIGH COMPLEX 45 MIN: CPT

## 2020-07-02 PROCEDURE — 80048 BASIC METABOLIC PNL TOTAL CA: CPT | Performed by: FAMILY MEDICINE

## 2020-07-02 RX ORDER — PANTOPRAZOLE SODIUM 40 MG/1
40 INJECTION, POWDER, FOR SOLUTION INTRAVENOUS ONCE
Status: COMPLETED | OUTPATIENT
Start: 2020-07-02 | End: 2020-07-02

## 2020-07-02 RX ORDER — PANTOPRAZOLE SODIUM 40 MG/1
40 TABLET, DELAYED RELEASE ORAL
Status: DISCONTINUED | OUTPATIENT
Start: 2020-07-03 | End: 2020-07-08 | Stop reason: HOSPADM

## 2020-07-02 RX ORDER — BUDESONIDE 0.5 MG/2ML
0.5 INHALANT ORAL
Status: DISCONTINUED | OUTPATIENT
Start: 2020-07-02 | End: 2020-07-08 | Stop reason: HOSPADM

## 2020-07-02 RX ORDER — FORMOTEROL FUMARATE 20 UG/2ML
20 SOLUTION RESPIRATORY (INHALATION)
Status: DISCONTINUED | OUTPATIENT
Start: 2020-07-02 | End: 2020-07-02

## 2020-07-02 RX ADMIN — FOLIC ACID 1000 MCG: 1 TABLET ORAL at 08:40

## 2020-07-02 RX ADMIN — LEVALBUTEROL HYDROCHLORIDE 1.25 MG: 1.25 SOLUTION, CONCENTRATE RESPIRATORY (INHALATION) at 19:09

## 2020-07-02 RX ADMIN — METHYLPREDNISOLONE SODIUM SUCCINATE 40 MG: 40 INJECTION, POWDER, FOR SOLUTION INTRAMUSCULAR; INTRAVENOUS at 20:45

## 2020-07-02 RX ADMIN — IPRATROPIUM BROMIDE 0.5 MG: 0.5 SOLUTION RESPIRATORY (INHALATION) at 13:21

## 2020-07-02 RX ADMIN — LEVALBUTEROL HYDROCHLORIDE 1.25 MG: 1.25 SOLUTION, CONCENTRATE RESPIRATORY (INHALATION) at 07:18

## 2020-07-02 RX ADMIN — METHYLPREDNISOLONE SODIUM SUCCINATE 40 MG: 40 INJECTION, POWDER, FOR SOLUTION INTRAMUSCULAR; INTRAVENOUS at 08:40

## 2020-07-02 RX ADMIN — CITALOPRAM HYDROBROMIDE 20 MG: 20 TABLET ORAL at 08:40

## 2020-07-02 RX ADMIN — FUROSEMIDE 10 MG: 20 TABLET ORAL at 08:40

## 2020-07-02 RX ADMIN — IPRATROPIUM BROMIDE 0.5 MG: 0.5 SOLUTION RESPIRATORY (INHALATION) at 02:34

## 2020-07-02 RX ADMIN — IPRATROPIUM BROMIDE 0.5 MG: 0.5 SOLUTION RESPIRATORY (INHALATION) at 19:09

## 2020-07-02 RX ADMIN — LEVALBUTEROL HYDROCHLORIDE 1.25 MG: 1.25 SOLUTION, CONCENTRATE RESPIRATORY (INHALATION) at 13:21

## 2020-07-02 RX ADMIN — FERROUS SULFATE TAB 325 MG (65 MG ELEMENTAL FE) 325 MG: 325 (65 FE) TAB at 08:40

## 2020-07-02 RX ADMIN — BUDESONIDE 0.5 MG: 0.5 INHALANT RESPIRATORY (INHALATION) at 19:09

## 2020-07-02 RX ADMIN — LEVALBUTEROL HYDROCHLORIDE 1.25 MG: 1.25 SOLUTION, CONCENTRATE RESPIRATORY (INHALATION) at 02:34

## 2020-07-02 RX ADMIN — PANTOPRAZOLE SODIUM 40 MG: 40 INJECTION, POWDER, FOR SOLUTION INTRAVENOUS at 05:29

## 2020-07-02 RX ADMIN — ENOXAPARIN SODIUM 40 MG: 40 INJECTION SUBCUTANEOUS at 08:44

## 2020-07-02 RX ADMIN — BUDESONIDE 0.5 MG: 0.5 INHALANT RESPIRATORY (INHALATION) at 07:18

## 2020-07-02 RX ADMIN — DILTIAZEM HYDROCHLORIDE 180 MG: 180 CAPSULE, COATED, EXTENDED RELEASE ORAL at 08:40

## 2020-07-02 RX ADMIN — IPRATROPIUM BROMIDE 0.5 MG: 0.5 SOLUTION RESPIRATORY (INHALATION) at 07:18

## 2020-07-02 RX ADMIN — POTASSIUM CHLORIDE 20 MEQ: 1500 TABLET, EXTENDED RELEASE ORAL at 08:40

## 2020-07-02 NOTE — PHYSICAL THERAPY NOTE
Physical Therapy Evaluation    Patient's Name: Shanelle Sampson    Admitting Diagnosis  COPD with acute exacerbation ([facility] ) [J44 1]    Problem List  Patient Active Problem List   Diagnosis    Anemia of chronic disease    Pulmonary artery hypertension ([facility] )    Aortic regurgitation    Pulmonary emphysema (HCC)    Low TSH level    Paroxysmal atrial tachycardia    Essential hypertension    GERD    Anxiety    Iron deficiency anemia due to chronic blood loss    Leukopenia    Acute on chronic respiratory failure with hypercapnia (HCC)    Respiratory acidosis    At risk for venous thromboembolism (VTE)    Chronic respiratory failure with hypoxia    Severe protein-calorie malnutrition     Paroxysmal atrial fibrillation (HCC)    Supplemental oxygen dependent    Shortness of breath    Patient has living will    Goals of care, counseling/discussion    Palliative care patient    Acute exacerbation of chronic obstructive pulmonary disease (COPD) ([facility] )    Fall at home       Past Medical History  Past Medical History:   Diagnosis Date    Anxiety     Aortic regurgitation     Atrial flutter (HCC)     Chronic respiratory failure (HCC)     Colon polyp     COPD (chronic obstructive pulmonary disease) ([facility] )     Deep venous thrombosis of distal lower extremity (HCC)     Diverticulitis     GI bleed     Hypertension     Iron deficiency anemia     MGUS (monoclonal gammopathy of unknown significance)     Osteoarthritis of hip     PAC (premature atrial contraction)     Paroxysmal atrial fibrillation ([facility] )     Patent foramen ovale     Pulmonary artery hypertension (HCC)     Renal failure     Sinus tachycardia        Past Surgical History  Past Surgical History:   Procedure Laterality Date    APPENDECTOMY      COLON SURGERY      HERNIA REPAIR      JOINT REPLACEMENT      KNEE SURGERY          07/02/20 1043   Note Type   Note type Eval only   Pain Assessment   Pain Assessment Tool 0-10   Pain Score 4 Pain Location/Orientation Orientation: Left; Location: Leg   Hospital Pain Intervention(s) Repositioned; Ambulation/increased activity   Home Living   Type of [address] Two level; Able to live on main level with bedroom/bathroom;Stairs to enter with rails  (2 SAUMYA)   Bathroom Shower/Tub Tub/shower unit   Bathroom Toilet [address]  (pt describes hurrycane or quad cane? unclear)   Prior Function   Level of [place] Independent with ADLs and functional mobility   Lives With Spouse   Receives Help From Family   ADL Assistance Independent   IADLs Needs assistance   Falls in the last 6 months 1 to 4  (tripped over O2 line)   Vocational Retired   Comments Pt reports use of straight cane for all mobility, supportive spouse at home, 4L O2 continuous at baseline  Restrictions/Precautions   Weight Bearing Precautions Per Order No   Other Precautions Impulsive;Cognitive; Chair Alarm; Bed Alarm;O2;Fall Risk   General   Family/Caregiver Present No   Cognition   Overall Cognitive Status WFL   Arousal/Participation Alert   Attention Attends with cues to redirect   Orientation Level Oriented X4   Memory Decreased recall of recent events;Decreased recall of precautions   Following Commands Follows one step commands with increased time or repetition   RLE Assessment   RLE Assessment WFL  (Grossly 4/5)   LLE Assessment   LLE Assessment WFL  (Grossly 4/5)   Light Touch   RLE Light Touch Grossly intact   LLE Light Touch Grossly intact   Bed Mobility   Supine to Sit 5  Supervision   Additional Comments Pt able to sit EOB with close supervision, good sitting balance, O2 stable on 4L, mild BURTON noted  Transfers   Sit to Stand 5  Supervision   Stand to Sit 5  Supervision   Additional Comments with use of RW, impulsive with increased distance to AD   VC's for control  Moderate BURTON noted, pt desribes at 7/10 on dyspnea scale after transfer to chair, O2 sats > 95% throughout      Ambulation/Elevation Gait pattern Wide LUCÍA; Decreased foot clearance; Foward flexed   Gait Assistance 4  Minimal assist   Additional items Assist x 1   Assistive Device Rolling walker   Distance 4 ft to chair, limited by BURTON   Balance   Static Sitting Good   Dynamic Sitting Fair +   Static Standing Fair   Dynamic Standing Fair -   Ambulatory Poor +   Activity Tolerance   Activity Tolerance Patient limited by fatigue   Medical Staff Made Aware Carlee BOB OT student Eliane   Nurse Made Aware RN updated  Chair alarm intact  Assessment   Prognosis Fair   Problem List Decreased strength;Decreased endurance; Impaired balance;Decreased mobility; Decreased cognition;Decreased safety awareness   Assessment Pt is a 65 y o  male seen for PT evaluation s/p admit to [facility] on 7/1/2020  Pt was admitted with a primary dx of: acute COPD exacerbation  PT now consulted for assessment of mobility and d/c needs  Pt with Up with assistance orders  Pts current comorbidities effecting treatment include: Paroxysmal Afib, Anxiety, HTN, COPD on 4L O2 at baseline continuous  Pts current clinical presentation is Unstable/ Unpredictable (high complexity) due to Ongoing medical management for primary dx, Increased reliance on more restrictive AD compared to baseline, Decreased activity tolerance compared to baseline, Fall risk, Ongoing telemetry monitoring, Cog status, Continuous pulse oximetry monitoring   Prior to admission, pt was independent with household mobility with cane  Upon evaluation, pt currently is requiring supervision for bed mobility; supervision for transfers and [place] for ambulation 4 ft w/ RW  Pt presents at PT eval functioning below baseline and currently w/ overall mobility deficits 2* to: BLE weakness, impaired balance, decreased endurance, gait deviations, decreased activity tolerance compared to baseline, decreased functional mobility tolerance compared to baseline, decreased safety awareness, fall risk, SOB upon exertion  Pt currently at a fall risk 2* to impairments listed above  Pt will continue to benefit from skilled acute PT interventions to address stated impairments; to maximize functional mobility; for ongoing pt/ family training; and DME needs  At conclusion of PT session pt returned back in chair and chair alarm engaged with phone and call bell within reach  Pt denies any further questions at this time  Recommend home with family care and HHPT upon hospital D/C  Goals   Patient Goals "go back to bed"   [facility] Expiration Date 07/12/20   Short Term Goal #1 In 10 days pt will be able to: 1  Demonstrate ability to perform all aspects of bed mobility independently to increase functional independence  2  Perform functional transfers with LRAD independently to facilitate safe return to previous living environment  3   Ambulate 150 ft with LRAD independently with stable vitals to improve safety with household distances and reduce fall risk  4  Climb 2 steps with supervision and 1 HR to simulate entrance to home  5  Improve LE strength grades by 1 to increase ease of functional mobility with transfers and gait  6  Pt will demonstrate improved balance by one grade order to decrease risk of falls  PT Treatment Day 0   Plan   Treatment/Interventions Functional transfer training;LE strengthening/ROM; Therapeutic exercise; Endurance training;Patient/family training;Equipment eval/education; Bed mobility;Gait training;Cognitive reorientation;Elevations   PT Frequency Other (Comment)  (3-5x/week)   Recommendation   PT Discharge Recommendation Home with skilled therapy  (HHPT)   Equipment Recommended Walker  (pending progress)   PT - OK to Discharge No   Additional Comments increase ambulation tolerance   Barthel Index   Feeding 10   Bathing 0   Grooming Score 5   Dressing Score 5   Bladder Score 10   Bowels Score 10   Toilet Use Score 5   Transfers (Bed/Chair) Score 10   Mobility (Level Surface) Score 0   Stairs Score 0   Barthel Index Score 55       Hedda Gonzalez, PT, DPT

## 2020-07-02 NOTE — RESPIRATORY THERAPY NOTE
resp care      07/02/20 0718   Inhalation Therapy Tx   $ Inhalation Therapy Performed Yes   Pre-Treatment Pulse 111   Breath Sounds Pre-Treatment Bilateral Diminished   Breath Sounds Post-Treatment Bilateral Diminished   Post-Treatment Pulse 101   Resp Comments pt offers no resp c/o  Pt on bipa x12 hrs  Will try nc at 4lpm as pt uses at home

## 2020-07-02 NOTE — OCCUPATIONAL THERAPY NOTE
Occupational Therapy Evaluation     Patient Name: Monico Cross  [id number] Date: 7/2/2020  Problem List  Principal Problem:    Acute exacerbation of chronic obstructive pulmonary disease (COPD) ([facility] )  Active Problems:    GERD    Anxiety    Leukopenia    Acute on chronic respiratory failure with hypercapnia ([facility] )    Respiratory acidosis    Paroxysmal atrial fibrillation ([facility] )    Fall at home     07/02/20 1044   Note Type   Note type Eval/Treat   Restrictions/Precautions   Weight Bearing Precautions Per Order No   Other Precautions Impulsive; Chair Alarm; Bed Alarm;Multiple lines;Telemetry;O2;Fall Risk  (4LNC O2)   Pain Assessment   Pain Assessment Tool 0-10   Pain Score 4   Pain Location/Orientation Orientation: Left; Location: Leg   Home Living   Type of [address] [place] [address] Two level; Able to live on main level with bedroom/bathroom; Performs ADLs on one level;Stairs to enter with rails  ([address] - 1st fl setup; 2STE )   Bathroom Shower/Tub Tub/shower unit   Bathroom Toilet Standard   Bathroom Equipment Shower chair   [address]  (Unclear type of cane - Pt unable to name or describe)   Additional Comments Pt reports use of cane for all mobility   Prior Function   Level of [place] Independent with ADLs and functional mobility; Needs assistance with IADLs   Lives With Spouse   Receives Help From Family;Home health  (Wife; Pt reports home health aide PRN)   ADL Assistance Independent   IADLs Needs assistance   Falls in the last 6 months 1 to 4  (1 per Pt - tripped over O2 Line )   Vocational Retired   Lifestyle   Autonomy Pt reports IND w/ all ADLs and mobility; A from wife for IADLs; (+) drives "sometimes" PTA    Reciprocal Relationships Pt has supportive wife at home- she works during the day  Pt reports wife is able to A PRN when not working     Service to Others Pt is a retired public TV director    Intrinsic Gratification Pt reports he enjoys watching TV and reading Psychosocial   Psychosocial (WDL) WDL   ADL   Where Assessed Other (Comment)  (seated in bed )   Eating Assistance 5  Supervision/Setup   Grooming Assistance 5  Supervision/Setup   UB Bathing Assistance 5  Supervision/Setup   LB Bathing Assistance 5  Supervision/Setup   UB Dressing Assistance 5  Supervision/Setup   LB Dressing Assistance 5  Supervision/Setup   Toileting Assistance  5  Supervision/Setup   Bed Mobility   Supine to Sit 5  Supervision   Additional items Impulsive;HOB elevated; Bedrails;Verbal cues   Sit to Supine Unable to assess   Additional Comments Pt found supine in bed upon OT arrival  Pt sat EOB for approx ~5 min w/ G balance  Pt SpO2 remained <96% on 4LNC O2, denies SOB/BURTON  Pt left seated OOB in chair w/chair alarm on and all needs in reach following OT eval   Transfers   Sit to Stand 5  Supervision   Additional items Impulsive;Verbal cues   Stand to Sit 5  Supervision   Additional items Impulsive;Verbal cues   Additional Comments Pt performed STS transfers using RW - required VC for safety/slow of pace  SpO2 remained >95% on 4LNC during all transfers  Functional Mobility   Functional Mobility 4  Minimal assistance   Additional Comments Pt required VC for safety/slow of pace and RW management  SpO2 remained >95% on 4LNC O2 during short distance household mobility EOB>chair  , however Pt felt SOB and rated 7/10 subjective SOB/BURTON- Pt educated on breathing techniques and demonstrated G carryover  RN Erlinda Wilkinson notified      Additional items Rolling walker   Balance   Static Sitting Good   Dynamic Sitting Fair +   Static Standing Fair   Dynamic Standing Fair -   Ambulatory Poor +   Activity Tolerance   Activity Tolerance Patient limited by fatigue;Treatment limited secondary to medical complications (Comment)  (dyspnea on exertion )   Medical Staff Made Aware OT Sherley Lloyd, PT Anitha   Nurse Made Aware RN cleared Pt for OT eval    RUE Assessment   RUE Assessment WFL   LUE Assessment   LUE Assessment [facility] Hand Function   Gross Motor Coordination Functional   Cognition   Overall Cognitive Status WFL   Arousal/Participation Alert; Cooperative   Attention Attends with cues to redirect   Orientation Level Oriented to person;Oriented to place;Oriented to situation;Disoriented to time  ("my bday was 2 weeks ago, so we are in beginning of june")   Memory Decreased recall of precautions;Decreased short term memory   Following Commands Follows one step commands without difficulty   Comments Pt pleasant and cooperative to work w/ therapy today  Pt aware of deficits and limitations  Pt at times impulsive during mobility w / decreased safety awareness  Able to report all home setup and PLOF w/ no difficulty  Noted deficits w/short term memory/ unable to recall eduaction  Assessment   Limitation Decreased Safe judgement during ADL;Decreased cognition;Decreased endurance;Decreased ADL status   Prognosis Good   Assessment Pt is a 64 y/o male who presented to [facility] on 7/1/2020 w/ complaints of increasing shortness of breath  Pt primary dx of acute exacerbation of COPD  Pt  has a past medical history of Aortic regurgitation, Atrial flutter ([facility]), Chronic respiratory failure ([address] ), COPD (chronic obstructive pulmonary disease) ([address] ), Deep venous thrombosis of distal lower extremity ([address] ), Diverticulitis, Hypertension, Osteoarthritis of hip, Paroxysmal atrial fibrillation ([address] ), Pulmonary artery hypertension ([address] ), Renal failure, and Sinus tachycardia  Pt on 4LNC O2 at baseline  Pt has active OT orders  Pt lives w/spouse in a [facility] w/1st fl setup and 2STE  Pt reports a tub/shower w/shower chair and a standard toilet  Pt reports use of a cane for all mobility and states he does not go up the stairs anymore  Pt reports IND w/ all ADLs, and mobility; requires A from spouse PTA  Pt reports supportive spouse who works during the day but able to A as needed when home  At the time of OT evaluation, Pt was AxOx3, disoriented to time  Current level of function: S for all self-care, functional transfers, and functional mobility using RW  Pt at times impulsive and required VC for slow of pace/safety during transfers and mobility  Pt SpO2 remained >95% during EOB sitting and all transfers and mobility, however, Pt complained of moderate SOB/BURTON- took a 2-3 min seated rest break and educated on breathing techniques to decreased SOB- demonstrated G carryover  Pt presents w/ limitations in endurance, activity tolerance, and safety awareness  Following evaluation, Pt will benefit from continued OT treatment during hospital stay to address barriers to occupational performance defined above and to maximize functional IND  OT plan of care 3-5x/week for 7-10days  Home w/home OT and increased social support recommended following D/C  Goals   Patient Goals to go back to bed    LTG Time Frame 7-10   Long Term Goal #1 see goals below    Plan   Treatment Interventions ADL retraining;Functional transfer training;UE strengthening/ROM; Endurance training;Patient/family training;Equipment evaluation/education;Cognitive reorientation; Energy conservation; Activityengagement; Compensatory technique education   Goal Expiration Date 07/12/20   OT Frequency 3-5x/wk   Recommendation   OT Discharge Recommendation Home with skilled therapy  (home OT and increased social support)   OT - OK to Discharge Yes  (when medically cleared )   Modified [place] Scale   Modified [place] Scale 4     Goals     1  Pt will perform UB bathing/dressing w/ Mod I using adaptive device and DME as needed  2  Pt will perform LB bathing/dressing w/ Mod I using adaptive device and DME as needed  3  Pt will perform toileting w/ Mod I w/ G hygiene/thoroughness using adaptive device and DME as needed  4  Pt will perform simulated IADL task w/ Min A using adaptive device and DME as needed and G balance/safety      5  Pt will perform functional transfers on/off all surfaces w/ S and 100% carryover of safety awareness using appropriate DME as needed w/ G balance  6  Pt will perform functional mobility w/ S and 100% carryover for safety and sequencing using appropriate DME as needed w/ G balance during ADL/IADL/leisure tasks  7  Pt will demonstrate understanding of patient/caregiver education and training with G carryover as appropriate without cues to increase safety during functional tasks  8  Pt will demonstrate G carryover of breathing techniques to increase activity tolerance during ADL/IADL/leisure tasks  9  Pt will demonstrate G carryover of energy conservation techniques to increase endurance during ADL/IADL/leisure tasks      Milo Nunn, OTS

## 2020-07-02 NOTE — PLAN OF CARE
Problem: OCCUPATIONAL THERAPY ADULT  Goal: Performs self-care activities at highest level of function for planned discharge setting  See evaluation for individualized goals  Description  Treatment Interventions: ADL retraining, Functional transfer training, UE strengthening/ROM, Endurance training, Patient/family training, Equipment evaluation/education, Cognitive reorientation, Energy conservation, Activityengagement, Compensatory technique education          See flowsheet documentation for full assessment, interventions and recommendations      Note:   Limitation: Decreased Safe judgement during ADL, Decreased cognition, Decreased endurance, Decreased ADL status  Prognosis: Good        OT Discharge Recommendation: Home with skilled therapy(home OT )  OT - OK to Discharge: Yes(when medically cleared )

## 2020-07-02 NOTE — CONSULTS
[address]  Ángel Rodriguez 65 y o  male MRN: [id number]  Unit/Bed#: [facility] 509-01 Encounter: [id number]    Code Status: Level 2 - DNAR: but accepts endotracheal intubation  POA:      Reason for Admission / Principal Problem:  COPD exacerbation  Reason for Consult:  COPD exacerbation    Impression:  Patient Active Problem List   Diagnosis    Anemia of chronic disease    Pulmonary artery hypertension ([facility] )    Aortic regurgitation    Pulmonary emphysema (HCC)    Low TSH level    Paroxysmal atrial tachycardia    Essential hypertension    GERD    Anxiety    Iron deficiency anemia due to chronic blood loss    Leukopenia    Acute on chronic respiratory failure with hypercapnia ([facility] )    Respiratory acidosis    At risk for venous thromboembolism (VTE)    Chronic respiratory failure with hypoxia    Severe protein-calorie malnutrition     Paroxysmal atrial fibrillation (HCC)    Supplemental oxygen dependent    Shortness of breath    Patient has living will    Goals of care, counseling/discussion    Palliative care patient    Acute exacerbation of chronic obstructive pulmonary disease (COPD) ([facility] )    Fall at home       Assessment:    1  Acute on chronic hypoxic and hypercapnic respiratory failure  2  Very severe COPD - in exacerbation  3  Dyspnea on exertion  4  Hypertension  5  GERD  6  Paroxysmal atrial fibrillation    Plan:    -continue supplemental oxygen and titrate as tolerated  Baseline 4 L nasal cannula    Maintain SpO2 greater than 88%  -continue respiratory protocol   -continue IV Solu-Medrol 40 mg b i d ; potentially transition to oral prednisone tomorrow  -continue Atrovent, Xopenex, Pulmicort  -continue BiPAP p r n /q h s  15/8  -patient denies purulent/excessive sputum production; no need for azithromycin  -continue PT/OT, out of bed as tolerated  -continue home Lasix and Cardizem under primary care  -continue PPI  -rest of care per primary team  -patient would benefit from lung transplantation but currently adamant about not pursuing transplantation; he is willing to discuss further with his primary pulmonologist as an outpatient  -patient does not qualify for pulmonary valve therapy due to low FEV1      HPI: Ida Sousa is a 65 y o  male with chronic hypoxic and hypercapnic respiratory failure on 4 L nasal cannula oxygen 24/7, very severe COPD, paroxysmal atrial fibrillation, anemia, leukopenia presents for worsening shortness of breath for the past 3 days  Patient tried his home inhalers/nebulizers without much improvement  Patient reports that his symptoms were similar to his prior exacerbations  Denies any recent fever, chills, sick contacts, travel, changes in medications, excessive dust, exposure to pets  Patient was recently hospitalized from 12/26/2019 to 12/31/2019 for COPD exacerbation  He follows up with pulmonology as an outpatient and is maintained on budesonide 0 5 mg nebulizer b i d , ipratropium/albuterol nebulizer q i d , nocturnal BiPAP 15/8, supplemental oxygen 24/7  Last spirometry 12/19/2019:    FEV1/FVC ratio 22%  FEV1 12% (0 32 L)  FVC 38% (1 46 L)    Echocardiogram 11/22/2019:  Ejection fraction 65% with no regional wall motion abnormalities    No evidence to suggest moderate her severe pulmonary hypertension    History obtained from patient and chart review    PMH:  Past Medical History:   Diagnosis Date    Anxiety     Aortic regurgitation     Atrial flutter (HCC)     Chronic respiratory failure (HCC)     Colon polyp     COPD (chronic obstructive pulmonary disease) (HCC)     Deep venous thrombosis of distal lower extremity (HCC)     Diverticulitis     GI bleed     Hypertension     Iron deficiency anemia     MGUS (monoclonal gammopathy of unknown significance)     Osteoarthritis of hip     PAC (premature atrial contraction)     Paroxysmal atrial fibrillation (HCC)     Patent foramen ovale     Pulmonary artery hypertension (HCC)     Renal failure     Sinus tachycardia         PSH:  Past Surgical History:   Procedure Laterality Date    APPENDECTOMY      COLON SURGERY      HERNIA REPAIR      JOINT REPLACEMENT      KNEE SURGERY         Allergies: Allergies   Allergen Reactions    Penicillins Anaphylaxis       Family History:   Family History   Problem Relation Age of Onset    Cancer Mother     Heart attack Father     Sudden death Father     Arthritis Family     Diabetes Family        Social History:   Social History     Tobacco Use   Smoking Status Former Smoker    Packs/day: 1 50    Years: 42 00    Pack years: 63 00    Types: Cigarettes    Start date:     Last attempt to quit:     Years since quittin 5   Smokeless Tobacco Never Used      Social History     Substance and Sexual Activity   Alcohol Use Not Currently      Social History     Substance and Sexual Activity   Drug Use Not Currently        Home Medications:   Prior to Admission medications    Medication Sig Start Date End Date Taking?  Authorizing Provider   albuterol (2 5 mg/3 mL) 0 083 % nebulizer solution INHALE THE CONTENTS OF 1 VIAL VIA NEBULIZER EVERY 6 HOURS AS NEEDED FOR WHEEZING OR SHORTNESS OF BREATH 20  Yes GERA Noriega   albuterol (ProAir HFA) 90 mcg/act inhaler Inhale 2 puffs every 6 (six) hours as needed for wheezing 20  Yes GERA Noriega   budesonide (PULMICORT) 0 5 mg/2 mL nebulizer solution TAKE 1 VIAL (0 5 MG TOTAL) BY NEBULIZATION 2 (TWO) TIMES A DAY RINSE MOUTH AFTER USE  20  Yes GERA Noriega   citalopram (CeleXA) 20 mg tablet Take 1 tablet (20 mg total) by mouth daily 20  Yes Alethea Vega MD   diltiazem (CARDIZEM CD) 180 mg 24 hr capsule TAKE 1 CAPSULE BY MOUTH EVERY DAY 20  Yes Alethea Vega MD   ferrous sulfate 325 (65 Fe) mg tablet Take 325 mg by mouth daily   Yes Historical MD Keke   folic acid (FOLVITE) 1 mg tablet TAKE 1 TABLET BY MOUTH EVERY DAY 5/22/20  Yes Purvi Smith MD   furosemide (LASIX) 20 mg tablet TAKE 1/2 TABLET BY MOUTH DAILY 12/2/19  Yes Purvi Smith MD   ipratropium (ATROVENT) 0 02 % nebulizer solution TAKE 1 VIAL (0 5 MG TOTAL) BY NEBULIZATION 4 (FOUR) TIMES A DAY 5/18/20  Yes GERA Gallardo   melatonin 3 mg Take 2 tablets by mouth daily at bedtime as needed (sleep) 11/11/17  Yes Kelly Cuenca MD   potassium chloride (K-DUR,KLOR-CON) 20 mEq tablet TAKE 1 TABLET BY MOUTH EVERY DAY 4/13/20  Yes Purvi Smith MD   methylPREDNISolone 4 MG tablet therapy pack Use as directed on package  Patient not taking: Reported on 7/1/2020 4/22/20   Purvi Smith MD       ROS:   Review of Systems: Denies fever, chills, headaches, lightheadedness, dizziness, visual disturbances, sore throat, chest pain, palpitations, abdominal pain, nausea, vomiting, or trouble urinating/ defecating  He reports fatigue and dyspnea on exertion  Vitals:   Vitals:    07/02/20 0552 07/02/20 0722 07/02/20 0900 07/02/20 1100   BP:  138/65  151/67   BP Location:  Right arm  Left arm   Pulse:  80  87   Resp:  (!) 23  22   Temp:  98 3 °F (36 8 °C)  98 2 °F (36 8 °C)   TempSrc:  Axillary  Axillary   SpO2:  98% 100% 100%   Weight: 58 2 kg (128 lb 4 9 oz)      Height:         Temp  Min: 98 °F (36 7 °C)  Max: 99 8 °F (37 7 °C)  IBW: 66 1 kg  Body mass index is 20 1 kg/m²  PHYSICAL EXAM:  Physical Exam   Constitutional: He is oriented to person, place, and time  He appears well-developed  Thin frail cachectic appearance   HENT:   Head: Normocephalic and atraumatic  Mouth/Throat: Oropharynx is clear and moist  No oropharyngeal exudate  Eyes: Pupils are equal, round, and reactive to light  EOM are normal    Neck: No JVD present  No tracheal deviation present  Cardiovascular: Normal rate, regular rhythm and normal heart sounds  Exam reveals no gallop     No murmur heard   Pulmonary/Chest: Accessory muscle usage present  No stridor  Tachypnea noted  No apnea  No respiratory distress  He has no wheezes  He has no rhonchi  He has no rales  Chest wall is not dull to percussion  He exhibits no tenderness, no bony tenderness, no edema, no deformity and no swelling  Diminished breath sounds bilateral lung fields   Abdominal: Soft  Bowel sounds are normal  He exhibits no distension and no ascites  There is no tenderness  There is no rebound and no guarding  Musculoskeletal:        Right lower leg: He exhibits no tenderness and no edema  Left lower leg: He exhibits no tenderness and no edema  Neurological: He is alert and oriented to person, place, and time  No cranial nerve deficit  Skin: Skin is warm and dry  No rash noted  No erythema  Psychiatric: He has a normal mood and affect         Labs:   Results from last 7 days   Lab Units 07/02/20  0536 07/01/20 2117 07/01/20 1906 07/01/20  1035   WBC Thousand/uL 1 79*  --   --  2 69*   HEMOGLOBIN g/dL 8 3*  --   --  9 2*   I STAT HEMOGLOBIN g/dl  --   --  10 5*  --    HEMATOCRIT % 27 2*  --   --  31 4*   HEMATOCRIT, ISTAT %  --   --  31*  --    PLATELETS Thousands/uL 160 128*  --  127*   NEUTROS PCT % 62  --   --  40*   MONOS PCT % 5  --   --  5     Results from last 7 days   Lab Units 07/02/20 0536 07/01/20 1906 07/01/20  1035   POTASSIUM mmol/L 4 5  --  4 1   CHLORIDE mmol/L 98*  --  96*   CO2 mmol/L 43*  --  >45*   CO2, I-STAT mmol/L  --  >45*  --    BUN mg/dL 17  --  13   CREATININE mg/dL 0 48*  --  0 57*   CALCIUM mg/dL 9 6  --  9 7   GLUCOSE, ISTAT mg/dl  --  249*  --          Lab Results   Component Value Date    PHOS 3 6 06/02/2015    PHOS 2 9 05/23/2015    PHOS 2 6 05/14/2015          No results found for: TROPONINT  ABG:  Lab Results   Component Value Date    PHART 7 364 09/04/2017    PBX0PWH 74 5 (HH) 09/04/2017    PO2ART 117 6 09/04/2017    HZE1BST 41 5 (H) 09/04/2017    BEART 13 2 09/04/2017    SOURCE Radial, Right 09/04/2017       Imaging: I have personally reviewed pertinent reports  EKG: This was personally reviewed by myself  Micro:  Blood Culture:   Lab Results   Component Value Date    BLOODCX No Growth After 5 Days  02/21/2019    BLOODCX No Growth After 5 Days   02/21/2019     Urine Culture: No results found for: URINECX  Sputum Culture: No components found for: SPUTUMCX  Wound Culure: No results found for: WOUNDCULT    VTE Pharmacologic Prophylaxis: Enoxaparin (Lovenox)  VTE Mechanical Prophylaxis: sequential compression device    Tameka Chairez DO  7/2/2020 1:05 PM

## 2020-07-02 NOTE — PLAN OF CARE
Problem: Potential for Falls  Goal: Patient will remain free of falls  Description  INTERVENTIONS:  - Assess patient frequently for physical needs  -  Identify cognitive and physical deficits and behaviors that affect risk of falls    -  [place] fall precautions as indicated by assessment   - Educate patient/family on patient safety including physical limitations  - Instruct patient to call for assistance with activity based on assessment  - Modify environment to reduce risk of injury  - Consider OT/PT consult to assist with strengthening/mobility  Outcome: Progressing     Problem: PAIN - ADULT  Goal: Verbalizes/displays adequate comfort level or baseline comfort level  Description  Interventions:  - Encourage patient to monitor pain and request assistance  - Assess pain using appropriate pain scale  - Administer analgesics based on type and severity of pain and evaluate response  - Implement non-pharmacological measures as appropriate and evaluate response  - Consider cultural and social influences on pain and pain management  - Notify physician/advanced practitioner if interventions unsuccessful or patient reports new pain  Outcome: Progressing     Problem: INFECTION - ADULT  Goal: Absence or prevention of progression during hospitalization  Description  INTERVENTIONS:  - Assess and monitor for signs and symptoms of infection  - Monitor lab/diagnostic results  - Monitor all insertion sites, i e  indwelling lines, tubes, and drains  - Monitor endotracheal if appropriate and nasal secretions for changes in amount and color  - [place] appropriate cooling/warming therapies per order  - Administer medications as ordered  - Instruct and encourage patient and family to use good hand hygiene technique  - Identify and instruct in appropriate isolation precautions for identified infection/condition  Outcome: Progressing  Goal: Absence of fever/infection during neutropenic period  Description  INTERVENTIONS:  - Monitor WBC    Outcome: Progressing     Problem: SAFETY ADULT  Goal: Maintain or return to baseline ADL function  Description  INTERVENTIONS:  -  Assess patient's ability to carry out ADLs; assess patient's baseline for ADL function and identify physical deficits which impact ability to perform ADLs (bathing, care of mouth/teeth, toileting, grooming, dressing, etc )  - Assess/evaluate cause of self-care deficits   - Assess range of motion  - Assess patient's mobility; develop plan if impaired  - Assess patient's need for assistive devices and provide as appropriate  - Encourage maximum independence but intervene and supervise when necessary  - Involve family in performance of ADLs  - Assess for home care needs following discharge   - Consider OT consult to assist with ADL evaluation and planning for discharge  - Provide patient education as appropriate  Outcome: Progressing  Goal: Maintain or return mobility status to optimal level  Description  INTERVENTIONS:  - Assess patient's baseline mobility status (ambulation, transfers, stairs, etc )    - Identify cognitive and physical deficits and behaviors that affect mobility  - Identify mobility aids required to assist with transfers and/or ambulation (gait belt, sit-to-stand, lift, walker, cane, etc )  - [place] fall precautions as indicated by assessment  - Record patient progress and toleration of activity level on Mobility SBAR; progress patient to next Phase/Stage  - Instruct patient to call for assistance with activity based on assessment  - Consider rehabilitation consult to assist with strengthening/weightbearing, etc   Outcome: Progressing     Problem: DISCHARGE PLANNING  Goal: Discharge to home or other facility with appropriate resources  Description  INTERVENTIONS:  - Identify barriers to discharge w/patient and caregiver  - Arrange for needed discharge resources and transportation as appropriate  - Identify discharge learning needs (meds, wound care, etc )  - Arrange for interpretive services to assist at discharge as needed  - Refer to Case Management Department for coordinating discharge planning if the patient needs post-hospital services based on physician/advanced practitioner order or complex needs related to functional status, cognitive ability, or social support system  Outcome: Progressing     Problem: Knowledge Deficit  Goal: Patient/family/caregiver demonstrates understanding of disease process, treatment plan, medications, and discharge instructions  Description  Complete learning assessment and assess knowledge base    Interventions:  - Provide teaching at level of understanding  - Provide teaching via preferred learning methods  Outcome: Progressing     Problem: CARDIOVASCULAR - ADULT  Goal: Maintains optimal cardiac output and hemodynamic stability  Description  INTERVENTIONS:  - Monitor I/O, vital signs and rhythm  - Monitor for S/S and trends of decreased cardiac output  - Administer and titrate ordered vasoactive medications to optimize hemodynamic stability  - Assess quality of pulses, skin color and temperature  - Assess for signs of decreased coronary artery perfusion  - Instruct patient to report change in severity of symptoms  Outcome: Progressing  Goal: Absence of cardiac dysrhythmias or at baseline rhythm  Description  INTERVENTIONS:  - Continuous cardiac monitoring, vital signs, obtain 12 lead EKG if ordered  - Administer antiarrhythmic and heart rate control medications as ordered  - Monitor electrolytes and administer replacement therapy as ordered  Outcome: Progressing     Problem: RESPIRATORY - ADULT  Goal: Achieves optimal ventilation and oxygenation  Description  INTERVENTIONS:  - Assess for changes in respiratory status  - Assess for changes in mentation and behavior  - Position to facilitate oxygenation and minimize respiratory effort  - Oxygen administered by appropriate delivery if ordered  - Initiate smoking cessation education as indicated  - Encourage broncho-pulmonary hygiene including cough, deep breathe, Incentive Spirometry  - Assess the need for suctioning and aspirate as needed  - Assess and instruct to report SOB or any respiratory difficulty  - Respiratory Therapy support as indicated  Outcome: Progressing

## 2020-07-02 NOTE — ASSESSMENT & PLAN NOTE
- agree with primary team treatment for acute COPD exacerbation  - BiPap overnight, wean to NC for goal SaO2 of 88% in AM  - hypercarbia on ABG related to chronic severe COPD, appears stable when compared to previous blood gases in December  - CXR reviewed, no evidence of significant pulm edema or effusions  - denies fever or URI symptoms  - pulmonary consult pending

## 2020-07-02 NOTE — MALNUTRITION/BMI
This medical record reflects one or more clinical indicators suggestive of malnutrition  Malnutrition Findings:   Malnutrition type: Chronic illness  Degree of Malnutrition: Malnutrition of moderate degree(malnutrition related to COPD as evidenced by mild depletion of orbital body fat and mild depletion of muscle mass seen at clavicle to be treated with regular diet and nutrition supplements )  Malnutrition Characteristics: Muscle loss, Fat loss       See Nutrition note dated 7/2/20 for additional details  Completed nutrition assessment is viewable in the nutrition documentation

## 2020-07-02 NOTE — SOCIAL WORK
Met with pt & explained Cm role  Pt lives with wife in 2 sty home with 2 dianne  Has 1st flr set up  States was [place], retired & drives  DME home o2 via [employer], rw, 3 prong cane  Open with [facility] for [facility] program & agreeable to referral to continue  H/o rhb at [facility]  Denies any MH,D&A tx  Uses [facility] [address]  Emergency contact, wife Breana Diaz [phone]  Wife available to transport home  Therapy evals pending  CM reviewed d/c planning process including the following: identifying help at home, patient preference for d/c planning needs, Discharge Lounge, [facility] Meds to Bed program, availability of treatment team to discuss questions or concerns patient and/or family may have regarding understanding medications and recognizing signs and symptoms once discharged  CM also encouraged patient to follow up with all recommended appointments after discharge  Patient advised of importance for patient and family to participate in managing patients medical well being

## 2020-07-02 NOTE — PROGRESS NOTES
Progress Note - Ivis Thornton 1950, 65 y o  male MRN: [id number]    Unit/Bed#: [facility] 509-01 Encounter: [id number]    Primary Care Provider: Michael Hampton MD   Date and time admitted to hospital: 7/1/2020 10:17 AM        Acute on chronic respiratory failure with hypercapnia ([address] )  Assessment & Plan  - agree with primary team treatment for acute COPD exacerbation with steroids, scheduled nebs   - BiPap overnight - decrease FiO2 to 28% to avoid oxygen toxicity, goal SaO2 88%, wean to NC for goal SaO2 of 88% in AM  - hypercarbia on ABG related to chronic severe COPD, appears stable when compared to previous blood gases in December  - CXR reviewed, no evidence of significant pulm edema or effusions  - denies fever or URI symptoms  - pulmonary consult pending       -------------------------------------------------------------------------------------------------------------  Chief Complaint: SOB, worse over past 3 days    History of Present Illness   HX and PE limited by:   Ivis Thornton is a 65 y o  male who presents with SOB which has worsened over the past 3 days  Patient reports he feels like he is having a flare up of his COPD and describes similar symptoms on previous admissions for his COPD  He denied fever or symptoms of URI  He has a past medical history significant for severe COPD, anxiety, GERD and PAF  He is noted to be mildly tachycardic in the low 100's to 110's bpm but this appears to be baseline on chart review of previous records  History obtained from chart review and the patient   -------------------------------------------------------------------------------------------------------------  Dispo: Admit to Stepdown Level 1    Code Status: Level 2 - DNAR: but accepts endotracheal intubation  --------------------------------------------------------------------------------------------------------------  Review of Systems   Constitutional: Negative for chills and fever     HENT: Negative for trouble swallowing  Respiratory: Positive for cough (chronic ), shortness of breath and wheezing  Negative for chest tightness and stridor  Cardiovascular: Negative for chest pain  Gastrointestinal: Negative for abdominal pain  Musculoskeletal: Negative for myalgias  Neurological: Negative for headaches  A 12-point, complete review of systems was reviewed and negative except as stated above     Physical Exam   Constitutional: No distress  HENT:   Head: Normocephalic  Neck: No tracheal deviation present  Cardiovascular: Regular rhythm  Tachycardia    Pulmonary/Chest: No respiratory distress  He has no wheezes  He has no rales  Abdominal: Soft  He exhibits no distension  Musculoskeletal: He exhibits no edema or deformity  Neurological: He is alert  Skin: Skin is warm and dry  He is not diaphoretic      --------------------------------------------------------------------------------------------------------------  Vitals:   Vitals:    07/01/20 2045 07/01/20 2048 07/01/20 2204 07/01/20 2300   BP:  132/61  112/52   BP Location:    Right arm   Pulse:  (!) 123  (!) 117   Resp:  (!) 32  (!) 32   Temp:  98 °F (36 7 °C)  99 8 °F (37 7 °C)   TempSrc:  Axillary  Axillary   SpO2: 99%  98% 98%   Weight:  61 2 kg (135 lb)     Height:  5' 7" (1 702 m)       Temp  Min: 98 °F (36 7 °C)  Max: 99 8 °F (37 7 °C)  IBW: 66 1 kg  Height: 5' 7" (170 2 cm)  Body mass index is 21 14 kg/m²      Laboratory and Diagnostics:  Results from last 7 days   Lab Units 07/01/20 2117 07/01/20 1906 07/01/20  1035   WBC Thousand/uL  --   --  2 69*   HEMOGLOBIN g/dL  --   --  9 2*   I STAT HEMOGLOBIN g/dl  --  10 5*  --    HEMATOCRIT %  --   --  31 4*   HEMATOCRIT, ISTAT %  --  31*  --    PLATELETS Thousands/uL 128*  --  127*   NEUTROS PCT %  --   --  40*   MONOS PCT %  --   --  5     Results from last 7 days   Lab Units 07/01/20  1906 07/01/20  1035   SODIUM mmol/L  --  140   POTASSIUM mmol/L  --  4 1   CHLORIDE mmol/L  -- 96*   CO2 mmol/L  --  >45*   CO2, I-STAT mmol/L >45*  --    BUN mg/dL  --  13   CREATININE mg/dL  --  0 57*   CALCIUM mg/dL  --  9 7   GLUCOSE RANDOM mg/dL  --  129               Results from last 7 days   Lab Units 20  1035   TROPONIN I ng/mL <0 02         ABG:    VBG:  Results from last 7 days   Lab Units 20  1238   PH MAGALI  7 243*   PCO2 MAGALI mm Hg 109 4*   PO2 MAGALI mm Hg 62 4*   HCO3 MAGALI mmol/L 46 1*   BASE EXC MAGALI mmol/L 15 5     Results from last 7 days   Lab Units 20  2117   PROCALCITONIN ng/ml <0 05       Micro:        EKG:   Imaging: I have personally reviewed pertinent reports          Historical Information   Past Medical History:   Diagnosis Date    Anxiety     Aortic regurgitation     Atrial flutter (HCC)     Chronic respiratory failure (HCC)     Colon polyp     COPD (chronic obstructive pulmonary disease) (HCC)     Deep venous thrombosis of distal lower extremity (HCC)     Diverticulitis     GI bleed     Hypertension     Iron deficiency anemia     MGUS (monoclonal gammopathy of unknown significance)     Osteoarthritis of hip     PAC (premature atrial contraction)     Paroxysmal atrial fibrillation (HCC)     Patent foramen ovale     Pulmonary artery hypertension (HCC)     Renal failure     Sinus tachycardia      Past Surgical History:   Procedure Laterality Date    APPENDECTOMY      COLON SURGERY      HERNIA REPAIR      JOINT REPLACEMENT      KNEE SURGERY       Social History   Social History     Substance and Sexual Activity   Alcohol Use Not Currently     Social History     Substance and Sexual Activity   Drug Use Not Currently     Social History     Tobacco Use   Smoking Status Former Smoker    Packs/day: 1 50    Years: 42 00    Pack years: 63 00    Types: Cigarettes    Start date: 5    Last attempt to quit:     Years since quittin 5   Smokeless Tobacco Never Used     Exercise History: independent   Family History:   Family History   Problem Relation Age of Onset    Cancer Mother     Heart attack Father     Sudden death Father     Arthritis Family     Diabetes Family      I have reviewed this patient's family history and commented on sigificant items within the HPI      Medications:  Current Facility-Administered Medications   Medication Dose Route Frequency    [START ON 7/2/2020] citalopram (CeleXA) tablet 20 mg  20 mg Oral Daily    [START ON 7/2/2020] diltiazem (CARDIZEM CD) 24 hr capsule 180 mg  180 mg Oral Daily    [START ON 7/2/2020] enoxaparin (LOVENOX) subcutaneous injection 40 mg  40 mg Subcutaneous Daily    [START ON 7/2/2020] ferrous sulfate tablet 325 mg  325 mg Oral Daily    [START ON 8/3/9582] folic acid (FOLVITE) tablet 1,000 mcg  1,000 mcg Oral Daily    [START ON 7/2/2020] furosemide (LASIX) tablet 10 mg  10 mg Oral Daily    ipratropium (ATROVENT) 0 02 % inhalation solution 0 5 mg  0 5 mg Nebulization Q6H    levalbuterol (XOPENEX) inhalation solution 1 25 mg  1 25 mg Nebulization Q6H    melatonin tablet 6 mg  6 mg Oral HS PRN    [START ON 7/2/2020] methylPREDNISolone sodium succinate (Solu-MEDROL) injection 40 mg  40 mg Intravenous Q12H [address]    [START ON 7/2/2020] pantoprazole (PROTONIX) EC tablet 40 mg  40 mg Oral Early Morning    [START ON 7/2/2020] potassium chloride (K-DUR,KLOR-CON) CR tablet 20 mEq  20 mEq Oral Daily    sodium chloride (PF) 0 9 % injection 3 mL  3 mL Intravenous Q1H PRN     Home medications:  Prior to Admission Medications   Prescriptions Last Dose Informant Patient Reported? Taking?    albuterol (2 5 mg/3 mL) 0 083 % nebulizer solution 7/1/2020 at Unknown time  No Yes   Sig: INHALE THE CONTENTS OF 1 VIAL VIA NEBULIZER EVERY 6 HOURS AS NEEDED FOR WHEEZING OR SHORTNESS OF BREATH   albuterol (ProAir HFA) 90 mcg/act inhaler 7/1/2020 at Unknown time  No Yes   Sig: Inhale 2 puffs every 6 (six) hours as needed for wheezing   budesonide (PULMICORT) 0 5 mg/2 mL nebulizer solution 7/1/2020 at Unknown time  No Yes   Sig: TAKE 1 VIAL (0 5 MG TOTAL) BY NEBULIZATION 2 (TWO) TIMES A DAY RINSE MOUTH AFTER USE    citalopram (CeleXA) 20 mg tablet 6/30/2020 at Unknown time Self No Yes   Sig: Take 1 tablet (20 mg total) by mouth daily   diltiazem (CARDIZEM CD) 180 mg 24 hr capsule 6/30/2020 at Unknown time  No Yes   Sig: TAKE 1 CAPSULE BY MOUTH EVERY DAY   ferrous sulfate 325 (65 Fe) mg tablet 6/30/2020 at Unknown time Self Yes Yes   Sig: Take 325 mg by mouth daily   folic acid (FOLVITE) 1 mg tablet 6/30/2020 at Unknown time  No Yes   Sig: TAKE 1 TABLET BY MOUTH EVERY DAY   furosemide (LASIX) 20 mg tablet 6/30/2020 at Unknown time Self No Yes   Sig: TAKE 1/2 TABLET BY MOUTH DAILY   ipratropium (ATROVENT) 0 02 % nebulizer solution 6/30/2020 at Unknown time  No Yes   Sig: TAKE 1 VIAL (0 5 MG TOTAL) BY NEBULIZATION 4 (FOUR) TIMES A DAY   melatonin 3 mg 6/30/2020 at Unknown time Self No Yes   Sig: Take 2 tablets by mouth daily at bedtime as needed (sleep)   methylPREDNISolone 4 MG tablet therapy pack Not Taking at Unknown time  No No   Sig: Use as directed on package   Patient not taking: Reported on 7/1/2020   potassium chloride (K-DUR,KLOR-CON) 20 mEq tablet 6/30/2020 at Unknown time  No Yes   Sig: TAKE 1 TABLET BY MOUTH EVERY DAY      Facility-Administered Medications: None     Allergies: Allergies   Allergen Reactions    Penicillins Anaphylaxis     ------------------------------------------------------------------------------------------------------------  Advance Directive and Living Will: Yes    Power of :    POLST:    ------------------------------------------------------------------------------------------------------------  Care Time Delivered:   No Critical Care time spent       GERA [place]        Portions of the record may have been created with voice recognition software    Occasional wrong word or "sound a like" substitutions may have occurred due to the inherent limitations of voice recognition software    Read the chart carefully and recognize, using context, where substitutions have occurred

## 2020-07-02 NOTE — PLAN OF CARE
Problem: PHYSICAL THERAPY ADULT  Goal: Performs mobility at highest level of function for planned discharge setting  See evaluation for individualized goals  Description  Treatment/Interventions: Functional transfer training, LE strengthening/ROM, Therapeutic exercise, Endurance training, Patient/family training, Equipment eval/education, Bed mobility, Gait training, Cognitive reorientation, Elevations  Equipment Recommended: Walker(pending progress)       See flowsheet documentation for full assessment, interventions and recommendations  Note:   Prognosis: Fair  Problem List: Decreased strength, Decreased endurance, Impaired balance, Decreased mobility, Decreased cognition, Decreased safety awareness  Assessment: Pt is a 65 y o  male seen for PT evaluation s/p admit to [facility] on 7/1/2020  Pt was admitted with a primary dx of: acute COPD exacerbation  PT now consulted for assessment of mobility and d/c needs  Pt with Up with assistance orders  Pts current comorbidities effecting treatment include: Paroxysmal Afib, Anxiety, HTN, COPD on 4L O2 at baseline continuous  Pts current clinical presentation is Unstable/ Unpredictable (high complexity) due to Ongoing medical management for primary dx, Increased reliance on more restrictive AD compared to baseline, Decreased activity tolerance compared to baseline, Fall risk, Ongoing telemetry monitoring, Cog status, Continuous pulse oximetry monitoring   Prior to admission, pt was independent with household mobility with cane  Upon evaluation, pt currently is requiring supervision for bed mobility; supervision for transfers and [place] for ambulation 4 ft w/ RW   Pt presents at PT eval functioning below baseline and currently w/ overall mobility deficits 2* to: BLE weakness, impaired balance, decreased endurance, gait deviations, decreased activity tolerance compared to baseline, decreased functional mobility tolerance compared to baseline, decreased safety awareness, fall risk, SOB upon exertion  Pt currently at a fall risk 2* to impairments listed above  Pt will continue to benefit from skilled acute PT interventions to address stated impairments; to maximize functional mobility; for ongoing pt/ family training; and DME needs  At conclusion of PT session pt returned back in chair and chair alarm engaged with phone and call bell within reach  Pt denies any further questions at this time  Recommend home with family care and HHPT upon hospital D/C  PT Discharge Recommendation: Home with skilled therapy(HHPT)     PT - OK to Discharge: No    See flowsheet documentation for full assessment

## 2020-07-02 NOTE — ASSESSMENT & PLAN NOTE
Malnutrition Findings:   Malnutrition type: Chronic illness  Degree of Malnutrition: Malnutrition of moderate degree(malnutrition related to COPD as evidenced by mild depletion of orbital body fat and mild depletion of muscle mass seen at clavicle to be treated with regular diet and nutrition supplements )    BMI Findings: Body mass index is 20 1 kg/m²

## 2020-07-02 NOTE — SOCIAL WORK
Pt recommended for home therapies  S/w pt & informed will add to [facility] referral  Pt agreeable  Has rw already at home

## 2020-07-02 NOTE — RESPIRATORY THERAPY NOTE
resp care      07/02/20 0943   Respiratory Assessment   Resp Comments Incentive spirometry contraindicated in COPD exacerbation  IS order d/c'd

## 2020-07-02 NOTE — PROGRESS NOTES
Progress Note - Aaliyah Yoon 1950, 65 y o  male MRN: [id number]    Unit/Bed#: [facility] 509-01 Encounter: [id number]    Primary Care Provider: Santi Stoll MD   Date and time admitted to hospital: 7/1/2020 10:17 AM        * Acute exacerbation of chronic obstructive pulmonary disease (COPD) ([facility] [address] )  Assessment & Plan  pt with end stage COPD, FEV1/FVC 30%  initially  on BiPAP - now back to his baseline O2 of 4 lt nc  Pt feeling better but still not back to baseline  Continue Solu-Medrol 40 mg q 12h  Xopenex, Atrovent, Pulmicort  Acute on chronic respiratory failure with hypercapnia (HCC)  Assessment & Plan  In setting of COPD  Patient now to his baseline oxygen requirements  Continue HS BiPAP  Moderate protein-calorie malnutrition ([facility] )  Assessment & Plan  Malnutrition Findings:   Malnutrition type: Chronic illness  Degree of Malnutrition: Malnutrition of moderate degree(malnutrition related to COPD as evidenced by mild depletion of orbital body fat and mild depletion of muscle mass seen at clavicle to be treated with regular diet and nutrition supplements )    BMI Findings: Body mass index is 20 1 kg/m²  Fall at home  Assessment & Plan  - no LOC  - no injury reported; fell onto bed  - mechanical fall  - PT/OT consult     Paroxysmal atrial fibrillation (HCC)  Assessment & Plan  Continue PTA diltiazem 180 mg QD    Respiratory acidosis  Assessment & Plan  In setting of acute on chronic COPD    Leukopenia  Assessment & Plan  - has leukopenia @ baseline, worsening  - WBC < 4 @ 2 69, HR: 96, RR: 34, meets SIRS  - CXR w/o acute abnormality; no source of infection  Continue monitor    If worsens will consult Heme-Onc    Anxiety  Assessment & Plan  Continue PTA Celexa 20 mg QD    GERD  Assessment & Plan  Start Protonix in setting of daily steroid use      VTE Pharmacologic Prophylaxis:   Pharmacologic: Enoxaparin (Lovenox)  Mechanical VTE Prophylaxis in Place: Yes    Patient Centered Rounds: I have performed bedside rounds with nursing staff today  Discussions with Specialists or Other Care Team Provider: pulm    Education and Discussions with Family / Patient: pt    Time Spent for Care: 30 minutes  More than 50% of total time spent on counseling and coordination of care as described above  Current Length of Stay: 1 day(s)    Current Patient Status: Inpatient   Certification Statement: The patient will continue to require additional inpatient hospital stay due to above    Discharge Plan:  Possibly next 1-2 days    Code Status: Level 2 - DNAR: but accepts endotracheal intubation      Subjective:   Pt seen and examined by me this morning  Pt said he is feeling much better  Still on back to his baseline  Objective:     Vitals:   Temp (24hrs), Av 5 °F (36 9 °C), Min:98 °F (36 7 °C), Max:99 8 °F (37 7 °C)    Temp:  [98 °F (36 7 °C)-99 8 °F (37 7 °C)] 98 4 °F (36 9 °C)  HR:  [] 82  Resp:  [22-42] 30  BP: (100-151)/(38-70) 115/55  SpO2:  [92 %-100 %] 99 %  Body mass index is 20 1 kg/m²  Input and Output Summary (last 24 hours): Intake/Output Summary (Last 24 hours) at 2020 1623  Last data filed at 2020 1312  Gross per 24 hour   Intake 180 ml   Output 800 ml   Net -620 ml       Physical Exam:     Physical Exam    Constitutional: Pt appears comfortable  Not in any acute distress  Cardiovascular: Normal rate, regular rhythm, normal heart sounds  No murmur heard  Pulmonary/Chest: Effort normal, significantly decreased air entry b/l  No respiratory distress  Pt has no wheezes or crackles  Abdominal: Soft  Non-distended, Non-tender  Bowel sounds are normal    Musculoskeletal: Normal range of motion  Neurological: awake, alert  Moving all extremities spontaneously  Psychiatric: normal mood and affect        Additional Data:     Labs:    Results from last 7 days   Lab Units 20  0536   WBC Thousand/uL 1 79*   HEMOGLOBIN g/dL 8 3*   HEMATOCRIT % 27 2*   PLATELETS Thousands/uL 160   NEUTROS PCT % 62   LYMPHS PCT % 32   MONOS PCT % 5   EOS PCT % 1     Results from last 7 days   Lab Units 07/02/20  0536   SODIUM mmol/L 140   POTASSIUM mmol/L 4 5   CHLORIDE mmol/L 98*   CO2 mmol/L 43*   BUN mg/dL 17   CREATININE mg/dL 0 48*   ANION GAP mmol/L -1*   CALCIUM mg/dL 9 6   GLUCOSE RANDOM mg/dL 104                 Results from last 7 days   Lab Units 07/02/20  0536 07/01/20  2117   PROCALCITONIN ng/ml <0 05 <0 05           * I Have Reviewed All Lab Data Listed Above  * Additional Pertinent Lab Tests Reviewed: [address] Admission Reviewed    Imaging:    Imaging Reports Reviewed Today Include:   Imaging Personally Reviewed by Myself Includes:     Recent Cultures (last 7 days):           Last 24 Hours Medication List:     Current Facility-Administered Medications:  budesonide 0 5 mg Nebulization Q12H Quanchristina Ahmadi DO   citalopram 20 mg Oral Daily Raenette Bamberger, MD   diltiazem 180 mg Oral Daily Raenette Bamberger, MD   enoxaparin 40 mg Subcutaneous Daily Raenette Bamberger, MD   ferrous sulfate 325 mg Oral Daily Raenette Bamberger, MD   folic acid 4,294 mcg Oral Daily Raenette Bamberger, MD   furosemide 10 mg Oral Daily Raenette Bamberger, MD   ipratropium 0 5 mg Nebulization Q6H Wallace Cunha MD   levalbuterol 1 25 mg Nebulization Q6H Wallace Cunha MD   melatonin 6 mg Oral HS PRN Raenette Bamberger, MD   methylPREDNISolone sodium succinate 40 mg Intravenous Q12H [address] Raenette Bamberger, MD   [START ON 7/3/2020] pantoprazole 40 mg Oral Early Morning [place], PA-C   potassium chloride 20 mEq Oral Daily Raenette Bamberger, MD   sodium chloride (PF) 3 mL Intravenous Q1H PRN Raenette Bamberger, MD        Today, Patient Was Seen By: Abe Silva MD    ** Please Note: Dictation voice to text software may have been used in the creation of this document   **

## 2020-07-03 LAB
BASOPHILS # BLD AUTO: 0 THOUSANDS/ΜL (ref 0–0.1)
BASOPHILS NFR BLD AUTO: 0 % (ref 0–1)
EOSINOPHIL # BLD AUTO: 0 THOUSAND/ΜL (ref 0–0.61)
EOSINOPHIL NFR BLD AUTO: 0 % (ref 0–6)
ERYTHROCYTE [DISTWIDTH] IN BLOOD BY AUTOMATED COUNT: 14.1 % (ref 11.6–15.1)
HCT VFR BLD AUTO: 27.5 % (ref 36.5–49.3)
HGB BLD-MCNC: 8.3 G/DL (ref 12–17)
IMM GRANULOCYTES # BLD AUTO: 0.01 THOUSAND/UL (ref 0–0.2)
IMM GRANULOCYTES NFR BLD AUTO: 0 % (ref 0–2)
LYMPHOCYTES # BLD AUTO: 0.25 THOUSANDS/ΜL (ref 0.6–4.47)
LYMPHOCYTES NFR BLD AUTO: 10 % (ref 14–44)
MCH RBC QN AUTO: 33.1 PG (ref 26.8–34.3)
MCHC RBC AUTO-ENTMCNC: 30.2 G/DL (ref 31.4–37.4)
MCV RBC AUTO: 110 FL (ref 82–98)
MONOCYTES # BLD AUTO: 0.12 THOUSAND/ΜL (ref 0.17–1.22)
MONOCYTES NFR BLD AUTO: 5 % (ref 4–12)
NEUTROPHILS # BLD AUTO: 2.12 THOUSANDS/ΜL (ref 1.85–7.62)
NEUTS SEG NFR BLD AUTO: 85 % (ref 43–75)
NRBC BLD AUTO-RTO: 0 /100 WBCS
PLATELET # BLD AUTO: 133 THOUSANDS/UL (ref 149–390)
PMV BLD AUTO: 9.5 FL (ref 8.9–12.7)
RBC # BLD AUTO: 2.51 MILLION/UL (ref 3.88–5.62)
WBC # BLD AUTO: 2.5 THOUSAND/UL (ref 4.31–10.16)

## 2020-07-03 PROCEDURE — 99233 SBSQ HOSP IP/OBS HIGH 50: CPT | Performed by: INTERNAL MEDICINE

## 2020-07-03 PROCEDURE — 99232 SBSQ HOSP IP/OBS MODERATE 35: CPT | Performed by: INTERNAL MEDICINE

## 2020-07-03 PROCEDURE — 94760 N-INVAS EAR/PLS OXIMETRY 1: CPT

## 2020-07-03 PROCEDURE — 85025 COMPLETE CBC W/AUTO DIFF WBC: CPT | Performed by: INTERNAL MEDICINE

## 2020-07-03 PROCEDURE — 94640 AIRWAY INHALATION TREATMENT: CPT

## 2020-07-03 RX ADMIN — IPRATROPIUM BROMIDE 0.5 MG: 0.5 SOLUTION RESPIRATORY (INHALATION) at 07:23

## 2020-07-03 RX ADMIN — METHYLPREDNISOLONE SODIUM SUCCINATE 40 MG: 40 INJECTION, POWDER, FOR SOLUTION INTRAMUSCULAR; INTRAVENOUS at 21:06

## 2020-07-03 RX ADMIN — DILTIAZEM HYDROCHLORIDE 180 MG: 180 CAPSULE, COATED, EXTENDED RELEASE ORAL at 09:26

## 2020-07-03 RX ADMIN — BUDESONIDE 0.5 MG: 0.5 INHALANT RESPIRATORY (INHALATION) at 07:23

## 2020-07-03 RX ADMIN — ENOXAPARIN SODIUM 40 MG: 40 INJECTION SUBCUTANEOUS at 09:26

## 2020-07-03 RX ADMIN — IPRATROPIUM BROMIDE 0.5 MG: 0.5 SOLUTION RESPIRATORY (INHALATION) at 19:13

## 2020-07-03 RX ADMIN — BUDESONIDE 0.5 MG: 0.5 INHALANT RESPIRATORY (INHALATION) at 19:13

## 2020-07-03 RX ADMIN — METHYLPREDNISOLONE SODIUM SUCCINATE 40 MG: 40 INJECTION, POWDER, FOR SOLUTION INTRAMUSCULAR; INTRAVENOUS at 09:26

## 2020-07-03 RX ADMIN — LEVALBUTEROL HYDROCHLORIDE 1.25 MG: 1.25 SOLUTION, CONCENTRATE RESPIRATORY (INHALATION) at 00:22

## 2020-07-03 RX ADMIN — LEVALBUTEROL HYDROCHLORIDE 1.25 MG: 1.25 SOLUTION, CONCENTRATE RESPIRATORY (INHALATION) at 07:23

## 2020-07-03 RX ADMIN — PANTOPRAZOLE SODIUM 40 MG: 40 TABLET, DELAYED RELEASE ORAL at 05:00

## 2020-07-03 RX ADMIN — LEVALBUTEROL HYDROCHLORIDE 1.25 MG: 1.25 SOLUTION, CONCENTRATE RESPIRATORY (INHALATION) at 13:19

## 2020-07-03 RX ADMIN — LEVALBUTEROL HYDROCHLORIDE 1.25 MG: 1.25 SOLUTION, CONCENTRATE RESPIRATORY (INHALATION) at 19:13

## 2020-07-03 RX ADMIN — FUROSEMIDE 10 MG: 20 TABLET ORAL at 09:26

## 2020-07-03 RX ADMIN — POTASSIUM CHLORIDE 20 MEQ: 1500 TABLET, EXTENDED RELEASE ORAL at 09:26

## 2020-07-03 RX ADMIN — FERROUS SULFATE TAB 325 MG (65 MG ELEMENTAL FE) 325 MG: 325 (65 FE) TAB at 09:26

## 2020-07-03 RX ADMIN — FOLIC ACID 1000 MCG: 1 TABLET ORAL at 09:26

## 2020-07-03 RX ADMIN — IPRATROPIUM BROMIDE 0.5 MG: 0.5 SOLUTION RESPIRATORY (INHALATION) at 13:19

## 2020-07-03 RX ADMIN — CITALOPRAM HYDROBROMIDE 20 MG: 20 TABLET ORAL at 09:26

## 2020-07-03 RX ADMIN — IPRATROPIUM BROMIDE 0.5 MG: 0.5 SOLUTION RESPIRATORY (INHALATION) at 00:22

## 2020-07-03 NOTE — ED PROVIDER NOTES
History  Chief Complaint   Patient presents with    Shortness of Breath     hx COPD, increased SOB and work of breathing for a few days pt arrived on 6LPM NC his baseline use is 3LPM NC     Patient is a 77-year-old male with a past medical history of chronic respiratory failure on 3 L nasal cannula continuous, severe COPD, MGUS, who presents the emergency department for evaluation of shortness of breath  He states his symptoms were gradual in onset but then worsened this morning acutely  He states that minimal movement causes severe dyspnea  He has been utilizing his home inhalers without alleviation of his symptoms  He denies any increasing cough or sputum production, he denies chest pain, nausea, vomiting, diaphoresis, no fevers or chills, denies abdominal pain, diarrhea, constipation, melena hematochezia  He has required ICU admission and intubation previously for his COPD, but no recent admissions  No personal history of DVT or PE, no hemoptysis, no lower extremity tenderness or edema  History provided by:  Patient   used: No    Shortness of Breath   Onset quality:  Gradual  Timing:  Constant  Progression:  Worsening  Chronicity:  Recurrent  Relieved by:  Nothing  Worsened by: Movement  Ineffective treatments:  Inhaler and oxygen  Associated symptoms: no abdominal pain, no chest pain, no cough, no diaphoresis, no fever, no headaches, no neck pain, no sputum production and no vomiting        Prior to Admission Medications   Prescriptions Last Dose Informant Patient Reported? Taking?    albuterol (2 5 mg/3 mL) 0 083 % nebulizer solution 7/1/2020 at Unknown time  No Yes   Sig: INHALE THE CONTENTS OF 1 VIAL VIA NEBULIZER EVERY 6 HOURS AS NEEDED FOR WHEEZING OR SHORTNESS OF BREATH   albuterol (ProAir HFA) 90 mcg/act inhaler 7/1/2020 at Unknown time  No Yes   Sig: Inhale 2 puffs every 6 (six) hours as needed for wheezing   budesonide (PULMICORT) 0 5 mg/2 mL nebulizer solution 7/1/2020 at Unknown time  No Yes   Sig: TAKE 1 VIAL (0 5 MG TOTAL) BY NEBULIZATION 2 (TWO) TIMES A DAY RINSE MOUTH AFTER USE    citalopram (CeleXA) 20 mg tablet 6/30/2020 at Unknown time Self No Yes   Sig: Take 1 tablet (20 mg total) by mouth daily   diltiazem (CARDIZEM CD) 180 mg 24 hr capsule 6/30/2020 at Unknown time  No Yes   Sig: TAKE 1 CAPSULE BY MOUTH EVERY DAY   ferrous sulfate 325 (65 Fe) mg tablet 6/30/2020 at Unknown time Self Yes Yes   Sig: Take 325 mg by mouth daily   folic acid (FOLVITE) 1 mg tablet 6/30/2020 at Unknown time  No Yes   Sig: TAKE 1 TABLET BY MOUTH EVERY DAY   furosemide (LASIX) 20 mg tablet 6/30/2020 at Unknown time Self No Yes   Sig: TAKE 1/2 TABLET BY MOUTH DAILY   ipratropium (ATROVENT) 0 02 % nebulizer solution 6/30/2020 at Unknown time  No Yes   Sig: TAKE 1 VIAL (0 5 MG TOTAL) BY NEBULIZATION 4 (FOUR) TIMES A DAY   melatonin 3 mg 6/30/2020 at Unknown time Self No Yes   Sig: Take 2 tablets by mouth daily at bedtime as needed (sleep)   methylPREDNISolone 4 MG tablet therapy pack Not Taking at Unknown time  No No   Sig: Use as directed on package   Patient not taking: Reported on 7/1/2020   potassium chloride (K-DUR,KLOR-CON) 20 mEq tablet 6/30/2020 at Unknown time  No Yes   Sig: TAKE 1 TABLET BY MOUTH EVERY DAY      Facility-Administered Medications: None       Past Medical History:   Diagnosis Date    Anxiety     Aortic regurgitation     Atrial flutter (HCC)     Chronic respiratory failure (HCC)     Colon polyp     COPD (chronic obstructive pulmonary disease) (HCC)     Deep venous thrombosis of distal lower extremity (HCC)     Diverticulitis     GI bleed     Hypertension     Iron deficiency anemia     MGUS (monoclonal gammopathy of unknown significance)     Osteoarthritis of hip     PAC (premature atrial contraction)     Paroxysmal atrial fibrillation (HCC)     Patent foramen ovale     Pulmonary artery hypertension (HCC)     Renal failure     Sinus tachycardia        Past Surgical History:   Procedure Laterality Date    APPENDECTOMY      COLON SURGERY      HERNIA REPAIR      JOINT REPLACEMENT      KNEE SURGERY         Family History   Problem Relation Age of Onset    Cancer Mother     Heart attack Father     Sudden death Father     Arthritis Family     Diabetes Family      I have reviewed and agree with the history as documented  E-Cigarette/Vaping     E-Cigarette/Vaping Substances     Social History     Tobacco Use    Smoking status: Former Smoker     Packs/day: 1 50     Years: 42 00     Pack years: 63 00     Types: Cigarettes     Start date:      Last attempt to quit:      Years since quittin 5    Smokeless tobacco: Never Used   Substance Use Topics    Alcohol use: Not Currently    Drug use: Not Currently        Review of Systems   Constitutional: Negative  Negative for appetite change, chills, diaphoresis and fever  HENT: Negative  Eyes: Negative  Negative for photophobia and visual disturbance  Respiratory: Positive for shortness of breath  Negative for cough, sputum production and chest tightness  Cardiovascular: Negative  Negative for chest pain and leg swelling  Gastrointestinal: Negative  Negative for abdominal pain, blood in stool, constipation, diarrhea, nausea and vomiting  Endocrine: Negative  Genitourinary: Negative  Negative for difficulty urinating, dysuria, flank pain, frequency and urgency  Musculoskeletal: Negative  Negative for back pain, neck pain and neck stiffness  Skin: Negative  Allergic/Immunologic: Negative  Neurological: Negative  Negative for dizziness, weakness, light-headedness and headaches  Hematological: Negative  Psychiatric/Behavioral: Negative          Physical Exam  ED Triage Vitals   Temperature Pulse Respirations Blood Pressure SpO2   20 1233 20 1025 20 1025 20 1025 20 1025   98 2 °F (36 8 °C) 94 (!) 26 150/60 95 %      Temp Source Heart Rate Source Patient Position - Orthostatic VS BP Location FiO2 (%)   07/01/20 1233 07/01/20 1700 07/01/20 1515 07/01/20 1515 --   Tympanic Monitor Lying Right arm       Pain Score       07/01/20 2048       3             Orthostatic Vital Signs  Vitals:    07/03/20 0719 07/03/20 1056 07/03/20 1500 07/03/20 2300   BP: 134/63 113/56 118/55 117/54   Pulse: 82 79 91 58   Patient Position - Orthostatic VS: Lying Lying  Lying       Physical Exam   Constitutional: He is oriented to person, place, and time  He appears ill  Cachectic appearance   HENT:   Head: Normocephalic and atraumatic  Right Ear: External ear normal    Left Ear: External ear normal    Nose: Nose normal    Eyes: Conjunctivae and EOM are normal  No scleral icterus  Neck: Normal range of motion  No JVD present  Cardiovascular: Normal rate, regular rhythm, normal heart sounds and intact distal pulses  No murmur heard  Pulmonary/Chest: Accessory muscle usage present  Tachypnea noted  He has decreased breath sounds in the right upper field, the right middle field, the right lower field, the left upper field, the left middle field and the left lower field  Abdominal: Soft  Bowel sounds are normal  He exhibits no distension  There is no tenderness  There is no guarding  Musculoskeletal: Normal range of motion  He exhibits no edema  Neurological: He is alert and oriented to person, place, and time  Skin: Skin is warm and dry  Capillary refill takes less than 2 seconds  Psychiatric: He has a normal mood and affect   His behavior is normal        ED Medications  Medications   sodium chloride (PF) 0 9 % injection 3 mL (has no administration in time range)   citalopram (CeleXA) tablet 20 mg (20 mg Oral Given 7/3/20 0926)   diltiazem (CARDIZEM CD) 24 hr capsule 180 mg (180 mg Oral Given 7/3/20 0926)   ferrous sulfate tablet 325 mg (325 mg Oral Given 2/6/50 2320)   folic acid (FOLVITE) tablet 1,000 mcg (1,000 mcg Oral Given 7/3/20 0926)   furosemide (LASIX) tablet 10 mg (10 mg Oral Given 7/3/20 0926)   melatonin tablet 6 mg (has no administration in time range)   potassium chloride (K-DUR,KLOR-CON) CR tablet 20 mEq (20 mEq Oral Given 7/3/20 0926)   enoxaparin (LOVENOX) subcutaneous injection 40 mg (40 mg Subcutaneous Given 7/3/20 0926)   methylPREDNISolone sodium succinate (Solu-MEDROL) injection 40 mg (40 mg Intravenous Given 7/3/20 2106)   levalbuterol (Donell Rell) inhalation solution 1 25 mg (1 25 mg Nebulization Given 7/4/20 0034)   ipratropium (ATROVENT) 0 02 % inhalation solution 0 5 mg (0 5 mg Nebulization Given 7/4/20 0035)   pantoprazole (PROTONIX) EC tablet 40 mg (40 mg Oral Given 7/4/20 0528)   budesonide (PULMICORT) inhalation solution 0 5 mg (0 5 mg Nebulization Given 7/3/20 1913)   terbutaline (FOR EMS ONLY) (BRETHINE) injection 1 each (0 each Does not apply Given to EMS 7/1/20 1028)   methylPREDNISolone sodium succinate (FOR EMS ONLY) (Solu-MEDROL) 125 MG injection 125 mg (0 mg Does not apply Given to EMS 7/1/20 1028)   predniSONE tablet 40 mg (40 mg Oral Given 7/1/20 1043)   albuterol inhalation solution 10 mg (10 mg Nebulization Given 7/1/20 1047)     And   ipratropium (ATROVENT) 0 02 % inhalation solution 1 mg (1 mg Nebulization Given 7/1/20 1047)     And   sodium chloride 0 9 % inhalation solution 3 mL (3 mL Nebulization Given 7/1/20 1047)   magnesium sulfate 2 g/50 mL IVPB (premix) 2 g (0 g Intravenous Stopped 7/1/20 1300)   sodium chloride 0 9 % bolus 500 mL (0 mL Intravenous Stopped 7/1/20 1308)   sodium chloride 0 9 % bolus 500 mL (500 mL Intravenous New Bag 7/1/20 1924)   pantoprazole (PROTONIX) injection 40 mg (40 mg Intravenous Given 7/2/20 0529)       Diagnostic Studies  Results Reviewed     Procedure Component Value Units Date/Time    Vitamin B12 [[id number]]  (Normal) Collected:  07/02/20 0536    Lab Status:  Final result Specimen:  Blood from Arm, Right Updated:  07/02/20 0702     Vitamin B-12 251 pg/mL     Folate [[id number]]  (Abnormal) Collected:  07/02/20 0536    Lab Status:  Final result Specimen:  Blood from Arm, Right Updated:  07/02/20 0702     Folate >20 0 ng/mL     POCT Blood Gas (CG8+) [[id number]]  (Abnormal) Collected:  07/01/20 1906    Lab Status:  Final result Specimen:  Venous Updated:  07/01/20 1911     ph, Stewart ISTAT 7 427     pCO2, Stewart i-STAT 77 0 mm HG      pO2, Stewart i-STAT 168 0 mm HG      BE, i-STAT 23 mmol/L      HCO3, Stewart i-STAT 50 8 mmol/L      CO2, i-STAT >45 mmol/L      O2 Sat, i-STAT 99 %      SODIUM, I-STAT 138 mmol/l      Potassium, i-STAT 5 3 mmol/L      Calcium, Ionized i-STAT 1 11 mmol/L      Hct, i-STAT 31 %      Hgb, i-STAT 10 5 g/dl      Glucose, i-STAT 249 mg/dl      Specimen Type VENOUS    Blood gas, venous [[id number]]  (Abnormal) Collected:  07/01/20 1238    Lab Status:  Final result Specimen:  Blood from Arm, Left Updated:  07/01/20 1248     pH, Stewart 7 243     pCO2, Stewart 109 4 mm Hg      pO2, Stewart 62 4 mm Hg      HCO3, Stewart 46 1 mmol/L      Base Excess, Stewart 15 5 mmol/L      O2 Content, Stewart 12 0 ml/dL      O2 HGB, VENOUS 86 7 %     Basic metabolic panel [[id number]]  (Abnormal) Collected:  07/01/20 1035    Lab Status:  Final result Specimen:  Blood from Arm, Left Updated:  07/01/20 1125     Sodium 140 mmol/L      Potassium 4 1 mmol/L      Chloride 96 mmol/L      CO2 >45 mmol/L      ANION GAP --     BUN 13 mg/dL      Creatinine 0 57 mg/dL      Glucose 129 mg/dL      Calcium 9 7 mg/dL      eGFR 103 ml/min/1 73sq m     Narrative:       [facility] guidelines for Chronic Kidney Disease (CKD):     Stage 1 with normal or high GFR (GFR > 90 mL/min/1 73 square meters)    Stage 2 Mild CKD (GFR = 60-89 mL/min/1 73 square meters)    Stage 3A Moderate CKD (GFR = 45-59 mL/min/1 73 square meters)    Stage 3B Moderate CKD (GFR = 30-44 mL/min/1 73 square meters)    Stage 4 Severe CKD (GFR = 15-29 mL/min/1 73 square meters)    Stage 5 End Stage CKD (GFR <15 mL/min/1 73 square meters)  Note: GFR calculation is accurate only with a steady state creatinine    Troponin I [[id number]]  (Normal) Collected:  07/01/20 1035    Lab Status:  Final result Specimen:  Blood from Arm, Left Updated:  07/01/20 1107     Troponin I <0 02 ng/mL     CBC and differential [[id number]]  (Abnormal) Collected:  07/01/20 1035    Lab Status:  Final result Specimen:  Blood from Arm, Left Updated:  07/01/20 1049     WBC 2 69 Thousand/uL      RBC 2 86 Million/uL      Hemoglobin 9 2 g/dL      Hematocrit 31 4 %       fL      MCH 32 2 pg      MCHC 29 3 g/dL      RDW 14 2 %      MPV 9 0 fL      Platelets 313 Thousands/uL      nRBC 0 /100 WBCs      Neutrophils Relative 40 %      Immat GRANS % 1 %      Lymphocytes Relative 52 %      Monocytes Relative 5 %      Eosinophils Relative 2 %      Basophils Relative 0 %      Neutrophils Absolute 1 07 Thousands/µL      Immature Grans Absolute 0 02 Thousand/uL      Lymphocytes Absolute 1 43 Thousands/µL      Monocytes Absolute 0 12 Thousand/µL      Eosinophils Absolute 0 05 Thousand/µL      Basophils Absolute 0 00 Thousands/µL                  X-ray chest 1 view portable   ED Interpretation by Ashlyn Ellis DO (07/01 1129)   No acute cardiopulmonary processes       Final Result by Arun Villegas MD (07/01 1223)      Emphysema    No evidence of acute abnormality in the chest             Workstation performed: [id number]               Procedures  Procedures      ED Course  ED Course as of Jul 04 0637 Wed Jul 01, 2020   1314 Procedure Note: EKG  Date/Time: 07/01/20 1:14 PM   Interpreted by: Supa Kim DO  Indications / Diagnosis: sob  ECG reviewed by me, the ED Physician: yes   The EKG demonstrates:  Rhythm: normal sinus  Intervals: normal intervals  Axis: normal axis  QRS/Blocks: normal QRS  ST Changes: No acute ST Changes, no STD/SAUMYA                  MDM  Number of Diagnoses or Management Options  COPD with acute exacerbation [facility]): new and requires workup  Diagnosis management comments: 33-year-old male with a history of COPD presents emergency department for evaluation  On exam patient has severely diminished breath sounds bilaterally, no wheezes auscultated  He is currently requiring oxygen above his normal daily oxygen requirement  Will treat as COPD exacerbation, will give steroids, magnesium, DuoNeb treatment and reassess  Initially improved on reassessment however becoming gradually more somnolent  VBG reveals PCO2 above patient's baseline  Patient was placed on BiPAP this time  After an hour half on BiPAP patient was weaned to nasal cannula and initially doing well, subsequently being admitted to Medicine  After patient was admitted I was called to the room to re-evaluate the patient  He had an oxygen saturation of 24% on nasal cannula 4 L, with a good waveform on monitor  Called for respiratory and intubation equipment  Increased oxygen via nasal cannula, until a non-rebreather given place  Patient's oxygen saturation gradually improved  He was then placed on BiPAP by respiratory  Medicine then updated level of care to step-down level 1  Amount and/or Complexity of Data Reviewed  Clinical lab tests: ordered and reviewed  Tests in the radiology section of CPT®: ordered and reviewed  Review and summarize past medical records: yes  Independent visualization of images, tracings, or specimens: yes          Disposition  Final diagnoses:   COPD with acute exacerbation ([facility] )     Time reflects when diagnosis was documented in both MDM as applicable and the Disposition within this note     Time User Action Codes Description Comment    7/1/2020  3:10 PM Felicita Lua Add [J44 1] COPD with acute exacerbation [facility])       ED Disposition     ED Disposition Condition Date/Time Comment    Admit Stable Wed Jul 1, 2020  3:10 PM Case was discussed with ALYSSA and the patient's admission status was agreed to be Admission Status: inpatient status to the service of Dr Eryn Meeks           Follow-up Information None         Current Discharge Medication List      CONTINUE these medications which have NOT CHANGED    Details   albuterol (2 5 mg/3 mL) 0 083 % nebulizer solution INHALE THE CONTENTS OF 1 VIAL VIA NEBULIZER EVERY 6 HOURS AS NEEDED FOR WHEEZING OR SHORTNESS OF BREATH  Qty: 375 mL, Refills: 5    Associated Diagnoses: Chronic obstructive pulmonary disease, unspecified COPD type ([facility])      albuterol (ProAir HFA) 90 mcg/act inhaler Inhale 2 puffs every 6 (six) hours as needed for wheezing  Qty: 8 5 g, Refills: 5    Comments: Substitution to a formulary equivalent within the same pharmaceutical class is authorized  Associated Diagnoses: Chronic obstructive pulmonary disease, unspecified COPD type ([facility])      budesonide (PULMICORT) 0 5 mg/2 mL nebulizer solution TAKE 1 VIAL (0 5 MG TOTAL) BY NEBULIZATION 2 (TWO) TIMES A DAY RINSE MOUTH AFTER USE    Qty: 120 mL, Refills: 5    Associated Diagnoses: COPD (chronic obstructive pulmonary disease) ([facility])      citalopram (CeleXA) 20 mg tablet Take 1 tablet (20 mg total) by mouth daily  Qty: 90 tablet, Refills: 3    Associated Diagnoses: Depression, unspecified depression type      diltiazem (CARDIZEM CD) 180 mg 24 hr capsule TAKE 1 CAPSULE BY MOUTH EVERY DAY  Qty: 30 capsule, Refills: 5    Associated Diagnoses: Paroxysmal atrial tachycardia ([facility])      ferrous sulfate 325 (65 Fe) mg tablet Take 325 mg by mouth daily      folic acid (FOLVITE) 1 mg tablet TAKE 1 TABLET BY MOUTH EVERY DAY  Qty: 30 tablet, Refills: 5    Associated Diagnoses: Anemia, unspecified type      furosemide (LASIX) 20 mg tablet TAKE 1/2 TABLET BY MOUTH DAILY  Qty: 30 tablet, Refills: 5    Associated Diagnoses: Chronic respiratory failure, unspecified whether with hypoxia or hypercapnia ([facility])      ipratropium (ATROVENT) 0 02 % nebulizer solution TAKE 1 VIAL (0 5 MG TOTAL) BY NEBULIZATION 4 (FOUR) TIMES A DAY  Qty: 312 5 mL, Refills: 5    Associated Diagnoses: COPD (chronic obstructive pulmonary disease) ([facility] )      melatonin 3 mg Take 2 tablets by mouth daily at bedtime as needed (sleep)  Qty: 30 tablet, Refills: 0      potassium chloride (K-DUR,KLOR-CON) 20 mEq tablet TAKE 1 TABLET BY MOUTH EVERY DAY  Qty: 30 tablet, Refills: 5    Associated Diagnoses: Hypokalemia      methylPREDNISolone 4 MG tablet therapy pack Use as directed on package  Qty: 21 each, Refills: 0    Associated Diagnoses: Chronic respiratory failure with hypoxia ([facility] )           No discharge procedures on file  PDMP Review     None           ED Provider  Attending physically available and evaluated [facility]  I managed the patient along with the ED Attending      Electronically Signed by         Valerie Jacques DO  07/04/20 0819

## 2020-07-03 NOTE — PROGRESS NOTES
Progress Note - Yane Nava 1950, 65 y o  male MRN: [id number]    Unit/Bed#: [facility] 509-01 Encounter: [id number]    Primary Care Provider: Portia Lorenzo MD   Date and time admitted to hospital: 7/1/2020 10:17 AM        * Acute exacerbation of chronic obstructive pulmonary disease (COPD) ([facility] [address] [address] )  Assessment & Plan  pt with end stage COPD, FEV1/FVC 30%  initially  on BiPAP - now back to his baseline O2 of 4 lt nc  Pt reports no change compared to yesterday  Still short of breath with minimal movements  Continue Solu-Medrol 40 mg q 12h  Xopenex, Atrovent, Pulmicort  Acute on chronic respiratory failure with hypercapnia (HCC)  Assessment & Plan  In setting of COPD  Patient now to his baseline oxygen requirements  Continue HS BiPAP  Moderate protein-calorie malnutrition ([facility] )  Assessment & Plan  Malnutrition Findings:   Malnutrition type: Chronic illness  Degree of Malnutrition: Malnutrition of moderate degree(malnutrition related to COPD as evidenced by mild depletion of orbital body fat and mild depletion of muscle mass seen at clavicle to be treated with regular diet and nutrition supplements )    BMI Findings: Body mass index is 19 41 kg/m²  Leukopenia  Assessment & Plan  - has leukopenia @ baseline, worsening  - WBC < 4 @ 2 69, HR: 96, RR: 34, meets SIRS  - CXR w/o acute abnormality; no source of infection  Improving today  Continue monitor  If worsens will consult Heme-Onc        VTE Pharmacologic Prophylaxis:   Pharmacologic: Enoxaparin (Lovenox)  Mechanical VTE Prophylaxis in Place: Yes    Patient Centered Rounds: I have performed bedside rounds with nursing staff today  Discussions with Specialists or Other Care Team Provider: pulm    Education and Discussions with Family / Patient: pt  He said he will update his family    Time Spent for Care: 30 minutes  More than 50% of total time spent on counseling and coordination of care as described above      Current Length of Stay: 2 day(s)    Current Patient Status: Inpatient   Certification Statement: The patient will continue to require additional inpatient hospital stay due to above    Discharge Plan: in next 1-2 days    Code Status: Level 2 - DNAR: but accepts endotracheal intubation      Subjective:   Pt seen and examined by me this morning  Pt reports no change compared to yesterday  Still feels short of breath with minimal movements  Also conversation patient visibly short of breath at the end of the sentences  Objective:     Vitals:   Temp (24hrs), Av °F (36 7 °C), Min:97 4 °F (36 3 °C), Max:98 4 °F (36 9 °C)    Temp:  [97 4 °F (36 3 °C)-98 4 °F (36 9 °C)] 98 °F (36 7 °C)  HR:  [76-87] 79  Resp:  [23-30] 23  BP: (105-136)/(53-65) 113/56  SpO2:  [95 %-100 %] 99 %  Body mass index is 19 41 kg/m²  Input and Output Summary (last 24 hours): Intake/Output Summary (Last 24 hours) at 7/3/2020 1321  Last data filed at 7/3/2020 1249  Gross per 24 hour   Intake 880 ml   Output 960 ml   Net -80 ml       Physical Exam:     Physical Exam     Constitutional: Pt appears comfortable  Not in any acute distress  HENT:   Head: Normocephalic and atraumatic  Eyes: EOM are normal    Neck: Neck supple  Cardiovascular: Normal rate, regular rhythm, normal heart sounds  No murmur heard  Pulmonary/Chest: Effort normal, significantly decreased air entry b/l  No respiratory distress  Pt has no wheezes or crackles  Abdominal: Soft  Non-distended, Non-tender  Bowel sounds are normal    Musculoskeletal: Normal range of motion  Neurological: awake, alert  Moving all extremities spontaneously  Psychiatric: normal mood and affect        Additional Data:     Labs:    Results from last 7 days   Lab Units 20  1119   WBC Thousand/uL 2 50*   HEMOGLOBIN g/dL 8 3*   HEMATOCRIT % 27 5*   PLATELETS Thousands/uL 133*   NEUTROS PCT % 85*   LYMPHS PCT % 10*   MONOS PCT % 5   EOS PCT % 0     Results from last 7 days   Lab Units 20  0536 SODIUM mmol/L 140   POTASSIUM mmol/L 4 5   CHLORIDE mmol/L 98*   CO2 mmol/L 43*   BUN mg/dL 17   CREATININE mg/dL 0 48*   ANION GAP mmol/L -1*   CALCIUM mg/dL 9 6   GLUCOSE RANDOM mg/dL 104                 Results from last 7 days   Lab Units 07/02/20  0536 07/01/20  2117   PROCALCITONIN ng/ml <0 05 <0 05           * I Have Reviewed All Lab Data Listed Above  * Additional Pertinent [facility]    Imaging:    Imaging Reports Reviewed Today Include:   Imaging Personally Reviewed by Myself Includes:     Recent Cultures (last 7 days):           Last 24 Hours Medication List:     Current Facility-Administered Medications:  budesonide 0 5 mg Nebulization Q12H Quan Ahmadi DO   citalopram 20 mg Oral Daily Verito Cee MD   diltiazem 180 mg Oral Daily Verito Cee MD   enoxaparin 40 mg Subcutaneous Daily Verito Cee MD   ferrous sulfate 325 mg Oral Daily Verito Cee MD   folic acid 5,197 mcg Oral Daily Verito Cee MD   furosemide 10 mg Oral Daily Verito Cee MD   ipratropium 0 5 mg Nebulization Q6H Guzman Culver MD   levalbuterol 1 25 mg Nebulization Q6H Guzman Culver MD   melatonin 6 mg Oral HS PRN Verito Cee MD   methylPREDNISolone sodium succinate 40 mg Intravenous Q12H Arnold Han MD   pantoprazole 40 mg Oral Early Morning Kalie Zaragoza PA-C   potassium chloride 20 mEq Oral Daily Verito Cee MD   sodium chloride (PF) 3 mL Intravenous Q1H PRN Verito Cee MD        Today, Patient Was Seen By: Jose Joyce MD    ** Please Note: Dictation voice to text software may have been used in the creation of this document   **

## 2020-07-03 NOTE — ASSESSMENT & PLAN NOTE
- has leukopenia @ baseline, worsening  - WBC < 4 @ 2 69, HR: 96, RR: 34, meets SIRS  - CXR w/o acute abnormality; no source of infection  Improving today  Continue monitor    If worsens will consult Heme-Onc

## 2020-07-03 NOTE — ASSESSMENT & PLAN NOTE
Malnutrition Findings:   Malnutrition type: Chronic illness  Degree of Malnutrition: Malnutrition of moderate degree(malnutrition related to COPD as evidenced by mild depletion of orbital body fat and mild depletion of muscle mass seen at clavicle to be treated with regular diet and nutrition supplements )    BMI Findings: Body mass index is 19 41 kg/m²

## 2020-07-03 NOTE — QUICK NOTE
Notified by RN patient refusing bipap HS  Spoke with him at bedside  Appears competent  Explained importance and reasoning of bipap, ultimately agreeable to it  Took it off shortly after, refusing further use tonight - says he will use bipap during the day and NC the remainder of night

## 2020-07-03 NOTE — ASSESSMENT & PLAN NOTE
pt with end stage COPD, FEV1/FVC 30%  initially  on BiPAP - now back to his baseline O2 of 4 lt nc  Pt reports no change compared to yesterday  Still short of breath with minimal movements  Continue Solu-Medrol 40 mg q 12h  Xopenex, Atrovent, Pulmicort

## 2020-07-03 NOTE — PROGRESS NOTES
Progress Note - Pulmonary   Mikey Villatoro 65 y o  male MRN: [id number]  Unit/Bed#: [facility] 509-01 Encounter: [id number]      Assessment and Plan:  Acute on chronic hypoxic and hypercapnic respiratory failure  Severe COPD in exacerbation  Severe tobacco dependence in early remission-smoked 1 pack per day x 50 years, quit in 2011  Hypertension  GERD  Paroxysmal atrial fibrillation    - Continue oxygen for pulse ox 88-92%  Keep on 4LPM   - continue BiPAP during the day as needed and overnight  Patient would benefit from home Trilogy  We will try to arrange at discharge  - continue current steroid dosing  Hope to wean tomorrow  - Continue nebulized bronchodilators as ordered  - Increase activity as able  He would benefit from outpatient pulmonary rehab  - consider endobronchial valves, lung transplant workup, etc   Pt will discuss with Dr Andre Montilla  Discussed with patient in detail  Concerns addressed  Subjective:   Found sitting at the side of the bed eating breakfast   Saturation 82% on 2 L  Patient feels short of breath  States his breathing is mildly improved but is not close to baseline  Denies other complaints  Objective:   Afebrile    Vitals: Blood pressure 134/63, pulse 82, temperature 98 1 °F (36 7 °C), temperature source Oral, resp  rate (!) 24, height 5' 7" (1 702 m), weight 56 2 kg (123 lb 14 4 oz), SpO2 99 %  , 4LPM, Body mass index is 19 41 kg/m²  Intake/Output Summary (Last 24 hours) at 7/3/2020 0902  Last data filed at 7/3/2020 0732  Gross per 24 hour   Intake 600 ml   Output 960 ml   Net -360 ml       Physical Exam  GEN: Frail, thin, mild breathlessness; HEENT: NCAT, EOMI  CVS: Regular  LUNGS: Diminished b/l b s  ABD: soft, nd  EXT: No c/c/e  NEURO: No focal deficits  MS: Moving all extremities  SKIN: warm, dry  PSYCH: calm, cooperative      Labs: I have personally reviewed pertinent lab results    Results from last 7 days   Lab Units 07/02/20  0536 07/01/20 2117 07/01/20  4350 07/01/20  1035   WBC Thousand/uL 1 79*  --   --  2 69*   HEMOGLOBIN g/dL 8 3*  --   --  9 2*   I STAT HEMOGLOBIN g/dl  --   --  10 5*  --    HEMATOCRIT % 27 2*  --   --  31 4*   HEMATOCRIT, ISTAT %  --   --  31*  --    PLATELETS Thousands/uL 160 128*  --  127*   NEUTROS PCT % 62  --   --  40*   MONOS PCT % 5  --   --  5      Results from last 7 days   Lab Units 07/02/20  0536 07/01/20  1906 07/01/20  1035   POTASSIUM mmol/L 4 5  --  4 1   CHLORIDE mmol/L 98*  --  96*   CO2 mmol/L 43*  --  >45*   CO2, I-STAT mmol/L  --  >45*  --    BUN mg/dL 17  --  13   CREATININE mg/dL 0 48*  --  0 57*   CALCIUM mg/dL 9 6  --  9 7   GLUCOSE, ISTAT mg/dl  --  249*  --                       0   Lab Value Date/Time    TROPONINI <0 02 07/01/2020 1035    TROPONINI <0 02 12/26/2019 1337    TROPONINI 0 02 11/22/2019 0046    TROPONINI <0 02 02/21/2019 0943    TROPONINI <0 02 08/27/2017 0652    TROPONINI <0 04 06/02/2015 0326    TROPONINI <0 04 06/01/2015 1949    TROPONINI <0 04 05/14/2015 0600       Imaging and other studies: I have personally reviewed pertinent films in PACS  Chest x-ray reviewed by me personally shows emphysema, no acute findings  [facility]

## 2020-07-04 LAB
BASOPHILS # BLD AUTO: 0 THOUSANDS/ΜL (ref 0–0.1)
BASOPHILS NFR BLD AUTO: 0 % (ref 0–1)
EOSINOPHIL # BLD AUTO: 0 THOUSAND/ΜL (ref 0–0.61)
EOSINOPHIL NFR BLD AUTO: 0 % (ref 0–6)
ERYTHROCYTE [DISTWIDTH] IN BLOOD BY AUTOMATED COUNT: 14.2 % (ref 11.6–15.1)
HCT VFR BLD AUTO: 25.7 % (ref 36.5–49.3)
HGB BLD-MCNC: 7.8 G/DL (ref 12–17)
IMM GRANULOCYTES # BLD AUTO: 0.02 THOUSAND/UL (ref 0–0.2)
IMM GRANULOCYTES NFR BLD AUTO: 1 % (ref 0–2)
LYMPHOCYTES # BLD AUTO: 0.25 THOUSANDS/ΜL (ref 0.6–4.47)
LYMPHOCYTES NFR BLD AUTO: 12 % (ref 14–44)
MCH RBC QN AUTO: 32.8 PG (ref 26.8–34.3)
MCHC RBC AUTO-ENTMCNC: 30.4 G/DL (ref 31.4–37.4)
MCV RBC AUTO: 108 FL (ref 82–98)
MONOCYTES # BLD AUTO: 0.06 THOUSAND/ΜL (ref 0.17–1.22)
MONOCYTES NFR BLD AUTO: 3 % (ref 4–12)
NEUTROPHILS # BLD AUTO: 1.84 THOUSANDS/ΜL (ref 1.85–7.62)
NEUTS SEG NFR BLD AUTO: 84 % (ref 43–75)
NRBC BLD AUTO-RTO: 0 /100 WBCS
PLATELET # BLD AUTO: 136 THOUSANDS/UL (ref 149–390)
PMV BLD AUTO: 9.6 FL (ref 8.9–12.7)
RBC # BLD AUTO: 2.38 MILLION/UL (ref 3.88–5.62)
WBC # BLD AUTO: 2.17 THOUSAND/UL (ref 4.31–10.16)

## 2020-07-04 PROCEDURE — 94760 N-INVAS EAR/PLS OXIMETRY 1: CPT

## 2020-07-04 PROCEDURE — 94664 DEMO&/EVAL PT USE INHALER: CPT

## 2020-07-04 PROCEDURE — 99232 SBSQ HOSP IP/OBS MODERATE 35: CPT | Performed by: INTERNAL MEDICINE

## 2020-07-04 PROCEDURE — 94640 AIRWAY INHALATION TREATMENT: CPT

## 2020-07-04 PROCEDURE — 85025 COMPLETE CBC W/AUTO DIFF WBC: CPT | Performed by: INTERNAL MEDICINE

## 2020-07-04 RX ADMIN — PANTOPRAZOLE SODIUM 40 MG: 40 TABLET, DELAYED RELEASE ORAL at 05:28

## 2020-07-04 RX ADMIN — LEVALBUTEROL HYDROCHLORIDE 1.25 MG: 1.25 SOLUTION, CONCENTRATE RESPIRATORY (INHALATION) at 13:25

## 2020-07-04 RX ADMIN — POTASSIUM CHLORIDE 20 MEQ: 1500 TABLET, EXTENDED RELEASE ORAL at 09:14

## 2020-07-04 RX ADMIN — FUROSEMIDE 10 MG: 20 TABLET ORAL at 09:15

## 2020-07-04 RX ADMIN — CITALOPRAM HYDROBROMIDE 20 MG: 20 TABLET ORAL at 09:15

## 2020-07-04 RX ADMIN — LEVALBUTEROL HYDROCHLORIDE 1.25 MG: 1.25 SOLUTION, CONCENTRATE RESPIRATORY (INHALATION) at 07:46

## 2020-07-04 RX ADMIN — IPRATROPIUM BROMIDE 0.5 MG: 0.5 SOLUTION RESPIRATORY (INHALATION) at 07:46

## 2020-07-04 RX ADMIN — FERROUS SULFATE TAB 325 MG (65 MG ELEMENTAL FE) 325 MG: 325 (65 FE) TAB at 09:14

## 2020-07-04 RX ADMIN — IPRATROPIUM BROMIDE 0.5 MG: 0.5 SOLUTION RESPIRATORY (INHALATION) at 00:35

## 2020-07-04 RX ADMIN — BUDESONIDE 0.5 MG: 0.5 INHALANT RESPIRATORY (INHALATION) at 07:46

## 2020-07-04 RX ADMIN — DILTIAZEM HYDROCHLORIDE 180 MG: 180 CAPSULE, COATED, EXTENDED RELEASE ORAL at 09:14

## 2020-07-04 RX ADMIN — IPRATROPIUM BROMIDE 0.5 MG: 0.5 SOLUTION RESPIRATORY (INHALATION) at 13:25

## 2020-07-04 RX ADMIN — METHYLPREDNISOLONE SODIUM SUCCINATE 40 MG: 40 INJECTION, POWDER, FOR SOLUTION INTRAMUSCULAR; INTRAVENOUS at 09:13

## 2020-07-04 RX ADMIN — LEVALBUTEROL HYDROCHLORIDE 1.25 MG: 1.25 SOLUTION, CONCENTRATE RESPIRATORY (INHALATION) at 00:34

## 2020-07-04 RX ADMIN — IPRATROPIUM BROMIDE 0.5 MG: 0.5 SOLUTION RESPIRATORY (INHALATION) at 19:14

## 2020-07-04 RX ADMIN — FOLIC ACID 1000 MCG: 1 TABLET ORAL at 09:14

## 2020-07-04 RX ADMIN — BUDESONIDE 0.5 MG: 0.5 INHALANT RESPIRATORY (INHALATION) at 19:14

## 2020-07-04 RX ADMIN — LEVALBUTEROL HYDROCHLORIDE 1.25 MG: 1.25 SOLUTION, CONCENTRATE RESPIRATORY (INHALATION) at 19:14

## 2020-07-04 RX ADMIN — ENOXAPARIN SODIUM 40 MG: 40 INJECTION SUBCUTANEOUS at 09:14

## 2020-07-04 RX ADMIN — METHYLPREDNISOLONE SODIUM SUCCINATE 40 MG: 40 INJECTION, POWDER, FOR SOLUTION INTRAMUSCULAR; INTRAVENOUS at 22:12

## 2020-07-04 NOTE — PLAN OF CARE
Problem: Potential for Falls  Goal: Patient will remain free of falls  Description  INTERVENTIONS:  - Assess patient frequently for physical needs  -  Identify cognitive and physical deficits and behaviors that affect risk of falls    -  [place] fall precautions as indicated by assessment   - Educate patient/family on patient safety including physical limitations  - Instruct patient to call for assistance with activity based on assessment  - Modify environment to reduce risk of injury  - Consider OT/PT consult to assist with strengthening/mobility  Outcome: Progressing     Problem: PAIN - ADULT  Goal: Verbalizes/displays adequate comfort level or baseline comfort level  Description  Interventions:  - Encourage patient to monitor pain and request assistance  - Assess pain using appropriate pain scale  - Administer analgesics based on type and severity of pain and evaluate response  - Implement non-pharmacological measures as appropriate and evaluate response  - Consider cultural and social influences on pain and pain management  - Notify physician/advanced practitioner if interventions unsuccessful or patient reports new pain  Outcome: Progressing     Problem: INFECTION - ADULT  Goal: Absence or prevention of progression during hospitalization  Description  INTERVENTIONS:  - Assess and monitor for signs and symptoms of infection  - Monitor lab/diagnostic results  - Monitor all insertion sites, i e  indwelling lines, tubes, and drains  - Monitor endotracheal if appropriate and nasal secretions for changes in amount and color  - [place] appropriate cooling/warming therapies per order  - Administer medications as ordered  - Instruct and encourage patient and family to use good hand hygiene technique  - Identify and instruct in appropriate isolation precautions for identified infection/condition  Outcome: Progressing  Goal: Absence of fever/infection during neutropenic period  Description  INTERVENTIONS:  - Monitor WBC    Outcome: Progressing     Problem: SAFETY ADULT  Goal: Maintain or return to baseline ADL function  Description  INTERVENTIONS:  -  Assess patient's ability to carry out ADLs; assess patient's baseline for ADL function and identify physical deficits which impact ability to perform ADLs (bathing, care of mouth/teeth, toileting, grooming, dressing, etc )  - Assess/evaluate cause of self-care deficits   - Assess range of motion  - Assess patient's mobility; develop plan if impaired  - Assess patient's need for assistive devices and provide as appropriate  - Encourage maximum independence but intervene and supervise when necessary  - Involve family in performance of ADLs  - Assess for home care needs following discharge   - Consider OT consult to assist with ADL evaluation and planning for discharge  - Provide patient education as appropriate  Outcome: Progressing  Goal: Maintain or return mobility status to optimal level  Description  INTERVENTIONS:  - Assess patient's baseline mobility status (ambulation, transfers, stairs, etc )    - Identify cognitive and physical deficits and behaviors that affect mobility  - Identify mobility aids required to assist with transfers and/or ambulation (gait belt, sit-to-stand, lift, walker, cane, etc )  - [place] fall precautions as indicated by assessment  - Record patient progress and toleration of activity level on Mobility SBAR; progress patient to next Phase/Stage  - Instruct patient to call for assistance with activity based on assessment  - Consider rehabilitation consult to assist with strengthening/weightbearing, etc   Outcome: Progressing     Problem: DISCHARGE PLANNING  Goal: Discharge to home or other facility with appropriate resources  Description  INTERVENTIONS:  - Identify barriers to discharge w/patient and caregiver  - Arrange for needed discharge resources and transportation as appropriate  - Identify discharge learning needs (meds, wound care, etc )  - Arrange for interpretive services to assist at discharge as needed  - Refer to Case Management Department for coordinating discharge planning if the patient needs post-hospital services based on physician/advanced practitioner order or complex needs related to functional status, cognitive ability, or social support system  Outcome: Progressing     Problem: Knowledge Deficit  Goal: Patient/family/caregiver demonstrates understanding of disease process, treatment plan, medications, and discharge instructions  Description  Complete learning assessment and assess knowledge base    Interventions:  - Provide teaching at level of understanding  - Provide teaching via preferred learning methods  Outcome: Progressing     Problem: CARDIOVASCULAR - ADULT  Goal: Maintains optimal cardiac output and hemodynamic stability  Description  INTERVENTIONS:  - Monitor I/O, vital signs and rhythm  - Monitor for S/S and trends of decreased cardiac output  - Administer and titrate ordered vasoactive medications to optimize hemodynamic stability  - Assess quality of pulses, skin color and temperature  - Assess for signs of decreased coronary artery perfusion  - Instruct patient to report change in severity of symptoms  Outcome: Progressing  Goal: Absence of cardiac dysrhythmias or at baseline rhythm  Description  INTERVENTIONS:  - Continuous cardiac monitoring, vital signs, obtain 12 lead EKG if ordered  - Administer antiarrhythmic and heart rate control medications as ordered  - Monitor electrolytes and administer replacement therapy as ordered  Outcome: Progressing     Problem: RESPIRATORY - ADULT  Goal: Achieves optimal ventilation and oxygenation  Description  INTERVENTIONS:  - Assess for changes in respiratory status  - Assess for changes in mentation and behavior  - Position to facilitate oxygenation and minimize respiratory effort  - Oxygen administered by appropriate delivery if ordered  - Initiate smoking cessation education as indicated  - Encourage broncho-pulmonary hygiene including cough, deep breathe, Incentive Spirometry  - Assess the need for suctioning and aspirate as needed  - Assess and instruct to report SOB or any respiratory difficulty  - Respiratory Therapy support as indicated  Outcome: Progressing     Problem: Nutrition/Hydration-ADULT  Goal: Nutrient/Hydration intake appropriate for improving, restoring or maintaining nutritional needs  Description  Monitor and assess patient's nutrition/hydration status for malnutrition  Collaborate with interdisciplinary team and initiate plan and interventions as ordered  Monitor patient's weight and dietary intake as ordered or per policy  Utilize nutrition screening tool and intervene as necessary  Determine patient's food preferences and provide high-protein, high-caloric foods as appropriate       INTERVENTIONS:  - Monitor oral intake, urinary output, labs, and treatment plans  - Assess nutrition and hydration status and recommend course of action  - Evaluate amount of meals eaten  - Assist patient with eating if necessary   - Allow adequate time for meals  - Recommend/ encourage appropriate diets, oral nutritional supplements, and vitamin/mineral supplements  - Order, calculate, and assess calorie counts as needed  - Recommend, monitor, and adjust tube feedings and TPN/PPN based on assessed needs  - Assess need for intravenous fluids  - Provide specific nutrition/hydration education as appropriate  - Include patient/family/caregiver in decisions related to nutrition  Outcome: Progressing

## 2020-07-04 NOTE — ASSESSMENT & PLAN NOTE
Malnutrition Findings:   Malnutrition type: Chronic illness  Degree of Malnutrition: Malnutrition of moderate degree(malnutrition related to COPD as evidenced by mild depletion of orbital body fat and mild depletion of muscle mass seen at clavicle to be treated with regular diet and nutrition supplements )    BMI Findings: Body mass index is 19 37 kg/m²

## 2020-07-04 NOTE — PROGRESS NOTES
Progress Note - Pulmonary   Thomas Garcia 65 y o  male MRN: [id number]  Unit/Bed#: [facility] 509-01 Encounter: [id number]      Assessment and Plan:  Acute on chronic hypoxic and hypercapnic respiratory failure  Severe COPD in exacerbation  Severe tobacco dependence in early remission-smoked 1 pack per day x 50 years, quit in 2011  Hypertension  GERD    - continue oxygen at 4 liters/minute for goal pulse ox 88-92%  - Continue BiPAP as needed during the day and overnight  He would benefit from home trilogy  - lower steroids  At discharge, start prednisone 40 mg daily and wean by 10 mg every 3 days until off    - continue nebulized bronchodilators  - continue activity as able  - outpatient follow-up with Dr Isela Major to address other options for his advanced lung disease  Discussed with patient in detail  Concerns addressed  Subjective:   Found lying in bed  Feels slightly improved today  Does not feel ready for discharge  States breathing is not back to baseline  Objective:   Afebrile    Vitals: Blood pressure 136/62, pulse 84, temperature 98 °F (36 7 °C), temperature source Oral, resp  rate 16, height 5' 7" (1 702 m), weight 56 1 kg (123 lb 10 9 oz), SpO2 93 %  , 4, Body mass index is 19 37 kg/m²  Intake/Output Summary (Last 24 hours) at 7/4/2020 0915  Last data filed at 7/4/2020 0901  Gross per 24 hour   Intake 520 ml   Output 1750 ml   Net -1230 ml         Physical Exam  GEN: thin, well developed, no distress, no accessory muscle use  HEENT: NCAT, EOMI, MMM; neck supple  CVS:  Regular  I do not hear murmurs rubs or gallops  LUNGS:  Diminished, but improved  Moving air, no wheezing  ABD: soft, not distended, not tender  EXT: No c/c/e  NEURO: No focal deficits  MS: Moving all extremities  SKIN: warm, dry  PSYCH: calm, cooperative      Labs: I have personally reviewed pertinent lab results    Results from last 7 days   Lab Units 07/04/20  0530 07/03/20  1119 07/02/20  0536   WBC Thousand/uL 2 17* 2 50* 1 79*   HEMOGLOBIN g/dL 7 8* 8 3* 8 3*   HEMATOCRIT % 25 7* 27 5* 27 2*   PLATELETS Thousands/uL 136* 133* 160   NEUTROS PCT % 84* 85* 62   MONOS PCT % 3* 5 5      Results from last 7 days   Lab Units 07/02/20  0536 07/01/20  1906 07/01/20  1035   POTASSIUM mmol/L 4 5  --  4 1   CHLORIDE mmol/L 98*  --  96*   CO2 mmol/L 43*  --  >45*   CO2, I-STAT mmol/L  --  >45*  --    BUN mg/dL 17  --  13   CREATININE mg/dL 0 48*  --  0 57*   CALCIUM mg/dL 9 6  --  9 7   GLUCOSE, ISTAT mg/dl  --  249*  --                       0   Lab Value Date/Time    TROPONINI <0 02 07/01/2020 1035    TROPONINI <0 02 12/26/2019 1337    TROPONINI 0 02 11/22/2019 0046    TROPONINI <0 02 02/21/2019 0943    TROPONINI <0 02 08/27/2017 0652    TROPONINI <0 04 06/02/2015 0326    TROPONINI <0 04 06/01/2015 1949    TROPONINI <0 04 05/14/2015 0600       [facility]

## 2020-07-04 NOTE — PROGRESS NOTES
Progress Note - Jay Mirza 1950, 65 y o  male MRN: [id number]    Unit/Bed#: [facility] 509-01 Encounter: [id number]    Primary Care Provider: Yesenia Cardenas MD   Date and time admitted to hospital: 7/1/2020 10:17 AM        * Acute exacerbation of chronic obstructive pulmonary disease (COPD) ([facility] [address] )  Assessment & Plan  pt with end stage COPD, FEV1/FVC 30%  initially  on BiPAP - now back to his baseline O2 of 4 lt nc  Pt reports no change compared to yesterday  Still short of breath with minimal movements  Continue Solu-Medrol 40 mg q 12h  Xopenex, Atrovent, Pulmicort  Acute on chronic respiratory failure with hypercapnia (HCC)  Assessment & Plan  In setting of COPD  Patient now to his baseline oxygen requirements  Continue HS BiPAP  Moderate protein[facility]cal[facility]  Assessment & Plan  Malnutrition Findings:   Malnutrition type: Chronic illness  Degree of Malnutrition: Malnutrition of moderate degree(malnutrition related to COPD as evidenced by mild depletion of orbital body fat and mild depletion of muscle mass seen at clavicle to be treated with regular diet and nutrition supplements )    BMI Findings: Body mass index is 19 37 kg/m²  Leukopenia  Assessment & Plan  - has leukopenia @ baseline, worsening  - WBC < 4 @ 2 69, HR: 96, RR: 34, meets SIRS  - CXR w/o acute abnormality; no source of infection  Improving today  Continue monitor  If worsens will consult Heme-Onc        VTE Pharmacologic Prophylaxis:   Pharmacologic: Enoxaparin (Lovenox)  Mechanical VTE Prophylaxis in Place: Yes    Patient Centered Rounds: I have performed bedside rounds with nursing staff today  Discussions with Specialists or Other Care Team Provider: pulm    Education and Discussions with Family / Patient: pt    Time Spent for Care: 30 minutes  More than 50% of total time spent on counseling and coordination of care as described above      Current Length of Stay: 3 day(s)    Current Patient Status: Inpatient   Certification Statement: The patient will continue to require additional inpatient hospital stay due to above    Discharge Plan: in next 1-2 days    Code Status: Level 2 - DNAR: but accepts endotracheal intubation      Subjective:   Pt seen and examined by me this morning  Pt denies any significant improvement in his symptoms  Still feel short of breath with minimal movements  Yesterday he said he walked around the hallway but was short of breath and dizzy a lot times  Objective:     Vitals:   Temp (24hrs), Av 1 °F (36 7 °C), Min:98 °F (36 7 °C), Max:98 4 °F (36 9 °C)    Temp:  [98 °F (36 7 °C)-98 4 °F (36 9 °C)] 98 °F (36 7 °C)  HR:  [58-91] 84  Resp:  [16-18] 16  BP: (117-136)/(54-62) 136/62  SpO2:  [93 %-98 %] 93 %  Body mass index is 19 37 kg/m²  Input and Output Summary (last 24 hours): Intake/Output Summary (Last 24 hours) at 2020 1309  Last data filed at 2020 1202  Gross per 24 hour   Intake 420 ml   Output 1575 ml   Net -1155 ml       Physical Exam:     Physical Exam    Constitutional: Pt appears comfortable  Not in any acute distress  HENT:   Head: Normocephalic and atraumatic  Eyes: EOM are normal    Neck: Neck supple  Cardiovascular: Normal rate, regular rhythm, normal heart sounds  No murmur heard  Pulmonary/Chest: Effort normal, significantly decreased air entry b/l  No respiratory distress  Pt has no wheezes or crackles  Abdominal: Soft  Non-distended, Non-tender  Bowel sounds are normal    Musculoskeletal: Normal range of motion  Neurological: awake, alert  Moving all extremities spontaneously  Psychiatric: normal mood and affect        Additional Data:     Labs:    Results from last 7 days   Lab Units 20  0530   WBC Thousand/uL 2 17*   HEMOGLOBIN g/dL 7 8*   HEMATOCRIT % 25 7*   PLATELETS Thousands/uL 136*   NEUTROS PCT % 84*   LYMPHS PCT % 12*   MONOS PCT % 3*   EOS PCT % 0     Results from last 7 days   Lab Units 20  0536   SODIUM mmol/L 140   POTASSIUM mmol/L 4 5   CHLORIDE mmol/L 98*   CO2 mmol/L 43*   BUN mg/dL 17   CREATININE mg/dL 0 48*   ANION GAP mmol/L -1*   CALCIUM mg/dL 9 6   GLUCOSE RANDOM mg/dL 104                 Results from last 7 days   Lab Units 07/02/20  0536 07/01/20  2117   PROCALCITONIN ng/ml <0 05 <0 05           * I Have Reviewed All Lab Data Listed Above  * Additional Pertinent Lab Tests Reviewed: [facility] Admission Reviewed    Imaging:    Imaging Reports Reviewed Today Include:   Imaging Personally Reviewed by Myself Includes:     Recent Cultures (last 7 days):           Last 24 Hours Medication List:     Current Facility-Administered Medications:  budesonide 0 5 mg Nebulization Q12H Quan Ahmadi DO   citalopram 20 mg Oral Daily Teresa Rivera MD   diltiazem 180 mg Oral Daily Teresa Rivera MD   enoxaparin 40 mg Subcutaneous Daily Teresa Rivera MD   ferrous sulfate 325 mg Oral Daily Teresa Rivera MD   folic acid 1,550 mcg Oral Daily Teresa Rivera MD   furosemide 10 mg Oral Daily Teresa Rivera MD   ipratropium 0 5 mg Nebulization Q6H Wagner Lorenzo MD   levalbuterol 1 25 mg Nebulization Q6H Wagner Lorenzo MD   melatonin 6 mg Oral HS PRN Teresa Rivera MD   methylPREDNISolone sodium succinate 40 mg Intravenous Q12H Dimple King MD   pantoprazole 40 mg Oral Early Morning Adali White, PA-C   potassium chloride 20 mEq Oral Daily Teresa Rivera MD   sodium chloride (PF) 3 mL Intravenous Q1H PRN Teresa Rivera MD        Today, Patient Was Seen By: Melia Graves MD    ** Please Note: Dictation voice to text software may have been used in the creation of this document   **

## 2020-07-05 LAB
BASOPHILS # BLD AUTO: 0 THOUSANDS/ΜL (ref 0–0.1)
BASOPHILS NFR BLD AUTO: 0 % (ref 0–1)
EOSINOPHIL # BLD AUTO: 0 THOUSAND/ΜL (ref 0–0.61)
EOSINOPHIL NFR BLD AUTO: 0 % (ref 0–6)
ERYTHROCYTE [DISTWIDTH] IN BLOOD BY AUTOMATED COUNT: 14.4 % (ref 11.6–15.1)
HCT VFR BLD AUTO: 27 % (ref 36.5–49.3)
HGB BLD-MCNC: 8.1 G/DL (ref 12–17)
IMM GRANULOCYTES # BLD AUTO: 0.02 THOUSAND/UL (ref 0–0.2)
IMM GRANULOCYTES NFR BLD AUTO: 1 % (ref 0–2)
LYMPHOCYTES # BLD AUTO: 0.22 THOUSANDS/ΜL (ref 0.6–4.47)
LYMPHOCYTES NFR BLD AUTO: 7 % (ref 14–44)
MCH RBC QN AUTO: 32.8 PG (ref 26.8–34.3)
MCHC RBC AUTO-ENTMCNC: 30 G/DL (ref 31.4–37.4)
MCV RBC AUTO: 109 FL (ref 82–98)
MONOCYTES # BLD AUTO: 0.12 THOUSAND/ΜL (ref 0.17–1.22)
MONOCYTES NFR BLD AUTO: 4 % (ref 4–12)
NEUTROPHILS # BLD AUTO: 2.92 THOUSANDS/ΜL (ref 1.85–7.62)
NEUTS SEG NFR BLD AUTO: 88 % (ref 43–75)
NRBC BLD AUTO-RTO: 0 /100 WBCS
PLATELET # BLD AUTO: 133 THOUSANDS/UL (ref 149–390)
PMV BLD AUTO: 9.6 FL (ref 8.9–12.7)
RBC # BLD AUTO: 2.47 MILLION/UL (ref 3.88–5.62)
WBC # BLD AUTO: 3.28 THOUSAND/UL (ref 4.31–10.16)

## 2020-07-05 PROCEDURE — 99232 SBSQ HOSP IP/OBS MODERATE 35: CPT | Performed by: INTERNAL MEDICINE

## 2020-07-05 PROCEDURE — 94760 N-INVAS EAR/PLS OXIMETRY 1: CPT

## 2020-07-05 PROCEDURE — 85025 COMPLETE CBC W/AUTO DIFF WBC: CPT | Performed by: INTERNAL MEDICINE

## 2020-07-05 PROCEDURE — 94640 AIRWAY INHALATION TREATMENT: CPT

## 2020-07-05 RX ORDER — METHYLPREDNISOLONE SODIUM SUCCINATE 40 MG/ML
40 INJECTION, POWDER, LYOPHILIZED, FOR SOLUTION INTRAMUSCULAR; INTRAVENOUS DAILY
Status: DISCONTINUED | OUTPATIENT
Start: 2020-07-06 | End: 2020-07-07

## 2020-07-05 RX ADMIN — POTASSIUM CHLORIDE 20 MEQ: 1500 TABLET, EXTENDED RELEASE ORAL at 08:43

## 2020-07-05 RX ADMIN — LEVALBUTEROL HYDROCHLORIDE 1.25 MG: 1.25 SOLUTION, CONCENTRATE RESPIRATORY (INHALATION) at 08:04

## 2020-07-05 RX ADMIN — METHYLPREDNISOLONE SODIUM SUCCINATE 40 MG: 40 INJECTION, POWDER, FOR SOLUTION INTRAMUSCULAR; INTRAVENOUS at 08:44

## 2020-07-05 RX ADMIN — PANTOPRAZOLE SODIUM 40 MG: 40 TABLET, DELAYED RELEASE ORAL at 07:00

## 2020-07-05 RX ADMIN — LEVALBUTEROL HYDROCHLORIDE 1.25 MG: 1.25 SOLUTION, CONCENTRATE RESPIRATORY (INHALATION) at 19:27

## 2020-07-05 RX ADMIN — IPRATROPIUM BROMIDE 0.5 MG: 0.5 SOLUTION RESPIRATORY (INHALATION) at 01:24

## 2020-07-05 RX ADMIN — FERROUS SULFATE TAB 325 MG (65 MG ELEMENTAL FE) 325 MG: 325 (65 FE) TAB at 08:43

## 2020-07-05 RX ADMIN — LEVALBUTEROL HYDROCHLORIDE 1.25 MG: 1.25 SOLUTION, CONCENTRATE RESPIRATORY (INHALATION) at 13:38

## 2020-07-05 RX ADMIN — IPRATROPIUM BROMIDE 0.5 MG: 0.5 SOLUTION RESPIRATORY (INHALATION) at 08:04

## 2020-07-05 RX ADMIN — LEVALBUTEROL HYDROCHLORIDE 1.25 MG: 1.25 SOLUTION, CONCENTRATE RESPIRATORY (INHALATION) at 01:24

## 2020-07-05 RX ADMIN — IPRATROPIUM BROMIDE 0.5 MG: 0.5 SOLUTION RESPIRATORY (INHALATION) at 13:38

## 2020-07-05 RX ADMIN — ENOXAPARIN SODIUM 40 MG: 40 INJECTION SUBCUTANEOUS at 08:43

## 2020-07-05 RX ADMIN — FUROSEMIDE 10 MG: 20 TABLET ORAL at 08:43

## 2020-07-05 RX ADMIN — BUDESONIDE 0.5 MG: 0.5 INHALANT RESPIRATORY (INHALATION) at 08:04

## 2020-07-05 RX ADMIN — FOLIC ACID 1000 MCG: 1 TABLET ORAL at 08:43

## 2020-07-05 RX ADMIN — DILTIAZEM HYDROCHLORIDE 180 MG: 180 CAPSULE, COATED, EXTENDED RELEASE ORAL at 08:42

## 2020-07-05 RX ADMIN — BUDESONIDE 0.5 MG: 0.5 INHALANT RESPIRATORY (INHALATION) at 19:27

## 2020-07-05 RX ADMIN — IPRATROPIUM BROMIDE 0.5 MG: 0.5 SOLUTION RESPIRATORY (INHALATION) at 19:27

## 2020-07-05 RX ADMIN — CITALOPRAM HYDROBROMIDE 20 MG: 20 TABLET ORAL at 08:42

## 2020-07-05 NOTE — ASSESSMENT & PLAN NOTE
Malnutrition Findings:   Malnutrition type: Chronic illness  Degree of Malnutrition: Malnutrition of moderate degree(malnutrition related to COPD as evidenced by mild depletion of orbital body fat and mild depletion of muscle mass seen at clavicle to be treated with regular diet and nutrition supplements )    BMI Findings: Body mass index is 19 51 kg/m²

## 2020-07-05 NOTE — ASSESSMENT & PLAN NOTE
pt with end stage COPD, FEV1/FVC 30%  initially  on BiPAP - now back to his baseline O2 of 4 lt nc  Pt reports improvement but still get short of breath very easily  Solu-Medrol decreased 40 mg daily  Xopenex, Atrovent, Pulmicort    Overnight pulse ox ordered by pulmonology to see patient will qualify for Trilogy

## 2020-07-05 NOTE — ASSESSMENT & PLAN NOTE
- has leukopenia @ baseline, worsening  - WBC < 4 @ 2 69, HR: 96, RR: 34, meets SIRS  - CXR w/o acute abnormality; no source of infection  Improving  Continue monitor

## 2020-07-05 NOTE — PROGRESS NOTES
Progress Note - Pulmonary   Thomas Garcia 65 y o  male MRN: [id number]  Unit/Bed#: [facility] 509-01 Encounter: [id number]      Assessment and Plan:  Acute on chronic hypoxic and hypercapnic respiratory failure  Severe COPD in exacerbation  Severe tobacco dependence in early remission-smoked 1 pack per day x 50 years, quit in 2011  Hypertension  GERD     - continue oxygen at 4 liters/minute for goal pulse ox 88-92%  - He would benefit from home trilogy  Check nocturnal pulse ox tonight to see if he qualifies at discharge  - Lower steroids  At discharge, start prednisone 40 mg daily and wean by 10 mg every 3 days until off    - continue nebulized bronchodilators  - continue activity as able  - outpatient follow-up with Dr Isela Major to address other options for his advanced lung disease      Subjective:   Found lying in bed asleep  Appears comfortable  Objective:   Afebrile  Vitals: Blood pressure 150/65, pulse 84, temperature 97 8 °F (36 6 °C), temperature source Oral, resp  rate 18, height 5' 7" (1 702 m), weight 56 5 kg (124 lb 9 oz), SpO2 98 %  , 3LPM, Body mass index is 19 51 kg/m²  Intake/Output Summary (Last 24 hours) at 7/5/2020 0920  Last data filed at 7/5/2020 0707  Gross per 24 hour   Intake 380 ml   Output 850 ml   Net -470 ml     Physical Exam  GEN: WDWN, nad, comfortable  HEENT: NCAT; MMM; eyes closed  CVS: regular, no murmur  LUNGS: diminished b/l bs throughout  ABD: soft, nd, nt  EXT: no edema, no c/c  NEURO: nonfocal  PSYCH: cooperative  SKIN: warm, dry, no visible rash    Labs: I have personally reviewed pertinent lab results    Results from last 7 days   Lab Units 07/05/20  0511 07/04/20  0530 07/03/20  1119   WBC Thousand/uL 3 28* 2 17* 2 50*   HEMOGLOBIN g/dL 8 1* 7 8* 8 3*   HEMATOCRIT % 27 0* 25 7* 27 5*   PLATELETS Thousands/uL 133* 136* 133*   NEUTROS PCT % 88* 84* 85*   MONOS PCT % 4 3* 5      Results from last 7 days   Lab Units 07/02/20  0536 07/01/20  1906 07/01/20  1035   POTASSIUM mmol/L 4 5  --  4 1   CHLORIDE mmol/L 98*  --  96*   CO2 mmol/L 43*  --  >45*   CO2, I-STAT mmol/L  --  >45*  --    BUN mg/dL 17  --  13   CREATININE mg/dL 0 48*  --  0 57*   CALCIUM mg/dL 9 6  --  9 7   GLUCOSE, ISTAT mg/dl  --  249*  --                       0   Lab Value Date/Time    TROPONINI <0 02 07/01/2020 1035    TROPONINI <0 02 12/26/2019 1337    TROPONINI 0 02 11/22/2019 0046    TROPONINI <0 02 02/21/2019 0943    TROPONINI <0 02 08/27/2017 0652    TROPONINI <0 04 06/02/2015 0326    TROPONINI <0 04 06/01/2015 1949    TROPONINI <0 04 05/14/2015 0600     Pancytopenia, metabolic alkalosis    [facility]

## 2020-07-05 NOTE — PROGRESS NOTES
Progress Note - Elma Demarco 1950, 65 y o  male MRN: [id number]    Unit/Bed#: [facility] 509-01 Encounter: [id number]    Primary Care Provider: Song Gurrola MD   Date and time admitted to hospital: 7/1/2020 10:17 AM        * Acute exacerbation of chronic obstructive pulmonary disease (COPD) ([facility] [address] )  Assessment & Plan  pt with end stage COPD, FEV1/FVC 30%  initially  on BiPAP - now back to his baseline O2 of 4 lt nc  Pt reports improvement but still get short of breath very easily  Solu-Medrol decreased 40 mg daily  Xopenex, Atrovent, Pulmicort  Overnight pulse ox ordered by pulmonology to see patient will qualify for Trilogy    Acute on chronic respiratory failure with [facility] ([facility] )  Assessment & Plan  In setting of COPD  Patient now to his baseline oxygen requirements  Continue HS BiPAP  Moderate protein-calorie malnutrition ([facility] [address] )  Assessment & Plan  Malnutrition Findings:   Malnutrition type: Chronic illness  Degree of Malnutrition: Malnutrition of moderate degree(malnutrition related to COPD as evidenced by mild depletion of orbital body fat and mild depletion of muscle mass seen at clavicle to be treated with regular diet and nutrition supplements )    BMI Findings: Body mass index is 19 51 kg/m²  Paroxysmal atrial fibrillation (HCC)  Assessment & Plan  Rate controlled  Continue diltiazem 180 mg QD    Leukopenia  Assessment & Plan  - has leukopenia @ baseline, worsening  - WBC < 4 @ 2 69, HR: 96, RR: 34, meets SIRS  - CXR w/o acute abnormality; no source of infection  Improving  Continue monitor  VTE Pharmacologic Prophylaxis:   Pharmacologic: Enoxaparin (Lovenox)  Mechanical VTE Prophylaxis in Place: Yes    Patient Centered Rounds: I have performed bedside rounds with nursing staff today      Discussions with Specialists or Other Care Team Provider:  Pulmonary    Education and Discussions with Family / Patient:  Offer to call family but patient refused    Time Spent for Care: 30 minutes  More than 50% of total time spent on counseling and coordination of care as described above  Current Length of Stay: 4 day(s)    Current Patient Status: Inpatient   Certification Statement: The patient will continue to require additional inpatient hospital stay due to Above    Discharge Plan:  Possibly tomorrow if cleared by pulmonology    Code Status: Level 2 - DNAR: but accepts endotracheal intubation      Subjective:   Pt seen and examined by me this morning  Pt reports improvement compared to yesterday but still not back to his baseline  Objective:     Vitals:   Temp (24hrs), Av 5 °F (36 9 °C), Min:97 8 °F (36 6 °C), Max:98 9 °F (37 2 °C)    Temp:  [97 8 °F (36 6 °C)-98 9 °F (37 2 °C)] 97 8 °F (36 6 °C)  HR:  [76-84] 84  Resp:  [18] 18  BP: (127-150)/(62-65) 150/65  SpO2:  [95 %-98 %] 97 %  Body mass index is 19 51 kg/m²  Input and Output Summary (last 24 hours): Intake/Output Summary (Last 24 hours) at 2020 1341  Last data filed at 2020 1101  Gross per 24 hour   Intake 200 ml   Output 900 ml   Net -700 ml       Physical Exam:     Physical Exam    Constitutional: Pt appears comfortable  Not in any acute distress  HENT:   Head: Normocephalic and atraumatic  Eyes: EOM are normal    Neck: Neck supple  Cardiovascular: Normal rate, regular rhythm, normal heart sounds  No murmur heard  Pulmonary/Chest: Effort normal, significantly decreased air entry bilaterally  No respiratory distress  Pt has no wheezes or crackles  Abdominal: Soft  Non-distended, Non-tender  Bowel sounds are normal    Musculoskeletal: Normal range of motion  Neurological: awake, alert  Moving all extremities spontaneously  Psychiatric: normal mood and affect        Additional Data:     Labs:    Results from last 7 days   Lab Units 20  0511   WBC Thousand/uL 3 28*   HEMOGLOBIN g/dL 8 1*   HEMATOCRIT % 27 0*   PLATELETS Thousands/uL 133*   NEUTROS PCT % 88*   LYMPHS PCT % 7*   MONOS PCT % 4 EOS PCT % 0     Results from last 7 days   Lab Units 07/02/20  0536   SODIUM mmol/L 140   POTASSIUM mmol/L 4 5   CHLORIDE mmol/L 98*   CO2 mmol/L 43*   BUN mg/dL 17   CREATININE mg/dL 0 48*   ANION GAP mmol/L -1*   CALCIUM mg/dL 9 6   GLUCOSE RANDOM mg/dL 104                 Results from last 7 days   Lab Units 07/02/20  0536 07/01/20  2117   PROCALCITONIN ng/ml <0 05 <0 05           * I Have Reviewed All Lab Data Listed Above  * Additional Pertinent Lab Tests Reviewed: [facility] Admission Reviewed    Imaging:    Imaging Reports Reviewed Today Include:   Imaging Personally Reviewed by Myself Includes:     Recent Cultures (last 7 days):           Last 24 Hours Medication List:     Current Facility-Administered Medications:  budesonide 0 5 mg Nebulization Q12H Quan Ahmadi DO   citalopram 20 mg Oral Daily Angel Velasco MD   diltiazem 180 mg Oral Daily Angel Velasco MD   enoxaparin 40 mg Subcutaneous Daily Angel Velasco MD   ferrous sulfate 325 mg Oral Daily Angel Velasco MD   folic acid 2,977 mcg Oral Daily Agnel Velasco MD   furosemide 10 mg Oral Daily Angel Velasco MD   ipratropium 0 5 mg Nebulization Q6H Debora Renteria MD   levalbuterol 1 25 mg Nebulization Q6H Debora Renteria MD   melatonin 6 mg Oral HS PRN Angel Velasco MD   [START ON 7/6/2020] methylPREDNISolone sodium succinate 40 mg Intravenous Daily Araceli Coleman DO   pantoprazole 40 mg Oral Early Morning Cari Bautista PA-C   potassium chloride 20 mEq Oral Daily Angel Velasco MD   sodium chloride (PF) 3 mL Intravenous Q1H PRN Angel Velasco MD        Today, Patient Was Seen By: Alana Raymond MD    ** Please Note: Dictation voice to text software may have been used in the creation of this document   ** [id number] Comprehensive

## 2020-07-06 LAB
ARTERIAL PATENCY WRIST A: YES
ARTERIAL PATENCY WRIST A: YES
BASE EXCESS BLDA CALC-SCNC: 26.4 MMOL/L
BASE EXCESS BLDA CALC-SCNC: 27.3 MMOL/L
HCO3 BLDA-SCNC: 57.1 MMOL/L (ref 22–28)
HCO3 BLDA-SCNC: 57.7 MMOL/L (ref 22–28)
IPAP: 12
NASAL CANNULA: 4
NON VENT- BIPAP: ABNORMAL
O2 CT BLDA-SCNC: 12.6 ML/DL (ref 16–23)
O2 CT BLDA-SCNC: 9.2 ML/DL (ref 16–23)
OXYHGB MFR BLDA: 62.9 % (ref 94–97)
OXYHGB MFR BLDA: 98.1 % (ref 94–97)
PCO2 BLDA: 104.7 MM HG (ref 36–44)
PCO2 BLDA: 121.3 MM HG (ref 36–44)
PEEP MAX SETTING VENT: 5 CM[H2O]
PH BLDA: 7.29 [PH] (ref 7.35–7.45)
PH BLDA: 7.36 [PH] (ref 7.35–7.45)
PO2 BLDA: 162.1 MM HG (ref 75–129)
PO2 BLDA: 34.4 MM HG (ref 75–129)
SPECIMEN SOURCE: ABNORMAL
SPECIMEN SOURCE: ABNORMAL

## 2020-07-06 PROCEDURE — 36600 WITHDRAWAL OF ARTERIAL BLOOD: CPT

## 2020-07-06 PROCEDURE — 36600 WITHDRAWAL OF ARTERIAL BLOOD: CPT | Performed by: SOCIAL WORKER

## 2020-07-06 PROCEDURE — 94760 N-INVAS EAR/PLS OXIMETRY 1: CPT

## 2020-07-06 PROCEDURE — 94002 VENT MGMT INPAT INIT DAY: CPT | Performed by: SOCIAL WORKER

## 2020-07-06 PROCEDURE — 94640 AIRWAY INHALATION TREATMENT: CPT

## 2020-07-06 PROCEDURE — 99232 SBSQ HOSP IP/OBS MODERATE 35: CPT | Performed by: FAMILY MEDICINE

## 2020-07-06 PROCEDURE — 94660 CPAP INITIATION&MGMT: CPT

## 2020-07-06 PROCEDURE — 82805 BLOOD GASES W/O2 SATURATION: CPT | Performed by: STUDENT IN AN ORGANIZED HEALTH CARE EDUCATION/TRAINING PROGRAM

## 2020-07-06 PROCEDURE — 94760 N-INVAS EAR/PLS OXIMETRY 1: CPT | Performed by: SOCIAL WORKER

## 2020-07-06 PROCEDURE — 99223 1ST HOSP IP/OBS HIGH 75: CPT | Performed by: INTERNAL MEDICINE

## 2020-07-06 RX ADMIN — PANTOPRAZOLE SODIUM 40 MG: 40 TABLET, DELAYED RELEASE ORAL at 05:01

## 2020-07-06 RX ADMIN — METHYLPREDNISOLONE SODIUM SUCCINATE 40 MG: 40 INJECTION, POWDER, FOR SOLUTION INTRAMUSCULAR; INTRAVENOUS at 09:41

## 2020-07-06 RX ADMIN — POTASSIUM CHLORIDE 20 MEQ: 1500 TABLET, EXTENDED RELEASE ORAL at 09:42

## 2020-07-06 RX ADMIN — ENOXAPARIN SODIUM 40 MG: 40 INJECTION SUBCUTANEOUS at 09:41

## 2020-07-06 RX ADMIN — DILTIAZEM HYDROCHLORIDE 180 MG: 180 CAPSULE, COATED, EXTENDED RELEASE ORAL at 09:41

## 2020-07-06 RX ADMIN — IPRATROPIUM BROMIDE 0.5 MG: 0.5 SOLUTION RESPIRATORY (INHALATION) at 14:08

## 2020-07-06 RX ADMIN — LEVALBUTEROL HYDROCHLORIDE 1.25 MG: 1.25 SOLUTION, CONCENTRATE RESPIRATORY (INHALATION) at 08:06

## 2020-07-06 RX ADMIN — LEVALBUTEROL HYDROCHLORIDE 1.25 MG: 1.25 SOLUTION, CONCENTRATE RESPIRATORY (INHALATION) at 19:37

## 2020-07-06 RX ADMIN — FUROSEMIDE 10 MG: 20 TABLET ORAL at 09:41

## 2020-07-06 RX ADMIN — LEVALBUTEROL HYDROCHLORIDE 1.25 MG: 1.25 SOLUTION, CONCENTRATE RESPIRATORY (INHALATION) at 00:50

## 2020-07-06 RX ADMIN — BUDESONIDE 0.5 MG: 0.5 INHALANT RESPIRATORY (INHALATION) at 19:37

## 2020-07-06 RX ADMIN — IPRATROPIUM BROMIDE 0.5 MG: 0.5 SOLUTION RESPIRATORY (INHALATION) at 19:37

## 2020-07-06 RX ADMIN — BUDESONIDE 0.5 MG: 0.5 INHALANT RESPIRATORY (INHALATION) at 08:06

## 2020-07-06 RX ADMIN — FOLIC ACID 1000 MCG: 1 TABLET ORAL at 09:57

## 2020-07-06 RX ADMIN — CITALOPRAM HYDROBROMIDE 20 MG: 20 TABLET ORAL at 09:41

## 2020-07-06 RX ADMIN — IPRATROPIUM BROMIDE 0.5 MG: 0.5 SOLUTION RESPIRATORY (INHALATION) at 08:06

## 2020-07-06 RX ADMIN — IPRATROPIUM BROMIDE 0.5 MG: 0.5 SOLUTION RESPIRATORY (INHALATION) at 00:50

## 2020-07-06 RX ADMIN — LEVALBUTEROL HYDROCHLORIDE 1.25 MG: 1.25 SOLUTION, CONCENTRATE RESPIRATORY (INHALATION) at 14:07

## 2020-07-06 RX ADMIN — FERROUS SULFATE TAB 325 MG (65 MG ELEMENTAL FE) 325 MG: 325 (65 FE) TAB at 09:42

## 2020-07-06 NOTE — RESTORATIVE TECHNICIAN NOTE
Restorative Specialist Mobility Note       Activity: Ambulate in room, Bathroom privileges     Assistive Device: Front wheel walker        Repositioned: Supine, Pillow support

## 2020-07-06 NOTE — RESPIRATORY THERAPY NOTE
resp care      07/06/20 1740   Respiratory Assessment   Resp Comments Lab called for abg results  Sat 74 on monitor when abg was drawn  02 increased to 6lpm while pt is eating  question mixed sample  Blood did pump into syringe during stick  Pulm  Resident tiger texted with results

## 2020-07-06 NOTE — RESPIRATORY THERAPY NOTE
resp care      07/06/20 1607   Respiratory Assessment   Resp Comments Pts sat again dropped to 69  Pt had been moving in bed and got 02 hose stuck under his leg and it was pulled off of the bipap circuit  02 replaced    02 increased to 6 until sat increases,

## 2020-07-06 NOTE — ASSESSMENT & PLAN NOTE
Malnutrition Findings:   Malnutrition type: Chronic illness  Degree of Malnutrition: Malnutrition of moderate degree(malnutrition related to COPD as evidenced by mild depletion of orbital body fat and mild depletion of muscle mass seen at clavicle to be treated with regular diet and nutrition supplements )    BMI Findings: Body mass index is 19 58 kg/m²

## 2020-07-06 NOTE — RESPIRATORY THERAPY NOTE
resp care      07/06/20 1700   Respiratory Assessment   Resp Comments Called by Rn  Pt very upset and is requesting to have a drink and eat dinner  ABG drawn at this time and pt was placed on nc 4lpm as he wears at home

## 2020-07-06 NOTE — RESPIRATORY THERAPY NOTE
resp care      07/06/20 1534   Non-Invasive Information   Interface Face mask   Non-Invasive Ventilation Mode BiPAP   $ Continous NIV Initial   SpO2 100 %   $ Pulse Oximetry Spot Check Charge Completed   Resp Comments Pt placed on bipap for hypercarbia on abg  Pt found on 4lpm as he wears at home  02 decreased to 2 and if sat is ok , will placed on rma bipap  Pt compliant with settings 12/5  Will cont to monitor      Non-Invasive Settings   IPAP (cm) 12 cm   EPAP (cm) 5 cm   Rate (Set) 8   Flow (lpm) 2  (lpm)   Rise Time 3   Inspiratory Time (Set) 1   Non-Invasive Readings   Total Rate 30   Vt (mL) (Mech) 319   MV (Mech) 8 6   Leak (lpm) 37   Non-Invasive Alarms   Apnea Interval (sec) 20   Apnea Rate 8

## 2020-07-06 NOTE — PROGRESS NOTES
Progress Note - Jimbo Richardson 1950, 65 y o  male MRN: [id number]    Unit/Bed#: [facility] 509-01 Encounter: [id number]    Primary Care Provider: Colby Casillas MD   Date and time admitted to hospital: 7/1/2020 10:17 AM        * Acute exacerbation of chronic obstructive pulmonary disease (COPD) ([facility] [address] )  Assessment & Plan  pt with end stage COPD, FEV1/FVC 30%  initially  on BiPAP - now back to his baseline O2 of 4 lt nc, BiPAP qhs  Pt reports improvement but still get short of breath very easily  pulm following appreciate their recs  Solu-Medrol decreased 40 mg daily; Xopenex, Atrovent, Pulmicort  Overnight pulse ox ordered as part of assessment for trilogy; noted to have multiple events of desaturations overnight, lowest noted to be a 63%    Moderate protein-calorie malnutrition (HCC)  Assessment & Plan  Malnutrition Findings:   Malnutrition type: Chronic illness  Degree of Malnutrition: Malnutrition of moderate degree(malnutrition related to COPD as evidenced by mild depletion of orbital body fat and mild depletion of muscle mass seen at clavicle to be treated with regular diet and nutrition supplements )    BMI Findings: Body mass index is 19 58 kg/m²  Fall at home  Assessment & Plan  - no LOC  - no injury reported; fell onto bed  - mechanical fall  - PT/OT consult     Paroxysmal atrial fibrillation (HCC)  Assessment & Plan  Rate controlled  Continue diltiazem 180 mg QD    Respiratory acidosis  Assessment & Plan  In setting of acute on chronic COPD    Acute on chronic respiratory failure with hypercapnia (HCC)  Assessment & Plan  In setting of COPD  Patient now to his baseline oxygen requirements  Continue HS BiPAP  Leukopenia  Assessment & Plan  - has leukopenia @ baseline, worsening  - WBC < 4 @ 2 69, HR: 96, RR: 34, meets SIRS  - noted to be returning to baseline  - CXR w/o acute abnormality; no source of infection  Improving  Continue monitor      Anxiety  Assessment & Plan  Continue PTA Celexa 20 mg QD    GERD  Assessment & Plan  Start Protonix in setting of daily steroid use        VTE Pharmacologic Prophylaxis:   Pharmacologic: Enoxaparin (Lovenox)  Mechanical VTE Prophylaxis in Place: Yes    Patient Centered Rounds: I have performed bedside rounds with nursing staff today  Discussions with Specialists or Other Care Team Provider:  Discussed with intern on pulmonology    Education and Discussions with Family / Patient: Answered patient's questions appropriately    Time Spent for Care: 30 minutes  More than 50% of total time spent on counseling and coordination of care as described above  Current Length of Stay: 5 day(s)    Current Patient Status: Inpatient   Certification Statement: The patient will continue to require additional inpatient hospital stay due to Pending pulmonology assessment/ CM arrangements for discharge    Discharge Plan:  Pending trilogy/BiPAP eval    Code Status: Level 2 - DNAR: but accepts endotracheal intubation      Subjective:   49-year-old male with end-stage COPD reports that his SOB feels much improved  Patient noted to be jittery/shaking on exam; however patient reports that he did not notice/nor is it bothering him  Denies Hx of etoh use, it has also been 6 days since admission  Most probably attributable to steroid use  Objective:     Vitals:   Temp (24hrs), Av 1 °F (36 7 °C), Min:97 7 °F (36 5 °C), Max:98 3 °F (36 8 °C)    Temp:  [97 7 °F (36 5 °C)-98 3 °F (36 8 °C)] 97 7 °F (36 5 °C)  HR:  [78-97] 97  Resp:  [18-20] 20  BP: (120-147)/(63-67) 139/65  SpO2:  [96 %-99 %] 98 %  Body mass index is 19 58 kg/m²  Input and Output Summary (last 24 hours): Intake/Output Summary (Last 24 hours) at 2020 1400  Last data filed at 2020 1355  Gross per 24 hour   Intake 710 ml   Output 800 ml   Net -90 ml       Physical Exam:     Physical Exam   Constitutional: He is oriented to person, place, and time  No distress     Frail    HENT:   Head: Normocephalic and atraumatic  Mouth/Throat: Oropharynx is clear and moist  Dental caries present  No oropharyngeal exudate  Eyes: Conjunctivae are normal    Neck: Normal range of motion  Cardiovascular: Normal rate, regular rhythm and normal heart sounds  Pulmonary/Chest: Effort normal  No respiratory distress  He has no wheezes  He has no rales  Much improved compared to prior, good aeration   Abdominal: Soft  Bowel sounds are normal  There is no tenderness  Musculoskeletal: He exhibits no edema  Neurological: He is alert and oriented to person, place, and time  Skin: Skin is warm and dry  He is not diaphoretic  Psychiatric: He has a normal mood and affect  Vitals reviewed  Additional Data:     Labs:    Results from last 7 days   Lab Units 07/05/20  0511   WBC Thousand/uL 3 28*   HEMOGLOBIN g/dL 8 1*   HEMATOCRIT % 27 0*   PLATELETS Thousands/uL 133*   NEUTROS PCT % 88*   LYMPHS PCT % 7*   MONOS PCT % 4   EOS PCT % 0     Results from last 7 days   Lab Units 07/02/20  0536   SODIUM mmol/L 140   POTASSIUM mmol/L 4 5   CHLORIDE mmol/L 98*   CO2 mmol/L 43*   BUN mg/dL 17   CREATININE mg/dL 0 48*   ANION GAP mmol/L -1*   CALCIUM mg/dL 9 6   GLUCOSE RANDOM mg/dL 104                 Results from last 7 days   Lab Units 07/02/20  0536 07/01/20  2117   PROCALCITONIN ng/ml <0 05 <0 05           * I Have Reviewed All Lab Data Listed Above  * Additional Pertinent Lab Tests Reviewed:  All Labs Within Last 24 Hours Reviewed    Imaging:    Imaging Reports Reviewed Today Include: CXR  Imaging Personally Reviewed by Myself Includes:  CXR from 7/1    Recent Cultures (last 7 days):           Last 24 Hours Medication List:     Current Facility-Administered Medications:  budesonide 0 5 mg Nebulization Q12H Quan Ahmadi DO   citalopram 20 mg Oral Daily Goldie Martinez MD   diltiazem 180 mg Oral Daily Goldie Martinez MD   enoxaparin 40 mg Subcutaneous Daily Goldie Martinez MD   ferrous sulfate 325 mg Oral Daily Goldie Martinez MD   folic acid 9,909 mcg Oral Daily Goldie Martinez MD   furosemide 10 mg Oral Daily Goldie Martinez MD   ipratropium 0 5 mg Nebulization Q6H Reina Maguire MD   levalbuterol 1 25 mg Nebulization Q6H Reina Maguire MD   melatonin 6 mg Oral HS PRN Goldie Martinez MD   methylPREDNISolone sodium succinate 40 mg Intravenous Daily Araceli Coleman DO   pantoprazole 40 mg Oral Early Morning Jaspreet, PA-C   potassium chloride 20 mEq Oral Daily Goldie Martinez MD   sodium chloride (PF) 3 mL Intravenous Q1H PRN Goldie Martinez MD        Today, Patient Was Seen By: Goldie Martinez MD    ** Please Note: Dictation voice to text software may have been used in the creation of this document   **

## 2020-07-06 NOTE — PLAN OF CARE
Problem: Potential for Falls  Goal: Patient will remain free of falls  Description  INTERVENTIONS:  - Assess patient frequently for physical needs  -  Identify cognitive and physical deficits and behaviors that affect risk of falls    -  [place] fall precautions as indicated by assessment   - Educate patient/family on patient safety including physical limitations  - Instruct patient to call for assistance with activity based on assessment  - Modify environment to reduce risk of injury  - Consider OT/PT consult to assist with strengthening/mobility  Outcome: Progressing     Problem: PAIN - ADULT  Goal: Verbalizes/displays adequate comfort level or baseline comfort level  Description  Interventions:  - Encourage patient to monitor pain and request assistance  - Assess pain using appropriate pain scale  - Administer analgesics based on type and severity of pain and evaluate response  - Implement non-pharmacological measures as appropriate and evaluate response  - Consider cultural and social influences on pain and pain management  - Notify physician/advanced practitioner if interventions unsuccessful or patient reports new pain  Outcome: Progressing     Problem: INFECTION - ADULT  Goal: Absence or prevention of progression during hospitalization  Description  INTERVENTIONS:  - Assess and monitor for signs and symptoms of infection  - Monitor lab/diagnostic results  - Monitor all insertion sites, i e  indwelling lines, tubes, and drains  - Monitor endotracheal if appropriate and nasal secretions for changes in amount and color  - [place] appropriate cooling/warming therapies per order  - Administer medications as ordered  - Instruct and encourage patient and family to use good hand hygiene technique  - Identify and instruct in appropriate isolation precautions for identified infection/condition  Outcome: Progressing  Goal: Absence of fever/infection during neutropenic period  Description  INTERVENTIONS:  - Monitor WBC    Outcome: Progressing     Problem: SAFETY ADULT  Goal: Maintain or return to baseline ADL function  Description  INTERVENTIONS:  -  Assess patient's ability to carry out ADLs; assess patient's baseline for ADL function and identify physical deficits which impact ability to perform ADLs (bathing, care of mouth/teeth, toileting, grooming, dressing, etc )  - Assess/evaluate cause of self-care deficits   - Assess range of motion  - Assess patient's mobility; develop plan if impaired  - Assess patient's need for assistive devices and provide as appropriate  - Encourage maximum independence but intervene and supervise when necessary  - Involve family in performance of ADLs  - Assess for home care needs following discharge   - Consider OT consult to assist with ADL evaluation and planning for discharge  - Provide patient education as appropriate  Outcome: Progressing  Goal: Maintain or return mobility status to optimal level  Description  INTERVENTIONS:  - Assess patient's baseline mobility status (ambulation, transfers, stairs, etc )    - Identify cognitive and physical deficits and behaviors that affect mobility  - Identify mobility aids required to assist with transfers and/or ambulation (gait belt, sit-to-stand, lift, walker, cane, etc )  - [place] fall precautions as indicated by assessment  - Record patient progress and toleration of activity level on Mobility SBAR; progress patient to next Phase/Stage  - Instruct patient to call for assistance with activity based on assessment  - Consider rehabilitation consult to assist with strengthening/weightbearing, etc   Outcome: Progressing     Problem: DISCHARGE PLANNING  Goal: Discharge to home or other facility with appropriate resources  Description  INTERVENTIONS:  - Identify barriers to discharge w/patient and caregiver  - Arrange for needed discharge resources and transportation as appropriate  - Identify discharge learning needs (meds, wound care, etc )  - Arrange for interpretive services to assist at discharge as needed  - Refer to Case Management Department for coordinating discharge planning if the patient needs post-hospital services based on physician/advanced practitioner order or complex needs related to functional status, cognitive ability, or social support system  Outcome: Progressing     Problem: Knowledge Deficit  Goal: Patient/family/caregiver demonstrates understanding of disease process, treatment plan, medications, and discharge instructions  Description  Complete learning assessment and assess knowledge base    Interventions:  - Provide teaching at level of understanding  - Provide teaching via preferred learning methods  Outcome: Progressing     Problem: CARDIOVASCULAR - ADULT  Goal: Maintains optimal cardiac output and hemodynamic stability  Description  INTERVENTIONS:  - Monitor I/O, vital signs and rhythm  - Monitor for S/S and trends of decreased cardiac output  - Administer and titrate ordered vasoactive medications to optimize hemodynamic stability  - Assess quality of pulses, skin color and temperature  - Assess for signs of decreased coronary artery perfusion  - Instruct patient to report change in severity of symptoms  Outcome: Progressing  Goal: Absence of cardiac dysrhythmias or at baseline rhythm  Description  INTERVENTIONS:  - Continuous cardiac monitoring, vital signs, obtain 12 lead EKG if ordered  - Administer antiarrhythmic and heart rate control medications as ordered  - Monitor electrolytes and administer replacement therapy as ordered  Outcome: Progressing     Problem: RESPIRATORY - ADULT  Goal: Achieves optimal ventilation and oxygenation  Description  INTERVENTIONS:  - Assess for changes in respiratory status  - Assess for changes in mentation and behavior  - Position to facilitate oxygenation and minimize respiratory effort  - Oxygen administered by appropriate delivery if ordered  - Initiate smoking cessation education as indicated  - Encourage broncho-pulmonary hygiene including cough, deep breathe, Incentive Spirometry  - Assess the need for suctioning and aspirate as needed  - Assess and instruct to report SOB or any respiratory difficulty  - Respiratory Therapy support as indicated  Outcome: Progressing     Problem: Nutrition/Hydration-ADULT  Goal: Nutrient/Hydration intake appropriate for improving, restoring or maintaining nutritional needs  Description  Monitor and assess patient's nutrition/hydration status for malnutrition  Collaborate with interdisciplinary team and initiate plan and interventions as ordered  Monitor patient's weight and dietary intake as ordered or per policy  Utilize nutrition screening tool and intervene as necessary  Determine patient's food preferences and provide high-protein, high-caloric foods as appropriate       INTERVENTIONS:  - Monitor oral intake, urinary output, labs, and treatment plans  - Assess nutrition and hydration status and recommend course of action  - Evaluate amount of meals eaten  - Assist patient with eating if necessary   - Allow adequate time for meals  - Recommend/ encourage appropriate diets, oral nutritional supplements, and vitamin/mineral supplements  - Order, calculate, and assess calorie counts as needed  - Recommend, monitor, and adjust tube feedings and TPN/PPN based on assessed needs  - Assess need for intravenous fluids  - Provide specific nutrition/hydration education as appropriate  - Include patient/family/caregiver in decisions related to nutrition  Outcome: Progressing

## 2020-07-06 NOTE — RESPIRATORY THERAPY NOTE
resp care      07/06/20 3754   Non-Invasive Information   Resp Comments Called by RN for sat 69  Pt sat up to go to the bathroom and his bipap hose came apart  Pt not on monitor and called for RN  IPAP increased to 16 for sob, 02 increased from 2lpm to 6lpm  BIPAP ordered on rma secondary to abg p02 result  Sat increased to 84%  Sat decreased again to 71 with movement  Will leave on 6lpm and repeat abg at 1730

## 2020-07-06 NOTE — ASSESSMENT & PLAN NOTE
pt with end stage COPD, FEV1/FVC 30%  initially  on BiPAP - now back to his baseline O2 of 4 lt nc, BiPAP qhs  Pt reports improvement but still get short of breath very easily  pulm following appreciate their recs  Solu-Medrol decreased 40 mg daily; Xopenex, Atrovent, Pulmicort    Overnight pulse ox ordered as part of assessment for trilogy; noted to have multiple events of desaturations overnight, lowest noted to be a 63%

## 2020-07-06 NOTE — ASSESSMENT & PLAN NOTE
- has leukopenia @ baseline, worsening  - WBC < 4 @ 2 69, HR: 96, RR: 34, meets SIRS  - noted to be returning to baseline  - CXR w/o acute abnormality; no source of infection  Improving  Continue monitor

## 2020-07-06 NOTE — RESPIRATORY THERAPY NOTE
resp care      07/06/20 1759   Respiratory Assessment   Resp Comments Pt now done eating and resting  Sat 100% on 6lpm  02 decreased back to 4  Pt offers no resp c/o  Will place back on bipap hs unless ordered otherwise

## 2020-07-07 LAB
BASOPHILS # BLD AUTO: 0 THOUSANDS/ΜL (ref 0–0.1)
BASOPHILS NFR BLD AUTO: 0 % (ref 0–1)
BUN SERPL-MCNC: 26 MG/DL (ref 5–25)
CALCIUM SERPL-MCNC: 9.4 MG/DL (ref 8.3–10.1)
CHLORIDE SERPL-SCNC: 92 MMOL/L (ref 100–108)
CO2 SERPL-SCNC: >45 MMOL/L (ref 21–32)
CREAT SERPL-MCNC: 0.58 MG/DL (ref 0.6–1.3)
EOSINOPHIL # BLD AUTO: 0 THOUSAND/ΜL (ref 0–0.61)
EOSINOPHIL NFR BLD AUTO: 0 % (ref 0–6)
ERYTHROCYTE [DISTWIDTH] IN BLOOD BY AUTOMATED COUNT: 14.2 % (ref 11.6–15.1)
GFR SERPL CREATININE-BSD FRML MDRD: 102 ML/MIN/1.73SQ M
GLUCOSE SERPL-MCNC: 124 MG/DL (ref 65–140)
HCT VFR BLD AUTO: 30.1 % (ref 36.5–49.3)
HGB BLD-MCNC: 8.9 G/DL (ref 12–17)
IMM GRANULOCYTES # BLD AUTO: 0.01 THOUSAND/UL (ref 0–0.2)
IMM GRANULOCYTES NFR BLD AUTO: 0 % (ref 0–2)
LYMPHOCYTES # BLD AUTO: 0.52 THOUSANDS/ΜL (ref 0.6–4.47)
LYMPHOCYTES NFR BLD AUTO: 19 % (ref 14–44)
MAGNESIUM SERPL-MCNC: 2.6 MG/DL (ref 1.6–2.6)
MCH RBC QN AUTO: 32.5 PG (ref 26.8–34.3)
MCHC RBC AUTO-ENTMCNC: 29.6 G/DL (ref 31.4–37.4)
MCV RBC AUTO: 110 FL (ref 82–98)
MONOCYTES # BLD AUTO: 0.18 THOUSAND/ΜL (ref 0.17–1.22)
MONOCYTES NFR BLD AUTO: 7 % (ref 4–12)
NEUTROPHILS # BLD AUTO: 1.98 THOUSANDS/ΜL (ref 1.85–7.62)
NEUTS SEG NFR BLD AUTO: 74 % (ref 43–75)
NRBC BLD AUTO-RTO: 0 /100 WBCS
PLATELET # BLD AUTO: 150 THOUSANDS/UL (ref 149–390)
PMV BLD AUTO: 9.7 FL (ref 8.9–12.7)
POTASSIUM SERPL-SCNC: 4.5 MMOL/L (ref 3.5–5.3)
RBC # BLD AUTO: 2.74 MILLION/UL (ref 3.88–5.62)
SODIUM SERPL-SCNC: 141 MMOL/L (ref 136–145)
TSH SERPL DL<=0.05 MIU/L-ACNC: 0.77 UIU/ML (ref 0.36–3.74)
WBC # BLD AUTO: 2.69 THOUSAND/UL (ref 4.31–10.16)

## 2020-07-07 PROCEDURE — 83735 ASSAY OF MAGNESIUM: CPT | Performed by: PHYSICIAN ASSISTANT

## 2020-07-07 PROCEDURE — 94640 AIRWAY INHALATION TREATMENT: CPT

## 2020-07-07 PROCEDURE — 80048 BASIC METABOLIC PNL TOTAL CA: CPT | Performed by: FAMILY MEDICINE

## 2020-07-07 PROCEDURE — 99233 SBSQ HOSP IP/OBS HIGH 50: CPT | Performed by: INTERNAL MEDICINE

## 2020-07-07 PROCEDURE — 94760 N-INVAS EAR/PLS OXIMETRY 1: CPT

## 2020-07-07 PROCEDURE — 97535 SELF CARE MNGMENT TRAINING: CPT

## 2020-07-07 PROCEDURE — 97530 THERAPEUTIC ACTIVITIES: CPT

## 2020-07-07 PROCEDURE — 99232 SBSQ HOSP IP/OBS MODERATE 35: CPT | Performed by: INTERNAL MEDICINE

## 2020-07-07 PROCEDURE — 84443 ASSAY THYROID STIM HORMONE: CPT | Performed by: PHYSICIAN ASSISTANT

## 2020-07-07 PROCEDURE — 85025 COMPLETE CBC W/AUTO DIFF WBC: CPT | Performed by: FAMILY MEDICINE

## 2020-07-07 RX ORDER — PREDNISONE 20 MG/1
40 TABLET ORAL DAILY
Status: DISCONTINUED | OUTPATIENT
Start: 2020-07-08 | End: 2020-07-08 | Stop reason: HOSPADM

## 2020-07-07 RX ORDER — METHYLPREDNISOLONE SODIUM SUCCINATE 40 MG/ML
30 INJECTION, POWDER, LYOPHILIZED, FOR SOLUTION INTRAMUSCULAR; INTRAVENOUS DAILY
Status: DISCONTINUED | OUTPATIENT
Start: 2020-07-08 | End: 2020-07-07

## 2020-07-07 RX ORDER — CYANOCOBALAMIN 1000 UG/ML
1000 INJECTION INTRAMUSCULAR; SUBCUTANEOUS ONCE
Status: COMPLETED | OUTPATIENT
Start: 2020-07-07 | End: 2020-07-07

## 2020-07-07 RX ADMIN — BUDESONIDE 0.5 MG: 0.5 INHALANT RESPIRATORY (INHALATION) at 19:56

## 2020-07-07 RX ADMIN — DILTIAZEM HYDROCHLORIDE 180 MG: 180 CAPSULE, COATED, EXTENDED RELEASE ORAL at 08:04

## 2020-07-07 RX ADMIN — FERROUS SULFATE TAB 325 MG (65 MG ELEMENTAL FE) 325 MG: 325 (65 FE) TAB at 08:05

## 2020-07-07 RX ADMIN — CYANOCOBALAMIN 1000 MCG: 1000 INJECTION, SOLUTION INTRAMUSCULAR at 14:59

## 2020-07-07 RX ADMIN — ENOXAPARIN SODIUM 40 MG: 40 INJECTION SUBCUTANEOUS at 08:05

## 2020-07-07 RX ADMIN — FOLIC ACID 1000 MCG: 1 TABLET ORAL at 08:06

## 2020-07-07 RX ADMIN — PANTOPRAZOLE SODIUM 40 MG: 40 TABLET, DELAYED RELEASE ORAL at 05:50

## 2020-07-07 RX ADMIN — CITALOPRAM HYDROBROMIDE 20 MG: 20 TABLET ORAL at 08:04

## 2020-07-07 RX ADMIN — FUROSEMIDE 10 MG: 20 TABLET ORAL at 08:06

## 2020-07-07 RX ADMIN — IPRATROPIUM BROMIDE 0.5 MG: 0.5 SOLUTION RESPIRATORY (INHALATION) at 19:57

## 2020-07-07 RX ADMIN — LEVALBUTEROL HYDROCHLORIDE 1.25 MG: 1.25 SOLUTION, CONCENTRATE RESPIRATORY (INHALATION) at 00:14

## 2020-07-07 RX ADMIN — IPRATROPIUM BROMIDE 0.5 MG: 0.5 SOLUTION RESPIRATORY (INHALATION) at 00:14

## 2020-07-07 RX ADMIN — LEVALBUTEROL HYDROCHLORIDE 1.25 MG: 1.25 SOLUTION, CONCENTRATE RESPIRATORY (INHALATION) at 13:32

## 2020-07-07 RX ADMIN — IPRATROPIUM BROMIDE 0.5 MG: 0.5 SOLUTION RESPIRATORY (INHALATION) at 07:08

## 2020-07-07 RX ADMIN — LEVALBUTEROL HYDROCHLORIDE 1.25 MG: 1.25 SOLUTION, CONCENTRATE RESPIRATORY (INHALATION) at 19:57

## 2020-07-07 RX ADMIN — POTASSIUM CHLORIDE 20 MEQ: 1500 TABLET, EXTENDED RELEASE ORAL at 08:07

## 2020-07-07 RX ADMIN — BUDESONIDE 0.5 MG: 0.5 INHALANT RESPIRATORY (INHALATION) at 07:08

## 2020-07-07 RX ADMIN — IPRATROPIUM BROMIDE 0.5 MG: 0.5 SOLUTION RESPIRATORY (INHALATION) at 13:31

## 2020-07-07 RX ADMIN — LEVALBUTEROL HYDROCHLORIDE 1.25 MG: 1.25 SOLUTION, CONCENTRATE RESPIRATORY (INHALATION) at 07:08

## 2020-07-07 RX ADMIN — METHYLPREDNISOLONE SODIUM SUCCINATE 40 MG: 40 INJECTION, POWDER, FOR SOLUTION INTRAMUSCULAR; INTRAVENOUS at 08:06

## 2020-07-07 NOTE — PROGRESS NOTES
Progress Note - Ivis Thornton 1950, 65 y o  male MRN: [id number]    Unit/Bed#: [facility] 509-01 Encounter: [id number]    Primary Care Provider: Michael Hampton MD   Date and time admitted to hospital: 7/1/2020 10:17 AM        * Acute exacerbation of chronic obstructive pulmonary disease (COPD) ([facility] )  Assessment & Plan  pt with end stage COPD, FEV1/FVC 30%  Transitioned to p o  Prednisone, discharged on taper  Continue respiratory treatments  Pulmonary following      Moderate protein-calorie malnutrition (HCC)  Assessment & Plan  Malnutrition Findings:   Malnutrition type: Chronic illness  Degree of Malnutrition: Malnutrition of moderate degree(malnutrition related to COPD as evidenced by mild depletion of orbital body fat and mild depletion of muscle mass seen at clavicle to be treated with regular diet and nutrition supplements )    BMI Findings: Body mass index is 19 41 kg/m²  Fall at home  Assessment & Plan  - no LOC  - no injury reported; fell onto bed  - mechanical fall  - PT/OT     Paroxysmal atrial fibrillation (HCC)  Assessment & Plan  Rate controlled  Continue diltiazem 180 mg QD    Respiratory acidosis  Assessment & Plan  In setting of chronic COPD  Pulmonary following - patient is being evaluated for BiPAP candidacy at discharge    Acute on chronic respiratory failure with hypercapnia ([facility] )  Assessment & Plan  In setting of COPD  Patient now to his baseline oxygen requirements  Continue HS BiPAP  Leukopenia  Assessment & Plan  Monitor counts    Anxiety  Assessment & Plan  Continue PTA Celexa 20 mg QD    GERD  Assessment & Plan  Continue PPI    Anemia  Assessment & Plan  Noted to have low B12 levels will supplement with B12  Outpatient Hematology follow-up        VTE Pharmacologic Prophylaxis:   Pharmacologic: Enoxaparin (Lovenox)  Mechanical VTE Prophylaxis in Place: Yes    Patient Centered Rounds: I have performed bedside rounds with nursing staff today      Discussions with Specialists or Other Care Team Provider:     Education and Discussions with Family / Patient:  Discussed with the patient, updated spouse Niesha Moon     Time Spent for Care: 30 minutes  More than 50% of total time spent on counseling and coordination of care as described above  Current Length of Stay: 6 day(s)    Current Patient Status: Inpatient   Certification Statement: The patient will continue to require additional inpatient hospital stay due to as mentioned    Discharge Plan: awaiting Pulmonary inputs for disposition planning     Code Status: Level 2 - DNAR: but accepts endotracheal intubation      Subjective:     Reports feeling tired  Appetite fair  History chart labs medications reviewed     Objective:     Vitals:   Temp (24hrs), Av 9 °F (36 6 °C), Min:97 6 °F (36 4 °C), Max:98 2 °F (36 8 °C)    Temp:  [97 6 °F (36 4 °C)-98 2 °F (36 8 °C)] 97 9 °F (36 6 °C)  HR:  [75-99] 85  Resp:  [22-30] 22  BP: (118-142)/(50-69) 139/63  SpO2:  [90 %-100 %] 93 %  Body mass index is 19 41 kg/m²  Input and Output Summary (last 24 hours):        Intake/Output Summary (Last 24 hours) at 2020 1345  Last data filed at 2020 1146  Gross per 24 hour   Intake    Output 1650 ml   Net -1650 ml       Physical Exam:     Physical Exam    Dyspnea at rest noted  Pursed lip breathing noted  Neck supple   Lungs emphysematous   Diminished breath sounds noted  Heart sounds S1 and S2   Abdomen soft, non tender  Awake, obeys simple commands  No rash  Pulses noted      Additional Data:     Labs:    Results from last 7 days   Lab Units 20  0513   WBC Thousand/uL 2 69*   HEMOGLOBIN g/dL 8 9*   HEMATOCRIT % 30 1*   PLATELETS Thousands/uL 150   NEUTROS PCT % 74   LYMPHS PCT % 19   MONOS PCT % 7   EOS PCT % 0     Results from last 7 days   Lab Units 20  0513 20  0536   SODIUM mmol/L 141 140   POTASSIUM mmol/L 4 5 4 5   CHLORIDE mmol/L 92* 98*   CO2 mmol/L >45* 43*   BUN mg/dL 26* 17   CREATININE mg/dL 0 58* 0 48* ANION GAP mmol/L  --  -1*   CALCIUM mg/dL 9 4 9 6   GLUCOSE RANDOM mg/dL 124 104                 Results from last 7 days   Lab Units 07/02/20  0536 07/01/20  2117   PROCALCITONIN ng/ml <0 05 <0 05           * I Have Reviewed All Lab Data Listed Above  * Additional Pertinent Lab Tests Reviewed: All Labs Within Last 24 Hours Reviewed    Imaging:    Imaging Reports Reviewed Today Include: imaging studies noted  Imaging Personally Reviewed by Myself Includes:      Recent Cultures (last 7 days):           Last 24 Hours Medication List:     Current Facility-Administered Medications:  budesonide 0 5 mg Nebulization Q12H Quan Ahmadi DO   citalopram 20 mg Oral Daily Ashley Pitt MD   cyanocobalamin 1,000 mcg Intramuscular Once Balbir Munson MD   diltiazem 180 mg Oral Daily Ashley Pitt MD   enoxaparin 40 mg Subcutaneous Daily Ashley Pitt MD   ferrous sulfate 325 mg Oral Daily Ashley Pitt MD   folic acid 8,560 mcg Oral Daily Ashley Pitt MD   furosemide 10 mg Oral Daily Ashley Pitt MD   ipratropium 0 5 mg Nebulization Q6H Rody Covington MD   levalbuterol 1 25 mg Nebulization Q6H Rody Covington MD   melatonin 6 mg Oral HS PRN Ashley Pitt MD   pantoprazole 40 mg Oral Early Morning Kiersten Ramirez PA-C   potassium chloride 20 mEq Oral Daily MD Kenya Trotter ON 7/8/2020] predniSONE 40 mg Oral Daily Evelio Chakraborty MD   sodium chloride (PF) 3 mL Intravenous Q1H PRN Ashley Pitt MD        Today, Patient Was Seen By: Balbir Munson MD    ** Please Note: Dictation voice to text software may have been used in the creation of this document   **

## 2020-07-07 NOTE — OCCUPATIONAL THERAPY NOTE
Occupational Therapy Treatment Note      Ray Taylor    7/7/2020    Principal Problem:    Acute exacerbation of chronic obstructive pulmonary disease (COPD) ([address] )  Active Problems:    GERD    Anxiety    Leukopenia    Acute on chronic respiratory failure with hypercapnia (HCC)    Respiratory acidosis    Paroxysmal atrial fibrillation (HCC)    Fall at home    Moderate protein-calorie malnutrition [facility])      Past Medical History:   Diagnosis Date    Anxiety     Aortic regurgitation     Atrial flutter (HCC)     Chronic respiratory failure (HCC)     Colon polyp     COPD (chronic obstructive pulmonary disease) (HCC)     Deep venous thrombosis of distal lower extremity (HCC)     Diverticulitis     GI bleed     Hypertension     Iron deficiency anemia     MGUS (monoclonal gammopathy of unknown significance)     Osteoarthritis of hip     PAC (premature atrial contraction)     Paroxysmal atrial fibrillation (HCC)     Patent foramen ovale     Pulmonary artery hypertension (HCC)     Renal failure     Sinus tachycardia        Past Surgical History:   Procedure Laterality Date    APPENDECTOMY      COLON SURGERY      HERNIA REPAIR      JOINT REPLACEMENT      KNEE SURGERY          07/07/20 1123   Restrictions/Precautions   Weight Bearing Precautions Per Order No   Other Precautions Impulsive;Cognitive; Chair Alarm; Bed Alarm;Multiple lines;Telemetry;O2;Fall Risk  (4 5L NC O2)   Lifestyle   Autonomy Pt reports IND w/ all ADLs and mobility; A from wife for IADLs; (+) drives "sometimes" PTA    Reciprocal Relationships Pt has supportive wife at home- she works during the day  Pt reports wife is able to A PRN when not working     Service to Others Pt is a retired public TV director    Intrinsic Gratification Pt reports he enjoys watching TV and reading    Pain Assessment   Pain Assessment Tool Pain Assessment not indicated - pt denies pain   Pain Score No Pain   ADL   Where Assessed Chair   LB Dressing Assistance 5 Supervision/Setup   LB Dressing Deficit Setup;Verbal cueing;Supervision/safety; Increased time to complete   LB Dressing Comments Donning/doffing B/L socks w/ cross-leg style  Pt required VC for contiued diaphragmatic breathing t/o activity - Pt demonstrated fair carryover of education  Toileting Comments Denies need at this time  Bed Mobility   Supine to Sit Unable to assess   Sit to Supine Unable to assess   Additional Comments Pt ambulating in hallway w/ restorative staff Latricia Alfaros upon OT arrival  Pt seated OOB in chair w/ chair alarm activated and all needs in reach s/p OT session  Transfers   Sit to Stand 5  Supervision   Additional items Assist x 1; Increased time required;Verbal cues;Armrests   Stand to Sit 5  Supervision   Additional items Assist x 1; Increased time required;Verbal cues;Armrests   Additional Comments Transfers w/ RW  VC and TC required for safety and hand placement - Pt continues to present w/ poor had safety carryover  Functional Mobility   Functional Mobility 4  Minimal assistance   Additional Comments Pt demonstrated short distance household mobility in hallway w/ RW at CGA/Min A level  Pt's SpO2 remained >99% t/o mobility, however Pt presents w/ SOB/[place] and rated his exertion and SOB on 6/10 scale  Pt contiues to present impulsive during mobility, at times disregarding RW and ambulating and brisk pace despite VC and TC for slow of pace for energy conservation  Additional items Rolling walker   Cognition   Overall Cognitive Status WFL   Arousal/Participation Alert; Cooperative   Attention Attends with cues to redirect   Orientation Level Oriented X4   Memory Decreased recall of precautions;Decreased short term memory   Following Commands Follows one step commands with increased time or repetition   Comments Pt is pleasant and cooperative to participate in therapy this day   Pt continues to present w/ impulsivity and noted STM deficits w/ inability to recall energy conservation techniques that were discussed 5 minutes prior  Activity Tolerance   Activity Tolerance Patient limited by fatigue;Treatment limited secondary to medical complications (Comment)  (SOB/BURTON)   Medical Staff Made Aware RN cleared Pt for OT session   Assessment   Assessment Patient participated in Skilled OT session this date with interventions consisting of ADL re training with the use of correct body mechnaics, Energy Conservation techniques, deep breathing technique, safety awareness and fall prevention techniques, one handed dressing technique,  therapeutic activities to: increase activity tolerance and increase OOB/ sitting tolerance   Patient agreeable to OT treatment session, upon arrival patient was found ambulating in hallway w/ restorative staff  Pt participated in functional transfers, functional mobility, LB dressing, and Pt education of energy conservation techniques  Patient requiring frequent re direction, verbal cues for safety, verbal cues for correct technique, verbal cues for pacing thru activity steps, one step directives and frequent rest periods  Patient continues to be functioning below baseline level, occupational performance remains limited secondary to factors listed above and increased risk for falls and injury  From OT standpoint, recommendation at time of d/c would be Home with family support and Home OT  Patient to benefit from continued Occupational Therapy treatment while in the hospital to address deficits as defined above and maximize level of functional independence with ADLs and functional mobility  Plan   Treatment Interventions ADL retraining;Functional transfer training;UE strengthening/ROM; Endurance training;Cognitive reorientation;Patient/family training;Equipment evaluation/education; Fine motor coordination activities; Compensatory technique education; Activityengagement; Energy conservation   Goal Expiration Date 07/12/20   OT Treatment Day 1   OT Frequency 3-5x/wk Recommendation   OT Discharge Recommendation Home with skilled therapy  (Home OT and increased family support)   OT - OK to Discharge Yes  (when medically cleared)   Modified [place] Scale   Modified [place] Scale 4         Sebas Myles MS, OTR/L

## 2020-07-07 NOTE — PLAN OF CARE
Problem: OCCUPATIONAL THERAPY ADULT  Goal: Performs self-care activities at highest level of function for planned discharge setting  See evaluation for individualized goals  Description  Treatment Interventions: ADL retraining, Functional transfer training, UE strengthening/ROM, Endurance training, Patient/family training, Equipment evaluation/education, Cognitive reorientation, Energy conservation, Activityengagement, Compensatory technique education          See flowsheet documentation for full assessment, interventions and recommendations  Outcome: Progressing  Note:   Limitation: Decreased Safe judgement during ADL, Decreased cognition, Decreased endurance, Decreased ADL status  Prognosis: Good  Assessment: Patient participated in Skilled OT session this date with interventions consisting of ADL re training with the use of correct body mechnaics, Energy Conservation techniques, deep breathing technique, safety awareness and fall prevention techniques, one handed dressing technique,  therapeutic activities to: increase activity tolerance and increase OOB/ sitting tolerance   Patient agreeable to OT treatment session, upon arrival patient was found ambulating in hallway w/ restorative staff  Pt participated in functional transfers, functional mobility, LB dressing, and Pt education of energy conservation techniques  Patient requiring frequent re direction, verbal cues for safety, verbal cues for correct technique, verbal cues for pacing thru activity steps, one step directives and frequent rest periods  Patient continues to be functioning below baseline level, occupational performance remains limited secondary to factors listed above and increased risk for falls and injury  From OT standpoint, recommendation at time of d/c would be Home with family support and Home OT     Patient to benefit from continued Occupational Therapy treatment while in the hospital to address deficits as defined above and maximize level of functional independence with ADLs and functional mobility  OT Discharge Recommendation: Home with skilled therapy(Home OT and increased family support)  OT - OK to Discharge: Yes(when medically cleared)       Problem: OCCUPATIONAL THERAPY ADULT  Goal: Performs self-care activities at highest level of function for planned discharge setting  See evaluation for individualized goals  Description  Treatment Interventions: ADL retraining, Functional transfer training, UE strengthening/ROM, Endurance training, Patient/family training, Equipment evaluation/education, Cognitive reorientation, Energy conservation, Activityengagement, Compensatory technique education          See flowsheet documentation for full assessment, interventions and recommendations  Outcome: Progressing  Note:   Limitation: Decreased Safe judgement during ADL, Decreased cognition, Decreased endurance, Decreased ADL status  Prognosis: Good  Assessment: Patient participated in Skilled OT session this date with interventions consisting of ADL re training with the use of correct body mechnaics, Energy Conservation techniques, deep breathing technique, safety awareness and fall prevention techniques, one handed dressing technique,  therapeutic activities to: increase activity tolerance and increase OOB/ sitting tolerance   Patient agreeable to OT treatment session, upon arrival patient was found ambulating in hallway w/ restorative staff  Pt participated in functional transfers, functional mobility, LB dressing, and Pt education of energy conservation techniques  Patient requiring frequent re direction, verbal cues for safety, verbal cues for correct technique, verbal cues for pacing thru activity steps, one step directives and frequent rest periods  Patient continues to be functioning below baseline level, occupational performance remains limited secondary to factors listed above and increased risk for falls and injury     From OT standpoint, recommendation at time of d/c would be Home with family support and Home OT  Patient to benefit from continued Occupational Therapy treatment while in the hospital to address deficits as defined above and maximize level of functional independence with ADLs and functional mobility        OT Discharge Recommendation: Home with skilled therapy(Home OT and increased family support)  OT - OK to Discharge: Yes(when medically cleared)

## 2020-07-07 NOTE — ASSESSMENT & PLAN NOTE
pt with end stage COPD, FEV1/FVC 30%  Transitioned to p o   Prednisone, discharged on taper  Continue respiratory treatments  Pulmonary following

## 2020-07-07 NOTE — PLAN OF CARE
Problem: Potential for Falls  Goal: Patient will remain free of falls  Description  INTERVENTIONS:  - Assess patient frequently for physical needs  -  Identify cognitive and physical deficits and behaviors that affect risk of falls    -  Ethel fall precautions as indicated by assessment   - Educate patient/family on patient safety including physical limitations  - Instruct patient to call for assistance with activity based on assessment  - Modify environment to reduce risk of injury  - Consider OT/PT consult to assist with strengthening/mobility  Outcome: Progressing     Problem: PAIN - ADULT  Goal: Verbalizes/displays adequate comfort level or baseline comfort level  Description  Interventions:  - Encourage patient to monitor pain and request assistance  - Assess pain using appropriate pain scale  - Administer analgesics based on type and severity of pain and evaluate response  - Implement non-pharmacological measures as appropriate and evaluate response  - Consider cultural and social influences on pain and pain management  - Notify physician/advanced practitioner if interventions unsuccessful or patient reports new pain  Outcome: Progressing     Problem: INFECTION - ADULT  Goal: Absence or prevention of progression during hospitalization  Description  INTERVENTIONS:  - Assess and monitor for signs and symptoms of infection  - Monitor lab/diagnostic results  - Monitor all insertion sites, i e  indwelling lines, tubes, and drains  - Monitor endotracheal if appropriate and nasal secretions for changes in amount and color  - Ethel appropriate cooling/warming therapies per order  - Administer medications as ordered  - Instruct and encourage patient and family to use good hand hygiene technique  - Identify and instruct in appropriate isolation precautions for identified infection/condition  Outcome: Progressing  Goal: Absence of fever/infection during neutropenic period  Description  INTERVENTIONS:  - Monitor WBC    Outcome: Progressing     Problem: SAFETY ADULT  Goal: Maintain or return to baseline ADL function  Description  INTERVENTIONS:  -  Assess patient's ability to carry out ADLs; assess patient's baseline for ADL function and identify physical deficits which impact ability to perform ADLs (bathing, care of mouth/teeth, toileting, grooming, dressing, etc )  - Assess/evaluate cause of self-care deficits   - Assess range of motion  - Assess patient's mobility; develop plan if impaired  - Assess patient's need for assistive devices and provide as appropriate  - Encourage maximum independence but intervene and supervise when necessary  - Involve family in performance of ADLs  - Assess for home care needs following discharge   - Consider OT consult to assist with ADL evaluation and planning for discharge  - Provide patient education as appropriate  Outcome: Progressing  Goal: Maintain or return mobility status to optimal level  Description  INTERVENTIONS:  - Assess patient's baseline mobility status (ambulation, transfers, stairs, etc )    - Identify cognitive and physical deficits and behaviors that affect mobility  - Identify mobility aids required to assist with transfers and/or ambulation (gait belt, sit-to-stand, lift, walker, cane, etc )  - Ewing fall precautions as indicated by assessment  - Record patient progress and toleration of activity level on Mobility SBAR; progress patient to next Phase/Stage  - Instruct patient to call for assistance with activity based on assessment  - Consider rehabilitation consult to assist with strengthening/weightbearing, etc   Outcome: Progressing     Problem: DISCHARGE PLANNING  Goal: Discharge to home or other facility with appropriate resources  Description  INTERVENTIONS:  - Identify barriers to discharge w/patient and caregiver  - Arrange for needed discharge resources and transportation as appropriate  - Identify discharge learning needs (meds, wound care, etc )  - Arrange for interpretive services to assist at discharge as needed  - Refer to Case Management Department for coordinating discharge planning if the patient needs post-hospital services based on physician/advanced practitioner order or complex needs related to functional status, cognitive ability, or social support system  Outcome: Progressing     Problem: Knowledge Deficit  Goal: Patient/family/caregiver demonstrates understanding of disease process, treatment plan, medications, and discharge instructions  Description  Complete learning assessment and assess knowledge base    Interventions:  - Provide teaching at level of understanding  - Provide teaching via preferred learning methods  Outcome: Progressing     Problem: CARDIOVASCULAR - ADULT  Goal: Maintains optimal cardiac output and hemodynamic stability  Description  INTERVENTIONS:  - Monitor I/O, vital signs and rhythm  - Monitor for S/S and trends of decreased cardiac output  - Administer and titrate ordered vasoactive medications to optimize hemodynamic stability  - Assess quality of pulses, skin color and temperature  - Assess for signs of decreased coronary artery perfusion  - Instruct patient to report change in severity of symptoms  Outcome: Progressing  Goal: Absence of cardiac dysrhythmias or at baseline rhythm  Description  INTERVENTIONS:  - Continuous cardiac monitoring, vital signs, obtain 12 lead EKG if ordered  - Administer antiarrhythmic and heart rate control medications as ordered  - Monitor electrolytes and administer replacement therapy as ordered  Outcome: Progressing     Problem: RESPIRATORY - ADULT  Goal: Achieves optimal ventilation and oxygenation  Description  INTERVENTIONS:  - Assess for changes in respiratory status  - Assess for changes in mentation and behavior  - Position to facilitate oxygenation and minimize respiratory effort  - Oxygen administered by appropriate delivery if ordered  - Initiate smoking cessation education as indicated  - Encourage broncho-pulmonary hygiene including cough, deep breathe, Incentive Spirometry  - Assess the need for suctioning and aspirate as needed  - Assess and instruct to report SOB or any respiratory difficulty  - Respiratory Therapy support as indicated  Outcome: Progressing     Problem: Nutrition/Hydration-ADULT  Goal: Nutrient/Hydration intake appropriate for improving, restoring or maintaining nutritional needs  Description  Monitor and assess patient's nutrition/hydration status for malnutrition  Collaborate with interdisciplinary team and initiate plan and interventions as ordered  Monitor patient's weight and dietary intake as ordered or per policy  Utilize nutrition screening tool and intervene as necessary  Determine patient's food preferences and provide high-protein, high-caloric foods as appropriate       INTERVENTIONS:  - Monitor oral intake, urinary output, labs, and treatment plans  - Assess nutrition and hydration status and recommend course of action  - Evaluate amount of meals eaten  - Assist patient with eating if necessary   - Allow adequate time for meals  - Recommend/ encourage appropriate diets, oral nutritional supplements, and vitamin/mineral supplements  - Order, calculate, and assess calorie counts as needed  - Recommend, monitor, and adjust tube feedings and TPN/PPN based on assessed needs  - Assess need for intravenous fluids  - Provide specific nutrition/hydration education as appropriate  - Include patient/family/caregiver in decisions related to nutrition  Outcome: Progressing

## 2020-07-07 NOTE — PROGRESS NOTES
Progress Note - Pulmonary   Jayrlyn Setting 65 y o  male MRN: [id number]  Unit/Bed#: [facility] 509-01 Encounter: [id number]    Assessment / Plan     Acute on Chronic Hypoxic and Hypercapnic Respiratory failure in the setting of Severe COPD Exacerbation      - Initially placed on bipap, Now on baseline Oxygen at 4 L,saturating 96%    - Nocturnal Pulse Ox demonstrated desaturation at 63%  w/ 2 desaturation events lasting 2 min and 26 secs,   - Placed on BiPAP due to worsening ABG  pH 7 291, pCO2 121, HCO3 57 1-   -ABG post 2 hours BiPAP use- pH 7 35, pCO2 104, HCO3 57  7  -Continue BiPAP 16/5 as tolerated by the patient  - Weaning off Steroids, Solumedrol 40mg to PO Prednisone 40 mg QD, taper off 10 mg every 3 days until completed the course  - Continue Nebulized bronchodilators - Pulmicort, Ipratropium and Xopenex  - Continue Incentive Spirometry use  - Continue activity as able  - Outpatient Follow up w/ Dr  Paras Broad      PAF  Rate controlled  Continue diltiazem 180 mg QD     Leukopenia:   - has leukopenia @ baseline, worsening  - WBC < 4 @ 2 69, HR: 96, RR: 34, meets SIRS  - CXR w/o acute abnormality; no source of infection  Improving today   Continue monitor  If worsens will consult Heme-Onc     HTN     GERD     on Protonix      Subjective:     Pt is feeling better this morning  No adverse events overnight  Denies any fever, chills, n/v, worsening  In cough, dyspnea, or wheezing  Was able to intermittently use BiPAP at night  Review of Systems   Constitutional: Negative for chills, diaphoresis, fatigue and fever  HENT: Negative for rhinorrhea, sinus pressure, sinus pain and sneezing  Respiratory: Positive for shortness of breath (baseline COPD)  Negative for apnea, cough, choking, chest tightness, wheezing and stridor  Cardiovascular: Negative for chest pain, palpitations and leg swelling  Endocrine: Negative for polydipsia and polyphagia     Allergic/Immunologic: Negative for environmental allergies, food allergies and immunocompromised state  Psychiatric/Behavioral: Negative for agitation, behavioral problems and confusion  Objective:     Vitals:    07/07/20 0251 07/07/20 0555 07/07/20 0708 07/07/20 0731   BP: 142/69   139/63   BP Location: Right arm   Right arm   Pulse: 75   87   Resp: (!) 30   (!) 23   Temp:    98 2 °F (36 8 °C)   TempSrc:    Oral   SpO2: 98%  100% 100%   Weight:  56 2 kg (123 lb 14 4 oz)     Height:               Intake/Output Summary (Last 24 hours) at 7/7/2020 0830  Last data filed at 7/7/2020 0547  Gross per 24 hour   Intake 360 ml   Output 1150 ml   Net -790 ml         Physical Exam   Constitutional: No distress  HENT:   Head: Normocephalic and atraumatic  Neck: Normal range of motion  Neck supple  Cardiovascular: Normal rate, regular rhythm, normal heart sounds and intact distal pulses  Exam reveals no gallop and no friction rub  No murmur heard  Pulmonary/Chest: No stridor  No respiratory distress  He has no wheezes  He has no rales  Very Severe COPD  Decreased Breath Sounds B/L   Abdominal: Soft  Skin: He is not diaphoretic  Psychiatric: He has a normal mood and affect   His behavior is normal  Judgment and thought content normal        Labs:   ABG:   Lab Results   Component Value Date    PHART 7 359 07/06/2020    ZYV6LEU 104 7 (HH) 07/06/2020    PO2ART 34 4 (LL) 07/06/2020    UUR5QRL 57 7 (H) 07/06/2020    BEART 27 3 07/06/2020    SOURCE Artery 07/06/2020   , BNP: No results found for: BNP, CBC:   Lab Results   Component Value Date    WBC 2 69 (L) 07/07/2020    HGB 8 9 (L) 07/07/2020    HCT 30 1 (L) 07/07/2020     (H) 07/07/2020     07/07/2020    MCH 32 5 07/07/2020    MCHC 29 6 (L) 07/07/2020    RDW 14 2 07/07/2020    MPV 9 7 07/07/2020    NRBC 0 07/07/2020   , CMP:   Lab Results   Component Value Date    SODIUM 141 07/07/2020    K 4 5 07/07/2020    CL 92 (L) 07/07/2020    CO2 >45 (HH) 07/07/2020    BUN 26 (H) 07/07/2020    CREATININE 0 58 (L) 07/07/2020    CALCIUM 9 4 07/07/2020    EGFR 102 07/07/2020   , PT/INR: No results found for: PT, INR, Troponin: No results found for: TROPONINI  Results from last 7 days   Lab Units 07/07/20  0513 07/05/20  0511 07/04/20  0530   WBC Thousand/uL 2 69* 3 28* 2 17*   HEMOGLOBIN g/dL 8 9* 8 1* 7 8*   HEMATOCRIT % 30 1* 27 0* 25 7*   PLATELETS Thousands/uL 150 133* 136*   NEUTROS PCT % 74 88* 84*   MONOS PCT % 7 4 3*      Results from last 7 days   Lab Units 07/07/20  0513 07/02/20  0536 07/01/20  1906 07/01/20  1035   POTASSIUM mmol/L 4 5 4 5  --  4 1   CHLORIDE mmol/L 92* 98*  --  96*   CO2 mmol/L >45* 43*  --  >45*   CO2, I-STAT mmol/L  --   --  >45*  --    BUN mg/dL 26* 17  --  13   CREATININE mg/dL 0 58* 0 48*  --  0 57*   CALCIUM mg/dL 9 4 9 6  --  9 7   GLUCOSE, ISTAT mg/dl  --   --  249*  --      Results from last 7 days   Lab Units 07/07/20  0513   MAGNESIUM mg/dL 2 6                  0   Lab Value Date/Time    TROPONINI <0 02 07/01/2020 1035    TROPONINI <0 02 12/26/2019 1337    TROPONINI 0 02 11/22/2019 0046    TROPONINI <0 02 02/21/2019 0943    TROPONINI <0 02 08/27/2017 0652    TROPONINI <0 04 06/02/2015 0326    TROPONINI <0 04 06/01/2015 1949    TROPONINI <0 04 05/14/2015 0600       Microbiology:      Imaging and other studies: I have personally reviewed pertinent reports   , I have personally reviewed pertinent films in PACS and I have personally reviewed pertinent films in PACS with a Radiologist

## 2020-07-07 NOTE — ASSESSMENT & PLAN NOTE
In setting of chronic COPD  Pulmonary following - patient is being evaluated for BiPAP candidacy at discharge

## 2020-07-08 VITALS
SYSTOLIC BLOOD PRESSURE: 127 MMHG | TEMPERATURE: 97.9 F | DIASTOLIC BLOOD PRESSURE: 58 MMHG | HEART RATE: 88 BPM | BODY MASS INDEX: 19.27 KG/M2 | OXYGEN SATURATION: 100 % | RESPIRATION RATE: 22 BRPM | WEIGHT: 122.8 LBS | HEIGHT: 67 IN

## 2020-07-08 PROCEDURE — 94760 N-INVAS EAR/PLS OXIMETRY 1: CPT

## 2020-07-08 PROCEDURE — 99239 HOSP IP/OBS DSCHRG MGMT >30: CPT | Performed by: INTERNAL MEDICINE

## 2020-07-08 PROCEDURE — 94660 CPAP INITIATION&MGMT: CPT

## 2020-07-08 PROCEDURE — 94640 AIRWAY INHALATION TREATMENT: CPT

## 2020-07-08 RX ORDER — PREDNISONE 10 MG/1
10 TABLET ORAL DAILY
Qty: 30 TABLET | Refills: 0 | Status: SHIPPED | OUTPATIENT
Start: 2020-07-09

## 2020-07-08 RX ORDER — CYANOCOBALAMIN (VITAMIN B-12) 500 MCG
500 TABLET ORAL DAILY
Qty: 30 TABLET | Refills: 0 | Status: SHIPPED | OUTPATIENT
Start: 2020-07-08 | End: 2020-08-07

## 2020-07-08 RX ORDER — CYANOCOBALAMIN 1000 UG/ML
1000 INJECTION INTRAMUSCULAR; SUBCUTANEOUS ONCE
Status: COMPLETED | OUTPATIENT
Start: 2020-07-08 | End: 2020-07-08

## 2020-07-08 RX ADMIN — FOLIC ACID 1000 MCG: 1 TABLET ORAL at 08:52

## 2020-07-08 RX ADMIN — PANTOPRAZOLE SODIUM 40 MG: 40 TABLET, DELAYED RELEASE ORAL at 05:46

## 2020-07-08 RX ADMIN — FERROUS SULFATE TAB 325 MG (65 MG ELEMENTAL FE) 325 MG: 325 (65 FE) TAB at 08:52

## 2020-07-08 RX ADMIN — PREDNISONE 40 MG: 20 TABLET ORAL at 08:51

## 2020-07-08 RX ADMIN — FUROSEMIDE 10 MG: 20 TABLET ORAL at 08:53

## 2020-07-08 RX ADMIN — IPRATROPIUM BROMIDE 0.5 MG: 0.5 SOLUTION RESPIRATORY (INHALATION) at 14:13

## 2020-07-08 RX ADMIN — LEVALBUTEROL HYDROCHLORIDE 1.25 MG: 1.25 SOLUTION, CONCENTRATE RESPIRATORY (INHALATION) at 00:07

## 2020-07-08 RX ADMIN — IPRATROPIUM BROMIDE 0.5 MG: 0.5 SOLUTION RESPIRATORY (INHALATION) at 07:07

## 2020-07-08 RX ADMIN — LEVALBUTEROL HYDROCHLORIDE 1.25 MG: 1.25 SOLUTION, CONCENTRATE RESPIRATORY (INHALATION) at 14:13

## 2020-07-08 RX ADMIN — LEVALBUTEROL HYDROCHLORIDE 1.25 MG: 1.25 SOLUTION, CONCENTRATE RESPIRATORY (INHALATION) at 07:07

## 2020-07-08 RX ADMIN — DILTIAZEM HYDROCHLORIDE 180 MG: 180 CAPSULE, COATED, EXTENDED RELEASE ORAL at 08:52

## 2020-07-08 RX ADMIN — CYANOCOBALAMIN 1000 MCG: 1000 INJECTION, SOLUTION INTRAMUSCULAR at 11:55

## 2020-07-08 RX ADMIN — CITALOPRAM HYDROBROMIDE 20 MG: 20 TABLET ORAL at 08:53

## 2020-07-08 RX ADMIN — ENOXAPARIN SODIUM 40 MG: 40 INJECTION SUBCUTANEOUS at 08:54

## 2020-07-08 RX ADMIN — POTASSIUM CHLORIDE 20 MEQ: 1500 TABLET, EXTENDED RELEASE ORAL at 08:54

## 2020-07-08 RX ADMIN — IPRATROPIUM BROMIDE 0.5 MG: 0.5 SOLUTION RESPIRATORY (INHALATION) at 00:07

## 2020-07-08 RX ADMIN — BUDESONIDE 0.5 MG: 0.5 INHALANT RESPIRATORY (INHALATION) at 07:07

## 2020-07-08 NOTE — ASSESSMENT & PLAN NOTE
Malnutrition Findings:   Malnutrition type: Chronic illness  Degree of Malnutrition: Malnutrition of moderate degree(malnutrition related to COPD as evidenced by mild depletion of orbital body fat and mild depletion of muscle mass seen at clavicle to be treated with regular diet and nutrition supplements )    BMI Findings: Body mass index is 19 23 kg/m²

## 2020-07-08 NOTE — DISCHARGE SUMMARY
Discharge- Jimbo Richardson 1950, 65 y o  male MRN: [id number]    Unit/Bed#: [facility] 509-01 Encounter: [id number]    Primary Care Provider: Colby Casillas MD   Date and time admitted to hospital: 7/1/2020 10:17 AM        * Acute exacerbation of chronic obstructive pulmonary disease (COPD) ([facility] 75 )  Assessment & Plan  pt with end stage COPD, FEV1/FVC 30%  Transitioned to p o  Prednisone, discharged on taper  Continue respiratory treatments  Pulmonary following      Moderate protein-calorie malnutrition (HCC)  Assessment & Plan  Malnutrition Findings:   Malnutrition type: Chronic illness  Degree of Malnutrition: Malnutrition of moderate degree(malnutrition related to COPD as evidenced by mild depletion of orbital body fat and mild depletion of muscle mass seen at clavicle to be treated with regular diet and nutrition supplements )    BMI Findings: Body mass index is 19 23 kg/m²  Fall at home  Assessment & Plan  - no LOC  - no injury reported; fell onto bed  - mechanical fall  - PT/OT     Paroxysmal atrial fibrillation (HCC)  Assessment & Plan  Rate controlled  Continue diltiazem 180 mg QD    Respiratory acidosis  Assessment & Plan  In setting of chronic COPD  Pulmonary input noted - patient is deemed appropriate for BiPAP treatment on discharge  Arrangements underway for BiPAP on discharge by case management    Acute on chronic respiratory failure with hypercapnia ([facility] 75 )  Assessment & Plan  In setting of COPD  Patient now to his baseline oxygen requirements  Continue HS BiPAP        Leukopenia  Assessment & Plan  Monitor counts    Anxiety  Assessment & Plan  Continue PTA Celexa 20 mg QD    GERD  Assessment & Plan  Continue PPI    Anemia  Assessment & Plan  Noted to have low B12 levels will supplement with B12  Outpatient Hematology follow-up            Discharge Summary - [facility]    Patient Information: Jimbo Richardson 65 y o  male MRN: [id number]  Unit/Bed#: [facility] 509-01 Encounter: [id number]    Discharging Physician / Practitioner: Wilber Romeo MD  PCP: Jerel Joshi MD  Admission Date: 7/1/2020  Discharge Date: 07/08/20    Disposition:     Home    Reason for Admission:  Shortness of breath    Discharge Diagnoses:     Principal Problem:    Acute exacerbation of chronic obstructive pulmonary disease (COPD) ([facility] )  Active Problems:    Anemia    GERD    Anxiety    Leukopenia    Acute on chronic respiratory failure with hypercapnia (HCC)    Respiratory acidosis    Paroxysmal [facility] fibrillation ([facility] )    Fall at home    Moderate protein-calorie malnutrition ([facility] 75 )  Resolved Problems:    * No resolved hospital problems  *      Consultations During Hospital Stay:  · Pulmonary    Procedures Performed:     · Chest x-ray emphysematous lungs      Hospital Course:     Alexandra Nava is a 65 y o  male patient who originally presented to the hospital on 7/1/2020 due to shortness of breath  Patient with history of chronic hypoxic respiratory failure 4 L supplemental oxygen chronic COPD presented with worsening shortness of breath  He was diagnosed with acute on chronic hypoxic respiratory failure, COPD exacerbation received IV Solu-Medrol  He was seen in consultation with Pulmonary, he was also noted to have respiratory acidosis  His respiratory symptoms are improving he has been transitioned to p o  Prednisone and will be discharged on prednisone taper  He will continue with respiratory treatments  His oxygen needs are at baseline 4 L supplemental oxygen, given respiratory acidosis patient was evaluated by Pulmonary and is deemed appropriate for BiPAP at discharge  Arrangements for BiPAP at discharge per case management  Patient reports symptomatic improvement remains hemodynamically stable and is deemed ready for discharge today  Kindly review the chart for details      Condition at Discharge: fair     Discharge Day Visit / Exam:     Subjective:      Comfortably sitting up in bed  Reports feeling okay  Agreeable to discharge plan    Vitals: Blood Pressure: 127/58 (07/08/20 1515)  Pulse: 88 (07/08/20 1515)  Temperature: 97 9 °F (36 6 °C) (07/08/20 1515)  Temp Source: Oral (07/08/20 1515)  Respirations: 22 (07/08/20 1515)  Height: 5' 7" (170 2 cm) (07/01/20 2048)  Weight - Scale: 55 7 kg (122 lb 12 7 oz) (07/08/20 0545)  SpO2: 100 % (07/08/20 1515)  Exam:   Physical Exam    Comfortably sitting up in bed  Dyspneic at rest but able to complete sentences  Pursed lip breathing noted  Features of protein calorie malnutrition noted  Emphysematous lungs  Diminished breath sounds bilaterally  Heart sounds S1-S2 noted  Abdomen soft nontender  Awake obeys simple commands  Pulses noted  No rash    Discharge instructions/Information to patient and family:   See after visit summary for information provided to patient and family  Discharge plan discussed the patient, updated spouse in detail  Outpatient follow-up with Pulmonary, primary care physician    Provisions for Follow-Up Care:  See after visit summary for information related to follow-up care and any pertinent home health orders  Planned Readmission: no     Discharge Statement:  I spent 45 minutes discharging the patient  This time was spent on the day of discharge  I had direct contact with the patient on the day of discharge  Greater than 50% of the total time was spent examining patient, answering all patient questions, arranging and discussing plan of care with patient as well as directly providing post-discharge instructions  Additional time then spent on discharge activities  Discharge Medications:  See after visit summary for reconciled discharge medications provided to patient and family        ** Please Note: This note has been constructed using a voice recognition system **

## 2020-07-08 NOTE — SOCIAL WORK
[facility] referral was sent to [facility] yesterday for BIPAP with script and supporting documents

## 2020-07-08 NOTE — PLAN OF CARE
Problem: Potential for Falls  Goal: Patient will remain free of falls  Description  INTERVENTIONS:  - Assess patient frequently for physical needs  -  Identify cognitive and physical deficits and behaviors that affect risk of falls    -  [place] fall precautions as indicated by assessment   - Educate patient/family on patient safety including physical limitations  - Instruct patient to call for assistance with activity based on assessment  - Modify environment to reduce risk of injury  - Consider OT/PT consult to assist with strengthening/mobility  Outcome: Progressing     Problem: PAIN - ADULT  Goal: Verbalizes/displays adequate comfort level or baseline comfort level  Description  Interventions:  - Encourage patient to monitor pain and request assistance  - Assess pain using appropriate pain scale  - Administer analgesics based on type and severity of pain and evaluate response  - Implement non-pharmacological measures as appropriate and evaluate response  - Consider cultural and social influences on pain and pain management  - Notify physician/advanced practitioner if interventions unsuccessful or patient reports new pain  Outcome: Progressing     Problem: INFECTION - ADULT  Goal: Absence or prevention of progression during hospitalization  Description  INTERVENTIONS:  - Assess and monitor for signs and symptoms of infection  - Monitor lab/diagnostic results  - Monitor all insertion sites, i e  indwelling lines, tubes, and drains  - Monitor endotracheal if appropriate and nasal secretions for changes in amount and color  - [place] appropriate cooling/warming therapies per order  - Administer medications as ordered  - Instruct and encourage patient and family to use good hand hygiene technique  - Identify and instruct in appropriate isolation precautions for identified infection/condition  Outcome: Progressing  Goal: Absence of fever/infection during neutropenic period  Description  INTERVENTIONS:  - Monitor WBC    Outcome: Progressing     Problem: SAFETY ADULT  Goal: Maintain or return to baseline ADL function  Description  INTERVENTIONS:  -  Assess patient's ability to carry out ADLs; assess patient's baseline for ADL function and identify physical deficits which impact ability to perform ADLs (bathing, care of mouth/teeth, toileting, grooming, dressing, etc )  - Assess/evaluate cause of self-care deficits   - Assess range of motion  - Assess patient's mobility; develop plan if impaired  - Assess patient's need for assistive devices and provide as appropriate  - Encourage maximum independence but intervene and supervise when necessary  - Involve family in performance of ADLs  - Assess for home care needs following discharge   - Consider OT consult to assist with ADL evaluation and planning for discharge  - Provide patient education as appropriate  Outcome: Progressing  Goal: Maintain or return mobility status to optimal level  Description  INTERVENTIONS:  - Assess patient's baseline mobility status (ambulation, transfers, stairs, etc )    - Identify cognitive and physical deficits and behaviors that affect mobility  - Identify mobility aids required to assist with transfers and/or ambulation (gait belt, sit-to-stand, lift, walker, cane, etc )  - [place] fall precautions as indicated by assessment  - Record patient progress and toleration of activity level on Mobility SBAR; progress patient to next Phase/Stage  - Instruct patient to call for assistance with activity based on assessment  - Consider rehabilitation consult to assist with strengthening/weightbearing, etc   Outcome: Progressing     Problem: DISCHARGE PLANNING  Goal: Discharge to home or other facility with appropriate resources  Description  INTERVENTIONS:  - Identify barriers to discharge w/patient and caregiver  - Arrange for needed discharge resources and transportation as appropriate  - Identify discharge learning needs (meds, wound care, etc )  - Arrange for interpretive services to assist at discharge as needed  - Refer to Case Management Department for coordinating discharge planning if the patient needs post-hospital services based on physician/advanced practitioner order or complex needs related to functional status, cognitive ability, or social support system  Outcome: Progressing     Problem: Knowledge Deficit  Goal: Patient/family/caregiver demonstrates understanding of disease process, treatment plan, medications, and discharge instructions  Description  Complete learning assessment and assess knowledge base    Interventions:  - Provide teaching at level of understanding  - Provide teaching via preferred learning methods  Outcome: Progressing     Problem: CARDIOVASCULAR - ADULT  Goal: Maintains optimal cardiac output and hemodynamic stability  Description  INTERVENTIONS:  - Monitor I/O, vital signs and rhythm  - Monitor for S/S and trends of decreased cardiac output  - Administer and titrate ordered vasoactive medications to optimize hemodynamic stability  - Assess quality of pulses, skin color and temperature  - Assess for signs of decreased coronary artery perfusion  - Instruct patient to report change in severity of symptoms  Outcome: Progressing  Goal: Absence of cardiac dysrhythmias or at baseline rhythm  Description  INTERVENTIONS:  - Continuous cardiac monitoring, vital signs, obtain 12 lead EKG if ordered  - Administer antiarrhythmic and heart rate control medications as ordered  - Monitor electrolytes and administer replacement therapy as ordered  Outcome: Progressing     Problem: RESPIRATORY - ADULT  Goal: Achieves optimal ventilation and oxygenation  Description  INTERVENTIONS:  - Assess for changes in respiratory status  - Assess for changes in mentation and behavior  - Position to facilitate oxygenation and minimize respiratory effort  - Oxygen administered by appropriate delivery if ordered  - Initiate smoking cessation education as indicated  - Encourage broncho-pulmonary hygiene including cough, deep breathe, Incentive Spirometry  - Assess the need for suctioning and aspirate as needed  - Assess and instruct to report SOB or any respiratory difficulty  - Respiratory Therapy support as indicated  Outcome: Progressing     Problem: Nutrition/Hydration-ADULT  Goal: Nutrient/Hydration intake appropriate for improving, restoring or maintaining nutritional needs  Description  Monitor and assess patient's nutrition/hydration status for malnutrition  Collaborate with interdisciplinary team and initiate plan and interventions as ordered  Monitor patient's weight and dietary intake as ordered or per policy  Utilize nutrition screening tool and intervene as necessary  Determine patient's food preferences and provide high-protein, high-caloric foods as appropriate       INTERVENTIONS:  - Monitor oral intake, urinary output, labs, and treatment plans  - Assess nutrition and hydration status and recommend course of action  - Evaluate amount of meals eaten  - Assist patient with eating if necessary   - Allow adequate time for meals  - Recommend/ encourage appropriate diets, oral nutritional supplements, and vitamin/mineral supplements  - Order, calculate, and assess calorie counts as needed  - Recommend, monitor, and adjust tube feedings and TPN/PPN based on assessed needs  - Assess need for intravenous fluids  - Provide specific nutrition/hydration education as appropriate  - Include patient/family/caregiver in decisions related to nutrition  Outcome: Progressing

## 2020-07-08 NOTE — ASSESSMENT & PLAN NOTE
In setting of chronic COPD  Pulmonary input noted - patient is deemed appropriate for BiPAP treatment on discharge  Arrangements underway for BiPAP on discharge by case management

## 2020-07-08 NOTE — SOCIAL WORK
Spoke to [place] at [facility] to confirm he has all paperwork needed for BIPAP order  He will wait for confirmation of dc to call family to arrange delivery at home  Dr Bere Mcconnell informed CM that patient is ready for dc but requests discharge another day because his wife is having a medical procedure today  Met with patient to discuss discharge needs  Patient now very upset and says he explained he cannot go home today due to wife's needs  He would agree to [facility] and consents to CM contacting wife  Called wife at cell # [phone] but not able to leave a message  Called home # [phone] and left message for wife to call  Called daughter, Minnie Woods, [phone], and left message for call back

## 2020-07-09 ENCOUNTER — TRANSITIONAL CARE MANAGEMENT (OUTPATIENT)
Dept: INTERNAL MEDICINE CLINIC | Facility: CLINIC | Age: 70
End: 2020-07-09

## 2020-07-09 ENCOUNTER — TELEPHONE (OUTPATIENT)
Dept: PULMONOLOGY | Facility: CLINIC | Age: 70
End: 2020-07-09

## 2020-07-10 ENCOUNTER — PATIENT OUTREACH (OUTPATIENT)
Dept: INTERNAL MEDICINE CLINIC | Facility: CLINIC | Age: 70
End: 2020-07-10

## 2020-07-12 DIAGNOSIS — F32.A DEPRESSION, UNSPECIFIED DEPRESSION TYPE: ICD-10-CM

## 2020-07-12 RX ORDER — CITALOPRAM 20 MG/1
TABLET ORAL
Qty: 90 TABLET | Refills: 3 | Status: SHIPPED | OUTPATIENT
Start: 2020-07-12

## 2020-07-13 ENCOUNTER — TELEPHONE (OUTPATIENT)
Dept: OTHER | Facility: HOSPITAL | Age: 70
End: 2020-07-13

## 2020-07-13 ENCOUNTER — DOCUMENTATION (OUTPATIENT)
Dept: PULMONOLOGY | Facility: CLINIC | Age: 70
End: 2020-07-13

## 2020-07-13 DIAGNOSIS — J96.11 CHRONIC RESPIRATORY FAILURE WITH HYPOXIA (HCC): Primary | ICD-10-CM

## 2020-07-13 DIAGNOSIS — G47.33 OSA (OBSTRUCTIVE SLEEP APNEA): ICD-10-CM

## 2020-07-13 NOTE — PROGRESS NOTES

## 2020-07-14 ENCOUNTER — OFFICE VISIT (OUTPATIENT)
Dept: PULMONOLOGY | Facility: CLINIC | Age: 70
End: 2020-07-14
Payer: MEDICARE

## 2020-07-14 VITALS
BODY MASS INDEX: 19.15 KG/M2 | HEIGHT: 67 IN | WEIGHT: 122 LBS | SYSTOLIC BLOOD PRESSURE: 120 MMHG | TEMPERATURE: 97 F | OXYGEN SATURATION: 99 % | HEART RATE: 93 BPM | DIASTOLIC BLOOD PRESSURE: 60 MMHG

## 2020-07-14 DIAGNOSIS — J44.9 CHRONIC OBSTRUCTIVE PULMONARY DISEASE, UNSPECIFIED COPD TYPE (HCC): Primary | ICD-10-CM

## 2020-07-14 DIAGNOSIS — I10 ESSENTIAL HYPERTENSION: ICD-10-CM

## 2020-07-14 DIAGNOSIS — J96.12 CHRONIC RESPIRATORY FAILURE WITH HYPOXIA AND HYPERCAPNIA (HCC): ICD-10-CM

## 2020-07-14 DIAGNOSIS — R06.00 DOE (DYSPNEA ON EXERTION): ICD-10-CM

## 2020-07-14 DIAGNOSIS — J96.11 CHRONIC RESPIRATORY FAILURE WITH HYPOXIA AND HYPERCAPNIA (HCC): ICD-10-CM

## 2020-07-14 DIAGNOSIS — K21.9 GASTROESOPHAGEAL REFLUX DISEASE WITHOUT ESOPHAGITIS: ICD-10-CM

## 2020-07-14 PROCEDURE — 1160F RVW MEDS BY RX/DR IN RCRD: CPT | Performed by: INTERNAL MEDICINE

## 2020-07-14 PROCEDURE — 3008F BODY MASS INDEX DOCD: CPT | Performed by: INTERNAL MEDICINE

## 2020-07-14 PROCEDURE — 4040F PNEUMOC VAC/ADMIN/RCVD: CPT | Performed by: INTERNAL MEDICINE

## 2020-07-14 PROCEDURE — 3078F DIAST BP <80 MM HG: CPT | Performed by: INTERNAL MEDICINE

## 2020-07-14 PROCEDURE — 1111F DSCHRG MED/CURRENT MED MERGE: CPT | Performed by: INTERNAL MEDICINE

## 2020-07-14 PROCEDURE — 99215 OFFICE O/P EST HI 40 MIN: CPT | Performed by: INTERNAL MEDICINE

## 2020-07-14 PROCEDURE — 3074F SYST BP LT 130 MM HG: CPT | Performed by: INTERNAL MEDICINE

## 2020-07-14 PROCEDURE — 1036F TOBACCO NON-USER: CPT | Performed by: INTERNAL MEDICINE

## 2020-07-14 NOTE — PROGRESS NOTES
Office Progress Note - Pulmonary    [place] 65 y o  male MRN: [id number]    Encounter: [id number]      Assessment:   Chronic obstructive pulmonary disease with a recent acute exacerbation   Chronic hypoxemic and hypercapnic respiratory failure   Dyspnea on exertion   Essential hypertension   Gastroesophageal reflux disease  Plan:     Ipratropium/albuterol nebulized 4 times a day   Prednisone taper   Budesonide 0 5 mg nebulized twice a day   BiPAP during the day and at night as tolerated   Oxygen supplement 24 hours a day   Nutritional supplement   Follow-up in 3 months  Discussion:   The patient's most recent acute on chronic hypoxemic and hypercapnic respiratory failure is due to COPD with acute exacerbation  He is back to his baseline  We talked about strategies how to maintain his carbon dioxide level acceptable to avoid another hospitalization  I have suggested to the patient to have the nocturnal BiPAP every night  However he does have issues keep minute at night and sometimes he gets confused and the wife was concerned he may not have the oxygen on  The alternative would be to have it on for 2-3 hours in the morning and 2-3 hours in the afternoon as tolerated  Hopefully this will keep his carbon dioxide level within a reasonable range  He will use the oxygen 24 hours a day  He will continue with the prednisone taper  I have maintained him on the ipratropium and albuterol nebulized 4 times a day  He will continue the budesonide 0 5 mg nebulized twice a day  We talked about nutritional supplement to be able to gain weight  He will continue with the ensure and have higher protein intake  I will see him in 3 months  Subjective: The patient is here for a follow-up visit  About 2 weeks ago he was admitted to [facility] with acute on chronic hypercapnic and hypoxemic respiratory failure due to COPD exacerbation    The patient was treated with steroids and bronchodilators  Currently he is on a prednisone taper  He was also placed on BiPAP  The patient feels he is close to his baseline  However he feels tired  He has lost weight  He started taking nutritional supplement mainly ensure  He is using ipratropium/albuterol nebulized 4 times a day and budesonide 0 5 mg nebulized twice a day  He is using the oxygen 24 hours a day  Review of systems:  A 12 point system review is done and aside from what is stated above the rest of the review of systems is negative  Family history and social history are reviewed  Medications list is reviewed  Vitals: Blood pressure 120/60, pulse 93, temperature (!) 97 °F (36 1 °C), height 5' 7" (1 702 m), weight 55 3 kg (122 lb), SpO2 99 %  ,     Physical Exam  Gen: Awake, alert, oriented x 3, no acute distress  Cachectic  HEENT: Mucous membranes moist, no oral lesions, no thrush  NECK: No accessory muscle use, JVP not elevated  Cardiac: Regular, single S1, single S2, no murmurs, no rubs, no gallops  Lungs:  Decreased breath sounds  No wheezing or rhonchi  Abdomen: normoactive bowel sounds, soft nontender, nondistended, no rebound or rigidity, no guarding  Extremities: no cyanosis, no clubbing, no edema  Neuro:  Grossly nonfocal   Skin:  No rash  Lab Results   Component Value Date    WBC 2 69 (L) 07/07/2020    HGB 8 9 (L) 07/07/2020    HCT 30 1 (L) 07/07/2020     (H) 07/07/2020     07/07/2020     Lab Results   Component Value Date    SODIUM 141 07/07/2020    K 4 5 07/07/2020    CL 92 (L) 07/07/2020    CO2 >45 (HH) 07/07/2020    BUN 26 (H) 07/07/2020    CREATININE 0 58 (L) 07/07/2020    GLUC 124 07/07/2020    CALCIUM 9 4 07/07/2020     Chest x-ray from July 1st is reviewed on the [facility] system and it showed hyperinflated lungs  No acute pulmonary findings

## 2020-07-15 ENCOUNTER — TELEMEDICINE (OUTPATIENT)
Dept: INTERNAL MEDICINE CLINIC | Facility: CLINIC | Age: 70
End: 2020-07-15
Payer: MEDICARE

## 2020-07-15 VITALS
BODY MASS INDEX: 19.11 KG/M2 | TEMPERATURE: 97.6 F | DIASTOLIC BLOOD PRESSURE: 50 MMHG | RESPIRATION RATE: 22 BRPM | WEIGHT: 122 LBS | HEART RATE: 95 BPM | OXYGEN SATURATION: 99 % | SYSTOLIC BLOOD PRESSURE: 140 MMHG

## 2020-07-15 DIAGNOSIS — E43 SEVERE PROTEIN-CALORIE MALNUTRITION (HCC): ICD-10-CM

## 2020-07-15 DIAGNOSIS — J96.11 CHRONIC RESPIRATORY FAILURE WITH HYPOXIA (HCC): Primary | ICD-10-CM

## 2020-07-15 DIAGNOSIS — Z51.5 PALLIATIVE CARE PATIENT: ICD-10-CM

## 2020-07-15 DIAGNOSIS — F41.9 ANXIETY: Chronic | ICD-10-CM

## 2020-07-15 DIAGNOSIS — I10 ESSENTIAL HYPERTENSION: Chronic | ICD-10-CM

## 2020-07-15 PROCEDURE — 1111F DSCHRG MED/CURRENT MED MERGE: CPT | Performed by: INTERNAL MEDICINE

## 2020-07-15 PROCEDURE — 99495 TRANSJ CARE MGMT MOD F2F 14D: CPT | Performed by: INTERNAL MEDICINE

## 2020-07-15 NOTE — PATIENT INSTRUCTIONS
Problem List Items Addressed This Visit        Respiratory    Chronic respiratory failure with hypoxia - Primary     Follow-up Pulmonary, continue current regimen of BiPAP during the day, and 4 L of oxygen is night as per pulmonary            Cardiovascular and Mediastinum    Essential hypertension (Chronic)     Continue diltiazem, controlled            Other    Anxiety (Chronic)     Continue citalopram          Severe protein-calorie malnutrition      Malnutrition Findings:           BMI Findings: Body mass index is 19 11 kg/m²     Continue Ensure 1 to 2 times a day         Palliative care patient     Continue Pals program

## 2020-07-15 NOTE — ASSESSMENT & PLAN NOTE
Continue Pals program
Continue citalopram 
Continue diltiazem, controlled
Follow-up Pulmonary, continue current regimen of BiPAP during the day, and 4 L of oxygen is night as per pulmonary
Malnutrition Findings:           BMI Findings: Body mass index is 19 11 kg/m²     Continue Ensure 1 to 2 times a day
No

## 2020-07-15 NOTE — PROGRESS NOTES
Assessment/Plan:        Problem List Items Addressed This Visit        Respiratory    Chronic respiratory failure with hypoxia - Primary     Follow-up Pulmonary, continue current regimen of BiPAP during the day, and 4 L of oxygen is night as per pulmonary            Cardiovascular and Mediastinum    Essential hypertension (Chronic)     Continue diltiazem, controlled            Other    Anxiety (Chronic)     Continue citalopram          Severe protein-calorie malnutrition      Malnutrition Findings:           BMI Findings: Body mass index is 19 11 kg/m²  Continue Ensure 1 to 2 times a day         Palliative care patient     Continue Pals program                  Reason for visit is TCM    Encounter provider Amanda Dixon MD       Provider located at [address] [place] [id number]      Recent Visits  No visits were found meeting these conditions  Showing recent visits within past 7 days and meeting all other requirements     Today's Visits  Date Type Provider Dept   07/15/20 Telemedicine [place] Pastor today's visits and meeting all other requirements     Future Appointments  No visits were found meeting these conditions  Showing future appointments within next 150 days and meeting all other requirements        After connecting through [employer], the patient was identified by name and date of birth  Papaklaudia Cedeno was informed that this is a telemedicine visit and that the visit is being conducted through [employer]  My office door was closed  No one else was in the room  He acknowledged consent and understanding of privacy and security of the video platform  The patient has agreed to participate and understands they can discontinue the visit at any time  Patient is aware this is a billable service  BMI Counseling: Body mass index is 19 11 kg/m²   The BMI is below normal  Patient advised to gain weight  Subjective:     Patient ID: Nilda Stover is a 65 y o  male  I reviewed patient's hospitalization for COPD exacerbation, he was treated with IV steroids, and respiratory support, and seen by Pulmonary  He was discharged on BiPAP during the day recommendations  Since home, breathing has been stable, he has PALS coming once a month, and they are there with him during this appt  Patient is doing bipap during the day, and is on 4 liters oxygen at night  Pt on Ensure once or twice a day  Patient getting around without an assistive device currently, but obviously fatigues easily  Review of Systems   Constitutional: Positive for fatigue  Negative for chills and fever  HENT: Negative for congestion, nosebleeds, postnasal drip, sore throat and trouble swallowing  Eyes: Negative for pain  Respiratory: Positive for shortness of breath  Negative for cough, chest tightness and wheezing  Cardiovascular: Negative for chest pain, palpitations and leg swelling  Gastrointestinal: Negative for abdominal pain, constipation, diarrhea, nausea and vomiting  Endocrine: Negative for polydipsia and polyuria  Genitourinary: Negative for dysuria, flank pain and hematuria  Musculoskeletal: Positive for arthralgias (left hip)  Skin: Negative for rash  Neurological: Positive for light-headedness (if he stands up to quickly)  Negative for dizziness, tremors and headaches  Hematological: Does not bruise/bleed easily  Psychiatric/Behavioral: Negative for confusion and dysphoric mood  The patient is not nervous/anxious  Objective:    Vitals:    07/15/20 0959   BP: 140/50   Pulse: 95   Resp: 22   Temp: 97 6 °F (36 4 °C)   SpO2: 99%   Weight: 55 3 kg (122 lb)       Physical Exam   Constitutional: He is oriented to person, place, and time  He appears well-developed and well-nourished  No distress  HENT:   Head: Normocephalic and atraumatic     Right Ear: External ear normal    Left Ear: External ear normal    Nose: Nose normal    Mouth/Throat: Oropharynx is clear and moist    Eyes: Conjunctivae and EOM are normal  Right eye exhibits no discharge  Left eye exhibits no discharge  No scleral icterus  Neck: Normal range of motion  Neck supple  No tracheal deviation present  Pulmonary/Chest: Effort normal  No respiratory distress  Decreased breath sounds as per visiting nurse   Abdominal: Soft  There is no tenderness  Musculoskeletal: Normal range of motion  He exhibits no edema  Neurological: He is alert and oriented to person, place, and time  No cranial nerve deficit  Coordination normal    Skin: He is not diaphoretic  No erythema  No pallor  Psychiatric: He has a normal mood and affect  His behavior is normal  Judgment and thought content normal    Vitals reviewed  Transitional Care Management Review:  Avani Menchaca is a 65 y o  male here for TCM follow up  During the TCM phone call patient stated:    TCM Call (since 6/14/2020)     Date and time call was made  7/9/2020  8:49 AM    Hospital care reviewed  Records reviewed    Patient was hospitialized at  [facility]    Date of Admission  07/01/20    Date of discharge  07/08/20    Diagnosis  COPD    Disposition  Home    Current Symptoms  None      TCM Call (since 6/14/2020)     Post hospital issues  None    Scheduled for follow up?   Yes    Patients specialists  Pulmonlolgist    Pulmonologist name  Sukumarelyse Brizuela    Did you obtain your prescribed medications  Yes    Do you need help managing your prescriptions or medications  No    I have advised the patient to call PCP with any new or worsening symptoms  MR Paul    Are you recieving any outpatient services  No    Are you recieving home care services  Yes    Types of home care services  Nurse visit    Are you using any community resources  No    Have you fallen in the last 12 months  Yes    How many times  1    Interperter language line needed  No Counseling  Family          I spent 30 minutes with the patient during this visit      Arabella Morotn MD

## 2020-07-16 ENCOUNTER — PATIENT OUTREACH (OUTPATIENT)
Dept: INTERNAL MEDICINE CLINIC | Facility: CLINIC | Age: 70
End: 2020-07-16

## 2020-07-16 NOTE — PROGRESS NOTES
Spoke with patient He states he is doing okay  Gets short of breath easily  Using oxygen at 4 liters  Has follow ups with pulmonary and pcp  To follow up in three months with pulmonary  Back to his baseline with his COPD  He would like me to call back in a month

## 2020-07-27 ENCOUNTER — TELEPHONE (OUTPATIENT)
Dept: PULMONOLOGY | Facility: CLINIC | Age: 70
End: 2020-07-27

## 2020-07-27 NOTE — TELEPHONE ENCOUNTER
Dhaliwal wife called  He is having trouble using the bipap he is taking the mask off and then his oxygen comes off with it  She is concerned that this is  A safety issue , he wants to discontinue  The bipap

## 2020-07-27 NOTE — TELEPHONE ENCOUNTER
[address] is there any way you can call this patient and determine if he needs a new mask size, or different apparatus    -  please let me know any orders I can place

## 2020-09-02 ENCOUNTER — PATIENT OUTREACH (OUTPATIENT)
Dept: INTERNAL MEDICINE CLINIC | Facility: CLINIC | Age: 70
End: 2020-09-02

## 2020-09-02 NOTE — PROGRESS NOTES
Spoke with Courtney Gomez he is doing good  Happy with how his maintaining his health   [facility] was there today  Pulse ox in low 90's he said  No questions or concerns  Uses nebulizer three times a day  No mucus production right now   I will check back in one month

## 2020-10-02 ENCOUNTER — PATIENT OUTREACH (OUTPATIENT)
Dept: INTERNAL MEDICINE CLINIC | Facility: CLINIC | Age: 70
End: 2020-10-02

## 2020-12-04 ENCOUNTER — PATIENT OUTREACH (OUTPATIENT)
Dept: INTERNAL MEDICINE CLINIC | Facility: CLINIC | Age: 70
End: 2020-12-04

## 2021-06-23 NOTE — ASSESSMENT & PLAN NOTE
Malnutrition Findings:   Malnutrition type: Chronic illness(in the context of severe COPD with exacerbation)  Degree of Malnutrition: Other severe protein calorie malnutrition    BMI Findings: Body mass index is 20 68 kg/m²  General

## 2024-08-30 NOTE — PROGRESS NOTES
Left voicemail, I will get authorization from Dr. Gayle, once I have that, I can proceed with obtaining insurance authorization and making appointments.    [address] Internal Medicine  Progress Note - Jimbo Shah 1950, 68 y o  male MRN: [id number]    [facility] Encounter: [id number]    Primary Care Provider: Yoanna Ruth MD   Date and time admitted to hospital: 12/26/2019  1:03 PM      * COPD with exacerbation ([facility] [address] )  Assessment & Plan  · Acute on chronic hypoxic respiratory failure in the setting of a severe COPD exacerbation  · He has underlying end-stage COPD and was not amenable to lung transplantation considerations  His last FEV1 was 12%  · Last month he was taken off of Breo and Spiriva concurrently is on albuterol as needed, budesonide and formoterol  · He has since quit smoking in 2012 however his lung disease remains progressively worsening  · Pulmonary following and appreciate input   · Transitioned to PO prednisone from IV solumedrol, day 2/5   · Azithromycin 500 mg daily x3 days - completed   · Flu/RSV negative   · Scheduled nebs  · No evidence of focal consolidation on x-ray and simply extensive emphysematous and chronic scarring changes    Macrocytic anemia  Assessment & Plan  · The patient has chronic anemia, Hgb around 10-11 at baseline  · No carolyn/overt evidence of blood loss is noted  Pt denies hematuria, hematemesis, no known bloody stool - reports he had BM last night this was not collected   · Will send stool for occult blood testing - needs collecting   · In the interim continue ferrous sulfate and folic acid supplementation as his anemic status is likely contributory factor to his generalized fatigue and worsening dyspnea  · Iron panel reviewed and wnl  · Hgb 7 8 12/28 and pt with symptomatic anemia - fatigue, malaise, sob  Given 1 unit PRBC and Hgb this AM 9 1  · Continue Protonix for GI prophylaxis    If continues to trend down, would consider GI evaluation given recurrent steroid use/anemia   · CBC in AM     At risk for venous thromboembolism (VTE)  Assessment & Plan  DVT prophylaxis with LMWH ordered daily     Leukopenia  Assessment & Plan  · The patient has been leukopenic in the past and seemingly related to his acute episodes of illness, correlating with a SIRS response  · Thus far there is no overt evidence of an active infection present warranting the use of broad-spectrum antibiotics  · Monitor trend daily  Gastroesophageal reflux disease without esophagitis  Assessment & Plan  · Start pantoprazole 40 mg daily especially in the setting of high dose steroid use for ulcer prophylaxis  Paroxysmal atrial tachycardia (HCC)  Assessment & Plan  · Seemingly in the setting of chronic lung disease  · We will continue diltiazem 180 mg daily  VTE Pharmacologic Prophylaxis:   Pharmacologic: Enoxaparin (Lovenox)  Mechanical VTE Prophylaxis in Place: Yes    Patient Centered Rounds: I have performed bedside rounds with nursing staff today  Discussions with Specialists or Other Care Team Provider: RN    Education and Discussions with Family / Patient: patient, wife over phone  Time Spent for Care: 20 minutes  More than 50% of total time spent on counseling and coordination of care as described above  Current Length of Stay: 3 day(s)    Current Patient Status: Inpatient   Certification Statement: The patient will continue to require additional inpatient hospital stay due to pending safe discharge plan, monitor Hgb    Discharge Plan: 24 hrs if Hgb stable w/ home VNA     Code Status: Level 2 - DNAR: but accepts endotracheal intubation      Subjective:   Patient reports his breathing feels back to baseline  He is still very fatigued/tired  Ambulated to the bathroom, needed some assistance  His legs feel weak  Objective:     Vitals:   Temp (24hrs), Av 1 °F (36 7 °C), Min:97 4 °F (36 3 °C), Max:98 6 °F (37 °C)    Temp:  [97 4 °F (36 3 °C)-98 6 °F (37 °C)] 98 6 °F (37 °C)  HR:  [74-98] 76  Resp:  [18-20] 20  BP: (108-116)/(54-83) 116/83  SpO2:  [96 %-100 %] 100 %  Body mass index is 20 68 kg/m²  Input and Output Summary (last 24 hours): Intake/Output Summary (Last 24 hours) at 12/29/2019 1216  Last data filed at 12/29/2019 1156  Gross per 24 hour   Intake 1894 61 ml   Output 875 ml   Net 1019 61 ml       Physical Exam:     Physical Exam   Constitutional: He is oriented to person, place, and time  No distress  HENT:   Head: Normocephalic and atraumatic  Eyes: EOM are normal    Neck: Normal range of motion  Neck supple  Cardiovascular: Normal rate and regular rhythm  Pulmonary/Chest: Effort normal    Diminished b/l    Abdominal: Soft  Bowel sounds are normal    Musculoskeletal: Normal range of motion  Neurological: He is alert and oriented to person, place, and time  No cranial nerve deficit  Skin: Skin is warm  He is not diaphoretic  Psychiatric: He has a normal mood and affect  His behavior is normal    Vitals reviewed  Additional Data:     Labs:    Results from last 7 days   Lab Units 12/29/19  0447  12/27/19  0501   WBC Thousand/uL 4 03*   < > 2 08*   HEMOGLOBIN g/dL 9 1*   < > 8 2*   HEMATOCRIT % 30 8*   < > 27 1*   PLATELETS Thousands/uL 268   < > 276   BANDS PCT %  --   --  3   NEUTROS PCT % 81*  --   --    LYMPHS PCT % 12*  --   --    LYMPHO PCT %  --   --  7*   MONOS PCT % 6  --   --    MONO PCT %  --   --  2*   EOS PCT % 0  --  0    < > = values in this interval not displayed  Results from last 7 days   Lab Units 12/28/19  0618  12/26/19  1337   SODIUM mmol/L 142   < > 141   POTASSIUM mmol/L 4 9   < > 4 7   CHLORIDE mmol/L 98*   < > 97*   CO2 mmol/L >45*   < > 44*   BUN mg/dL 23   < > 18   CREATININE mg/dL 0 68   < > 0 60   ANION GAP mmol/L  --   --  0*   CALCIUM mg/dL 9 5   < > 9 8   ALBUMIN g/dL  --   --  3 0*   TOTAL BILIRUBIN mg/dL  --   --  0 16*   ALK PHOS U/L  --   --  78   ALT U/L  --   --  15   AST U/L  --   --  15   GLUCOSE RANDOM mg/dL 192*   < > 143*    < > = values in this interval not displayed                             * I Have Reviewed All Lab Data Listed Above  * Additional Pertinent Lab Tests Reviewed: All Labs Within Last 24 Hours Reviewed    Imaging:    Imaging Reports Reviewed Today Include: none  Imaging Personally Reviewed by Myself Includes:  none    Recent Cultures (last 7 days):           Last 24 Hours Medication List:     Current Facility-Administered Medications:  acetaminophen 650 mg Oral Q6H PRN Ann Cerna MD   albuterol 2 5 mg Nebulization Q4H PRN Dilshad Han MD   budesonide 0 5 mg Nebulization Q12H Ann Cerna MD   calcium carbonate 1,000 mg Oral Daily PRN Ann Cerna MD   citalopram 20 mg Oral Daily Ann Cerna MD   diltiazem 180 mg Oral Daily Ann Cerna MD   docusate sodium 100 mg Oral BID Bita Ramos PA-C   enoxaparin 40 mg Subcutaneous Daily Ann Cerna MD   ferrous sulfate 325 mg Oral Daily Ann Cerna MD   folic acid 0,509 mcg Oral Daily Ann Cerna MD   furosemide 20 mg Oral Daily Ann Cerna MD   ipratropium 0 5 mg Nebulization TID Dilshad Han MD   levalbuterol 1 25 mg Nebulization TID Dilshad Han MD   magnesium hydroxide 30 mL Oral Daily PRN Ann Cerna MD   melatonin 3 mg Oral HS PRN Ann Cerna MD   pantoprazole 40 mg Oral Early Morning Ann Cerna MD   polyethylene glycol 17 g Oral Daily PRN Bita Ramos PA-C   potassium chloride 20 mEq Oral Daily Ann Cerna MD   predniSONE 40 mg Oral Daily Enoch France MD   senna 1 tablet Oral HS Bita Ramos PA-C        Today, Patient Was Seen By: Vicente Richard PA-C    ** Please Note: Dictation voice to text software may have been used in the creation of this document   **

## 2025-06-17 NOTE — ASSESSMENT & PLAN NOTE
· Likely related to underlying COPD exacerbation  · Briefly required BiPAP in the ER, now off BiPAP    PLAN:  · no significant wheezing but diminished all over  · O2 supplementation as needed to maintain O2 sat between 88-90%, now on 4 L (on 4L at baseline)  · solumedrol changed to PO prednisone 40 mg today  · procalcitonin elevated but no clinical evidence of infection hence Pulm recs DCing antibiotic  · Nebs QID  · Cont breo  · Flu PCR & resp viral panel negative  · Follows up with Dr Jimbo Robledo outpatient  · Pt not feeling good today, will reassess tomorrow I personally spent